# Patient Record
Sex: FEMALE | Race: WHITE | NOT HISPANIC OR LATINO | URBAN - METROPOLITAN AREA
[De-identification: names, ages, dates, MRNs, and addresses within clinical notes are randomized per-mention and may not be internally consistent; named-entity substitution may affect disease eponyms.]

---

## 2017-06-22 ENCOUNTER — OUTPATIENT (OUTPATIENT)
Dept: OUTPATIENT SERVICES | Facility: HOSPITAL | Age: 27
LOS: 1 days | Discharge: HOME | End: 2017-06-22

## 2017-06-22 DIAGNOSIS — K21.9 GASTRO-ESOPHAGEAL REFLUX DISEASE WITHOUT ESOPHAGITIS: ICD-10-CM

## 2017-06-22 DIAGNOSIS — B19.20 UNSPECIFIED VIRAL HEPATITIS C WITHOUT HEPATIC COMA: ICD-10-CM

## 2017-06-22 DIAGNOSIS — F41.8 OTHER SPECIFIED ANXIETY DISORDERS: ICD-10-CM

## 2017-06-22 DIAGNOSIS — F12.20 CANNABIS DEPENDENCE, UNCOMPLICATED: ICD-10-CM

## 2017-06-22 DIAGNOSIS — F13.10 SEDATIVE, HYPNOTIC OR ANXIOLYTIC ABUSE, UNCOMPLICATED: ICD-10-CM

## 2017-06-22 DIAGNOSIS — F11.20 OPIOID DEPENDENCE, UNCOMPLICATED: ICD-10-CM

## 2017-06-22 DIAGNOSIS — J44.9 CHRONIC OBSTRUCTIVE PULMONARY DISEASE, UNSPECIFIED: ICD-10-CM

## 2017-06-22 DIAGNOSIS — F60.3 BORDERLINE PERSONALITY DISORDER: ICD-10-CM

## 2017-06-22 DIAGNOSIS — F17.200 NICOTINE DEPENDENCE, UNSPECIFIED, UNCOMPLICATED: ICD-10-CM

## 2017-06-28 DIAGNOSIS — Z00.8 ENCOUNTER FOR OTHER GENERAL EXAMINATION: ICD-10-CM

## 2017-08-03 ENCOUNTER — OUTPATIENT (OUTPATIENT)
Dept: OUTPATIENT SERVICES | Facility: HOSPITAL | Age: 27
LOS: 1 days | Discharge: HOME | End: 2017-08-03

## 2017-08-03 DIAGNOSIS — F11.20 OPIOID DEPENDENCE, UNCOMPLICATED: ICD-10-CM

## 2017-08-03 DIAGNOSIS — I49.9 CARDIAC ARRHYTHMIA, UNSPECIFIED: ICD-10-CM

## 2017-08-03 DIAGNOSIS — B19.20 UNSPECIFIED VIRAL HEPATITIS C WITHOUT HEPATIC COMA: ICD-10-CM

## 2017-08-03 DIAGNOSIS — F12.20 CANNABIS DEPENDENCE, UNCOMPLICATED: ICD-10-CM

## 2017-08-03 DIAGNOSIS — F13.10 SEDATIVE, HYPNOTIC OR ANXIOLYTIC ABUSE, UNCOMPLICATED: ICD-10-CM

## 2017-08-03 DIAGNOSIS — F60.3 BORDERLINE PERSONALITY DISORDER: ICD-10-CM

## 2017-08-03 DIAGNOSIS — F17.200 NICOTINE DEPENDENCE, UNSPECIFIED, UNCOMPLICATED: ICD-10-CM

## 2017-08-03 DIAGNOSIS — F41.8 OTHER SPECIFIED ANXIETY DISORDERS: ICD-10-CM

## 2017-08-03 DIAGNOSIS — J44.9 CHRONIC OBSTRUCTIVE PULMONARY DISEASE, UNSPECIFIED: ICD-10-CM

## 2017-08-03 DIAGNOSIS — K21.9 GASTRO-ESOPHAGEAL REFLUX DISEASE WITHOUT ESOPHAGITIS: ICD-10-CM

## 2018-07-26 ENCOUNTER — OUTPATIENT (OUTPATIENT)
Dept: OUTPATIENT SERVICES | Facility: HOSPITAL | Age: 28
LOS: 1 days | Discharge: HOME | End: 2018-07-26

## 2018-07-26 DIAGNOSIS — Z00.8 ENCOUNTER FOR OTHER GENERAL EXAMINATION: ICD-10-CM

## 2018-07-30 LAB
ALBUMIN SERPL ELPH-MCNC: 4.3 G/DL — SIGNIFICANT CHANGE UP (ref 3.5–5.2)
ALP SERPL-CCNC: 95 U/L — SIGNIFICANT CHANGE UP (ref 30–115)
ALT FLD-CCNC: 60 U/L — HIGH (ref 0–41)
ANION GAP SERPL CALC-SCNC: 11 MMOL/L — SIGNIFICANT CHANGE UP (ref 7–14)
APPEARANCE UR: ABNORMAL
AST SERPL-CCNC: 50 U/L — HIGH (ref 0–41)
BACTERIA # UR AUTO: ABNORMAL
BILIRUB DIRECT SERPL-MCNC: <0.2 MG/DL — SIGNIFICANT CHANGE UP (ref 0–0.2)
BILIRUB INDIRECT FLD-MCNC: >0.1 MG/DL — LOW (ref 0.2–1.2)
BILIRUB SERPL-MCNC: 0.3 MG/DL — SIGNIFICANT CHANGE UP (ref 0.2–1.2)
BILIRUB UR-MCNC: NEGATIVE — SIGNIFICANT CHANGE UP
BUN SERPL-MCNC: 7 MG/DL — LOW (ref 10–20)
CALCIUM SERPL-MCNC: 9.2 MG/DL — SIGNIFICANT CHANGE UP (ref 8.5–10.1)
CHLORIDE SERPL-SCNC: 103 MMOL/L — SIGNIFICANT CHANGE UP (ref 98–110)
CHOLEST SERPL-MCNC: 147 MG/DL — SIGNIFICANT CHANGE UP (ref 100–200)
CO2 SERPL-SCNC: 25 MMOL/L — SIGNIFICANT CHANGE UP (ref 17–32)
COLOR SPEC: YELLOW — SIGNIFICANT CHANGE UP
CREAT SERPL-MCNC: 0.8 MG/DL — SIGNIFICANT CHANGE UP (ref 0.7–1.5)
DIFF PNL FLD: ABNORMAL
EPI CELLS # UR: ABNORMAL /HPF
ESTIMATED AVERAGE GLUCOSE: 94 MG/DL — SIGNIFICANT CHANGE UP (ref 68–114)
GLUCOSE SERPL-MCNC: 81 MG/DL — SIGNIFICANT CHANGE UP (ref 70–99)
GLUCOSE UR QL: NEGATIVE MG/DL — SIGNIFICANT CHANGE UP
HBA1C BLD-MCNC: 4.9 % — SIGNIFICANT CHANGE UP (ref 4–5.6)
HCG UR QL: NEGATIVE — SIGNIFICANT CHANGE UP
HCT VFR BLD CALC: 42.6 % — SIGNIFICANT CHANGE UP (ref 37–47)
HDLC SERPL-MCNC: 59 MG/DL — SIGNIFICANT CHANGE UP (ref 40–125)
HGB BLD-MCNC: 14.5 G/DL — SIGNIFICANT CHANGE UP (ref 12–16)
KETONES UR-MCNC: NEGATIVE — SIGNIFICANT CHANGE UP
LEUKOCYTE ESTERASE UR-ACNC: SIGNIFICANT CHANGE UP
LIPID PNL WITH DIRECT LDL SERPL: 80 MG/DL — SIGNIFICANT CHANGE UP (ref 4–129)
MAGNESIUM SERPL-MCNC: 2.1 MG/DL — SIGNIFICANT CHANGE UP (ref 1.8–2.4)
MCHC RBC-ENTMCNC: 32.5 PG — HIGH (ref 27–31)
MCHC RBC-ENTMCNC: 34 G/DL — SIGNIFICANT CHANGE UP (ref 32–37)
MCV RBC AUTO: 95.5 FL — SIGNIFICANT CHANGE UP (ref 81–99)
NITRITE UR-MCNC: NEGATIVE — SIGNIFICANT CHANGE UP
NRBC # BLD: 0 /100 WBCS — SIGNIFICANT CHANGE UP (ref 0–0)
PH UR: 7 — SIGNIFICANT CHANGE UP (ref 5–8)
PLATELET # BLD AUTO: 303 K/UL — SIGNIFICANT CHANGE UP (ref 130–400)
POTASSIUM SERPL-MCNC: 5.1 MMOL/L — HIGH (ref 3.5–5)
POTASSIUM SERPL-SCNC: 5.1 MMOL/L — HIGH (ref 3.5–5)
PROT SERPL-MCNC: 7 G/DL — SIGNIFICANT CHANGE UP (ref 6–8)
PROT UR-MCNC: NEGATIVE MG/DL — SIGNIFICANT CHANGE UP
RBC # BLD: 4.46 M/UL — SIGNIFICANT CHANGE UP (ref 4.2–5.4)
RBC # FLD: 12.6 % — SIGNIFICANT CHANGE UP (ref 11.5–14.5)
RBC CASTS # UR COMP ASSIST: SIGNIFICANT CHANGE UP /HPF
SODIUM SERPL-SCNC: 139 MMOL/L — SIGNIFICANT CHANGE UP (ref 135–146)
SP GR SPEC: 1.01 — SIGNIFICANT CHANGE UP (ref 1.01–1.03)
TOTAL CHOLESTEROL/HDL RATIO MEASUREMENT: 2.5 RATIO — LOW (ref 4–5.5)
TRIGL SERPL-MCNC: 58 MG/DL — SIGNIFICANT CHANGE UP (ref 10–149)
UROBILINOGEN FLD QL: 0.2 MG/DL — SIGNIFICANT CHANGE UP (ref 0.2–0.2)
WBC # BLD: 5.5 K/UL — SIGNIFICANT CHANGE UP (ref 4.8–10.8)
WBC # FLD AUTO: 5.5 K/UL — SIGNIFICANT CHANGE UP (ref 4.8–10.8)
WBC UR QL: >50 /HPF

## 2018-07-31 ENCOUNTER — OUTPATIENT (OUTPATIENT)
Dept: OUTPATIENT SERVICES | Facility: HOSPITAL | Age: 28
LOS: 1 days | Discharge: HOME | End: 2018-07-31

## 2018-07-31 DIAGNOSIS — I49.9 CARDIAC ARRHYTHMIA, UNSPECIFIED: ICD-10-CM

## 2018-07-31 LAB
HAV IGM SER-ACNC: SIGNIFICANT CHANGE UP
HBV CORE IGM SER-ACNC: SIGNIFICANT CHANGE UP
HBV SURFACE AG SER-ACNC: SIGNIFICANT CHANGE UP
HCV AB S/CO SERPL IA: 14.03 S/CO — SIGNIFICANT CHANGE UP
HCV AB SERPL-IMP: REACTIVE
T PALLIDUM AB TITR SER: NEGATIVE — SIGNIFICANT CHANGE UP

## 2018-08-01 LAB
HCV RNA FLD QL NAA+PROBE: SIGNIFICANT CHANGE UP
HCV RNA SPEC QL PROBE+SIG AMP: DETECTED

## 2018-09-01 ENCOUNTER — EMERGENCY (EMERGENCY)
Facility: HOSPITAL | Age: 28
LOS: 0 days | Discharge: HOME | End: 2018-09-01
Attending: EMERGENCY MEDICINE | Admitting: EMERGENCY MEDICINE

## 2018-09-01 VITALS
RESPIRATION RATE: 20 BRPM | DIASTOLIC BLOOD PRESSURE: 86 MMHG | TEMPERATURE: 99 F | OXYGEN SATURATION: 98 % | SYSTOLIC BLOOD PRESSURE: 133 MMHG | HEART RATE: 65 BPM

## 2018-09-01 VITALS — WEIGHT: 89.95 LBS | HEIGHT: 62 IN

## 2018-09-01 DIAGNOSIS — F17.200 NICOTINE DEPENDENCE, UNSPECIFIED, UNCOMPLICATED: ICD-10-CM

## 2018-09-01 DIAGNOSIS — R11.2 NAUSEA WITH VOMITING, UNSPECIFIED: ICD-10-CM

## 2018-09-01 DIAGNOSIS — Z79.899 OTHER LONG TERM (CURRENT) DRUG THERAPY: ICD-10-CM

## 2018-09-01 DIAGNOSIS — Z90.49 ACQUIRED ABSENCE OF OTHER SPECIFIED PARTS OF DIGESTIVE TRACT: ICD-10-CM

## 2018-09-01 DIAGNOSIS — Z90.49 ACQUIRED ABSENCE OF OTHER SPECIFIED PARTS OF DIGESTIVE TRACT: Chronic | ICD-10-CM

## 2018-09-01 DIAGNOSIS — K59.00 CONSTIPATION, UNSPECIFIED: ICD-10-CM

## 2018-09-01 DIAGNOSIS — R10.84 GENERALIZED ABDOMINAL PAIN: ICD-10-CM

## 2018-09-01 DIAGNOSIS — R11.10 VOMITING, UNSPECIFIED: ICD-10-CM

## 2018-09-01 DIAGNOSIS — Z86.19 PERSONAL HISTORY OF OTHER INFECTIOUS AND PARASITIC DISEASES: ICD-10-CM

## 2018-09-01 DIAGNOSIS — F32.9 MAJOR DEPRESSIVE DISORDER, SINGLE EPISODE, UNSPECIFIED: ICD-10-CM

## 2018-09-01 RX ORDER — SODIUM CHLORIDE 9 MG/ML
2000 INJECTION, SOLUTION INTRAVENOUS ONCE
Qty: 0 | Refills: 0 | Status: COMPLETED | OUTPATIENT
Start: 2018-09-01 | End: 2018-09-01

## 2018-09-01 RX ORDER — ONDANSETRON 8 MG/1
4 TABLET, FILM COATED ORAL ONCE
Qty: 0 | Refills: 0 | Status: COMPLETED | OUTPATIENT
Start: 2018-09-01 | End: 2018-09-01

## 2018-09-01 RX ORDER — KETOROLAC TROMETHAMINE 30 MG/ML
30 SYRINGE (ML) INJECTION ONCE
Qty: 0 | Refills: 0 | Status: DISCONTINUED | OUTPATIENT
Start: 2018-09-01 | End: 2018-09-01

## 2018-09-01 RX ORDER — IOHEXOL 300 MG/ML
30 INJECTION, SOLUTION INTRAVENOUS ONCE
Qty: 0 | Refills: 0 | Status: COMPLETED | OUTPATIENT
Start: 2018-09-01 | End: 2018-09-01

## 2018-09-01 NOTE — ED PROVIDER NOTE - CARE PLAN
Assessment and plan of treatment:	a/p: abd pain, vomiting, constipation, will do labs, ua, CT a/p, ivf, pain control, antiemetics, upreg, re-eval.

## 2018-09-01 NOTE — ED PROVIDER NOTE - NS ED ROS FT
Review of Systems:  	•	CONSTITUTIONAL - No fever, No diaphoresis, No weight change  	•	SKIN - No rash  	•	HEMATOLOGIC - No abnormal bleeding or bruising  	•	EYES - No eye pain, No blurred vision  	•	ENT - No change in hearing, No sore throat, No neck pain, No rhinorrhea, No ear pain  	•	RESPIRATORY - No shortness of breath, No cough  	•	CARDIAC - No chest pain, No palpitations  	•	GI -+ abdominal pain, + nausea, + vomiting, No diarrhea, + constipation, No bright red blood per rectum or melena.                      •                 - No dysuria, frequency, hematuria.   	•	ENDO - No polydypsia, No polyuria, No heat/cold intolerance  	•	MUSCULOSKELETAL - No joint paint, No swelling, No back pain  	•	NEUROLOGIC - No numbness, No focal weakness, No headache, No dizziness

## 2018-09-01 NOTE — ED ADULT NURSE NOTE - OBJECTIVE STATEMENT
Patient presents to the ER stating she is having stomach pain and hasn't had anything to eat or drink in eighteen days

## 2018-09-01 NOTE — ED ADULT NURSE NOTE - NSIMPLEMENTINTERV_GEN_ALL_ED
Implemented All Fall with Harm Risk Interventions:  [place] to call system. Call bell, personal items and telephone within reach. Instruct patient to call for assistance. Room bathroom lighting operational. Non-slip footwear when patient is off stretcher. Physically safe environment: no spills, clutter or unnecessary equipment. Stretcher in lowest position, wheels locked, appropriate side rails in place. Provide visual cue, wrist band, yellow gown, etc. Monitor gait and stability. Monitor for mental status changes and reorient to person, place, and time. Review medications for side effects contributing to fall risk. Reinforce activity limits and safety measures with patient and family. Provide visual clues: red socks.

## 2018-09-01 NOTE — ED PROVIDER NOTE - PLAN OF CARE
a/p: abd pain, vomiting, constipation, will do labs, ua, CT a/p, ivf, pain control, antiemetics, upreg, re-eval.

## 2018-09-01 NOTE — ED PROVIDER NOTE - PHYSICAL EXAMINATION
VITAL SIGNS: AFebrile, vital signs stable  CONSTITUTIONAL: Well-developed; well-nourished; in no acute distress.  SKIN: Skin exam is warm and dry, no acute rash.  HEAD: Normocephalic; atraumatic.  EYES: Pupils equal round reactive to light, Extraocular movements intact; conjunctiva and sclera clear.  ENT: No nasal discharge; airway clear. Moist mucus membranes.  NECK: Supple; non tender. No rigidity  CARD: Regular rate and rhythm. Normal S1, S2; no murmurs, gallops, or rubs.  RESP: Lungs clear to auscultation bilaterally. No wheezes, rales or rhonchi.  ABD: Abdomen soft; mild diffuse ttp, no rebound or rigidity; non-distended;  no hepatosplenomegaly. No costovertebral angle tenderness.   EXT: Normal ROM. No clubbing, cyanosis or edema. No calf tenderness to palpation.  NEURO: Alert and oriented x 3. No focal deficits.  PSYCH: Cooperative, appropriate.

## 2018-09-17 ENCOUNTER — INPATIENT (INPATIENT)
Facility: HOSPITAL | Age: 28
LOS: 1 days | Discharge: HOME | End: 2018-09-19
Attending: INTERNAL MEDICINE | Admitting: INTERNAL MEDICINE

## 2018-09-17 VITALS
TEMPERATURE: 98 F | DIASTOLIC BLOOD PRESSURE: 52 MMHG | HEIGHT: 62 IN | WEIGHT: 115.08 LBS | SYSTOLIC BLOOD PRESSURE: 101 MMHG | HEART RATE: 75 BPM | RESPIRATION RATE: 16 BRPM

## 2018-09-17 DIAGNOSIS — F11.20 OPIOID DEPENDENCE, UNCOMPLICATED: ICD-10-CM

## 2018-09-17 DIAGNOSIS — Z90.49 ACQUIRED ABSENCE OF OTHER SPECIFIED PARTS OF DIGESTIVE TRACT: Chronic | ICD-10-CM

## 2018-09-17 DIAGNOSIS — F12.20 CANNABIS DEPENDENCE, UNCOMPLICATED: ICD-10-CM

## 2018-09-17 DIAGNOSIS — F17.200 NICOTINE DEPENDENCE, UNSPECIFIED, UNCOMPLICATED: ICD-10-CM

## 2018-09-17 DIAGNOSIS — B19.20 UNSPECIFIED VIRAL HEPATITIS C WITHOUT HEPATIC COMA: ICD-10-CM

## 2018-09-17 DIAGNOSIS — J44.9 CHRONIC OBSTRUCTIVE PULMONARY DISEASE, UNSPECIFIED: ICD-10-CM

## 2018-09-17 DIAGNOSIS — F13.10 SEDATIVE, HYPNOTIC OR ANXIOLYTIC ABUSE, UNCOMPLICATED: ICD-10-CM

## 2018-09-17 DIAGNOSIS — F13.20 SEDATIVE, HYPNOTIC OR ANXIOLYTIC DEPENDENCE, UNCOMPLICATED: ICD-10-CM

## 2018-09-17 DIAGNOSIS — F41.9 ANXIETY DISORDER, UNSPECIFIED: ICD-10-CM

## 2018-09-17 LAB
APPEARANCE UR: CLEAR — SIGNIFICANT CHANGE UP
BACTERIA # UR AUTO: ABNORMAL
BASOPHILS # BLD AUTO: 0.04 K/UL — SIGNIFICANT CHANGE UP (ref 0–0.2)
BASOPHILS NFR BLD AUTO: 0.7 % — SIGNIFICANT CHANGE UP (ref 0–1)
BILIRUB UR-MCNC: NEGATIVE — SIGNIFICANT CHANGE UP
COLOR SPEC: YELLOW — SIGNIFICANT CHANGE UP
DIFF PNL FLD: ABNORMAL
EOSINOPHIL # BLD AUTO: 0.09 K/UL — SIGNIFICANT CHANGE UP (ref 0–0.7)
EOSINOPHIL NFR BLD AUTO: 1.6 % — SIGNIFICANT CHANGE UP (ref 0–8)
EPI CELLS # UR: ABNORMAL /HPF
GLUCOSE UR QL: NEGATIVE MG/DL — SIGNIFICANT CHANGE UP
HCG UR QL: NEGATIVE — SIGNIFICANT CHANGE UP
HCT VFR BLD CALC: 39.9 % — SIGNIFICANT CHANGE UP (ref 37–47)
HGB BLD-MCNC: 13.2 G/DL — SIGNIFICANT CHANGE UP (ref 12–16)
IMM GRANULOCYTES NFR BLD AUTO: 0 % — LOW (ref 0.1–0.3)
KETONES UR-MCNC: NEGATIVE — SIGNIFICANT CHANGE UP
LEUKOCYTE ESTERASE UR-ACNC: NEGATIVE — SIGNIFICANT CHANGE UP
LYMPHOCYTES # BLD AUTO: 2.68 K/UL — SIGNIFICANT CHANGE UP (ref 1.2–3.4)
LYMPHOCYTES # BLD AUTO: 46.9 % — SIGNIFICANT CHANGE UP (ref 20.5–51.1)
MCHC RBC-ENTMCNC: 31.8 PG — HIGH (ref 27–31)
MCHC RBC-ENTMCNC: 33.1 G/DL — SIGNIFICANT CHANGE UP (ref 32–37)
MCV RBC AUTO: 96.1 FL — SIGNIFICANT CHANGE UP (ref 81–99)
MONOCYTES # BLD AUTO: 0.59 K/UL — SIGNIFICANT CHANGE UP (ref 0.1–0.6)
MONOCYTES NFR BLD AUTO: 10.3 % — HIGH (ref 1.7–9.3)
NEUTROPHILS # BLD AUTO: 2.31 K/UL — SIGNIFICANT CHANGE UP (ref 1.4–6.5)
NEUTROPHILS NFR BLD AUTO: 40.5 % — LOW (ref 42.2–75.2)
NITRITE UR-MCNC: NEGATIVE — SIGNIFICANT CHANGE UP
NRBC # BLD: 0 /100 WBCS — SIGNIFICANT CHANGE UP (ref 0–0)
PH UR: 6 — SIGNIFICANT CHANGE UP (ref 5–8)
PLATELET # BLD AUTO: 251 K/UL — SIGNIFICANT CHANGE UP (ref 130–400)
PROT UR-MCNC: NEGATIVE MG/DL — SIGNIFICANT CHANGE UP
RBC # BLD: 4.15 M/UL — LOW (ref 4.2–5.4)
RBC # FLD: 13.5 % — SIGNIFICANT CHANGE UP (ref 11.5–14.5)
RBC CASTS # UR COMP ASSIST: SIGNIFICANT CHANGE UP /HPF
SP GR SPEC: 1.02 — SIGNIFICANT CHANGE UP (ref 1.01–1.03)
UROBILINOGEN FLD QL: 0.2 MG/DL — SIGNIFICANT CHANGE UP (ref 0.2–0.2)
WBC # BLD: 5.71 K/UL — SIGNIFICANT CHANGE UP (ref 4.8–10.8)
WBC # FLD AUTO: 5.71 K/UL — SIGNIFICANT CHANGE UP (ref 4.8–10.8)

## 2018-09-17 RX ORDER — PSEUDOEPHEDRINE HCL 30 MG
60 TABLET ORAL EVERY 6 HOURS
Qty: 0 | Refills: 0 | Status: DISCONTINUED | OUTPATIENT
Start: 2018-09-17 | End: 2018-09-19

## 2018-09-17 RX ORDER — METHOCARBAMOL 500 MG/1
500 TABLET, FILM COATED ORAL EVERY 6 HOURS
Qty: 0 | Refills: 0 | Status: DISCONTINUED | OUTPATIENT
Start: 2018-09-17 | End: 2018-09-19

## 2018-09-17 RX ORDER — METHADONE HYDROCHLORIDE 40 MG/1
TABLET ORAL
Qty: 0 | Refills: 0 | Status: DISCONTINUED | OUTPATIENT
Start: 2018-09-17 | End: 2018-09-19

## 2018-09-17 RX ORDER — ESCITALOPRAM OXALATE 10 MG/1
20 TABLET, FILM COATED ORAL AT BEDTIME
Qty: 0 | Refills: 0 | Status: DISCONTINUED | OUTPATIENT
Start: 2018-09-17 | End: 2018-09-19

## 2018-09-17 RX ORDER — HYDROXYZINE HCL 10 MG
100 TABLET ORAL AT BEDTIME
Qty: 0 | Refills: 0 | Status: DISCONTINUED | OUTPATIENT
Start: 2018-09-17 | End: 2018-09-19

## 2018-09-17 RX ORDER — IBUPROFEN 200 MG
600 TABLET ORAL EVERY 6 HOURS
Qty: 0 | Refills: 0 | Status: DISCONTINUED | OUTPATIENT
Start: 2018-09-17 | End: 2018-09-19

## 2018-09-17 RX ORDER — ALBUTEROL 90 UG/1
2 AEROSOL, METERED ORAL EVERY 6 HOURS
Qty: 0 | Refills: 0 | Status: DISCONTINUED | OUTPATIENT
Start: 2018-09-17 | End: 2018-09-19

## 2018-09-17 RX ORDER — METHADONE HYDROCHLORIDE 40 MG/1
10 TABLET ORAL ONCE
Qty: 0 | Refills: 0 | Status: DISCONTINUED | OUTPATIENT
Start: 2018-09-17 | End: 2018-09-18

## 2018-09-17 RX ORDER — ESCITALOPRAM OXALATE 10 MG/1
20 TABLET, FILM COATED ORAL DAILY
Qty: 0 | Refills: 0 | Status: DISCONTINUED | OUTPATIENT
Start: 2018-09-17 | End: 2018-09-17

## 2018-09-17 RX ORDER — ESCITALOPRAM OXALATE 10 MG/1
40 TABLET, FILM COATED ORAL AT BEDTIME
Qty: 0 | Refills: 0 | Status: DISCONTINUED | OUTPATIENT
Start: 2018-09-17 | End: 2018-09-17

## 2018-09-17 RX ORDER — LAMOTRIGINE 25 MG/1
200 TABLET, ORALLY DISINTEGRATING ORAL AT BEDTIME
Qty: 0 | Refills: 0 | Status: DISCONTINUED | OUTPATIENT
Start: 2018-09-17 | End: 2018-09-19

## 2018-09-17 RX ORDER — MAGNESIUM HYDROXIDE 400 MG/1
30 TABLET, CHEWABLE ORAL ONCE
Qty: 0 | Refills: 0 | Status: DISCONTINUED | OUTPATIENT
Start: 2018-09-17 | End: 2018-09-19

## 2018-09-17 RX ORDER — METHADONE HYDROCHLORIDE 40 MG/1
5 TABLET ORAL EVERY 12 HOURS
Qty: 0 | Refills: 0 | Status: DISCONTINUED | OUTPATIENT
Start: 2018-09-18 | End: 2018-09-19

## 2018-09-17 RX ORDER — METHADONE HYDROCHLORIDE 40 MG/1
10 TABLET ORAL EVERY 12 HOURS
Qty: 0 | Refills: 0 | Status: DISCONTINUED | OUTPATIENT
Start: 2018-09-17 | End: 2018-09-17

## 2018-09-17 RX ORDER — HYDROXYZINE HCL 10 MG
50 TABLET ORAL EVERY 6 HOURS
Qty: 0 | Refills: 0 | Status: DISCONTINUED | OUTPATIENT
Start: 2018-09-17 | End: 2018-09-19

## 2018-09-17 RX ORDER — ACETAMINOPHEN 500 MG
650 TABLET ORAL EVERY 4 HOURS
Qty: 0 | Refills: 0 | Status: DISCONTINUED | OUTPATIENT
Start: 2018-09-17 | End: 2018-09-19

## 2018-09-17 RX ORDER — PHENOBARBITAL 60 MG
32.4 TABLET ORAL EVERY 4 HOURS
Qty: 0 | Refills: 0 | Status: DISCONTINUED | OUTPATIENT
Start: 2018-09-17 | End: 2018-09-19

## 2018-09-17 RX ORDER — INFLUENZA VIRUS VACCINE 15; 15; 15; 15 UG/.5ML; UG/.5ML; UG/.5ML; UG/.5ML
0.5 SUSPENSION INTRAMUSCULAR ONCE
Qty: 0 | Refills: 0 | Status: COMPLETED | OUTPATIENT
Start: 2018-09-17 | End: 2018-09-17

## 2018-09-17 RX ORDER — NICOTINE POLACRILEX 2 MG
1 GUM BUCCAL DAILY
Qty: 0 | Refills: 0 | Status: DISCONTINUED | OUTPATIENT
Start: 2018-09-17 | End: 2018-09-19

## 2018-09-17 RX ADMIN — ESCITALOPRAM OXALATE 20 MILLIGRAM(S): 10 TABLET, FILM COATED ORAL at 21:10

## 2018-09-17 RX ADMIN — LAMOTRIGINE 200 MILLIGRAM(S): 25 TABLET, ORALLY DISINTEGRATING ORAL at 21:11

## 2018-09-17 RX ADMIN — METHADONE HYDROCHLORIDE 10 MILLIGRAM(S): 40 TABLET ORAL at 21:10

## 2018-09-17 NOTE — H&P ADULT - NSHPPHYSICALEXAM_GEN_ALL_CORE
-  Vital Signs:      Temp: 98.5      Pulse: 70        RR:  12      BP:  82/58    Physical Exam:              Constitutional: +Anxious A&Ox3, W/N and W/D.  HEENT: NC/AT, PERRLA, EOM Intact, Nares normal, No Sinus tenderness.  Lips, mucosa and tongue normal; Neck supple, No adenopathy  Respiratory: CTAB, no rales, no rhonchi, + wheezes  Cardiovascular: +S1S2, No M/R/G  Gastrointestinal: +BS, soft, non-tender, not distended, No CVAT  Extremities: Atraumatic, no cyanosis, no edema, no calf tenderness,  Vascular: +dorsal pedis and radial pulses, no extremity cyanosis  Neurological: sensation intact, ROM equal B/L, CN II-XII intact, Gait: steady  Skin: no rashes, no lesions, normal turgor, No track marks  No Decubiti present  No IV lines present  Rectal/Breasts Exam: Deferred

## 2018-09-17 NOTE — H&P ADULT - HISTORY OF PRESENT ILLNESS
27y Female presents for detox with continuous Opioid use disorder & Benzodiazepine use disorder. Pt reports she was being discharged from [facility] 6 weeks ago and she was in 30mg of methadone by that time. Patient says she is now taking "the blue" oxycodone 30mg 4 tablets daily in the past 4 weeks. Denies h/o overdose. Patient also admits to getting xanax or klonopin from the street when she feeling of anxiety. Pt says she was getting valium from Dr Malhotra for about a year and last Rx dispensed in last month on Aug 13 for 2 weeks. Pt says she only takes it as needed. Denies h/o seizure or AVH.    Patient c/o feeling anxiety, insomnia, body aches and poor appetite. Patient A&Ox3, denies cp, sob, headache, dizziness, bleeding and dysuria. As pt reports: LMP: a week ago, Last normal papsmear in 2016.  Patient A&Ox3, denies cp, sob, headache, dizziness, bleeding and dysuria.  Declines AWM or MMTP. Patient says she may consider rehab and wants to try get clean without maintenance treatment.   Patient admits to abusing current substances as follows:    DRUG	AGE OF ONSET (Y.O)	ROUTE	FREQ	AMOUNT	LAST USE	LENGTH OF CURRENT USE	  Oxycodone	14	IN	Daily	120mg	9/17/18 30 mg	4 weeks	  Valium  Rx	13	PO	PRN	2 mg	3-4 weeks ago	1 year	  Klonopin	13	PO	2-3x /month	2mg	9/16/18 2mg	PRN	  Xanax	13	PO	Rarely	0.5mg	6 days ago	PRN	  THC	13	Smoking	Daily	3 joints	9/17/18		  Denies other drugs abuse							  Last Detox: ? years ago  Hx of Withdrawal Seizures: Denied  Psyhx: Borderline PD, Anxiety and depression  Denies any S/H Ideation or A/V Hallucination  Is patient currently receiving methadone from an MMTP: No  Pt no longer belonged to [facility], verified by RN: Crys, ext [phone]  Screening history	Last tested	Result	History of treatment	  HIV	2016	NEG	N/A	  Hepatitis C	2010	POS	Never	  Quantiferon GOLD TB test	2018	NEG	N/A	    Immunization	Not Received	Unknown	Received	Date Received 	  Influenza			v	2017	  Pneumococcus		v			  Tetanus			v	<10 years	  Others					  					  [employer]: Patient Name:	Mattie Mejia	YOB: 1990	  Address:	[address] [place] [address]	Sex:	Female	  Rx Written	Rx Dispensed	Drug	Quantity	Days Supply	Prescriber Name	  08/10/2018	08/13/2018	diazepam 2 mg tablet	14	14	Homero Malhotra MD	  07/17/2018	07/17/2018	diazepam 2 mg tablet	7	7	Homero Malhotra MD	  06/22/2018	06/24/2018	diazepam 2 mg tablet	7	7	Homero Malhotra MD	  05/03/2018	05/03/2018	diazepam 2 mg tablet	7	7	Homero Malhotra MD 27y Female presents for detox with continuous Opioid use disorder & Benzodiazepine use disorder. Pt reports she was being discharged from [facility] 6 weeks ago and she was in 30mg of methadone by that time. Patient says she is now taking "the blue" oxycodone 30mg 4 tablets daily in the past 4 weeks. Denies h/o overdose. Patient also admits to getting xanax or klonopin from the street when she feeling of anxiety. Pt says she was getting valium from Dr Malhotra for about a year and last Rx dispensed in last month on Aug 13 for 2 weeks. Pt says she only takes it as needed. Denies h/o seizure or AVH.    Patient c/o feeling anxiety, insomnia, body aches and poor appetite. Patient A&Ox3, denies cp, sob, headache, dizziness, bleeding and dysuria. As pt reports: LMP: a week ago, Last normal papsmear in 2016.  Declines AWM or MMTP. Patient says she may consider rehab and wants to try get clean without maintenance treatment.   Patient admits to abusing current substances as follows:    DRUG	AGE OF ONSET (Y.O)	ROUTE	FREQ	AMOUNT	LAST USE	LENGTH OF CURRENT USE	  Oxycodone	14	IN	Daily	120mg	9/17/18 30 mg	4 weeks	  Valium  Rx	13	PO	PRN	2 mg	3-4 weeks ago	1 year	  Klonopin	13	PO	2-3x /month	2mg	9/16/18 2mg	PRN	  Xanax	13	PO	Rarely	0.5mg	6 days ago	PRN	  THC	13	Smoking	Daily	3 joints	9/17/18		  Denies other drugs abuse							  Last Detox: ? years ago  Hx of Withdrawal Seizures: Denied  Psyhx: Borderline PD, Anxiety and depression  Denies any S/H Ideation or A/V Hallucination  Is patient currently receiving methadone from an MMTP: No  Pt no longer belonged to [facility], verified by RN: Crys, ext [phone]  Screening history	Last tested	Result	History of treatment	  HIV	2016	NEG	N/A	  Hepatitis C	2010	POS	Never	  Quantiferon GOLD TB test	2018	NEG	N/A	    Immunization	Not Received	Unknown	Received	Date Received 	  Influenza			v	2017	  Pneumococcus		v			  Tetanus			v	<10 years	  Others					  					  [employer]Stop: Patient Name:	Mattie Mejia	YOB: 1990	  Address:	[address] [place] [address] [address] [place] [address]	Sex:	Female	  Rx Written	Rx Dispensed	Drug	Quantity	Days Supply	Prescriber Name	  08/10/2018	08/13/2018	diazepam 2 mg tablet	14	14	Homero Malhotra MD	  07/17/2018	07/17/2018	diazepam 2 mg tablet	7	7	Homero Malhotra MD	  06/22/2018	06/24/2018	diazepam 2 mg tablet	7	7	Homero Malhotra MD	  05/03/2018	05/03/2018	diazepam 2 mg tablet	7	7	Homero Malhotra MD

## 2018-09-17 NOTE — H&P ADULT - PROBLEM SELECTOR PLAN 7
observation  Atarax 50mg PO Q6H PRN for anxiety  Atarax 100mg PO QHS PRN for insomnia  psych consult PRN  c/w home meds:  lamictal 200 mg po qhs  lexapro 40mg po qhs observation  Atarax 50mg PO Q6H PRN for anxiety  Atarax 100mg PO QHS PRN for insomnia  psych consult PRN  c/w home meds:  lamictal 200 mg po qhs  lexapro 20mg po qhs

## 2018-09-17 NOTE — H&P ADULT - ASSESSMENT
27y Female presents for detox with continuous Opioid use disorder & Benzodiazepine use disorder. Pt reports she was being discharged from [facility] 6 weeks ago and she was in 30mg of methadone by that time. Patient says she is now taking "the blue" oxycodone 30mg 4 tablets daily in the past 4 weeks. Denies h/o overdose. Patient also admits to getting xanax or klonopin from the street when she feeling of anxiety. Pt says she was getting valium from Dr Malhotra for about a year and last Rx dispensed in last month on Aug 13 for 2 weeks. Pt says she only takes it as needed. Denies h/o seizure or AVH.

## 2018-09-17 NOTE — H&P ADULT - PMH
Anxiety and depression    Borderline personality disorder    COPD (chronic obstructive pulmonary disease)    Hepatitis C    Nicotine dependence

## 2018-09-18 PROBLEM — F32.9 MAJOR DEPRESSIVE DISORDER, SINGLE EPISODE, UNSPECIFIED: Chronic | Status: INACTIVE | Noted: 2018-09-01 | Resolved: 2018-09-17

## 2018-09-18 LAB
AMPHET UR-MCNC: NEGATIVE — SIGNIFICANT CHANGE UP
BARBITURATES UR SCN-MCNC: NEGATIVE — SIGNIFICANT CHANGE UP
BENZODIAZ UR-MCNC: POSITIVE
COCAINE METAB.OTHER UR-MCNC: NEGATIVE — SIGNIFICANT CHANGE UP
ESTIMATED AVERAGE GLUCOSE: 85 MG/DL — SIGNIFICANT CHANGE UP (ref 68–114)
HAV IGM SER-ACNC: SIGNIFICANT CHANGE UP
HBA1C BLD-MCNC: 4.6 % — SIGNIFICANT CHANGE UP (ref 4–5.6)
HBV CORE IGM SER-ACNC: SIGNIFICANT CHANGE UP
HBV SURFACE AG SER-ACNC: SIGNIFICANT CHANGE UP
HCV AB S/CO SERPL IA: 15.68 S/CO — SIGNIFICANT CHANGE UP
HCV AB SERPL-IMP: REACTIVE
HIV 1+2 AB+HIV1 P24 AG SERPL QL IA: SIGNIFICANT CHANGE UP
METHADONE UR-MCNC: POSITIVE
OPIATES UR-MCNC: NEGATIVE — SIGNIFICANT CHANGE UP
PCP SPEC-MCNC: SIGNIFICANT CHANGE UP
PROPOXYPHENE QUALITATIVE URINE RESULT: NEGATIVE — SIGNIFICANT CHANGE UP
T PALLIDUM AB TITR SER: NEGATIVE — SIGNIFICANT CHANGE UP

## 2018-09-18 RX ADMIN — Medication 32.4 MILLIGRAM(S): at 16:54

## 2018-09-18 RX ADMIN — Medication 650 MILLIGRAM(S): at 10:00

## 2018-09-18 RX ADMIN — Medication 1 TABLET(S): at 09:37

## 2018-09-18 RX ADMIN — Medication 32.4 MILLIGRAM(S): at 09:37

## 2018-09-18 RX ADMIN — ESCITALOPRAM OXALATE 20 MILLIGRAM(S): 10 TABLET, FILM COATED ORAL at 21:31

## 2018-09-18 RX ADMIN — Medication 100 MILLIGRAM(S): at 21:33

## 2018-09-18 RX ADMIN — Medication 650 MILLIGRAM(S): at 10:56

## 2018-09-18 RX ADMIN — LAMOTRIGINE 200 MILLIGRAM(S): 25 TABLET, ORALLY DISINTEGRATING ORAL at 21:31

## 2018-09-18 RX ADMIN — Medication 1 PATCH: at 09:38

## 2018-09-18 RX ADMIN — METHADONE HYDROCHLORIDE 10 MILLIGRAM(S): 40 TABLET ORAL at 14:16

## 2018-09-18 RX ADMIN — Medication 650 MILLIGRAM(S): at 22:05

## 2018-09-19 VITALS
HEART RATE: 54 BPM | TEMPERATURE: 97 F | DIASTOLIC BLOOD PRESSURE: 50 MMHG | SYSTOLIC BLOOD PRESSURE: 94 MMHG | RESPIRATION RATE: 14 BRPM

## 2018-09-19 LAB
GAMMA INTERFERON BACKGROUND BLD IA-ACNC: 0.01 IU/ML — SIGNIFICANT CHANGE UP
M TB IFN-G BLD-IMP: NEGATIVE — SIGNIFICANT CHANGE UP
M TB IFN-G CD4+ BCKGRND COR BLD-ACNC: 0.07 IU/ML — SIGNIFICANT CHANGE UP
M TB IFN-G CD4+CD8+ BCKGRND COR BLD-ACNC: 0.03 IU/ML — SIGNIFICANT CHANGE UP
QUANT TB PLUS MITOGEN MINUS NIL: 8.46 IU/ML — SIGNIFICANT CHANGE UP

## 2018-09-19 RX ADMIN — Medication 1 PATCH: at 09:28

## 2018-09-19 RX ADMIN — Medication 1 TABLET(S): at 09:27

## 2018-09-19 RX ADMIN — Medication 1 PATCH: at 09:26

## 2018-09-19 NOTE — CHART NOTE - NSCHARTNOTEFT_GEN_A_CORE
Allergies:  No Known Drug Allergies  Seafood (Pruritus)      Diet: Regular    Activity: as tolerated    Follow up with    1. PMD in 2 weeks    2. Psych in 2 weeks    3.    Follow up for abnormal labs/tests    1.    Extra Instructions:      Flu Vaccine given  Yes_____         No______      Diagnosis:  Chemical Dependency   Maintain sobriety  refrain from all use      Patient Signature___________________________________________  Date_________________      Nurse Signature_____________________________________________Date_________________

## 2018-09-19 NOTE — CHART NOTE - NSCHARTNOTEFT_GEN_A_CORE
Subsequent Inpatient Encounter                                       Detox Unit    [facility]   27y   Female      Chief Complaint:    Follow up for Polysubstance  Dependency    HPI:     I reviewed previous notes. No Change, except if noted below.             Detail:_    ROS:   I reviewed with patient.  No changes from previous notes except if noted below.             Detail: _    PFSH I reviewed with patient. No changes from previous notes except if noted below.             Detail_    Medication reconciliation performed.    MEDICATIONS  (STANDING):  escitalopram 20 milliGRAM(s) Oral at bedtime  lamoTRIgine 200 milliGRAM(s) Oral at bedtime  methadone    Tablet   Oral   methadone    Tablet 5 milliGRAM(s) Oral every 12 hours  multivitamin 1 Tablet(s) Oral daily  nicotine - 21 mG/24Hr(s) Patch 1 Patch Transdermal daily      MEDICATIONS  (PRN):  acetaminophen   Tablet .. 650 milliGRAM(s) Oral every 4 hours PRN Temp greater or equal to 38C (100.4F), Mild Pain (1 - 3)  ALBUTerol    90 MICROgram(s) HFA Inhaler 2 Puff(s) Inhalation every 6 hours PRN Shortness of Breath and/or Wheezing  aluminum hydroxide/magnesium hydroxide/simethicone Suspension 30 milliLiter(s) Oral every 6 hours PRN Heartburn  bismuth subsalicylate Liquid 30 milliLiter(s) Oral every 6 hours PRN Diarrhea  cloNIDine 0.1 milliGRAM(s) Oral every 8 hours PRN Blood Pressure GREATER THAN 140/90 mmHG  cloNIDine 0.1 milliGRAM(s) Oral every 8 hours PRN opiate withdrawal  guaiFENesin/dextromethorphan  Syrup 5 milliLiter(s) Oral every 4 hours PRN Cough  hydrOXYzine hydrochloride 50 milliGRAM(s) Oral every 6 hours PRN Anxiety  hydrOXYzine hydrochloride 100 milliGRAM(s) Oral at bedtime PRN insomnia  ibuprofen  Tablet. 600 milliGRAM(s) Oral every 6 hours PRN Mild Pain (1 - 3)  magnesium hydroxide Suspension 30 milliLiter(s) Oral once PRN Constipation  methocarbamol 500 milliGRAM(s) Oral every 6 hours PRN muscle pain  PHENobarbital 32.4 milliGRAM(s) Oral every 4 hours PRN Withdrawal  pseudoephedrine 60 milliGRAM(s) Oral every 6 hours PRN Rhinitis  trimethobenzamide 300 milliGRAM(s) Oral every 6 hours PRN Nausea and/or Vomiting  trimethobenzamide Injectable 200 milliGRAM(s) IntraMuscular every 6 hours PRN Nausea and/or Vomiting      T(C): 36.2 (18 @ 06:00), Max: 37 (18 @ 00:09)  HR: 54 (18 @ 06:00) (54 - 87)  BP: 94/50 (18 @ 06:00) (94/50 - 120/67)  RR: 14 (18 @ 06:00) (14 - 16)  SpO2: --    PHYSICAL EXAM:      Constitutional: NAD, A&O x3    Eyes: PERRLA, no conjuctivitis    Neck: no lymphadenopathy    Respiratory: +air entry, no rales, no rhonchi, no wheezes    Cardiovascular: +S1 and S2, regular rate and rhythm    Gastrointestinal: +BS, soft, non-tender, not distended    Extremities:  no edema, no calf tenderness    Skin: no rashes, normal turgor                            13.2   5.71  )-----------( 251      ( 17 Sep 2018 19:31 )             39.9         Hemoglobin A1C, Whole Blood: 4.6 % (18 @ 19:31)  Treponema Pallidum Antibody Interpretation: Negative (18 @ 19:31)  Hepatitis B Surface Antigen: Nonreact (18 @ 19:31)  Hepatitis C Virus S/CO Ratio: 15.68 S/CO (18 @ 19:31)    Hepatitis C Virus Interpretation: Reactive (18 @ 19:31)      Urinalysis Basic - ( 17 Sep 2018 16:46 )    Color: Yellow / Appearance: Clear / S.025 / pH: x  Gluc: x / Ketone: Negative  / Bili: Negative / Urobili: 0.2 mg/dL   Blood: x / Protein: Negative mg/dL / Nitrite: Negative   Leuk Esterase: Negative / RBC: 1-2 /HPF / WBC x   Sq Epi: x / Non Sq Epi: Moderate /HPF / Bacteria: Many          Impression and Plan:    Primary Diagnosis:  Benzo/Opiate Dependency                                Medication: Pheno/Methadone Protocol    Secondary Diagnosis:                                                                                Medication:    Tertiary Diagnosis:                                                                                     Medication:      Continue Detox Protocols. Use of PRNS as needed for withdrawal and comfort.    Adjustments to protocols:    Labs/ Tests reviewed.    Tests ordered:     Likely Disposition: _X__Home       ___Rehab       ___Outpatient Program    ___Self Help     _____Other    Estimated Length of stay:__4__

## 2018-09-20 LAB
HCV RNA FLD QL NAA+PROBE: SIGNIFICANT CHANGE UP
HCV RNA SPEC QL PROBE+SIG AMP: DETECTED

## 2018-09-21 DIAGNOSIS — F13.10 SEDATIVE, HYPNOTIC OR ANXIOLYTIC ABUSE, UNCOMPLICATED: ICD-10-CM

## 2018-09-21 DIAGNOSIS — F12.20 CANNABIS DEPENDENCE, UNCOMPLICATED: ICD-10-CM

## 2018-09-21 DIAGNOSIS — F60.3 BORDERLINE PERSONALITY DISORDER: ICD-10-CM

## 2018-09-21 DIAGNOSIS — B19.20 UNSPECIFIED VIRAL HEPATITIS C WITHOUT HEPATIC COMA: ICD-10-CM

## 2018-09-21 DIAGNOSIS — Z91.013 ALLERGY TO SEAFOOD: ICD-10-CM

## 2018-09-21 DIAGNOSIS — F11.20 OPIOID DEPENDENCE, UNCOMPLICATED: ICD-10-CM

## 2018-09-21 DIAGNOSIS — F41.9 ANXIETY DISORDER, UNSPECIFIED: ICD-10-CM

## 2018-09-21 DIAGNOSIS — F17.200 NICOTINE DEPENDENCE, UNSPECIFIED, UNCOMPLICATED: ICD-10-CM

## 2018-09-21 DIAGNOSIS — F32.9 MAJOR DEPRESSIVE DISORDER, SINGLE EPISODE, UNSPECIFIED: ICD-10-CM

## 2018-09-21 DIAGNOSIS — J44.9 CHRONIC OBSTRUCTIVE PULMONARY DISEASE, UNSPECIFIED: ICD-10-CM

## 2018-11-14 PROBLEM — J44.9 CHRONIC OBSTRUCTIVE PULMONARY DISEASE, UNSPECIFIED: Chronic | Status: ACTIVE | Noted: 2018-09-17

## 2018-11-14 PROBLEM — F60.3 BORDERLINE PERSONALITY DISORDER: Chronic | Status: ACTIVE | Noted: 2018-09-17

## 2018-11-14 PROBLEM — F17.200 NICOTINE DEPENDENCE, UNSPECIFIED, UNCOMPLICATED: Chronic | Status: ACTIVE | Noted: 2018-09-17

## 2018-11-14 PROBLEM — F41.9 ANXIETY DISORDER, UNSPECIFIED: Chronic | Status: ACTIVE | Noted: 2018-09-17

## 2018-11-19 ENCOUNTER — OUTPATIENT (OUTPATIENT)
Dept: OUTPATIENT SERVICES | Facility: HOSPITAL | Age: 28
LOS: 1 days | Discharge: HOME | End: 2018-11-19

## 2018-11-19 DIAGNOSIS — Z90.49 ACQUIRED ABSENCE OF OTHER SPECIFIED PARTS OF DIGESTIVE TRACT: Chronic | ICD-10-CM

## 2018-11-27 LAB
ALBUMIN SERPL ELPH-MCNC: 4.6 G/DL — SIGNIFICANT CHANGE UP (ref 3.5–5.2)
ALP SERPL-CCNC: 92 U/L — SIGNIFICANT CHANGE UP (ref 30–115)
ALT FLD-CCNC: 53 U/L — HIGH (ref 0–41)
ANION GAP SERPL CALC-SCNC: 13 MMOL/L — SIGNIFICANT CHANGE UP (ref 7–14)
APTT BLD: 32.2 SEC — SIGNIFICANT CHANGE UP (ref 27–39.2)
AST SERPL-CCNC: 42 U/L — HIGH (ref 0–41)
BILIRUB SERPL-MCNC: 0.4 MG/DL — SIGNIFICANT CHANGE UP (ref 0.2–1.2)
BUN SERPL-MCNC: 6 MG/DL — LOW (ref 10–20)
CALCIUM SERPL-MCNC: 9.1 MG/DL — SIGNIFICANT CHANGE UP (ref 8.5–10.1)
CHLORIDE SERPL-SCNC: 107 MMOL/L — SIGNIFICANT CHANGE UP (ref 98–110)
CO2 SERPL-SCNC: 22 MMOL/L — SIGNIFICANT CHANGE UP (ref 17–32)
CREAT SERPL-MCNC: 0.8 MG/DL — SIGNIFICANT CHANGE UP (ref 0.7–1.5)
GGT SERPL-CCNC: 118 U/L — HIGH (ref 1–40)
GLUCOSE SERPL-MCNC: 87 MG/DL — SIGNIFICANT CHANGE UP (ref 70–99)
HCG UR QL: NEGATIVE — SIGNIFICANT CHANGE UP
INR BLD: 1.07 RATIO — SIGNIFICANT CHANGE UP (ref 0.65–1.3)
LDH SERPL L TO P-CCNC: 179 — SIGNIFICANT CHANGE UP (ref 50–242)
POTASSIUM SERPL-MCNC: 4.5 MMOL/L — SIGNIFICANT CHANGE UP (ref 3.5–5)
POTASSIUM SERPL-SCNC: 4.5 MMOL/L — SIGNIFICANT CHANGE UP (ref 3.5–5)
PROT SERPL-MCNC: 7.2 G/DL — SIGNIFICANT CHANGE UP (ref 6–8)
PROTHROM AB SERPL-ACNC: 11.6 SEC — SIGNIFICANT CHANGE UP (ref 9.95–12.87)
SODIUM SERPL-SCNC: 142 MMOL/L — SIGNIFICANT CHANGE UP (ref 135–146)

## 2018-11-28 LAB
C TRACH RRNA SPEC QL NAA+PROBE: SIGNIFICANT CHANGE UP
HAV IGG SER QL IA: SIGNIFICANT CHANGE UP
HBV SURFACE AB SER-ACNC: REACTIVE
HIV 1+2 AB+HIV1 P24 AG SERPL QL IA: SIGNIFICANT CHANGE UP
N GONORRHOEA RRNA SPEC QL NAA+PROBE: SIGNIFICANT CHANGE UP
SPECIMEN SOURCE: SIGNIFICANT CHANGE UP
T PALLIDUM AB TITR SER: NEGATIVE — SIGNIFICANT CHANGE UP

## 2018-11-29 LAB
HCV RNA SERPL NAA DL=5-ACNC: SIGNIFICANT CHANGE UP IU/ML
HCV RNA SPEC NAA+PROBE-LOG IU: 5.41 LOGIU/ML — SIGNIFICANT CHANGE UP

## 2018-12-01 LAB
A2 MACROGLOB SERPL-MCNC: 212 MG/DL — SIGNIFICANT CHANGE UP (ref 110–276)
ALT SERPL W P-5'-P-CCNC: 56 IU/L — HIGH (ref 0–40)
APO A-I SERPL-MCNC: 141 MG/DL — SIGNIFICANT CHANGE UP (ref 116–209)
BILIRUB SERPL-MCNC: 0.4 MG/DL — SIGNIFICANT CHANGE UP (ref 0–1.2)
COMMENT 2:: SIGNIFICANT CHANGE UP
FIBROSIS SCORE: 0.16 — SIGNIFICANT CHANGE UP (ref 0–0.21)
FIBROSIS SCORING:: SIGNIFICANT CHANGE UP
FIBROSIS STAGE: SIGNIFICANT CHANGE UP
GGT SERPL-CCNC: 115 IU/L — HIGH (ref 0–60)
HAPTOGLOB SERPL-MCNC: 168 MG/DL — SIGNIFICANT CHANGE UP (ref 34–200)
INTERPRETATIONS:: SIGNIFICANT CHANGE UP
LIMITATIONS:: SIGNIFICANT CHANGE UP
NECROINFLAMM ACTIVITY SCORING:: SIGNIFICANT CHANGE UP
NECROINFLAMMAT ACTIVITY GRADE: SIGNIFICANT CHANGE UP
NECROINFLAMMAT ACTIVITY SCORE: 0.28 — HIGH (ref 0–0.17)

## 2018-12-03 LAB
ALBUMIN SERPL ELPH-MCNC: 4.3 G/DL — SIGNIFICANT CHANGE UP (ref 3.5–5.2)
ALP SERPL-CCNC: 145 U/L — HIGH (ref 30–115)
ALT FLD-CCNC: 64 U/L — HIGH (ref 0–41)
ANION GAP SERPL CALC-SCNC: 13 MMOL/L — SIGNIFICANT CHANGE UP (ref 7–14)
APPEARANCE UR: CLEAR — SIGNIFICANT CHANGE UP
AST SERPL-CCNC: 50 U/L — HIGH (ref 0–41)
BACTERIA # UR AUTO: ABNORMAL
BILIRUB SERPL-MCNC: <0.2 MG/DL — SIGNIFICANT CHANGE UP (ref 0.2–1.2)
BILIRUB UR-MCNC: NEGATIVE — SIGNIFICANT CHANGE UP
BUN SERPL-MCNC: 8 MG/DL — LOW (ref 10–20)
CALCIUM SERPL-MCNC: 8.9 MG/DL — SIGNIFICANT CHANGE UP (ref 8.5–10.1)
CHLORIDE SERPL-SCNC: 102 MMOL/L — SIGNIFICANT CHANGE UP (ref 98–110)
CHOLEST SERPL-MCNC: 137 MG/DL — SIGNIFICANT CHANGE UP (ref 100–200)
CO2 SERPL-SCNC: 25 MMOL/L — SIGNIFICANT CHANGE UP (ref 17–32)
COLOR SPEC: YELLOW — SIGNIFICANT CHANGE UP
CREAT SERPL-MCNC: 0.7 MG/DL — SIGNIFICANT CHANGE UP (ref 0.7–1.5)
DIFF PNL FLD: ABNORMAL
EPI CELLS # UR: ABNORMAL /HPF
ETHANOL SERPL-MCNC: <10 MG/DL — HIGH
GLUCOSE SERPL-MCNC: 94 MG/DL — SIGNIFICANT CHANGE UP (ref 70–99)
GLUCOSE UR QL: NEGATIVE MG/DL — SIGNIFICANT CHANGE UP
HCG UR QL: NEGATIVE — SIGNIFICANT CHANGE UP
HCT VFR BLD CALC: 40.5 % — SIGNIFICANT CHANGE UP (ref 37–47)
HCV GENTYP BLD NAA+PROBE: ABNORMAL
HDLC SERPL-MCNC: 43 MG/DL — LOW
HGB BLD-MCNC: 13.5 G/DL — SIGNIFICANT CHANGE UP (ref 12–16)
KETONES UR-MCNC: NEGATIVE — SIGNIFICANT CHANGE UP
LEUKOCYTE ESTERASE UR-ACNC: NEGATIVE — SIGNIFICANT CHANGE UP
LIPID PNL WITH DIRECT LDL SERPL: 73 MG/DL — SIGNIFICANT CHANGE UP (ref 4–129)
MAGNESIUM SERPL-MCNC: 1.9 MG/DL — SIGNIFICANT CHANGE UP (ref 1.8–2.4)
MCHC RBC-ENTMCNC: 31.7 PG — HIGH (ref 27–31)
MCHC RBC-ENTMCNC: 33.3 G/DL — SIGNIFICANT CHANGE UP (ref 32–37)
MCV RBC AUTO: 95.1 FL — SIGNIFICANT CHANGE UP (ref 81–99)
NITRITE UR-MCNC: NEGATIVE — SIGNIFICANT CHANGE UP
NRBC # BLD: 0 /100 WBCS — SIGNIFICANT CHANGE UP (ref 0–0)
PH UR: 5.5 — SIGNIFICANT CHANGE UP (ref 5–8)
PLATELET # BLD AUTO: 303 K/UL — SIGNIFICANT CHANGE UP (ref 130–400)
POTASSIUM SERPL-MCNC: 4 MMOL/L — SIGNIFICANT CHANGE UP (ref 3.5–5)
POTASSIUM SERPL-SCNC: 4 MMOL/L — SIGNIFICANT CHANGE UP (ref 3.5–5)
PROT SERPL-MCNC: 6.8 G/DL — SIGNIFICANT CHANGE UP (ref 6–8)
PROT UR-MCNC: NEGATIVE MG/DL — SIGNIFICANT CHANGE UP
RBC # BLD: 4.26 M/UL — SIGNIFICANT CHANGE UP (ref 4.2–5.4)
RBC # FLD: 13 % — SIGNIFICANT CHANGE UP (ref 11.5–14.5)
SODIUM SERPL-SCNC: 140 MMOL/L — SIGNIFICANT CHANGE UP (ref 135–146)
SP GR SPEC: >=1.03 (ref 1.01–1.03)
TOTAL CHOLESTEROL/HDL RATIO MEASUREMENT: 3.2 RATIO — LOW (ref 4–5.5)
TRIGL SERPL-MCNC: 104 MG/DL — SIGNIFICANT CHANGE UP (ref 10–149)
UROBILINOGEN FLD QL: 0.2 MG/DL — SIGNIFICANT CHANGE UP (ref 0.2–0.2)
WBC # BLD: 6.24 K/UL — SIGNIFICANT CHANGE UP (ref 4.8–10.8)
WBC # FLD AUTO: 6.24 K/UL — SIGNIFICANT CHANGE UP (ref 4.8–10.8)

## 2018-12-04 DIAGNOSIS — B18.2 CHRONIC VIRAL HEPATITIS C: ICD-10-CM

## 2018-12-04 LAB
HBA1C BLD-MCNC: 4.6 % — SIGNIFICANT CHANGE UP (ref 4–5.6)
HIV 1+2 AB+HIV1 P24 AG SERPL QL IA: SIGNIFICANT CHANGE UP
T PALLIDUM AB TITR SER: NEGATIVE — SIGNIFICANT CHANGE UP

## 2018-12-05 ENCOUNTER — OUTPATIENT (OUTPATIENT)
Dept: OUTPATIENT SERVICES | Facility: HOSPITAL | Age: 28
LOS: 1 days | Discharge: HOME | End: 2018-12-05

## 2018-12-05 DIAGNOSIS — Z90.49 ACQUIRED ABSENCE OF OTHER SPECIFIED PARTS OF DIGESTIVE TRACT: Chronic | ICD-10-CM

## 2018-12-05 DIAGNOSIS — F11.20 OPIOID DEPENDENCE, UNCOMPLICATED: ICD-10-CM

## 2018-12-05 DIAGNOSIS — B18.2 CHRONIC VIRAL HEPATITIS C: ICD-10-CM

## 2018-12-05 DIAGNOSIS — Z23 ENCOUNTER FOR IMMUNIZATION: ICD-10-CM

## 2018-12-05 LAB
GAMMA INTERFERON BACKGROUND BLD IA-ACNC: 0.01 IU/ML — SIGNIFICANT CHANGE UP
M TB IFN-G BLD-IMP: NEGATIVE — SIGNIFICANT CHANGE UP
M TB IFN-G CD4+ BCKGRND COR BLD-ACNC: 0.03 IU/ML — SIGNIFICANT CHANGE UP
M TB IFN-G CD4+CD8+ BCKGRND COR BLD-ACNC: 0.01 IU/ML — SIGNIFICANT CHANGE UP
QUANT TB PLUS MITOGEN MINUS NIL: 2.39 IU/ML — SIGNIFICANT CHANGE UP

## 2018-12-12 ENCOUNTER — OUTPATIENT (OUTPATIENT)
Dept: OUTPATIENT SERVICES | Facility: HOSPITAL | Age: 28
LOS: 1 days | Discharge: HOME | End: 2018-12-12

## 2018-12-12 DIAGNOSIS — Z90.49 ACQUIRED ABSENCE OF OTHER SPECIFIED PARTS OF DIGESTIVE TRACT: Chronic | ICD-10-CM

## 2018-12-12 DIAGNOSIS — B18.2 CHRONIC VIRAL HEPATITIS C: ICD-10-CM

## 2018-12-12 DIAGNOSIS — F11.20 OPIOID DEPENDENCE, UNCOMPLICATED: ICD-10-CM

## 2019-03-01 ENCOUNTER — OUTPATIENT (OUTPATIENT)
Dept: OUTPATIENT SERVICES | Facility: HOSPITAL | Age: 29
LOS: 1 days | End: 2019-03-01
Payer: MEDICAID

## 2019-03-01 DIAGNOSIS — Z90.49 ACQUIRED ABSENCE OF OTHER SPECIFIED PARTS OF DIGESTIVE TRACT: Chronic | ICD-10-CM

## 2019-03-01 PROCEDURE — G9001: CPT

## 2019-03-21 ENCOUNTER — INPATIENT (INPATIENT)
Facility: HOSPITAL | Age: 29
LOS: 0 days | Discharge: LEFT BEFORE DISCHARGE | End: 2019-03-22
Attending: INTERNAL MEDICINE | Admitting: INTERNAL MEDICINE

## 2019-03-21 VITALS
WEIGHT: 104.94 LBS | RESPIRATION RATE: 14 BRPM | HEART RATE: 70 BPM | TEMPERATURE: 97 F | DIASTOLIC BLOOD PRESSURE: 57 MMHG | HEIGHT: 62 IN | SYSTOLIC BLOOD PRESSURE: 105 MMHG

## 2019-03-21 DIAGNOSIS — F33.2 MAJOR DEPRESSIVE DISORDER, RECURRENT SEVERE WITHOUT PSYCHOTIC FEATURES: ICD-10-CM

## 2019-03-21 DIAGNOSIS — F11.20 OPIOID DEPENDENCE, UNCOMPLICATED: ICD-10-CM

## 2019-03-21 DIAGNOSIS — F41.1 GENERALIZED ANXIETY DISORDER: ICD-10-CM

## 2019-03-21 DIAGNOSIS — Z90.49 ACQUIRED ABSENCE OF OTHER SPECIFIED PARTS OF DIGESTIVE TRACT: Chronic | ICD-10-CM

## 2019-03-21 DIAGNOSIS — F13.20 SEDATIVE, HYPNOTIC OR ANXIOLYTIC DEPENDENCE, UNCOMPLICATED: ICD-10-CM

## 2019-03-21 DIAGNOSIS — F17.200 NICOTINE DEPENDENCE, UNSPECIFIED, UNCOMPLICATED: ICD-10-CM

## 2019-03-21 LAB
ALBUMIN SERPL ELPH-MCNC: 4.3 G/DL — SIGNIFICANT CHANGE UP (ref 3.5–5.2)
ALP SERPL-CCNC: 91 U/L — SIGNIFICANT CHANGE UP (ref 30–115)
ALT FLD-CCNC: 20 U/L — SIGNIFICANT CHANGE UP (ref 0–41)
AMMONIA BLD-MCNC: 24 UMOL/L — SIGNIFICANT CHANGE UP (ref 11–55)
ANION GAP SERPL CALC-SCNC: 10 MMOL/L — SIGNIFICANT CHANGE UP (ref 7–14)
APPEARANCE UR: CLEAR — SIGNIFICANT CHANGE UP
AST SERPL-CCNC: 21 U/L — SIGNIFICANT CHANGE UP (ref 0–41)
BACTERIA # UR AUTO: ABNORMAL
BASOPHILS # BLD AUTO: 0.04 K/UL — SIGNIFICANT CHANGE UP (ref 0–0.2)
BASOPHILS NFR BLD AUTO: 0.6 % — SIGNIFICANT CHANGE UP (ref 0–1)
BILIRUB SERPL-MCNC: 0.4 MG/DL — SIGNIFICANT CHANGE UP (ref 0.2–1.2)
BILIRUB UR-MCNC: ABNORMAL
BUN SERPL-MCNC: 7 MG/DL — LOW (ref 10–20)
CALCIUM SERPL-MCNC: 9 MG/DL — SIGNIFICANT CHANGE UP (ref 8.5–10.1)
CHLORIDE SERPL-SCNC: 106 MMOL/L — SIGNIFICANT CHANGE UP (ref 98–110)
CO2 SERPL-SCNC: 25 MMOL/L — SIGNIFICANT CHANGE UP (ref 17–32)
COD CRY URNS QL: NEGATIVE — SIGNIFICANT CHANGE UP
COLOR SPEC: YELLOW — SIGNIFICANT CHANGE UP
CREAT SERPL-MCNC: 0.7 MG/DL — SIGNIFICANT CHANGE UP (ref 0.7–1.5)
DIFF PNL FLD: ABNORMAL
EOSINOPHIL # BLD AUTO: 0.01 K/UL — SIGNIFICANT CHANGE UP (ref 0–0.7)
EOSINOPHIL NFR BLD AUTO: 0.1 % — SIGNIFICANT CHANGE UP (ref 0–8)
EPI CELLS # UR: ABNORMAL /HPF
ETHANOL SERPL-MCNC: <10 MG/DL — HIGH
GGT SERPL-CCNC: 122 U/L — HIGH (ref 1–40)
GLUCOSE SERPL-MCNC: 110 MG/DL — HIGH (ref 70–99)
GLUCOSE UR QL: NEGATIVE MG/DL — SIGNIFICANT CHANGE UP
GRAN CASTS # UR COMP ASSIST: NEGATIVE — SIGNIFICANT CHANGE UP
HCG UR QL: NEGATIVE — SIGNIFICANT CHANGE UP
HCT VFR BLD CALC: 41.4 % — SIGNIFICANT CHANGE UP (ref 37–47)
HGB BLD-MCNC: 14 G/DL — SIGNIFICANT CHANGE UP (ref 12–16)
HYALINE CASTS # UR AUTO: NEGATIVE — SIGNIFICANT CHANGE UP
IMM GRANULOCYTES NFR BLD AUTO: 0.1 % — SIGNIFICANT CHANGE UP (ref 0.1–0.3)
KETONES UR-MCNC: ABNORMAL
LEUKOCYTE ESTERASE UR-ACNC: NEGATIVE — SIGNIFICANT CHANGE UP
LYMPHOCYTES # BLD AUTO: 2.06 K/UL — SIGNIFICANT CHANGE UP (ref 1.2–3.4)
LYMPHOCYTES # BLD AUTO: 29.8 % — SIGNIFICANT CHANGE UP (ref 20.5–51.1)
MAGNESIUM SERPL-MCNC: 2 MG/DL — SIGNIFICANT CHANGE UP (ref 1.8–2.4)
MCHC RBC-ENTMCNC: 32.7 PG — HIGH (ref 27–31)
MCHC RBC-ENTMCNC: 33.8 G/DL — SIGNIFICANT CHANGE UP (ref 32–37)
MCV RBC AUTO: 96.7 FL — SIGNIFICANT CHANGE UP (ref 81–99)
MONOCYTES # BLD AUTO: 0.54 K/UL — SIGNIFICANT CHANGE UP (ref 0.1–0.6)
MONOCYTES NFR BLD AUTO: 7.8 % — SIGNIFICANT CHANGE UP (ref 1.7–9.3)
NEUTROPHILS # BLD AUTO: 4.26 K/UL — SIGNIFICANT CHANGE UP (ref 1.4–6.5)
NEUTROPHILS NFR BLD AUTO: 61.6 % — SIGNIFICANT CHANGE UP (ref 42.2–75.2)
NITRITE UR-MCNC: NEGATIVE — SIGNIFICANT CHANGE UP
NRBC # BLD: 0 /100 WBCS — SIGNIFICANT CHANGE UP (ref 0–0)
PH UR: 6 — SIGNIFICANT CHANGE UP (ref 5–8)
PLATELET # BLD AUTO: 299 K/UL — SIGNIFICANT CHANGE UP (ref 130–400)
POTASSIUM SERPL-MCNC: 3.6 MMOL/L — SIGNIFICANT CHANGE UP (ref 3.5–5)
POTASSIUM SERPL-SCNC: 3.6 MMOL/L — SIGNIFICANT CHANGE UP (ref 3.5–5)
PROT SERPL-MCNC: 7.2 G/DL — SIGNIFICANT CHANGE UP (ref 6–8)
PROT UR-MCNC: 30 MG/DL
RBC # BLD: 4.28 M/UL — SIGNIFICANT CHANGE UP (ref 4.2–5.4)
RBC # FLD: 14.6 % — HIGH (ref 11.5–14.5)
RBC CASTS # UR COMP ASSIST: ABNORMAL /HPF
SODIUM SERPL-SCNC: 141 MMOL/L — SIGNIFICANT CHANGE UP (ref 135–146)
SP GR SPEC: 1.02 — SIGNIFICANT CHANGE UP (ref 1.01–1.03)
TRI-PHOS CRY UR QL COMP ASSIST: NEGATIVE — SIGNIFICANT CHANGE UP
URATE CRY FLD QL MICRO: NEGATIVE — SIGNIFICANT CHANGE UP
UROBILINOGEN FLD QL: 0.2 MG/DL — SIGNIFICANT CHANGE UP (ref 0.2–0.2)
WBC # BLD: 6.92 K/UL — SIGNIFICANT CHANGE UP (ref 4.8–10.8)
WBC # FLD AUTO: 6.92 K/UL — SIGNIFICANT CHANGE UP (ref 4.8–10.8)
WBC UR QL: SIGNIFICANT CHANGE UP /HPF

## 2019-03-21 RX ORDER — MULTIVIT-MIN/FERROUS GLUCONATE 9 MG/15 ML
1 LIQUID (ML) ORAL DAILY
Qty: 0 | Refills: 0 | Status: DISCONTINUED | OUTPATIENT
Start: 2019-03-21 | End: 2019-03-22

## 2019-03-21 RX ORDER — ESCITALOPRAM OXALATE 10 MG/1
20 TABLET, FILM COATED ORAL DAILY
Qty: 0 | Refills: 0 | Status: DISCONTINUED | OUTPATIENT
Start: 2019-03-21 | End: 2019-03-22

## 2019-03-21 RX ORDER — ESCITALOPRAM OXALATE 10 MG/1
40 TABLET, FILM COATED ORAL
Qty: 0 | Refills: 0 | COMMUNITY

## 2019-03-21 RX ORDER — IBUPROFEN 200 MG
400 TABLET ORAL EVERY 6 HOURS
Qty: 0 | Refills: 0 | Status: DISCONTINUED | OUTPATIENT
Start: 2019-03-21 | End: 2019-03-22

## 2019-03-21 RX ORDER — BUPRENORPHINE AND NALOXONE 2; .5 MG/1; MG/1
3 TABLET SUBLINGUAL ONCE
Qty: 0 | Refills: 0 | Status: DISCONTINUED | OUTPATIENT
Start: 2019-03-21 | End: 2019-03-21

## 2019-03-21 RX ORDER — NICOTINE POLACRILEX 2 MG
1 GUM BUCCAL DAILY
Qty: 0 | Refills: 0 | Status: DISCONTINUED | OUTPATIENT
Start: 2019-03-21 | End: 2019-03-22

## 2019-03-21 RX ORDER — BUPRENORPHINE AND NALOXONE 2; .5 MG/1; MG/1
1 TABLET SUBLINGUAL
Qty: 0 | Refills: 0 | Status: DISCONTINUED | OUTPATIENT
Start: 2019-03-21 | End: 2019-03-21

## 2019-03-21 RX ORDER — ACETAMINOPHEN 500 MG
650 TABLET ORAL EVERY 4 HOURS
Qty: 0 | Refills: 0 | Status: DISCONTINUED | OUTPATIENT
Start: 2019-03-21 | End: 2019-03-22

## 2019-03-21 RX ORDER — PHENOBARBITAL 60 MG
32.4 TABLET ORAL EVERY 6 HOURS
Qty: 0 | Refills: 0 | Status: COMPLETED | OUTPATIENT
Start: 2019-03-21 | End: 2019-03-23

## 2019-03-21 RX ORDER — BUPRENORPHINE AND NALOXONE 2; .5 MG/1; MG/1
1 TABLET SUBLINGUAL
Qty: 0 | Refills: 0 | Status: DISCONTINUED | OUTPATIENT
Start: 2019-03-22 | End: 2019-03-22

## 2019-03-21 RX ORDER — HYDROXYZINE HCL 10 MG
100 TABLET ORAL AT BEDTIME
Qty: 0 | Refills: 0 | Status: DISCONTINUED | OUTPATIENT
Start: 2019-03-21 | End: 2019-03-22

## 2019-03-21 RX ORDER — HYDROXYZINE HCL 10 MG
50 TABLET ORAL EVERY 6 HOURS
Qty: 0 | Refills: 0 | Status: DISCONTINUED | OUTPATIENT
Start: 2019-03-21 | End: 2019-03-22

## 2019-03-21 RX ORDER — PHENOBARBITAL 60 MG
32.4 TABLET ORAL EVERY 12 HOURS
Qty: 0 | Refills: 0 | Status: CANCELLED | OUTPATIENT
Start: 2019-03-24 | End: 2019-03-22

## 2019-03-21 RX ORDER — METHOCARBAMOL 500 MG/1
500 TABLET, FILM COATED ORAL EVERY 6 HOURS
Qty: 0 | Refills: 0 | Status: DISCONTINUED | OUTPATIENT
Start: 2019-03-21 | End: 2019-03-22

## 2019-03-21 RX ORDER — PHENOBARBITAL 60 MG
32.4 TABLET ORAL EVERY 6 HOURS
Qty: 0 | Refills: 0 | Status: DISCONTINUED | OUTPATIENT
Start: 2019-03-23 | End: 2019-03-22

## 2019-03-21 RX ORDER — LAMOTRIGINE 25 MG/1
200 TABLET, ORALLY DISINTEGRATING ORAL DAILY
Qty: 0 | Refills: 0 | Status: DISCONTINUED | OUTPATIENT
Start: 2019-03-21 | End: 2019-03-22

## 2019-03-21 RX ORDER — PHENOBARBITAL 60 MG
TABLET ORAL
Qty: 0 | Refills: 0 | Status: DISCONTINUED | OUTPATIENT
Start: 2019-03-21 | End: 2019-03-22

## 2019-03-21 RX ORDER — ALBUTEROL 90 UG/1
1 AEROSOL, METERED ORAL
Qty: 0 | Refills: 0 | Status: DISCONTINUED | OUTPATIENT
Start: 2019-03-21 | End: 2019-03-22

## 2019-03-21 RX ORDER — MAGNESIUM HYDROXIDE 400 MG/1
30 TABLET, CHEWABLE ORAL ONCE
Qty: 0 | Refills: 0 | Status: DISCONTINUED | OUTPATIENT
Start: 2019-03-21 | End: 2019-03-22

## 2019-03-21 RX ORDER — PSEUDOEPHEDRINE HCL 30 MG
60 TABLET ORAL EVERY 6 HOURS
Qty: 0 | Refills: 0 | Status: DISCONTINUED | OUTPATIENT
Start: 2019-03-21 | End: 2019-03-22

## 2019-03-21 RX ADMIN — LAMOTRIGINE 200 MILLIGRAM(S): 25 TABLET, ORALLY DISINTEGRATING ORAL at 16:33

## 2019-03-21 RX ADMIN — ESCITALOPRAM OXALATE 20 MILLIGRAM(S): 10 TABLET, FILM COATED ORAL at 16:33

## 2019-03-21 RX ADMIN — Medication 32.4 MILLIGRAM(S): at 17:08

## 2019-03-21 RX ADMIN — Medication 650 MILLIGRAM(S): at 21:08

## 2019-03-21 RX ADMIN — Medication 1 PATCH: at 18:18

## 2019-03-21 RX ADMIN — Medication 1 PATCH: at 16:32

## 2019-03-21 RX ADMIN — BUPRENORPHINE AND NALOXONE 3 TABLET(S): 2; .5 TABLET SUBLINGUAL at 16:32

## 2019-03-21 RX ADMIN — Medication 650 MILLIGRAM(S): at 16:33

## 2019-03-21 RX ADMIN — Medication 30 MILLILITER(S): at 20:38

## 2019-03-21 RX ADMIN — Medication 650 MILLIGRAM(S): at 20:38

## 2019-03-21 RX ADMIN — Medication 1 TABLET(S): at 16:33

## 2019-03-21 RX ADMIN — BUPRENORPHINE AND NALOXONE 1 TABLET(S): 2; .5 TABLET SUBLINGUAL at 20:38

## 2019-03-21 RX ADMIN — Medication 650 MILLIGRAM(S): at 17:40

## 2019-03-21 NOTE — H&P ADULT - NSICDXPASTMEDICALHX_GEN_ALL_CORE_FT
PAST MEDICAL HISTORY:  Anxiety and depression     Borderline personality disorder     COPD (chronic obstructive pulmonary disease)     Hepatitis C     Nicotine dependence

## 2019-03-21 NOTE — H&P ADULT - ATTENDING COMMENTS
Patient interviewed and examined.    Chart reviewed.    PA's H&P noted and modified, as appropriate.    Case discussed on team rounds    Following is my summary of the case.    Admitted for detox: from ____ED, __x_Intake, ____Med/Surg Floor    Alcohol____   Opioid__x___  Benzo_x__ Other_____    Substance amount, duration of use, last usage, and prior attempts at detox or rehabs, are outlined above in the H&P and discussed with patient.    Associated withdrawal symptoms presents.  Comorbid conditions noted. Chronic and Stable.    Past Medical Hx, Psych Hx, family Hx, Social Hx from H&P reviewed and NO changes.    Old medical record and medication Hx. Reviewed    Following items reviewed and addressed:  1. labs  2. EKG  3. Imaging from PACs module    Examination: no change from PA's exam.    Place on following protocol  _____Medically Managed  __X__Medically Supervised    [place]wa_____Librium taper____Ativan taper___Methadone taper___ Phenobarb taper_x___ Suboxone _x___MMTP____    Narcan Kit Offered    Psych Consult __X__N/A  ___Ordered    Physical Therapy  ___X n/a   ___  Ordered    Aftercare disposition to be addressed by counselors.    Estimated length of stay 3-5 days.

## 2019-03-21 NOTE — H&P ADULT - NSHPPHYSICALEXAM_GEN_ALL_CORE
PHYSICAL EXAM:  GENERAL: NAD, AXOX3  HEAD:  Atraumatic, Normocephalic  EYES: EOMI, PERRLA, conjunctiva and sclera clear  NECK: Supple, No JVD  ENMT: No tonsillar erythema, exudated or enlargement, moist mucous membranes  CHEST/LUNG: Clear to auscultation bilaterally; No rales, rhonchi or wheezing  HEART: S1,S2 Regular rate and rhythm  ABDOMEN: Soft, nontender, nondistended, Bowel sounds present and normoactive  EXTREMITIES:  2+ peripheral pulses bilaterally and symmetrically, no clubbing, cyanosis, or edema  PSYCH: AAOx3, normal mood and affect  NEUROLOGY: non-focal, muscle strength 5/5 all extremities  SKIN: No rashes or lesions

## 2019-03-21 NOTE — H&P ADULT - HISTORY OF PRESENT ILLNESS
28Y OLD FEMALE PMHX BELOW PRESENTS TO DETOX INTAKE REQUESTING TO BE PLACED ON SUBOXONE. PT ADMITS TO CURRENT AND CONTINOUS USE OF POLYSUBSTANCE.  HX OF OPIODS ABUSE - ROXYCODONE - 4-5PILLS/DAY X1MOS. LAST USE 3/20/19  HX OF MMTP - 30MG/DAY X5YRS. LAST USE - 8/2018  CURRENTLY ON SUBOXONE PROGRAM - 8MG/2X/DAY. LAST USE - 3/20/19  HX OF THC ABUSE. $30/DAY.  LAST USE - 3/20/19  HX OF XANAX ABUSE. 1MG/3X/WEEK X6YRS. LAST USE - 4DAYS AGO.  PT ADMITS TO GENERALISED BODYACHE, CHILLS AND DECREASE APPETITE, BUT DENIES  N/V, DIARRHEA, CHEST/ABDOMINAL PAIN, B/O, HA, FEVER, SOB OR SEIZURES

## 2019-03-22 VITALS
HEART RATE: 53 BPM | TEMPERATURE: 98 F | DIASTOLIC BLOOD PRESSURE: 53 MMHG | RESPIRATION RATE: 16 BRPM | SYSTOLIC BLOOD PRESSURE: 94 MMHG

## 2019-03-22 DIAGNOSIS — Z71.89 OTHER SPECIFIED COUNSELING: ICD-10-CM

## 2019-03-22 LAB
HAV IGM SER-ACNC: SIGNIFICANT CHANGE UP
HBV CORE IGM SER-ACNC: SIGNIFICANT CHANGE UP
HBV SURFACE AG SER-ACNC: SIGNIFICANT CHANGE UP
HCV AB S/CO SERPL IA: 13.89 S/CO — HIGH (ref 0–0.79)
HCV AB SERPL-IMP: REACTIVE
HIV 1+2 AB+HIV1 P24 AG SERPL QL IA: SIGNIFICANT CHANGE UP
T PALLIDUM AB TITR SER: NEGATIVE — SIGNIFICANT CHANGE UP

## 2019-03-22 RX ORDER — DIAZEPAM 5 MG
1 TABLET ORAL
Qty: 0 | Refills: 0 | COMMUNITY

## 2019-03-22 RX ORDER — LAMOTRIGINE 25 MG/1
1 TABLET, ORALLY DISINTEGRATING ORAL
Qty: 0 | Refills: 0 | DISCHARGE
Start: 2019-03-22

## 2019-03-22 RX ORDER — LAMOTRIGINE 25 MG/1
0 TABLET, ORALLY DISINTEGRATING ORAL
Qty: 0 | Refills: 0 | COMMUNITY

## 2019-03-22 RX ADMIN — Medication 32.4 MILLIGRAM(S): at 11:50

## 2019-03-22 RX ADMIN — BUPRENORPHINE AND NALOXONE 1 TABLET(S): 2; .5 TABLET SUBLINGUAL at 09:04

## 2019-03-22 RX ADMIN — Medication 1 PATCH: at 09:03

## 2019-03-22 RX ADMIN — ESCITALOPRAM OXALATE 20 MILLIGRAM(S): 10 TABLET, FILM COATED ORAL at 11:49

## 2019-03-22 RX ADMIN — Medication 1 PATCH: at 09:04

## 2019-03-22 RX ADMIN — Medication 50 MILLIGRAM(S): at 09:48

## 2019-03-22 RX ADMIN — LAMOTRIGINE 200 MILLIGRAM(S): 25 TABLET, ORALLY DISINTEGRATING ORAL at 11:49

## 2019-03-22 RX ADMIN — Medication 650 MILLIGRAM(S): at 11:50

## 2019-03-22 RX ADMIN — Medication 1 TABLET(S): at 09:04

## 2019-03-22 RX ADMIN — Medication 32.4 MILLIGRAM(S): at 00:00

## 2019-03-22 RX ADMIN — Medication 650 MILLIGRAM(S): at 09:47

## 2019-03-22 RX ADMIN — Medication 32.4 MILLIGRAM(S): at 06:04

## 2019-03-23 NOTE — CHART NOTE - NSCHARTNOTEFT_GEN_A_CORE
The patient was admitted to the inpt detox unit CDU, for   ETOH____ Opioid__x_  Benzox____Polysubstance _____ Dependency.    Pt was admitted from ED_x___, Intake____, Med/surg Floor_______.    Details are present in the preceding History & Physical section and follow up chart notes.  patient was evaluated on daily detox team  rounds.  Withdrawal symptoms and signs were reviewed on a daily basis, and the protocols were adjusted accordingly.    Labs and imaging results were reviewed and discussed with the patient.    All questions from the patient were addressed.  The patient was seen by the Chemical dependency counselors, and different options for after care were discussed.  The patient attended groups, meetings and 1:1 sessions with the counselors.  Narcane Kit was offered and instructions given prior to discharge.    Psychiatry consultation reviewed______, N/A___x___    Physical therapy evaluation reviewed_____, N/A__x__    Pt was given copies of labs and imaging reports, if applicable.    Prescriptions if needed, were sent through [facility] system to the pharmacy amnd are noted in the discharge instruction sheet.    After care was arranged by counselors and pt was discharged to:    Home___, Outpt. Program___, Rehab ___, Long term____ [facility] ____ IPP____ SNF____, AMA__x_, Admin Discharge____    Principal Diagnosis: Alcohol Dependency____ Opioid Dependency_x_ Benzo Dependency__x__ Polysubstance Dependency____

## 2019-03-25 LAB
HCV RNA FLD QL NAA+PROBE: SIGNIFICANT CHANGE UP
HCV RNA SPEC QL PROBE+SIG AMP: SIGNIFICANT CHANGE UP

## 2019-03-26 DIAGNOSIS — F60.3 BORDERLINE PERSONALITY DISORDER: ICD-10-CM

## 2019-03-26 DIAGNOSIS — F13.10 SEDATIVE, HYPNOTIC OR ANXIOLYTIC ABUSE, UNCOMPLICATED: ICD-10-CM

## 2019-03-26 DIAGNOSIS — F41.9 ANXIETY DISORDER, UNSPECIFIED: ICD-10-CM

## 2019-03-26 DIAGNOSIS — J44.9 CHRONIC OBSTRUCTIVE PULMONARY DISEASE, UNSPECIFIED: ICD-10-CM

## 2019-03-26 DIAGNOSIS — F11.20 OPIOID DEPENDENCE, UNCOMPLICATED: ICD-10-CM

## 2019-03-26 DIAGNOSIS — F17.210 NICOTINE DEPENDENCE, CIGARETTES, UNCOMPLICATED: ICD-10-CM

## 2019-03-26 DIAGNOSIS — B19.20 UNSPECIFIED VIRAL HEPATITIS C WITHOUT HEPATIC COMA: ICD-10-CM

## 2019-03-26 DIAGNOSIS — Z91.013 ALLERGY TO SEAFOOD: ICD-10-CM

## 2019-03-26 LAB
GAMMA INTERFERON BACKGROUND BLD IA-ACNC: 0.01 IU/ML — SIGNIFICANT CHANGE UP
M TB IFN-G BLD-IMP: NEGATIVE — SIGNIFICANT CHANGE UP
M TB IFN-G CD4+ BCKGRND COR BLD-ACNC: 0 IU/ML — SIGNIFICANT CHANGE UP
M TB IFN-G CD4+CD8+ BCKGRND COR BLD-ACNC: 0 IU/ML — SIGNIFICANT CHANGE UP
QUANT TB PLUS MITOGEN MINUS NIL: 8.68 IU/ML — SIGNIFICANT CHANGE UP

## 2019-05-21 NOTE — H&P ADULT - ASSESSMENT
Assumed care of pt at shift change. A/Ox4, discussed plan of care. Pt on room air. Pt right foot dressing in place. Not leaked through so far this shift. Zofran PRN given for nausea. Oxycodone, tylenol and morphine has been given for headache and rib pain so far per request. Pain is not under control so far.   All needs met at this time. Bed in lowest position, treaded socks on, personal belongings and call light within reach, instructed to call for any assistance, hourly rounding in place.   DX; POLYSUBSTANCE ABUSE  A/P; admit to CDU  PHENOBARBITAL PROTOCOL  SUBOXONE  labs/ecg/c-xray  MVI/THIAMINE  CONTINUE PREVIOUS MEDS  SMOKING CESSATION WITH NICOTINE PATCH  SW  consult - COPING SKILLS/REHAB  monitor vss  monitor pt

## 2019-10-25 ENCOUNTER — OUTPATIENT (OUTPATIENT)
Dept: OUTPATIENT SERVICES | Facility: HOSPITAL | Age: 29
LOS: 1 days | Discharge: HOME | End: 2019-10-25

## 2019-10-25 DIAGNOSIS — Z90.49 ACQUIRED ABSENCE OF OTHER SPECIFIED PARTS OF DIGESTIVE TRACT: Chronic | ICD-10-CM

## 2019-10-25 DIAGNOSIS — I49.9 CARDIAC ARRHYTHMIA, UNSPECIFIED: ICD-10-CM

## 2019-11-01 ENCOUNTER — OUTPATIENT (OUTPATIENT)
Dept: OUTPATIENT SERVICES | Facility: HOSPITAL | Age: 29
LOS: 1 days | Discharge: HOME | End: 2019-11-01

## 2019-11-01 DIAGNOSIS — Z00.8 ENCOUNTER FOR OTHER GENERAL EXAMINATION: ICD-10-CM

## 2019-11-01 DIAGNOSIS — I49.9 CARDIAC ARRHYTHMIA, UNSPECIFIED: ICD-10-CM

## 2019-11-01 DIAGNOSIS — Z90.49 ACQUIRED ABSENCE OF OTHER SPECIFIED PARTS OF DIGESTIVE TRACT: Chronic | ICD-10-CM

## 2019-11-01 LAB
ALBUMIN SERPL ELPH-MCNC: 4.8 G/DL — SIGNIFICANT CHANGE UP (ref 3.5–5.2)
ALP SERPL-CCNC: 68 U/L — SIGNIFICANT CHANGE UP (ref 30–115)
ALT FLD-CCNC: 12 U/L — SIGNIFICANT CHANGE UP (ref 0–41)
ANION GAP SERPL CALC-SCNC: 13 MMOL/L — SIGNIFICANT CHANGE UP (ref 7–14)
APPEARANCE UR: CLEAR — SIGNIFICANT CHANGE UP
AST SERPL-CCNC: 16 U/L — SIGNIFICANT CHANGE UP (ref 0–41)
BACTERIA # UR AUTO: ABNORMAL
BILIRUB SERPL-MCNC: 0.2 MG/DL — SIGNIFICANT CHANGE UP (ref 0.2–1.2)
BILIRUB UR-MCNC: ABNORMAL
BUN SERPL-MCNC: 8 MG/DL — LOW (ref 10–20)
CALCIUM SERPL-MCNC: 9.5 MG/DL — SIGNIFICANT CHANGE UP (ref 8.5–10.1)
CHLORIDE SERPL-SCNC: 105 MMOL/L — SIGNIFICANT CHANGE UP (ref 98–110)
CHOLEST SERPL-MCNC: 140 MG/DL — SIGNIFICANT CHANGE UP (ref 100–200)
CO2 SERPL-SCNC: 24 MMOL/L — SIGNIFICANT CHANGE UP (ref 17–32)
COLOR SPEC: YELLOW — SIGNIFICANT CHANGE UP
CREAT SERPL-MCNC: 0.7 MG/DL — SIGNIFICANT CHANGE UP (ref 0.7–1.5)
DIFF PNL FLD: ABNORMAL
EPI CELLS # UR: ABNORMAL /HPF
GLUCOSE SERPL-MCNC: 89 MG/DL — SIGNIFICANT CHANGE UP (ref 70–99)
GLUCOSE UR QL: NEGATIVE MG/DL — SIGNIFICANT CHANGE UP
HCG UR QL: NEGATIVE — SIGNIFICANT CHANGE UP
HCT VFR BLD CALC: 46.6 % — SIGNIFICANT CHANGE UP (ref 37–47)
HDLC SERPL-MCNC: 50 MG/DL — SIGNIFICANT CHANGE UP
HGB BLD-MCNC: 15.5 G/DL — SIGNIFICANT CHANGE UP (ref 12–16)
KETONES UR-MCNC: ABNORMAL
LEUKOCYTE ESTERASE UR-ACNC: NEGATIVE — SIGNIFICANT CHANGE UP
LIPID PNL WITH DIRECT LDL SERPL: 87 MG/DL — SIGNIFICANT CHANGE UP (ref 4–129)
MAGNESIUM SERPL-MCNC: 2.1 MG/DL — SIGNIFICANT CHANGE UP (ref 1.8–2.4)
MCHC RBC-ENTMCNC: 32.2 PG — HIGH (ref 27–31)
MCHC RBC-ENTMCNC: 33.3 G/DL — SIGNIFICANT CHANGE UP (ref 32–37)
MCV RBC AUTO: 96.9 FL — SIGNIFICANT CHANGE UP (ref 81–99)
NITRITE UR-MCNC: NEGATIVE — SIGNIFICANT CHANGE UP
NRBC # BLD: 0 /100 WBCS — SIGNIFICANT CHANGE UP (ref 0–0)
PH UR: 5.5 — SIGNIFICANT CHANGE UP (ref 5–8)
PLATELET # BLD AUTO: 300 K/UL — SIGNIFICANT CHANGE UP (ref 130–400)
POTASSIUM SERPL-MCNC: 4.1 MMOL/L — SIGNIFICANT CHANGE UP (ref 3.5–5)
POTASSIUM SERPL-SCNC: 4.1 MMOL/L — SIGNIFICANT CHANGE UP (ref 3.5–5)
PROT SERPL-MCNC: 7.5 G/DL — SIGNIFICANT CHANGE UP (ref 6–8)
PROT UR-MCNC: ABNORMAL MG/DL
RBC # BLD: 4.81 M/UL — SIGNIFICANT CHANGE UP (ref 4.2–5.4)
RBC # FLD: 12.7 % — SIGNIFICANT CHANGE UP (ref 11.5–14.5)
RBC CASTS # UR COMP ASSIST: ABNORMAL /HPF
SODIUM SERPL-SCNC: 142 MMOL/L — SIGNIFICANT CHANGE UP (ref 135–146)
SP GR SPEC: >=1.03 (ref 1.01–1.03)
TOTAL CHOLESTEROL/HDL RATIO MEASUREMENT: 2.8 RATIO — LOW (ref 4–5.5)
TRIGL SERPL-MCNC: 94 MG/DL — SIGNIFICANT CHANGE UP (ref 10–149)
UROBILINOGEN FLD QL: 0.2 MG/DL — SIGNIFICANT CHANGE UP (ref 0.2–0.2)
WBC # BLD: 6.15 K/UL — SIGNIFICANT CHANGE UP (ref 4.8–10.8)
WBC # FLD AUTO: 6.15 K/UL — SIGNIFICANT CHANGE UP (ref 4.8–10.8)
WBC UR QL: SIGNIFICANT CHANGE UP /HPF

## 2019-11-02 LAB
HAV IGM SER-ACNC: SIGNIFICANT CHANGE UP
HBV CORE IGM SER-ACNC: SIGNIFICANT CHANGE UP
HBV SURFACE AG SER-ACNC: SIGNIFICANT CHANGE UP
HCV AB S/CO SERPL IA: 15.51 S/CO — HIGH (ref 0–0.99)
HCV AB SERPL-IMP: REACTIVE
T PALLIDUM AB TITR SER: NEGATIVE — SIGNIFICANT CHANGE UP

## 2019-11-05 LAB
ESTIMATED AVERAGE GLUCOSE: 94 MG/DL — SIGNIFICANT CHANGE UP (ref 68–114)
HBA1C BLD-MCNC: 4.9 % — SIGNIFICANT CHANGE UP (ref 4–5.6)

## 2019-12-09 ENCOUNTER — OUTPATIENT (OUTPATIENT)
Dept: OUTPATIENT SERVICES | Facility: HOSPITAL | Age: 29
LOS: 1 days | Discharge: HOME | End: 2019-12-09

## 2019-12-09 DIAGNOSIS — Z90.49 ACQUIRED ABSENCE OF OTHER SPECIFIED PARTS OF DIGESTIVE TRACT: Chronic | ICD-10-CM

## 2019-12-09 DIAGNOSIS — I49.9 CARDIAC ARRHYTHMIA, UNSPECIFIED: ICD-10-CM

## 2020-06-01 ENCOUNTER — OUTPATIENT (OUTPATIENT)
Dept: OUTPATIENT SERVICES | Facility: HOSPITAL | Age: 30
LOS: 1 days | End: 2020-06-01
Payer: MEDICAID

## 2020-06-01 DIAGNOSIS — Z90.49 ACQUIRED ABSENCE OF OTHER SPECIFIED PARTS OF DIGESTIVE TRACT: Chronic | ICD-10-CM

## 2020-06-08 DIAGNOSIS — Z71.89 OTHER SPECIFIED COUNSELING: ICD-10-CM

## 2020-06-15 PROCEDURE — G9001: CPT

## 2020-08-13 ENCOUNTER — EMERGENCY (EMERGENCY)
Facility: HOSPITAL | Age: 30
LOS: 0 days | Discharge: LEFT BEFORE DISCHARGE | End: 2020-08-13
Attending: EMERGENCY MEDICINE | Admitting: EMERGENCY MEDICINE
Payer: MEDICAID

## 2020-08-13 VITALS
RESPIRATION RATE: 16 BRPM | HEART RATE: 99 BPM | OXYGEN SATURATION: 98 % | DIASTOLIC BLOOD PRESSURE: 55 MMHG | TEMPERATURE: 100 F | SYSTOLIC BLOOD PRESSURE: 111 MMHG | WEIGHT: 111.99 LBS

## 2020-08-13 DIAGNOSIS — F11.10 OPIOID ABUSE, UNCOMPLICATED: ICD-10-CM

## 2020-08-13 DIAGNOSIS — Z90.49 ACQUIRED ABSENCE OF OTHER SPECIFIED PARTS OF DIGESTIVE TRACT: Chronic | ICD-10-CM

## 2020-08-13 DIAGNOSIS — F17.200 NICOTINE DEPENDENCE, UNSPECIFIED, UNCOMPLICATED: ICD-10-CM

## 2020-08-13 DIAGNOSIS — F19.10 OTHER PSYCHOACTIVE SUBSTANCE ABUSE, UNCOMPLICATED: ICD-10-CM

## 2020-08-13 DIAGNOSIS — Z91.013 ALLERGY TO SEAFOOD: ICD-10-CM

## 2020-08-13 PROCEDURE — 99284 EMERGENCY DEPT VISIT MOD MDM: CPT

## 2020-08-13 RX ORDER — DIPHENOXYLATE HCL/ATROPINE 2.5-.025MG
0 TABLET ORAL
Qty: 0 | Refills: 0 | DISCHARGE

## 2020-08-13 RX ORDER — ALBUTEROL 90 UG/1
2 AEROSOL, METERED ORAL
Qty: 0 | Refills: 0 | DISCHARGE

## 2020-08-13 RX ORDER — POLYETHYLENE GLYCOL 3350 17 G/17G
0 POWDER, FOR SOLUTION ORAL
Qty: 0 | Refills: 0 | DISCHARGE

## 2020-08-13 NOTE — ED ADULT TRIAGE NOTE - CHIEF COMPLAINT QUOTE
"I'm detoxing from opiates and Xanax." Pt last used yesterday. Pt complains of feeling anxious; she can't sleep; she has leg pain."

## 2020-08-13 NOTE — ED ADULT NURSE NOTE - NSIMPLEMENTINTERV_GEN_ALL_ED
Implemented All Universal Safety Interventions:  [place] to call system. Call bell, personal items and telephone within reach. Instruct patient to call for assistance. Room bathroom lighting operational. Non-slip footwear when patient is off stretcher. Physically safe environment: no spills, clutter or unnecessary equipment. Stretcher in lowest position, wheels locked, appropriate side rails in place.

## 2020-08-13 NOTE — ED PROVIDER NOTE - OBJECTIVE STATEMENT
28 yo F requesting drug detox. Patient state she relapsed several months ago and has since been using daily since. She last used Percocet 120mg mg and Xanax 1.5mg yesterday. Patient has tried  Suboxone and does not want that at this time. No homicidal or suicidal ideations.

## 2020-08-13 NOTE — ED PROVIDER NOTE - NSFOLLOWUPCLINICS_GEN_ALL_ED_FT
[facility]  Detox Mgmt  [address] [place] [address]  Phone: [phone]  Fax:   Follow Up Time: 1-3 Days

## 2020-08-13 NOTE — ED PROVIDER NOTE - PATIENT PORTAL LINK FT
You can access the FollowMyHealth Patient Portal offered by [facility] by registering at the following website: [email or web address]. By joining [employer]’s FollowMyHealth portal, you will also be able to view your health information using other applications (apps) compatible with our system.

## 2020-08-13 NOTE — ED PROVIDER NOTE - ATTENDING CONTRIBUTION TO CARE
29y female above PMH requests inpatient detox c/o nausea and body aches, refuses suboxone induction (too soon to last use anyway), has clonidine at home and declines zofran rx, will refer to outpatient

## 2020-08-13 NOTE — ED PROVIDER NOTE - NSFOLLOWUPINSTRUCTIONS_ED_ALL_ED_FT
Polysubstance Abuse    WHAT YOU NEED TO KNOW:    Polysubstance abuse is the abuse of 2 or more drugs that cause impairment or distress. Examples include alcohol, nicotine, marijuana, cocaine, heroin, methamphetamine, hallucinogens such as mushrooms, or inhalants such as paint thinner. Prescribed medicines, such as opioids for pain or benzodiazepines for anxiety, are also commonly abused.    DISCHARGE INSTRUCTIONS:    Call 911 for any of the following:     You feel you might harm yourself or others.         Return to the emergency department if:     You have a seizure.       You have chest pain and your heart is beating faster than usual.       You have new shortness of breath.       You are dizzy and lightheaded.     Contact your healthcare provider or therapist if:     You are using drugs and think you are pregnant.       You have withdrawal symptoms and want to start using drugs again.       You have questions or concerns about your condition or care.     Risks of polysubstance abuse:     Drug dependence is when you continue to use drugs, even when you know the risks. Polysubstance abuse can damage your heart, brain, lungs, liver, and gastrointestinal tract. You continue even when it causes problems with work, school, or relationships. You may have difficulty finding or keeping a job because of your drug dependence.       Drug tolerance is when you need to use more drugs, or use them more often, to get the effects you want. You may not be able to stop using the drugs. When you try to stop, you may have withdrawal symptoms and strong cravings for the drugs.      Drug overdose can occur when you take more drugs than your body can handle. This may be a small amount or a large amount. You can lose consciousness or have a seizure or stroke. Your heart can stop beating, or you can stop breathing. You may die from a drug overdose.     Medicines:     Withdrawal medicines may be given according to the types of drugs you are abusing. Withdrawal from drugs can cause serious, life-threatening side effects. Certain medicines can help decrease your withdrawal symptoms and your desire for the drug. Ask for more information about the withdrawal medicines you may need.       Mood stabilizers may be given to help prevent or treat depression or anxiety caused by drug abuse and withdrawal.       Take your medicine as directed. Contact your healthcare provider if you think your medicine is not helping or if you have side effects. Tell him or her if you are allergic to any medicine. Keep a list of the medicines, vitamins, and herbs you take. Include the amounts, and when and why you take them. Bring the list or the pill bottles to follow-up visits. Carry your medicine list with you in case of an emergency.    Follow up with your healthcare provider as directed: You may be referred to a specialist to treat health conditions caused by your drug use. This includes mental health, heart, or lung specialists. Write down your questions so you remember to ask them during your visits.     Therapy: You may need therapy and support to stop using drugs:     Cognitive and behavioral therapy helps you change your thinking and behavior. It can help you develop plans to avoid the situations that make you want to use drugs. It also helps you cope with the feelings of wanting to use drugs. You may have individual or group therapy.       Contingency management helps you set drug-free goals with a therapist. You will decide ways to celebrate your success when you reach a goal.       Family therapy and support groups allow you and your family members to talk to and be encouraged by other people affected by drug abuse. You and your family members may attend together or separately. Ask your healthcare provider for information about programs in your area.     How polysubstance abuse affects unborn or  babies:     If you are pregnant or get pregnant while using drugs, you may have a miscarriage or give birth early. Your baby may be born addicted to the drugs.      Do not breastfeed your baby if you use drugs. Drugs pass from your bloodstream into your breast milk and affect your baby's health. Talk with your healthcare provider if you are using drugs and breastfeeding.    For support and more information:     Alcoholics Anonymous  Web Address: http://www.aa.org      National Clearinghouse on Drug and Alcohol Information  Phone: 0-435-0995847  Web Address: www.health.org      [employer] on Alcoholism and Drug Dependence  [address] [place], [place] [id number]  Phone: 1-352.279.7179  Phone: 1-253.408.8041  Web Address: http://www.[employer].org           © Copyright [employer]  All illustrations and images included in CareNotes are the copyrighted property of [employer] or [employer].

## 2021-02-27 ENCOUNTER — INPATIENT (INPATIENT)
Facility: HOSPITAL | Age: 31
LOS: 2 days | Discharge: LEFT BEFORE DISCHARGE | End: 2021-03-02
Attending: INTERNAL MEDICINE | Admitting: INTERNAL MEDICINE
Payer: MEDICAID

## 2021-02-27 VITALS
WEIGHT: 104.94 LBS | SYSTOLIC BLOOD PRESSURE: 125 MMHG | DIASTOLIC BLOOD PRESSURE: 56 MMHG | OXYGEN SATURATION: 94 % | HEIGHT: 62 IN | TEMPERATURE: 101 F | RESPIRATION RATE: 20 BRPM | HEART RATE: 86 BPM

## 2021-02-27 DIAGNOSIS — Z90.49 ACQUIRED ABSENCE OF OTHER SPECIFIED PARTS OF DIGESTIVE TRACT: Chronic | ICD-10-CM

## 2021-02-27 LAB
ALBUMIN SERPL ELPH-MCNC: 4.5 G/DL — SIGNIFICANT CHANGE UP (ref 3.5–5.2)
ALP SERPL-CCNC: 107 U/L — SIGNIFICANT CHANGE UP (ref 30–115)
ALT FLD-CCNC: 22 U/L — SIGNIFICANT CHANGE UP (ref 0–41)
ANION GAP SERPL CALC-SCNC: 14 MMOL/L — SIGNIFICANT CHANGE UP (ref 7–14)
APPEARANCE UR: CLEAR — SIGNIFICANT CHANGE UP
AST SERPL-CCNC: 48 U/L — HIGH (ref 0–41)
BACTERIA # UR AUTO: ABNORMAL
BASOPHILS # BLD AUTO: 0.04 K/UL — SIGNIFICANT CHANGE UP (ref 0–0.2)
BASOPHILS NFR BLD AUTO: 0.2 % — SIGNIFICANT CHANGE UP (ref 0–1)
BILIRUB DIRECT SERPL-MCNC: 0.3 MG/DL — HIGH (ref 0–0.2)
BILIRUB INDIRECT FLD-MCNC: 0.6 MG/DL — SIGNIFICANT CHANGE UP (ref 0.2–1.2)
BILIRUB SERPL-MCNC: 0.9 MG/DL — SIGNIFICANT CHANGE UP (ref 0.2–1.2)
BILIRUB UR-MCNC: ABNORMAL
BUN SERPL-MCNC: 21 MG/DL — HIGH (ref 10–20)
CALCIUM SERPL-MCNC: 9.5 MG/DL — SIGNIFICANT CHANGE UP (ref 8.5–10.1)
CHLORIDE SERPL-SCNC: 98 MMOL/L — SIGNIFICANT CHANGE UP (ref 98–110)
CO2 SERPL-SCNC: 27 MMOL/L — SIGNIFICANT CHANGE UP (ref 17–32)
COLOR SPEC: YELLOW — SIGNIFICANT CHANGE UP
CREAT SERPL-MCNC: 0.8 MG/DL — SIGNIFICANT CHANGE UP (ref 0.7–1.5)
DIFF PNL FLD: ABNORMAL
EOSINOPHIL # BLD AUTO: 0 K/UL — SIGNIFICANT CHANGE UP (ref 0–0.7)
EOSINOPHIL NFR BLD AUTO: 0 % — SIGNIFICANT CHANGE UP (ref 0–8)
EPI CELLS # UR: ABNORMAL /HPF
ERYTHROCYTE [SEDIMENTATION RATE] IN BLOOD: 25 MM/HR — HIGH (ref 0–20)
GLUCOSE SERPL-MCNC: 104 MG/DL — HIGH (ref 70–99)
GLUCOSE UR QL: NEGATIVE MG/DL — SIGNIFICANT CHANGE UP
HCT VFR BLD CALC: 40.8 % — SIGNIFICANT CHANGE UP (ref 37–47)
HGB BLD-MCNC: 14.2 G/DL — SIGNIFICANT CHANGE UP (ref 12–16)
IMM GRANULOCYTES NFR BLD AUTO: 0.6 % — HIGH (ref 0.1–0.3)
KETONES UR-MCNC: NEGATIVE — SIGNIFICANT CHANGE UP
LACTATE SERPL-SCNC: 1.8 MMOL/L — SIGNIFICANT CHANGE UP (ref 0.7–2)
LEUKOCYTE ESTERASE UR-ACNC: NEGATIVE — SIGNIFICANT CHANGE UP
LIDOCAIN IGE QN: 12 U/L — SIGNIFICANT CHANGE UP (ref 7–60)
LYMPHOCYTES # BLD AUTO: 0.31 K/UL — LOW (ref 1.2–3.4)
LYMPHOCYTES # BLD AUTO: 1.2 % — LOW (ref 20.5–51.1)
MANUAL SMEAR VERIFICATION: SIGNIFICANT CHANGE UP
MCHC RBC-ENTMCNC: 30.9 PG — SIGNIFICANT CHANGE UP (ref 27–31)
MCHC RBC-ENTMCNC: 34.8 G/DL — SIGNIFICANT CHANGE UP (ref 32–37)
MCV RBC AUTO: 88.7 FL — SIGNIFICANT CHANGE UP (ref 81–99)
MONOCYTES # BLD AUTO: 0.55 K/UL — SIGNIFICANT CHANGE UP (ref 0.1–0.6)
MONOCYTES NFR BLD AUTO: 2.2 % — SIGNIFICANT CHANGE UP (ref 1.7–9.3)
NEUTROPHILS # BLD AUTO: 24.07 K/UL — HIGH (ref 1.4–6.5)
NEUTROPHILS NFR BLD AUTO: 95.8 % — HIGH (ref 42.2–75.2)
NEUTS BAND # BLD: 2 % — SIGNIFICANT CHANGE UP (ref 0–6)
NEUTS VAC BLD QL SMEAR: SLIGHT — SIGNIFICANT CHANGE UP
NITRITE UR-MCNC: NEGATIVE — SIGNIFICANT CHANGE UP
NRBC # BLD: 0 /100 WBCS — SIGNIFICANT CHANGE UP (ref 0–0)
NRBC # BLD: 0 /100 — SIGNIFICANT CHANGE UP (ref 0–0)
PH UR: 5.5 — SIGNIFICANT CHANGE UP (ref 5–8)
PLAT MORPH BLD: NORMAL — SIGNIFICANT CHANGE UP
PLATELET # BLD AUTO: 275 K/UL — SIGNIFICANT CHANGE UP (ref 130–400)
POTASSIUM SERPL-MCNC: 3.3 MMOL/L — LOW (ref 3.5–5)
POTASSIUM SERPL-SCNC: 3.3 MMOL/L — LOW (ref 3.5–5)
PROT SERPL-MCNC: 7.6 G/DL — SIGNIFICANT CHANGE UP (ref 6–8)
PROT UR-MCNC: 30 MG/DL
RAPID RVP RESULT: SIGNIFICANT CHANGE UP
RBC # BLD: 4.6 M/UL — SIGNIFICANT CHANGE UP (ref 4.2–5.4)
RBC # FLD: 12.8 % — SIGNIFICANT CHANGE UP (ref 11.5–14.5)
RBC BLD AUTO: NORMAL — SIGNIFICANT CHANGE UP
RBC CASTS # UR COMP ASSIST: ABNORMAL /HPF
SARS-COV-2 RNA SPEC QL NAA+PROBE: SIGNIFICANT CHANGE UP
SODIUM SERPL-SCNC: 139 MMOL/L — SIGNIFICANT CHANGE UP (ref 135–146)
SP GR SPEC: >=1.03 (ref 1.01–1.03)
UROBILINOGEN FLD QL: 0.2 MG/DL — SIGNIFICANT CHANGE UP (ref 0.2–0.2)
WBC # BLD: 25.11 K/UL — HIGH (ref 4.8–10.8)
WBC # FLD AUTO: 25.11 K/UL — HIGH (ref 4.8–10.8)
WBC UR QL: SIGNIFICANT CHANGE UP /HPF

## 2021-02-27 PROCEDURE — 71045 X-RAY EXAM CHEST 1 VIEW: CPT | Mod: 26

## 2021-02-27 PROCEDURE — 74177 CT ABD & PELVIS W/CONTRAST: CPT | Mod: 26

## 2021-02-27 PROCEDURE — 99285 EMERGENCY DEPT VISIT HI MDM: CPT

## 2021-02-27 RX ORDER — ONDANSETRON 8 MG/1
4 TABLET, FILM COATED ORAL ONCE
Refills: 0 | Status: COMPLETED | OUTPATIENT
Start: 2021-02-27 | End: 2021-02-27

## 2021-02-27 RX ORDER — SODIUM CHLORIDE 9 MG/ML
1000 INJECTION, SOLUTION INTRAVENOUS ONCE
Refills: 0 | Status: COMPLETED | OUTPATIENT
Start: 2021-02-27 | End: 2021-02-27

## 2021-02-27 RX ORDER — AZITHROMYCIN 500 MG/1
500 TABLET, FILM COATED ORAL ONCE
Refills: 0 | Status: COMPLETED | OUTPATIENT
Start: 2021-02-27 | End: 2021-02-27

## 2021-02-27 RX ORDER — CEFTRIAXONE 500 MG/1
1000 INJECTION, POWDER, FOR SOLUTION INTRAMUSCULAR; INTRAVENOUS ONCE
Refills: 0 | Status: COMPLETED | OUTPATIENT
Start: 2021-02-27 | End: 2021-02-27

## 2021-02-27 RX ORDER — POTASSIUM CHLORIDE 20 MEQ
40 PACKET (EA) ORAL ONCE
Refills: 0 | Status: COMPLETED | OUTPATIENT
Start: 2021-02-27 | End: 2021-02-27

## 2021-02-27 RX ORDER — KETOROLAC TROMETHAMINE 30 MG/ML
15 SYRINGE (ML) INJECTION ONCE
Refills: 0 | Status: DISCONTINUED | OUTPATIENT
Start: 2021-02-27 | End: 2021-02-27

## 2021-02-27 RX ORDER — ACETAMINOPHEN 500 MG
975 TABLET ORAL ONCE
Refills: 0 | Status: DISCONTINUED | OUTPATIENT
Start: 2021-02-27 | End: 2021-02-27

## 2021-02-27 RX ADMIN — ONDANSETRON 4 MILLIGRAM(S): 8 TABLET, FILM COATED ORAL at 19:54

## 2021-02-27 RX ADMIN — Medication 15 MILLIGRAM(S): at 21:05

## 2021-02-27 RX ADMIN — SODIUM CHLORIDE 1000 MILLILITER(S): 9 INJECTION, SOLUTION INTRAVENOUS at 19:51

## 2021-02-27 NOTE — ED PROVIDER NOTE - ADMIT DISPOSITION PRESENT ON ADMISSION SEPSIS
Pt/parent declined 48hr nurse callback upon discharge.  Pt/parent verbalized understanding re: discharge instructions, medication, and f/u information.  Urgent Care phone number provided to patient if questions arise.    Pt declined wheelchair to lobby, would like to wait in lobby for daughter to drive her home.   Yes

## 2021-02-27 NOTE — ED PROVIDER NOTE - CLINICAL SUMMARY MEDICAL DECISION MAKING FREE TEXT BOX
Labs noted for WBC 25k otherwise negative.  Covid swab negative.  CXR RLL infiltrate.  CT abdo bilateral ground glass opacities.  Given IVF, Rocephin ad azithromycin.  Will [facility] for high suspicion of Covid.

## 2021-02-27 NOTE — ED PROVIDER NOTE - CARE PLAN
Principal Discharge DX:	Sepsis  Secondary Diagnosis:	Hypokalemia  Secondary Diagnosis:	Nausea & vomiting  Secondary Diagnosis:	Viral pneumonia

## 2021-02-27 NOTE — ED PROVIDER NOTE - OBJECTIVE STATEMENT
29 y/o female with a PMH of borderline personality disorder, s/p cholecystectomy, COPD current cigarette smoker, and hep c presents to the ED for evaluation of diffuse abdominal pain radiating to the back that began around 10AM associated with nbnb emesis. pt reports her back pain is relieved by squeezing her back. pt reports smoking marijuana last time she smoked was about three days ago. pt reports she tested negative for covid about 3 days ago. pt denies recent trauma, fever, chills, cough, chest pain, sob, rashes, diarrhea, constipation, blood in the urine, dizziness, headache, numbness, tingling.

## 2021-02-27 NOTE — ED PROVIDER NOTE - NS ED ROS FT
CONST: No fever, chills or bodyaches  EYES: No pain, redness, drainage or visual changes.  ENT: No ear pain or discharge, nasal discharge or congestion. No sore throat  CARD: No chest pain, palpitations  RESP: No SOB, cough, hemoptysis. No hx of asthma or COPD  GI: (+) abdominal pain, N/V. No Diarrhea  : No urinary symptoms  MS: No joint pain, back pain or extremity pain/injury  SKIN: No rashes  NEURO: No headache, dizziness, paresthesias or LOC

## 2021-02-27 NOTE — ED PROVIDER NOTE - PHYSICAL EXAMINATION
Physical Exam    Vital Signs: I have reviewed the initial vital signs.  Constitutional: well-nourished, appears stated age, no acute distress  Eyes: Conjunctiva pink, Sclera clear  Cardiovascular: S1 and S2, regular rate, regular rhythm, well-perfused extremities, radial pulses equal and 2+ b/l.   Respiratory: unlabored respiratory effort, clear to auscultation bilaterally no wheezing, rales and rhonchi. pt is speaking full sentences. no accessory muscle use.   Gastrointestinal: soft, non-tender, nondistended abdomen, no pulsatile mass, normal bowl sounds, no rebound, no guarding, negative psoas, negative obturator, negative murphys. no cva tenderness. no organomegaly.   Musculoskeletal: supple neck, no lower extremity edema, no calf tenderness, no midline tenderness, no palpable spinal step offs  Integumentary: warm, dry, no rash. (+) dark bruising on the entire back pt reports is due to squeezing her back.   Neurologic: awake, alert  Psychiatric: appropriate mood, appropriate affect

## 2021-02-27 NOTE — ED PROVIDER NOTE - ATTENDING CONTRIBUTION TO CARE
I personally evaluated the patient. I reviewed the Resident’s or Physician Assistant’s note (as assigned above), and agree with the findings and plan except as documented in my note.  Chart reviewed.  H/O COPD, cholecystectomy, hep C, borderline personality, substance abuse, presents with abdominal pain radiating to back with vomiting.  Tested negative for Covid 3 days ago.  Exam shows alert patient, in no distress, HEENT NCAT, lungs clear, RR S1S2, abdomen soft NT +BS, no rebound or guarding, no CCE, discoloration on back, neuro A&OX3 GCS 15 no deficits.

## 2021-02-28 LAB
ANION GAP SERPL CALC-SCNC: 10 MMOL/L — SIGNIFICANT CHANGE UP (ref 7–14)
BASOPHILS # BLD AUTO: 0.05 K/UL — SIGNIFICANT CHANGE UP (ref 0–0.2)
BASOPHILS NFR BLD AUTO: 0.2 % — SIGNIFICANT CHANGE UP (ref 0–1)
BUN SERPL-MCNC: 18 MG/DL — SIGNIFICANT CHANGE UP (ref 10–20)
CALCIUM SERPL-MCNC: 9.3 MG/DL — SIGNIFICANT CHANGE UP (ref 8.5–10.1)
CHLORIDE SERPL-SCNC: 101 MMOL/L — SIGNIFICANT CHANGE UP (ref 98–110)
CO2 SERPL-SCNC: 30 MMOL/L — SIGNIFICANT CHANGE UP (ref 17–32)
CREAT SERPL-MCNC: 0.9 MG/DL — SIGNIFICANT CHANGE UP (ref 0.7–1.5)
CRP SERPL-MCNC: 7.7 MG/DL — HIGH (ref 0–0.4)
D DIMER BLD IA.RAPID-MCNC: 656 NG/ML DDU — HIGH (ref 0–230)
EOSINOPHIL # BLD AUTO: 0 K/UL — SIGNIFICANT CHANGE UP (ref 0–0.7)
EOSINOPHIL NFR BLD AUTO: 0 % — SIGNIFICANT CHANGE UP (ref 0–8)
FERRITIN SERPL-MCNC: 164 NG/ML — HIGH (ref 15–150)
GLUCOSE SERPL-MCNC: 92 MG/DL — SIGNIFICANT CHANGE UP (ref 70–99)
HCT VFR BLD CALC: 38.8 % — SIGNIFICANT CHANGE UP (ref 37–47)
HGB BLD-MCNC: 13 G/DL — SIGNIFICANT CHANGE UP (ref 12–16)
IMM GRANULOCYTES NFR BLD AUTO: 0.5 % — HIGH (ref 0.1–0.3)
LYMPHOCYTES # BLD AUTO: 1.52 K/UL — SIGNIFICANT CHANGE UP (ref 1.2–3.4)
LYMPHOCYTES # BLD AUTO: 6.2 % — LOW (ref 20.5–51.1)
MAGNESIUM SERPL-MCNC: 2.1 MG/DL — SIGNIFICANT CHANGE UP (ref 1.8–2.4)
MCHC RBC-ENTMCNC: 30.4 PG — SIGNIFICANT CHANGE UP (ref 27–31)
MCHC RBC-ENTMCNC: 33.5 G/DL — SIGNIFICANT CHANGE UP (ref 32–37)
MCV RBC AUTO: 90.7 FL — SIGNIFICANT CHANGE UP (ref 81–99)
MONOCYTES # BLD AUTO: 1.39 K/UL — HIGH (ref 0.1–0.6)
MONOCYTES NFR BLD AUTO: 5.7 % — SIGNIFICANT CHANGE UP (ref 1.7–9.3)
NEUTROPHILS # BLD AUTO: 21.25 K/UL — HIGH (ref 1.4–6.5)
NEUTROPHILS NFR BLD AUTO: 87.4 % — HIGH (ref 42.2–75.2)
NRBC # BLD: 0 /100 WBCS — SIGNIFICANT CHANGE UP (ref 0–0)
PLATELET # BLD AUTO: 304 K/UL — SIGNIFICANT CHANGE UP (ref 130–400)
POTASSIUM SERPL-MCNC: 3.6 MMOL/L — SIGNIFICANT CHANGE UP (ref 3.5–5)
POTASSIUM SERPL-SCNC: 3.6 MMOL/L — SIGNIFICANT CHANGE UP (ref 3.5–5)
RBC # BLD: 4.28 M/UL — SIGNIFICANT CHANGE UP (ref 4.2–5.4)
RBC # FLD: 12.9 % — SIGNIFICANT CHANGE UP (ref 11.5–14.5)
SARS-COV-2 IGG SERPL QL IA: NEGATIVE — SIGNIFICANT CHANGE UP
SARS-COV-2 IGM SERPL IA-ACNC: 0.07 INDEX — SIGNIFICANT CHANGE UP
SODIUM SERPL-SCNC: 141 MMOL/L — SIGNIFICANT CHANGE UP (ref 135–146)
WBC # BLD: 24.33 K/UL — HIGH (ref 4.8–10.8)
WBC # FLD AUTO: 24.33 K/UL — HIGH (ref 4.8–10.8)

## 2021-02-28 PROCEDURE — 99406 BEHAV CHNG SMOKING 3-10 MIN: CPT

## 2021-02-28 PROCEDURE — 93010 ELECTROCARDIOGRAM REPORT: CPT

## 2021-02-28 PROCEDURE — 99223 1ST HOSP IP/OBS HIGH 75: CPT | Mod: 25

## 2021-02-28 RX ORDER — SODIUM CHLORIDE 9 MG/ML
1000 INJECTION, SOLUTION INTRAVENOUS ONCE
Refills: 0 | Status: COMPLETED | OUTPATIENT
Start: 2021-02-28 | End: 2021-02-28

## 2021-02-28 RX ORDER — ACETAMINOPHEN 500 MG
650 TABLET ORAL EVERY 6 HOURS
Refills: 0 | Status: DISCONTINUED | OUTPATIENT
Start: 2021-02-28 | End: 2021-03-02

## 2021-02-28 RX ORDER — ALBUTEROL 90 UG/1
1 AEROSOL, METERED ORAL EVERY 4 HOURS
Refills: 0 | Status: DISCONTINUED | OUTPATIENT
Start: 2021-02-28 | End: 2021-03-02

## 2021-02-28 RX ORDER — CEFEPIME 1 G/1
2000 INJECTION, POWDER, FOR SOLUTION INTRAMUSCULAR; INTRAVENOUS ONCE
Refills: 0 | Status: COMPLETED | OUTPATIENT
Start: 2021-02-28 | End: 2021-02-28

## 2021-02-28 RX ORDER — MORPHINE SULFATE 50 MG/1
2 CAPSULE, EXTENDED RELEASE ORAL EVERY 6 HOURS
Refills: 0 | Status: DISCONTINUED | OUTPATIENT
Start: 2021-02-28 | End: 2021-03-02

## 2021-02-28 RX ORDER — IPRATROPIUM/ALBUTEROL SULFATE 18-103MCG
3 AEROSOL WITH ADAPTER (GRAM) INHALATION EVERY 6 HOURS
Refills: 0 | Status: DISCONTINUED | OUTPATIENT
Start: 2021-02-28 | End: 2021-03-02

## 2021-02-28 RX ORDER — CEFEPIME 1 G/1
INJECTION, POWDER, FOR SOLUTION INTRAMUSCULAR; INTRAVENOUS
Refills: 0 | Status: DISCONTINUED | OUTPATIENT
Start: 2021-02-28 | End: 2021-03-02

## 2021-02-28 RX ORDER — NICOTINE POLACRILEX 2 MG
1 GUM BUCCAL DAILY
Refills: 0 | Status: DISCONTINUED | OUTPATIENT
Start: 2021-02-28 | End: 2021-03-02

## 2021-02-28 RX ORDER — ONDANSETRON 8 MG/1
4 TABLET, FILM COATED ORAL ONCE
Refills: 0 | Status: COMPLETED | OUTPATIENT
Start: 2021-02-28 | End: 2021-02-28

## 2021-02-28 RX ORDER — PALIPERIDONE 1.5 MG/1
0 TABLET, EXTENDED RELEASE ORAL
Qty: 0 | Refills: 0 | DISCHARGE

## 2021-02-28 RX ORDER — VANCOMYCIN HCL 1 G
VIAL (EA) INTRAVENOUS
Refills: 0 | Status: DISCONTINUED | OUTPATIENT
Start: 2021-02-28 | End: 2021-03-02

## 2021-02-28 RX ORDER — ALPRAZOLAM 0.25 MG
2 TABLET ORAL THREE TIMES A DAY
Refills: 0 | Status: DISCONTINUED | OUTPATIENT
Start: 2021-02-28 | End: 2021-03-02

## 2021-02-28 RX ORDER — ENOXAPARIN SODIUM 100 MG/ML
40 INJECTION SUBCUTANEOUS AT BEDTIME
Refills: 0 | Status: DISCONTINUED | OUTPATIENT
Start: 2021-02-28 | End: 2021-03-02

## 2021-02-28 RX ORDER — MORPHINE SULFATE 50 MG/1
1 CAPSULE, EXTENDED RELEASE ORAL EVERY 4 HOURS
Refills: 0 | Status: DISCONTINUED | OUTPATIENT
Start: 2021-02-28 | End: 2021-02-28

## 2021-02-28 RX ORDER — PANTOPRAZOLE SODIUM 20 MG/1
40 TABLET, DELAYED RELEASE ORAL
Refills: 0 | Status: DISCONTINUED | OUTPATIENT
Start: 2021-02-28 | End: 2021-03-02

## 2021-02-28 RX ORDER — POTASSIUM CHLORIDE 20 MEQ
20 PACKET (EA) ORAL ONCE
Refills: 0 | Status: COMPLETED | OUTPATIENT
Start: 2021-02-28 | End: 2021-02-28

## 2021-02-28 RX ORDER — CEFEPIME 1 G/1
2000 INJECTION, POWDER, FOR SOLUTION INTRAMUSCULAR; INTRAVENOUS EVERY 8 HOURS
Refills: 0 | Status: DISCONTINUED | OUTPATIENT
Start: 2021-02-28 | End: 2021-03-02

## 2021-02-28 RX ORDER — SODIUM CHLORIDE 9 MG/ML
1000 INJECTION INTRAMUSCULAR; INTRAVENOUS; SUBCUTANEOUS
Refills: 0 | Status: DISCONTINUED | OUTPATIENT
Start: 2021-02-28 | End: 2021-03-02

## 2021-02-28 RX ORDER — ONDANSETRON 8 MG/1
4 TABLET, FILM COATED ORAL EVERY 8 HOURS
Refills: 0 | Status: DISCONTINUED | OUTPATIENT
Start: 2021-02-28 | End: 2021-03-02

## 2021-02-28 RX ORDER — ESCITALOPRAM OXALATE 10 MG/1
20 TABLET, FILM COATED ORAL DAILY
Refills: 0 | Status: DISCONTINUED | OUTPATIENT
Start: 2021-02-28 | End: 2021-03-02

## 2021-02-28 RX ORDER — VANCOMYCIN HCL 1 G
750 VIAL (EA) INTRAVENOUS EVERY 12 HOURS
Refills: 0 | Status: DISCONTINUED | OUTPATIENT
Start: 2021-02-28 | End: 2021-03-02

## 2021-02-28 RX ORDER — VANCOMYCIN HCL 1 G
750 VIAL (EA) INTRAVENOUS ONCE
Refills: 0 | Status: COMPLETED | OUTPATIENT
Start: 2021-02-28 | End: 2021-02-28

## 2021-02-28 RX ORDER — ALBUTEROL 90 UG/1
2 AEROSOL, METERED ORAL EVERY 4 HOURS
Refills: 0 | Status: DISCONTINUED | OUTPATIENT
Start: 2021-02-28 | End: 2021-03-02

## 2021-02-28 RX ADMIN — ESCITALOPRAM OXALATE 20 MILLIGRAM(S): 10 TABLET, FILM COATED ORAL at 11:50

## 2021-02-28 RX ADMIN — Medication 1 PATCH: at 11:49

## 2021-02-28 RX ADMIN — MORPHINE SULFATE 1 MILLIGRAM(S): 50 CAPSULE, EXTENDED RELEASE ORAL at 09:51

## 2021-02-28 RX ADMIN — Medication 2 MILLIGRAM(S): at 21:37

## 2021-02-28 RX ADMIN — Medication 250 MILLIGRAM(S): at 11:51

## 2021-02-28 RX ADMIN — Medication 1 PATCH: at 18:43

## 2021-02-28 RX ADMIN — ENOXAPARIN SODIUM 40 MILLIGRAM(S): 100 INJECTION SUBCUTANEOUS at 21:37

## 2021-02-28 RX ADMIN — MORPHINE SULFATE 1 MILLIGRAM(S): 50 CAPSULE, EXTENDED RELEASE ORAL at 18:50

## 2021-02-28 RX ADMIN — ONDANSETRON 4 MILLIGRAM(S): 8 TABLET, FILM COATED ORAL at 19:52

## 2021-02-28 RX ADMIN — CEFEPIME 100 MILLIGRAM(S): 1 INJECTION, POWDER, FOR SOLUTION INTRAMUSCULAR; INTRAVENOUS at 21:37

## 2021-02-28 RX ADMIN — CEFEPIME 100 MILLIGRAM(S): 1 INJECTION, POWDER, FOR SOLUTION INTRAMUSCULAR; INTRAVENOUS at 07:09

## 2021-02-28 RX ADMIN — CEFTRIAXONE 100 MILLIGRAM(S): 500 INJECTION, POWDER, FOR SOLUTION INTRAMUSCULAR; INTRAVENOUS at 00:12

## 2021-02-28 RX ADMIN — AZITHROMYCIN 255 MILLIGRAM(S): 500 TABLET, FILM COATED ORAL at 01:20

## 2021-02-28 RX ADMIN — CEFEPIME 100 MILLIGRAM(S): 1 INJECTION, POWDER, FOR SOLUTION INTRAMUSCULAR; INTRAVENOUS at 14:42

## 2021-02-28 RX ADMIN — ONDANSETRON 4 MILLIGRAM(S): 8 TABLET, FILM COATED ORAL at 07:16

## 2021-02-28 RX ADMIN — MORPHINE SULFATE 1 MILLIGRAM(S): 50 CAPSULE, EXTENDED RELEASE ORAL at 14:42

## 2021-02-28 RX ADMIN — Medication 650 MILLIGRAM(S): at 17:35

## 2021-02-28 RX ADMIN — SODIUM CHLORIDE 1000 MILLILITER(S): 9 INJECTION, SOLUTION INTRAVENOUS at 02:23

## 2021-02-28 RX ADMIN — SODIUM CHLORIDE 100 MILLILITER(S): 9 INJECTION INTRAMUSCULAR; INTRAVENOUS; SUBCUTANEOUS at 15:09

## 2021-02-28 RX ADMIN — Medication 2 MILLIGRAM(S): at 13:06

## 2021-02-28 RX ADMIN — MORPHINE SULFATE 2 MILLIGRAM(S): 50 CAPSULE, EXTENDED RELEASE ORAL at 22:24

## 2021-02-28 RX ADMIN — SODIUM CHLORIDE 1000 MILLILITER(S): 9 INJECTION, SOLUTION INTRAVENOUS at 07:10

## 2021-02-28 NOTE — H&P ADULT - NSHPSOCIALHISTORY_GEN_ALL_CORE
Substance Use (street drugs): ( x ) never used  (  ) other:  Tobacco Usage:  ( x  ) never smoked   (   ) former smoker   (   ) current smoker  (     ) pack year  Alcohol Usage: None Substance Use (street drugs): (  ) never used  ( x ) other: marijuana, previously on Suboxone (as per previous chart)   Tobacco Usage:  ( x  ) never smoked   (  ) former smoker   (  x ) current smoker- a pack a day  (     ) pack year   Alcohol Usage: None Substance Use (street drugs): (  ) never used  ( x ) other: marijuana, previously on Suboxone (as per previous chart)   Tobacco Usage:  ( x  ) never smoked   (  ) former smoker   (  x ) current smoker- a pack a day    Alcohol Usage: None

## 2021-02-28 NOTE — H&P ADULT - NSHPPHYSICALEXAM_GEN_ALL_CORE
T(C): 37.9 (02-27-21 @ 22:20), Max: 38.3 (02-27-21 @ 19:29)  HR: 76 (02-27-21 @ 22:20) (76 - 86)  BP: 120/81 (02-27-21 @ 22:20) (120/81 - 125/56)  RR: 20 (02-27-21 @ 22:20) (20 - 20)  SpO2: 98% (02-27-21 @ 22:20) (94% - 98%)        GENERAL: NAD, well-developed  HEAD:  Atraumatic, Normocephalic  EYES: EOMI, PERRLA, conjunctiva and sclera clear  ENT: Normal tympanic membrane. No nasal obstruction or discharge. No tonsillar exudate, swelling or erythema.  NECK: Supple, No JVD  CHEST/LUNG: Clear to auscultation bilaterally; No wheeze  HEART: Regular rate and rhythm; No murmurs, rubs, or gallops  ABDOMEN: Soft, Nontender, Nondistended; Bowel sounds present  EXTREMITIES:  2+ Peripheral Pulses, No clubbing, cyanosis, or edema  PSYCH: AAOx3  NEUROLOGY: non-focal  SKIN: No rashes or lesions T(C): 37.9 (02-27-21 @ 22:20), Max: 38.3 (02-27-21 @ 19:29)  HR: 76 (02-27-21 @ 22:20) (76 - 86)  BP: 120/81 (02-27-21 @ 22:20) (120/81 - 125/56)  RR: 20 (02-27-21 @ 22:20) (20 - 20)  SpO2: 98% (02-27-21 @ 22:20) (94% - 98%)    GENERAL: NAD, malnourished  HEAD:  Atraumatic, Normocephalic  EYES: EOMI, PERRLA, conjunctiva and sclera clear  ENT: Normal tympanic membrane. No nasal obstruction or discharge. No tonsillar exudate, swelling or erythema.  NECK: Supple, No JVD  CHEST/LUNG: b/l diffuse rhonchi  HEART: Regular rate and rhythm; No murmurs, rubs, or gallops  ABDOMEN: Soft, diffuse tenderness noted, Nondistended; Bowel sounds present  EXTREMITIES:  2+ Peripheral Pulses, No clubbing, cyanosis, or edema  PSYCH: AAOx3  NEUROLOGY: non-focal  SKIN: diffuse erythematous marks on the back

## 2021-02-28 NOTE — ED ADULT NURSE REASSESSMENT NOTE - NS ED NURSE REASSESS COMMENT FT1
[place] being [place] [facility] due to high suspesion. charge ingrid hansen spoke to charge rn capizzo. crit lead not called due to the fact that the pt will not be going to that area.

## 2021-02-28 NOTE — H&P ADULT - HISTORY OF PRESENT ILLNESS
Pt is a 30F with a PMH of borderline personality disorder, s/p cholecystectomy, COPD current cigarette smoker, and hep c Pt is a 30F with a PMH of borderline personality disorder, s/p cholecystectomy, COPD current cigarette smoker, and hep c- treated presenting for constant abdominal pain associated with NBNB emesis located between the ribs that started yesterday morning at 10 AM. Pt currently rates the pain 10/10. As per pt, she stated that the pain started after eating a peanut butter and jelly sandwich. Pt stated that squeezing her back help alleviate some of the pain. Pt stated having similar symptoms in the past for which she has been admitted. She has f/u with Dr. Johnson (GI Doctor) in the past, but not recently. Pt denied have CP, SOB, constipation, fever, cough.

## 2021-02-28 NOTE — ED ADULT NURSE REASSESSMENT NOTE - NS ED NURSE REASSESS COMMENT FT1
[place] being [place] [facility]. pt was refusing to go wanted ama, ivl removed. once speaking to pt, pt decided to stay and did not want ivl replaced before transport pt being transferred [facility]. pt was refusing to go wanted ama, ivl removed. once speaking to pt, pt decided to stay and did not want ivl replaced before transport, vanco not given, spoke to rn kaveh who is accepting pt at [facility].

## 2021-02-28 NOTE — ED ADULT NURSE REASSESSMENT NOTE - NS ED NURSE REASSESS COMMENT FT1
spoke to md on call on 1144. md notified pt c/o pain/discomfort. no pain medications ordered. pt also stated "I cannot swallow pills right now". Po potassium and protonix not given do to pt unable to swallow, md on call aware. will order something for pain, and pt will receive dose once ordered as prescribed.

## 2021-02-28 NOTE — H&P ADULT - ATTENDING COMMENTS
30 yr old female with hx of borderline personality disorder, COPD, current cigarette smoker, and hep c- treated presenting for constant abdominal pain associated with NBNB emesis located between the ribs that started yesterday morning at 10 AM.     # Acute Colitis  # Acute Cystitis  # Sepsis  - T: 100.9; WBC: 25  - UA positive   - CT Abdomen: Mild gastric wall thickening which can be seen in gastritis.  - start ceftriaxone and flagyl   - f/u blood and urine culture   - ID consult    # High suspicion for covid  - COVID neg  - CT Abdomen: Mild patchy peripheral dominant ground-glass opacities, which may represent viral/infectious etiology.  - transfer to [facility]    # Hypokalemia  - potassium repleated     # Malnutrition   - dietitian consult    # Hx of Polysubs Abuse (opioid / Bz/ Cannabis)  - check urine toxicology  - Monitor VS and withdrawal symptoms    # COPD   - stable   - c/w ventolin HFA prn  - f/u with outpatient    # Anxiety and depression  - c/w Lamictal and xanax     # Nicotine dependence  - Nicotine patch 21 mg daily  - Smoking Cessation advised    # DASH diet    # DVT ppx  - start Lovenox 40mg dialy     # Ambulate as tolerated    # Full Code 30 yr old female with hx of borderline personality disorder, COPD, current cigarette smoker, and hep c- treated presenting for constant abdominal pain associated with NBNB emesis located between the ribs that started yesterday morning at 10 AM.     # Abdominal pain   # SIRS  - T: 100.9; WBC: 25  - UA positive for bacteria (not impressive)  - CT Abdomen: Mild gastric wall thickening which can be seen in gastritis.  - start protoxin   - will treat empirically with cefepime and vancomycin   - check urine tox  - f/u blood and urine culture   - consider ECHO  - ID consult    # High suspicion for covid  - COVID neg on admission  - CT Abdomen: Mild patchy peripheral dominant ground-glass opacities, which may represent viral/infectious etiology.  - CXR: no evidence of cardiopul disease  - sat 98% on RA, denies any resp complains  - transfer to [facility]    # Hx of Polysubs Abuse (opioid / Bz/ Cannabis)  - check urine toxicology  - Monitor VS and withdrawal symptoms    # Hypokalemia  - potassium repleated     # Malnutrition   - dietitian consult    # COPD   - stable   - DuoNebs  - f/u with outpatient    # Anxiety and depression  - c/w Lamictal and xanax     # Nicotine dependence  - Nicotine patch 21 mg daily  - Smoking Cessation advised    # Marks on entire back   - pt denies any hx of assault    # DASH diet    # DVT ppx  - start Lovenox 40mg daily     # Ambulate as tolerated    # Full Code 30 yr old female with hx of borderline personality disorder, COPD, current cigarette smoker, and hep c- treated admitted for abdominal pain associated with multiple episodes of associated with NBNB emesis for 1 day.     # Abdominal pain   # SIRS  - T: 100.9; WBC: 25  - UA positive for bacteria (not impressive)  - CT Abdomen: Mild gastric wall thickening which can be seen in gastritis.  - start protoxin   - will treat empirically with cefepime and vancomycin   - check urine tox  - NS@100  - f/u blood and urine culture   - consider ECHO  - ID consult    # High suspicion for covid  - COVID neg on admission  - CT Abdomen: Mild patchy peripheral dominant ground-glass opacities, which may represent viral/infectious etiology.  - CXR: no evidence of cardiopul disease  - sat 98% on RA, denies any resp complains  - transfer to [facility]    # Hx of Polysubs Abuse (opioid / Bz/ Cannabis)  - check urine toxicology  - Monitor VS and withdrawal symptoms    # Hypokalemia  - potassium repleated     # Malnutrition   - dietitian consult    # COPD   - stable   - DuoNebs  - f/u with outpatient    # Anxiety and depression  - c/w Lamictal and xanax     # Nicotine dependence  - Nicotine patch 21 mg daily  - Smoking Cessation advised    # Marks on entire back   - pt denies any hx of assault    # DASH diet    # DVT ppx  - start Lovenox 40mg daily     # Ambulate as tolerated    # Full Code

## 2021-02-28 NOTE — H&P ADULT - NSHPLABSRESULTS_GEN_ALL_CORE
CT Abdomen and Pelvis w/ IV Cont (02.27.21)  1.  Mild patchy peripheral dominant groundglass opacities, which may represent viral/infectious etiology.  2.  Mild gastric wall thickening which can be seen in gastritis.

## 2021-03-01 VITALS
TEMPERATURE: 99 F | SYSTOLIC BLOOD PRESSURE: 136 MMHG | RESPIRATION RATE: 18 BRPM | DIASTOLIC BLOOD PRESSURE: 60 MMHG | HEART RATE: 49 BPM

## 2021-03-01 PROCEDURE — 99238 HOSP IP/OBS DSCHRG MGMT 30/<: CPT

## 2021-03-01 RX ORDER — MORPHINE SULFATE 50 MG/1
1 CAPSULE, EXTENDED RELEASE ORAL ONCE
Refills: 0 | Status: DISCONTINUED | OUTPATIENT
Start: 2021-03-01 | End: 2021-03-01

## 2021-03-01 RX ORDER — CHLORHEXIDINE GLUCONATE 213 G/1000ML
1 SOLUTION TOPICAL DAILY
Refills: 0 | Status: DISCONTINUED | OUTPATIENT
Start: 2021-03-01 | End: 2021-03-02

## 2021-03-01 RX ADMIN — ESCITALOPRAM OXALATE 20 MILLIGRAM(S): 10 TABLET, FILM COATED ORAL at 11:42

## 2021-03-01 RX ADMIN — Medication 250 MILLIGRAM(S): at 00:38

## 2021-03-01 RX ADMIN — MORPHINE SULFATE 1 MILLIGRAM(S): 50 CAPSULE, EXTENDED RELEASE ORAL at 09:58

## 2021-03-01 RX ADMIN — Medication 1 PATCH: at 11:43

## 2021-03-01 RX ADMIN — ENOXAPARIN SODIUM 40 MILLIGRAM(S): 100 INJECTION SUBCUTANEOUS at 20:57

## 2021-03-01 RX ADMIN — Medication 250 MILLIGRAM(S): at 22:58

## 2021-03-01 RX ADMIN — MORPHINE SULFATE 2 MILLIGRAM(S): 50 CAPSULE, EXTENDED RELEASE ORAL at 11:51

## 2021-03-01 RX ADMIN — ONDANSETRON 4 MILLIGRAM(S): 8 TABLET, FILM COATED ORAL at 16:33

## 2021-03-01 RX ADMIN — MORPHINE SULFATE 2 MILLIGRAM(S): 50 CAPSULE, EXTENDED RELEASE ORAL at 05:38

## 2021-03-01 RX ADMIN — CEFEPIME 100 MILLIGRAM(S): 1 INJECTION, POWDER, FOR SOLUTION INTRAMUSCULAR; INTRAVENOUS at 05:36

## 2021-03-01 RX ADMIN — ONDANSETRON 4 MILLIGRAM(S): 8 TABLET, FILM COATED ORAL at 08:32

## 2021-03-01 RX ADMIN — Medication 1 PATCH: at 11:42

## 2021-03-01 RX ADMIN — CEFEPIME 100 MILLIGRAM(S): 1 INJECTION, POWDER, FOR SOLUTION INTRAMUSCULAR; INTRAVENOUS at 13:58

## 2021-03-01 RX ADMIN — ONDANSETRON 4 MILLIGRAM(S): 8 TABLET, FILM COATED ORAL at 01:49

## 2021-03-01 RX ADMIN — PANTOPRAZOLE SODIUM 40 MILLIGRAM(S): 20 TABLET, DELAYED RELEASE ORAL at 05:36

## 2021-03-01 RX ADMIN — CEFEPIME 100 MILLIGRAM(S): 1 INJECTION, POWDER, FOR SOLUTION INTRAMUSCULAR; INTRAVENOUS at 20:58

## 2021-03-01 RX ADMIN — MORPHINE SULFATE 2 MILLIGRAM(S): 50 CAPSULE, EXTENDED RELEASE ORAL at 18:39

## 2021-03-01 RX ADMIN — Medication 250 MILLIGRAM(S): at 11:42

## 2021-03-01 RX ADMIN — SODIUM CHLORIDE 100 MILLILITER(S): 9 INJECTION INTRAMUSCULAR; INTRAVENOUS; SUBCUTANEOUS at 05:36

## 2021-03-01 RX ADMIN — Medication 2 MILLIGRAM(S): at 08:41

## 2021-03-01 RX ADMIN — Medication 2 MILLIGRAM(S): at 16:33

## 2021-03-01 RX ADMIN — Medication 1 PATCH: at 05:38

## 2021-03-01 RX ADMIN — Medication 1 PATCH: at 17:04

## 2021-03-01 NOTE — PROGRESS NOTE ADULT - ATTENDING COMMENTS
I have performed a history and physical exam of this patient and discussed the management with the resident. I have reviewed the resident note and agree with the documented findings and plan of care.

## 2021-03-01 NOTE — PROGRESS NOTE ADULT - ASSESSMENT
30 yr old female with hx of borderline personality disorder, COPD, current cigarette smoker, and hep c- treated admitted for abdominal pain associated with multiple episodes of associated with NBNB emesis for 1 day.     # Abdominal pain   # SIRS, PoA  - T: 100.9; WBC: 25  - UA positive for bacteria (not impressive)  - CT Abdomen: Mild gastric wall thickening which can be seen in gastritis.  - Started protonix  - Started cefepime and vancomycin   - F/u urine tox  - NS@100  - f/u blood and urine culture   - consider ECHO  - F/u ID consult    # Hx of Polysubs Abuse (opioid / Bz/ Cannabis)  - F/u UTox  - Monitor VS and withdrawal symptoms    # Hypokalemia  - potassium repleated     # Malnutrition   - dietitian consult    # COPD   - stable   - DuoNebs  - f/u with outpatient    # Anxiety and depression  - c/w Lamictal and xanax     # Nicotine dependence  - Nicotine patch 21 mg daily  - Smoking Cessation advised    GI ppx:  [] Not indicated  /  [x] Pantoprazole 40mg PO Daily  DVT ppx: [] Not indicated  /  [] Heparin 5000mg SubQ  /  [x] Lovenox 40mg SubQ  /  [] SCDs  Fluids:  [x] PO  /  [] IVF  Activity:  [x] Increase as Tolerated  /  [] OOB w/ assist  /  [] Bed Rest  Diet:  [x] DASH / [] Carb Consistent  /  [] Renal  /  [] Dysphagia  /  [] NPO  Patient to be discharged when condition(s) optimized:  [x] Home  / [] SNF  /  [] Long Term Rehab    CODE STATUS:   [x] FULL   /   [] DNR

## 2021-03-01 NOTE — PROGRESS NOTE ADULT - SUBJECTIVE AND OBJECTIVE BOX
DAILY PROGRESS NOTE  ===========================================================    Patient Information:  RADU CONTRERAS  /  30y  /  Female  /  MRN#: [id number]    Hospital Day: 1d     |:::::::::::::::::::::::::::| SUBJECTIVE |:::::::::::::::::::::::::::|    OVERNIGHT EVENTS: None  TODAY: Patient was seen today at bedside. Review of systems is otherwise negative.    |:::::::::::::::::::::::::::| OBJECTIVE |:::::::::::::::::::::::::::|    VITAL SIGNS: Last 24 Hours  T(C): 36.8 (01 Mar 2021 08:00), Max: 37.2 (28 Feb 2021 16:19)  T(F): 98.2 (01 Mar 2021 08:00), Max: 99 (28 Feb 2021 16:19)  HR: 45 (01 Mar 2021 08:00) (45 - 98)  BP: 138/65 (01 Mar 2021 08:00) (112/60 - 138/65)  BP(mean): --  RR: 18 (01 Mar 2021 08:00) (17 - 18)  SpO2: 99% (01 Mar 2021 08:00) (97% - 99%)    PHYSICAL EXAM:  GENERAL:   Awake, alert; NAD.  HEENT:  Head NC/AT; Conjunctivae pink, Sclera anicteric; Oral mucosa moist.  CARDIO:   Regular rate; Regular rhythm; S1 & S2.  RESP:   No rales, wheezing, or rhonchi appreciated.  GI:   Soft; NT/ND; BS; No guarding; No rebound tenderness.  EXT:   Strength UE 5/5; Strength LE 5/5; No edema.   SKIN:   Intact.    LAB RESULTS:                        13.0   24.33 )-----------( 304      ( 28 Feb 2021 07:26 )             38.8     02-28    141  |  101  |  18  ----------------------------<  92  3.6   |  30  |  0.9    Ca    9.3      28 Feb 2021 07:26  Mg     2.1     02-28    TPro  7.6  /  Alb  4.5  /  TBili  0.9  /  DBili  0.3<H>  /  AST  48<H>  /  ALT  22  /  AlkPhos  107  02-27      Urinalysis Basic - ( 27 Feb 2021 20:46 )    Color: Yellow / Appearance: Clear / SG: >=1.030 / pH: x  Gluc: x / Ketone: Negative  / Bili: Small / Urobili: 0.2 mg/dL   Blood: x / Protein: 30 mg/dL / Nitrite: Negative   Leuk Esterase: Negative / RBC: 3-5 /HPF / WBC 3-5 /HPF   Sq Epi: x / Non Sq Epi: Many /HPF / Bacteria: Moderate              MICROBIOLOGY:    RADIOLOGY:    ALLERGIES: No Known Allergies      ===========================================================

## 2021-03-01 NOTE — SBIRT NOTE ADULT - NSSBIRTUNABLESCROTHER_GEN_A_CORE
Patient denied any ETOH or drug use. Patient admitted she is on Xanax prescribed by her psychiatrist but has been taking less than what is prescribed to her but her psychiatrist is aware. Patient denies needing any resources.

## 2021-03-02 ENCOUNTER — EMERGENCY (EMERGENCY)
Facility: HOSPITAL | Age: 31
LOS: 0 days | Discharge: HOME | End: 2021-03-02
Attending: EMERGENCY MEDICINE | Admitting: EMERGENCY MEDICINE
Payer: MEDICAID

## 2021-03-02 VITALS
RESPIRATION RATE: 18 BRPM | DIASTOLIC BLOOD PRESSURE: 57 MMHG | SYSTOLIC BLOOD PRESSURE: 101 MMHG | OXYGEN SATURATION: 95 % | HEART RATE: 63 BPM | TEMPERATURE: 98 F

## 2021-03-02 VITALS
TEMPERATURE: 98 F | RESPIRATION RATE: 20 BRPM | DIASTOLIC BLOOD PRESSURE: 57 MMHG | SYSTOLIC BLOOD PRESSURE: 113 MMHG | HEART RATE: 93 BPM | HEIGHT: 62 IN | OXYGEN SATURATION: 98 %

## 2021-03-02 DIAGNOSIS — J44.9 CHRONIC OBSTRUCTIVE PULMONARY DISEASE, UNSPECIFIED: ICD-10-CM

## 2021-03-02 DIAGNOSIS — Z79.899 OTHER LONG TERM (CURRENT) DRUG THERAPY: ICD-10-CM

## 2021-03-02 DIAGNOSIS — J12.89 OTHER VIRAL PNEUMONIA: ICD-10-CM

## 2021-03-02 DIAGNOSIS — R06.02 SHORTNESS OF BREATH: ICD-10-CM

## 2021-03-02 DIAGNOSIS — Z20.822 CONTACT WITH AND (SUSPECTED) EXPOSURE TO COVID-19: ICD-10-CM

## 2021-03-02 DIAGNOSIS — Z90.49 ACQUIRED ABSENCE OF OTHER SPECIFIED PARTS OF DIGESTIVE TRACT: Chronic | ICD-10-CM

## 2021-03-02 DIAGNOSIS — F17.200 NICOTINE DEPENDENCE, UNSPECIFIED, UNCOMPLICATED: ICD-10-CM

## 2021-03-02 LAB — SARS-COV-2 RNA SPEC QL NAA+PROBE: SIGNIFICANT CHANGE UP

## 2021-03-02 PROCEDURE — 99284 EMERGENCY DEPT VISIT MOD MDM: CPT

## 2021-03-02 PROCEDURE — 71046 X-RAY EXAM CHEST 2 VIEWS: CPT | Mod: 26

## 2021-03-02 RX ORDER — AZITHROMYCIN 500 MG/1
1 TABLET, FILM COATED ORAL
Qty: 6 | Refills: 0
Start: 2021-03-02 | End: 2021-03-06

## 2021-03-02 NOTE — ED PROVIDER NOTE - OBJECTIVE STATEMENT
30F hx COPD, cholecystectomy, borderline personality disorder presents with shortness of breath. Patient was recently hospitalized for viral pneumonia, ct demonstrated bilateral infiltrates suspicious for covid, but patient eloped yesterday. She spoke with her pmd, who suggested she come in for her shortness of breath. patient ran out of her inhaler at home, denies fever/abd pain/vomiting/diarrhea. Patient does not want to stay in the hospital.

## 2021-03-02 NOTE — ED PROVIDER NOTE - CLINICAL SUMMARY MEDICAL DECISION MAKING FREE TEXT BOX
pt evaluated for mild SOB, CXR NAPD, pt reporting feeling well. Labs reviewed from admission. Given cough and elevated WBC several days earlier pt prescribed Azithromycin. Pt VS wnl.  Pt ambulatory with pulse ox in high 90's without any sx. Advised close follow up with PMD for reevaluation and pt agreed. Strict return precautions advised and pt verbalized understanding.

## 2021-03-02 NOTE — ED PROVIDER NOTE - PHYSICAL EXAMINATION
Vital Signs: I have reviewed the initial vital signs.  Constitutional: well-nourished, appears stated age, no acute distress.  HEENT: Airway patent, moist MM, no erythema/swelling/deformity of oral structures. EOMI, PERRLA.  CV: regular rate, regular rhythm, well-perfused extremities, 2+ b/l DP and radial pulses equal.  Lungs: faint bilateral crackles, no increased WOB.  ABD: NTND, no guarding or rebound, no pulsatile mass, no hernias.   MSK: Neck supple, nontender, nl ROM, no stepoff. Chest nontender. Back nontender in TLS spine or to b/l bony structures or flanks. Ext nontender, nl rom, no deformity.   INTEG: Skin warm, dry, no rash.  NEURO: A&Ox3, moving all extremities, normal speech  PSYCH: Calm, cooperative, normal affect and interaction.

## 2021-03-02 NOTE — ED PROVIDER NOTE - CARE PROVIDER_API CALL
BETH FLOWER  [id number]  [address] [place] [address]  Phone: ()-  Fax: ()-  Established Patient  Follow Up Time: 4-6 Days

## 2021-03-02 NOTE — ED PROVIDER NOTE - ATTENDING CONTRIBUTION TO CARE
31 yo female presents c/o mild SOB. Pt states she was evaluated for abdominal pain several days ago and had CT A/P 2/27 which showed viral PNA, was admitted to [facility] and then left AMA. Pt states she is tolerating PO and feels better but had a little SOB this AM. Denies any fevers, current N/V/D or abdominal pain, CP or dizziness.     VITAL SIGNS: noted  CONSTITUTIONAL: Well-developed; well-nourished; in no acute distress  HEAD: Normocephalic; atraumatic  EYES: PERRL, EOM intact; conjunctiva and sclera clear  ENT: No nasal discharge; airway clear. MMM  NECK: Supple; non tender. No anterior cervical lymphadenopathy noted  CARD: S1, S2 normal; no murmurs, gallops, or rubs. Regular rate and rhythm  RESP: CTAB/L, no wheezes, rales or rhonchi  ABD: Normal bowel sounds; soft; non-distended; non-tender; no CVA tenderness  EXT: Normal ROM. No calf tenderness or edema. Distal pulses intact  NEURO: Alert, oriented. Grossly unremarkable. No focal deficits  SKIN: Skin exam is warm and dry

## 2021-03-02 NOTE — ED PROVIDER NOTE - PATIENT PORTAL LINK FT
You can access the FollowMyHealth Patient Portal offered by [facility] by registering at the following website: [email or web address]. By joining [employer]’s FollowMyHealth portal, you will also be able to view your health information using other applications (apps) compatible with our system.

## 2021-03-05 LAB
CULTURE RESULTS: SIGNIFICANT CHANGE UP
SPECIMEN SOURCE: SIGNIFICANT CHANGE UP

## 2021-03-08 DIAGNOSIS — A41.9 SEPSIS, UNSPECIFIED ORGANISM: ICD-10-CM

## 2021-03-08 DIAGNOSIS — F19.19 OTHER PSYCHOACTIVE SUBSTANCE ABUSE WITH UNSPECIFIED PSYCHOACTIVE SUBSTANCE-INDUCED DISORDER: ICD-10-CM

## 2021-03-08 DIAGNOSIS — F41.9 ANXIETY DISORDER, UNSPECIFIED: ICD-10-CM

## 2021-03-08 DIAGNOSIS — E87.6 HYPOKALEMIA: ICD-10-CM

## 2021-03-08 DIAGNOSIS — R11.2 NAUSEA WITH VOMITING, UNSPECIFIED: ICD-10-CM

## 2021-03-08 DIAGNOSIS — F17.210 NICOTINE DEPENDENCE, CIGARETTES, UNCOMPLICATED: ICD-10-CM

## 2021-03-08 DIAGNOSIS — R10.9 UNSPECIFIED ABDOMINAL PAIN: ICD-10-CM

## 2021-03-08 DIAGNOSIS — J12.9 VIRAL PNEUMONIA, UNSPECIFIED: ICD-10-CM

## 2021-03-08 DIAGNOSIS — F32.9 MAJOR DEPRESSIVE DISORDER, SINGLE EPISODE, UNSPECIFIED: ICD-10-CM

## 2021-03-08 DIAGNOSIS — J44.0 CHRONIC OBSTRUCTIVE PULMONARY DISEASE WITH (ACUTE) LOWER RESPIRATORY INFECTION: ICD-10-CM

## 2021-03-08 DIAGNOSIS — F60.3 BORDERLINE PERSONALITY DISORDER: ICD-10-CM

## 2021-03-08 DIAGNOSIS — E46 UNSPECIFIED PROTEIN-CALORIE MALNUTRITION: ICD-10-CM

## 2021-03-08 DIAGNOSIS — B19.20 UNSPECIFIED VIRAL HEPATITIS C WITHOUT HEPATIC COMA: ICD-10-CM

## 2021-10-12 ENCOUNTER — OUTPATIENT (OUTPATIENT)
Dept: OUTPATIENT SERVICES | Facility: HOSPITAL | Age: 31
LOS: 1 days | Discharge: HOME | End: 2021-10-12

## 2021-10-12 DIAGNOSIS — I49.9 CARDIAC ARRHYTHMIA, UNSPECIFIED: ICD-10-CM

## 2021-10-12 DIAGNOSIS — Z00.8 ENCOUNTER FOR OTHER GENERAL EXAMINATION: ICD-10-CM

## 2021-10-12 DIAGNOSIS — Z90.49 ACQUIRED ABSENCE OF OTHER SPECIFIED PARTS OF DIGESTIVE TRACT: Chronic | ICD-10-CM

## 2021-10-13 ENCOUNTER — INPATIENT (INPATIENT)
Facility: HOSPITAL | Age: 31
LOS: 3 days | Discharge: HOME | End: 2021-10-17
Attending: INTERNAL MEDICINE | Admitting: INTERNAL MEDICINE
Payer: MEDICAID

## 2021-10-13 VITALS
RESPIRATION RATE: 18 BRPM | TEMPERATURE: 97 F | OXYGEN SATURATION: 99 % | HEART RATE: 88 BPM | HEIGHT: 62 IN | SYSTOLIC BLOOD PRESSURE: 116 MMHG | WEIGHT: 134.92 LBS | DIASTOLIC BLOOD PRESSURE: 63 MMHG

## 2021-10-13 DIAGNOSIS — Z90.49 ACQUIRED ABSENCE OF OTHER SPECIFIED PARTS OF DIGESTIVE TRACT: Chronic | ICD-10-CM

## 2021-10-13 DIAGNOSIS — F11.20 OPIOID DEPENDENCE, UNCOMPLICATED: ICD-10-CM

## 2021-10-13 LAB
ALBUMIN SERPL ELPH-MCNC: 4.1 G/DL — SIGNIFICANT CHANGE UP (ref 3.5–5.2)
ALP SERPL-CCNC: 81 U/L — SIGNIFICANT CHANGE UP (ref 30–115)
ALT FLD-CCNC: 24 U/L — SIGNIFICANT CHANGE UP (ref 0–41)
ANION GAP SERPL CALC-SCNC: 8 MMOL/L — SIGNIFICANT CHANGE UP (ref 7–14)
APAP SERPL-MCNC: <5 UG/ML — LOW (ref 10–30)
APPEARANCE UR: CLEAR — SIGNIFICANT CHANGE UP
AST SERPL-CCNC: 22 U/L — SIGNIFICANT CHANGE UP (ref 0–41)
BASOPHILS # BLD AUTO: 0.03 K/UL — SIGNIFICANT CHANGE UP (ref 0–0.2)
BASOPHILS NFR BLD AUTO: 0.4 % — SIGNIFICANT CHANGE UP (ref 0–1)
BILIRUB SERPL-MCNC: <0.2 MG/DL — SIGNIFICANT CHANGE UP (ref 0.2–1.2)
BILIRUB UR-MCNC: NEGATIVE — SIGNIFICANT CHANGE UP
BUN SERPL-MCNC: 5 MG/DL — LOW (ref 10–20)
CALCIUM SERPL-MCNC: 9.5 MG/DL — SIGNIFICANT CHANGE UP (ref 8.5–10.1)
CHLORIDE SERPL-SCNC: 103 MMOL/L — SIGNIFICANT CHANGE UP (ref 98–110)
CO2 SERPL-SCNC: 28 MMOL/L — SIGNIFICANT CHANGE UP (ref 17–32)
COLOR SPEC: YELLOW — SIGNIFICANT CHANGE UP
CREAT SERPL-MCNC: 0.9 MG/DL — SIGNIFICANT CHANGE UP (ref 0.7–1.5)
DIFF PNL FLD: NEGATIVE — SIGNIFICANT CHANGE UP
EOSINOPHIL # BLD AUTO: 0.11 K/UL — SIGNIFICANT CHANGE UP (ref 0–0.7)
EOSINOPHIL NFR BLD AUTO: 1.4 % — SIGNIFICANT CHANGE UP (ref 0–8)
ETHANOL SERPL-MCNC: <10 MG/DL — SIGNIFICANT CHANGE UP
GLUCOSE SERPL-MCNC: 80 MG/DL — SIGNIFICANT CHANGE UP (ref 70–99)
GLUCOSE UR QL: NEGATIVE MG/DL — SIGNIFICANT CHANGE UP
HCG SERPL QL: NEGATIVE — SIGNIFICANT CHANGE UP
HCT VFR BLD CALC: 42.4 % — SIGNIFICANT CHANGE UP (ref 37–47)
HGB BLD-MCNC: 13.8 G/DL — SIGNIFICANT CHANGE UP (ref 12–16)
IMM GRANULOCYTES NFR BLD AUTO: 0.1 % — SIGNIFICANT CHANGE UP (ref 0.1–0.3)
KETONES UR-MCNC: NEGATIVE — SIGNIFICANT CHANGE UP
LEUKOCYTE ESTERASE UR-ACNC: NEGATIVE — SIGNIFICANT CHANGE UP
LYMPHOCYTES # BLD AUTO: 2.27 K/UL — SIGNIFICANT CHANGE UP (ref 1.2–3.4)
LYMPHOCYTES # BLD AUTO: 29 % — SIGNIFICANT CHANGE UP (ref 20.5–51.1)
MAGNESIUM SERPL-MCNC: 2 MG/DL — SIGNIFICANT CHANGE UP (ref 1.8–2.4)
MCHC RBC-ENTMCNC: 30.7 PG — SIGNIFICANT CHANGE UP (ref 27–31)
MCHC RBC-ENTMCNC: 32.5 G/DL — SIGNIFICANT CHANGE UP (ref 32–37)
MCV RBC AUTO: 94.2 FL — SIGNIFICANT CHANGE UP (ref 81–99)
MONOCYTES # BLD AUTO: 0.65 K/UL — HIGH (ref 0.1–0.6)
MONOCYTES NFR BLD AUTO: 8.3 % — SIGNIFICANT CHANGE UP (ref 1.7–9.3)
NEUTROPHILS # BLD AUTO: 4.75 K/UL — SIGNIFICANT CHANGE UP (ref 1.4–6.5)
NEUTROPHILS NFR BLD AUTO: 60.8 % — SIGNIFICANT CHANGE UP (ref 42.2–75.2)
NITRITE UR-MCNC: NEGATIVE — SIGNIFICANT CHANGE UP
NRBC # BLD: 0 /100 WBCS — SIGNIFICANT CHANGE UP (ref 0–0)
PH UR: 7 — SIGNIFICANT CHANGE UP (ref 5–8)
PLATELET # BLD AUTO: 281 K/UL — SIGNIFICANT CHANGE UP (ref 130–400)
POTASSIUM SERPL-MCNC: 4.8 MMOL/L — SIGNIFICANT CHANGE UP (ref 3.5–5)
POTASSIUM SERPL-SCNC: 4.8 MMOL/L — SIGNIFICANT CHANGE UP (ref 3.5–5)
PROT SERPL-MCNC: 6.4 G/DL — SIGNIFICANT CHANGE UP (ref 6–8)
PROT UR-MCNC: NEGATIVE MG/DL — SIGNIFICANT CHANGE UP
RBC # BLD: 4.5 M/UL — SIGNIFICANT CHANGE UP (ref 4.2–5.4)
RBC # FLD: 13.5 % — SIGNIFICANT CHANGE UP (ref 11.5–14.5)
SALICYLATES SERPL-MCNC: <0.3 MG/DL — LOW (ref 4–30)
SARS-COV-2 RNA SPEC QL NAA+PROBE: SIGNIFICANT CHANGE UP
SODIUM SERPL-SCNC: 139 MMOL/L — SIGNIFICANT CHANGE UP (ref 135–146)
SP GR SPEC: 1.02 — SIGNIFICANT CHANGE UP (ref 1.01–1.03)
UROBILINOGEN FLD QL: 0.2 MG/DL — SIGNIFICANT CHANGE UP
WBC # BLD: 7.82 K/UL — SIGNIFICANT CHANGE UP (ref 4.8–10.8)
WBC # FLD AUTO: 7.82 K/UL — SIGNIFICANT CHANGE UP (ref 4.8–10.8)

## 2021-10-13 PROCEDURE — 93010 ELECTROCARDIOGRAM REPORT: CPT

## 2021-10-13 PROCEDURE — 99281 EMR DPT VST MAYX REQ PHY/QHP: CPT

## 2021-10-13 PROCEDURE — 99221 1ST HOSP IP/OBS SF/LOW 40: CPT

## 2021-10-13 PROCEDURE — 99285 EMERGENCY DEPT VISIT HI MDM: CPT

## 2021-10-13 RX ORDER — METHADONE HYDROCHLORIDE 40 MG/1
TABLET ORAL
Refills: 0 | Status: DISCONTINUED | OUTPATIENT
Start: 2021-10-14 | End: 2021-10-14

## 2021-10-13 RX ORDER — DIAZEPAM 5 MG
5 TABLET ORAL EVERY 4 HOURS
Refills: 0 | Status: DISCONTINUED | OUTPATIENT
Start: 2021-10-13 | End: 2021-10-14

## 2021-10-13 RX ORDER — METHADONE HYDROCHLORIDE 40 MG/1
15 TABLET ORAL EVERY 12 HOURS
Refills: 0 | Status: DISCONTINUED | OUTPATIENT
Start: 2021-10-13 | End: 2021-10-14

## 2021-10-13 RX ORDER — DIAZEPAM 5 MG
5 TABLET ORAL EVERY 4 HOURS
Refills: 0 | Status: DISCONTINUED | OUTPATIENT
Start: 2021-10-13 | End: 2021-10-13

## 2021-10-13 RX ORDER — METHADONE HYDROCHLORIDE 40 MG/1
10 TABLET ORAL EVERY 12 HOURS
Refills: 0 | Status: DISCONTINUED | OUTPATIENT
Start: 2021-10-14 | End: 2021-10-14

## 2021-10-13 RX ADMIN — METHADONE HYDROCHLORIDE 15 MILLIGRAM(S): 40 TABLET ORAL at 20:01

## 2021-10-13 RX ADMIN — Medication 5 MILLIGRAM(S): at 22:51

## 2021-10-13 RX ADMIN — Medication 5 MILLIGRAM(S): at 20:00

## 2021-10-13 NOTE — CONSULT NOTE ADULT - PROBLEM SELECTOR RECOMMENDATION 9
After evaluation at this time recommend methadone protocol with possible admission to [facility] after discharge would consider maintaining methadone dose during admission once approved by Dr. Malhotra. Benzo withdrawal would recommend diazepam detox protocol. Pt would be possible discharge on sunday with admission to [facility] on Monday.  Pt will be monitored and supportive care provided  CATCH team involved for aftercare and pt will follow up with aftercare

## 2021-10-13 NOTE — PATIENT PROFILE ADULT - LONGEST PERIOD TOBACCO-FREE
Obstetric/Gynecology Resident Interval Note    Called back into the room for more bleeding. Patient denying any lightheadedness, dizziness, HA, CP, SOB. Patient examined and no trickling of blood noted but pads underneath buttocks were moderately saturated. Bimanual performed and small amount of clots removed from lower uterine segment and bleeding remained stable with no trickle of blood. Fundus very firm at umbilicus. Will monitor closely and weight pads.      Vitals:    11/10/20 0124 11/10/20 0632 11/10/20 0701 11/10/20 0801   BP: (!) 94/49 128/86 118/81 (!) 146/85   Pulse: 84 72 61 69   Resp: 16 18  18   Temp: 98.3 °F (36.8 °C) 97.2 °F (36.2 °C)  97.9 °F (36.6 °C)   TempSrc: Oral Oral  Oral   Weight:       Height:           Caty Javed DO  OBGYN Resident, PGY3  [address]  11/10/2020, 10:42 AM 0

## 2021-10-13 NOTE — ED PROVIDER NOTE - CARE PLAN
Principal Discharge DX:	Drug abuse  Secondary Diagnosis:	Opiate withdrawal  Secondary Diagnosis:	Benzodiazepine withdrawal   1

## 2021-10-13 NOTE — ED PROVIDER NOTE - ATTENDING CONTRIBUTION TO CARE
29 yo F PMHx COPD, drug abuse, depression and anxiety presents from outpt rehab medicine for possible admission for detox from Xanax.  Pt uses 5-6 sticks of Xanax per day and 2 bundles of heroin.  no SI, no CP, no SOB, Last use was last night, + slight nausea, no vomiting, no diarrhea, On exam pt in NAD AAO x 3, seen ambulate with steady gait, no signs of trauma, Lungs cta b/l no wrr . abd soft nt nd,

## 2021-10-13 NOTE — CONSULT NOTE ADULT - SUBJECTIVE AND OBJECTIVE BOX
Pt interviewed, examined and EMR chart reviewed.  Pt evaluation for polysubstance abuse.  Pt did not complaint detox 3 weeks ago and left AMA.  Pt admits to using opiates 2 bundles of heroine IV with cocaine x 4   months. Last use 1 dpta.    Hx of withdrawal   variable periods of sobriety in the past.  Pt admits to using 5-6 sticks of xanax per day x 4months. Last use 1 DPTA   Withdrawal Seizures  Has been in detox before __X___yes,   _____No    SOCIAL HISTORY:    REVIEW OF SYSTEMS:    Constitutional: No fever, weight loss or fatigue  ENT:  No difficulty hearing, tinnitus, vertigo; No sinus or throat pain  Neck: No pain or stiffness  Respiratory: No cough, wheezing, chills or hemoptysis  Cardiovascular: No chest pain, palpitations, shortness of breath, dizziness or leg swelling  Gastrointestinal: No abdominal or epigastric pain. No nausea, vomiting or hematemesis; No diarrhea or constipation. No melena or hematochezia.  Neurological: No headaches, memory loss, loss of strength, numbness or tremors  Musculoskeletal: No joint pain or swelling; No muscle, back or extremity pain  Psychiatric: No depression, anxiety, mood swings or difficulty sleeping    MEDICATIONS  (STANDING):    MEDICATIONS  (PRN):      Vital Signs Last 24 Hrs  T(C): 36 (13 Oct 2021 12:02), Max: 36 (13 Oct 2021 12:02)  T(F): 96.8 (13 Oct 2021 12:02), Max: 96.8 (13 Oct 2021 12:02)  HR: 88 (13 Oct 2021 12:02) (88 - 88)  BP: 116/63 (13 Oct 2021 12:02) (116/63 - 116/63)  BP(mean): --  RR: 18 (13 Oct 2021 12:02) (18 - 18)  SpO2: 99% (13 Oct 2021 12:02) (99% - 99%)    PHYSICAL EXAM:    Constitutional: NAD, well-groomed, well-developed  HEENT: PERRLA, EOMI, Normal Hearing, MMM  Neck: No LAD, No JVD  Back: Normal spine flexure, No CVA tenderness  Respiratory: CTAB/L  Cardiovascular: S1 and S2, RRR, no M/G/R  Gastrointestinal: BS+, soft, NT/ND  Extremities: No peripheral edema  Vascular: 2+ peripheral pulses  Neurological: A/O x 3, no focal deficits    LABS:                        13.8   7.82  )-----------( 281      ( 13 Oct 2021 15:23 )             42.4     10-13    139  |  103  |  5<L>  ----------------------------<  80  4.8   |  28  |  0.9    Ca    9.5      13 Oct 2021 15:23  Mg     2.0     10-13    TPro  6.4  /  Alb  4.1  /  TBili  <0.2  /  DBili  x   /  AST  22  /  ALT  24  /  AlkPhos  81  10-13        Drug Screen Urine:  Alcohol Level  Alcohol, Blood: <10 mg/dL (10-13-21 @ 15:23)        RADIOLOGY & ADDITIONAL STUDIES:

## 2021-10-13 NOTE — H&P ADULT - HISTORY OF PRESENT ILLNESS
31yo F w pmhx of [facility] personality dx sent from methadone clinic, Wants patient admitted to medicine service for benzo detox before she is started on methadone. Last use heroin and xanax yesterday,

## 2021-10-13 NOTE — ED PROVIDER NOTE - OBJECTIVE STATEMENT
Patient sent from methadone clinic, Wants patient admitted to medicine service for benzo detox before she is started on methadone. Last use heroin and xanax yesterday, no fever, no chest pain, C/o slight nausea, no abdominal pain

## 2021-10-14 PROCEDURE — 99231 SBSQ HOSP IP/OBS SF/LOW 25: CPT

## 2021-10-14 RX ORDER — DIAZEPAM 5 MG
5 TABLET ORAL THREE TIMES A DAY
Refills: 0 | Status: DISCONTINUED | OUTPATIENT
Start: 2021-10-15 | End: 2021-10-15

## 2021-10-14 RX ORDER — DIAZEPAM 5 MG
2 TABLET ORAL EVERY 6 HOURS
Refills: 0 | Status: DISCONTINUED | OUTPATIENT
Start: 2021-10-17 | End: 2021-10-17

## 2021-10-14 RX ORDER — DIAZEPAM 5 MG
5 TABLET ORAL EVERY 6 HOURS
Refills: 0 | Status: DISCONTINUED | OUTPATIENT
Start: 2021-10-14 | End: 2021-10-14

## 2021-10-14 RX ORDER — METHADONE HYDROCHLORIDE 40 MG/1
20 TABLET ORAL DAILY
Refills: 0 | Status: DISCONTINUED | OUTPATIENT
Start: 2021-10-14 | End: 2021-10-17

## 2021-10-14 RX ORDER — DIAZEPAM 5 MG
2 TABLET ORAL EVERY 6 HOURS
Refills: 0 | Status: DISCONTINUED | OUTPATIENT
Start: 2021-10-16 | End: 2021-10-16

## 2021-10-14 RX ADMIN — Medication 5 MILLIGRAM(S): at 14:33

## 2021-10-14 RX ADMIN — METHADONE HYDROCHLORIDE 15 MILLIGRAM(S): 40 TABLET ORAL at 05:40

## 2021-10-14 RX ADMIN — Medication 5 MILLIGRAM(S): at 05:40

## 2021-10-14 RX ADMIN — METHADONE HYDROCHLORIDE 10 MILLIGRAM(S): 40 TABLET ORAL at 18:14

## 2021-10-14 RX ADMIN — Medication 5 MILLIGRAM(S): at 18:14

## 2021-10-14 RX ADMIN — Medication 5 MILLIGRAM(S): at 10:23

## 2021-10-14 NOTE — PROGRESS NOTE ADULT - PROBLEM SELECTOR PLAN 1
After evaluation at this time protocol for diazepam should be completed by sunday for discharge home. Pt will be maintained on 20mg per day until discharge of methadone. Pt will follow up with [facility] on Monday morning.  Pt will be monitored and supportive care provided  CATCH team involved for aftercare and pt will follow up with aftercare

## 2021-10-15 LAB
COVID-19 SPIKE DOMAIN AB INTERP: POSITIVE
COVID-19 SPIKE DOMAIN ANTIBODY RESULT: 0.83 U/ML — HIGH
SARS-COV-2 IGG+IGM SERPL QL IA: 0.83 U/ML — HIGH
SARS-COV-2 IGG+IGM SERPL QL IA: POSITIVE

## 2021-10-15 PROCEDURE — 99231 SBSQ HOSP IP/OBS SF/LOW 25: CPT

## 2021-10-15 RX ADMIN — Medication 5 MILLIGRAM(S): at 16:34

## 2021-10-15 RX ADMIN — Medication 5 MILLIGRAM(S): at 22:04

## 2021-10-15 RX ADMIN — Medication 5 MILLIGRAM(S): at 05:28

## 2021-10-15 RX ADMIN — METHADONE HYDROCHLORIDE 20 MILLIGRAM(S): 40 TABLET ORAL at 10:33

## 2021-10-16 PROCEDURE — 99231 SBSQ HOSP IP/OBS SF/LOW 25: CPT

## 2021-10-16 RX ORDER — ESCITALOPRAM OXALATE 10 MG/1
20 TABLET, FILM COATED ORAL DAILY
Refills: 0 | Status: DISCONTINUED | OUTPATIENT
Start: 2021-10-16 | End: 2021-10-17

## 2021-10-16 RX ORDER — LAMOTRIGINE 25 MG/1
200 TABLET, ORALLY DISINTEGRATING ORAL AT BEDTIME
Refills: 0 | Status: DISCONTINUED | OUTPATIENT
Start: 2021-10-16 | End: 2021-10-17

## 2021-10-16 RX ORDER — METHADONE HYDROCHLORIDE 40 MG/1
10 TABLET ORAL ONCE
Refills: 0 | Status: DISCONTINUED | OUTPATIENT
Start: 2021-10-16 | End: 2021-10-16

## 2021-10-16 RX ADMIN — Medication 2 MILLIGRAM(S): at 11:34

## 2021-10-16 RX ADMIN — Medication 2 MILLIGRAM(S): at 06:01

## 2021-10-16 RX ADMIN — Medication 2 MILLIGRAM(S): at 00:08

## 2021-10-16 RX ADMIN — LAMOTRIGINE 200 MILLIGRAM(S): 25 TABLET, ORALLY DISINTEGRATING ORAL at 22:02

## 2021-10-16 RX ADMIN — METHADONE HYDROCHLORIDE 20 MILLIGRAM(S): 40 TABLET ORAL at 11:34

## 2021-10-16 RX ADMIN — Medication 2 MILLIGRAM(S): at 17:32

## 2021-10-16 RX ADMIN — METHADONE HYDROCHLORIDE 10 MILLIGRAM(S): 40 TABLET ORAL at 18:29

## 2021-10-16 RX ADMIN — ESCITALOPRAM OXALATE 20 MILLIGRAM(S): 10 TABLET, FILM COATED ORAL at 22:02

## 2021-10-16 NOTE — PROGRESS NOTE ADULT - SUBJECTIVE AND OBJECTIVE BOX
RADU CONTRERAS  30y, Female  Allergy: No Known Allergies    Hospital Day: 2d    Patient seen and examined, no acute events overnight.    PMH/PSH:  PAST MEDICAL & SURGICAL HISTORY:  Hepatitis C    COPD (chronic obstructive pulmonary disease)    Borderline personality disorder    Anxiety and depression    Nicotine dependence    S/P cholecystectomy  in     VITALS:  T(F): 96.9 (10-15-21 @ 05:39), Max: 97.6 (10-14-21 @ 21:05)  HR: 56 (10-15-21 @ 05:39)  BP: 97/51 (10-15-21 @ 05:39) (91/51 - 98/52)  RR: 16 (10-15-21 @ 05:39)  SpO2: --    TESTS & MEASUREMENTS:  Weight (Kg): 57.8 (10-13-21 @ 22:30)  BMI (kg/m2): 23.3 (10-13)                          13.8   7.82  )-----------( 281      ( 13 Oct 2021 15:23 )             42.4     10-13    139  |  103  |  5<L>  ----------------------------<  80  4.8   |  28  |  0.9    Ca    9.5      13 Oct 2021 15:23  Mg     2.0     10-13    TPro  6.4  /  Alb  4.1  /  TBili  <0.2  /  DBili  x   /  AST  22  /  ALT  24  /  AlkPhos  81  10-13    LIVER FUNCTIONS - ( 13 Oct 2021 15:23 )  Alb: 4.1 g/dL / Pro: 6.4 g/dL / ALK PHOS: 81 U/L / ALT: 24 U/L / AST: 22 U/L / GGT: x           Urinalysis Basic - ( 13 Oct 2021 15:23 )    Color: Yellow / Appearance: Clear / S.020 / pH: x  Gluc: x / Ketone: Negative  / Bili: Negative / Urobili: 0.2 mg/dL   Blood: x / Protein: Negative mg/dL / Nitrite: Negative   Leuk Esterase: Negative / RBC: x / WBC x   Sq Epi: x / Non Sq Epi: x / Bacteria: x    MEDICATIONS:  MEDICATIONS  (STANDING):  diazepam    Tablet 5 milliGRAM(s) Oral three times a day  methadone    Tablet 20 milliGRAM(s) Oral daily    MEDICATIONS  (PRN):      HOME MEDICATIONS:  Albuterol (Eqv-ProAir HFA) 90 mcg/inh inhalation aerosol (10-13)  Invega 1.5 mg oral tablet, extended release (10-13)  Lexapro (10-13)  Xanax 2 mg oral tablet (10-13)      REVIEW OF SYSTEMS:  All other review of systems is negative unless indicated above.     PHYSICAL EXAM:  PHYSICAL EXAM:  GENERAL: NAD, well-developed  HEAD:  Atraumatic, Normocephalic  EYES: EOMI, PERRLA, conjunctiva and sclera clear  NECK: Supple, No JVD  CHEST/LUNG: Clear to auscultation bilaterally; No wheeze  HEART: Regular rate and rhythm; No murmurs, rubs, or gallops  ABDOMEN: Soft, Nontender, Nondistended; Bowel sounds present  EXTREMITIES:  2+ Peripheral Pulses, No clubbing, cyanosis, or edema  PSYCH: AAOx3  SKIN: No rashes or lesions      
    Pt interviewed, examined and EMR chart reviewed.    Follow up of Opiate Addiction and Benzo Dependency. Pt is on diazepam protocol and will maintain on methadone of 20mg per day.  Pt is doing better. Complaint of some irritability and muscle aches but slept well.     SOCIAL HISTORY:    REVIEW OF SYSTEMS:    Constitutional: No fever, weight loss or fatigue  ENT:  No difficulty hearing, tinnitus, vertigo; No sinus or throat pain  Neck: No pain or stiffness  Respiratory: No cough, wheezing, chills or hemoptysis  Cardiovascular: No chest pain, palpitations, shortness of breath, dizziness or leg swelling  Gastrointestinal: No abdominal or epigastric pain. No nausea, vomiting or hematemesis; No diarrhea or constipation. No melena or hematochezia.  Neurological: No headaches, memory loss, loss of strength, numbness or tremors  Musculoskeletal: No joint pain or swelling; No muscle, back or extremity pain      MEDICATIONS  (STANDING):  diazepam    Tablet 5 milliGRAM(s) Oral every 6 hours  methadone    Tablet 10 milliGRAM(s) Oral every 12 hours  methadone    Tablet   Oral     MEDICATIONS  (PRN):      Vital Signs Last 24 Hrs  T(C): 36.2 (14 Oct 2021 14:36), Max: 36.3 (14 Oct 2021 05:21)  T(F): 97.1 (14 Oct 2021 14:36), Max: 97.4 (14 Oct 2021 05:21)  HR: 57 (14 Oct 2021 14:36) (57 - 65)  BP: 91/51 (14 Oct 2021 14:36) (91/51 - 108/51)  BP(mean): --  RR: 16 (14 Oct 2021 14:36) (16 - 16)  SpO2: --    PHYSICAL EXAM:    Constitutional: NAD, well-groomed, well-developed  HEENT: PERRLA, EOMI, Normal Hearing, MMM  Neck: No LAD, No JVD  Back: Normal spine flexure, No CVA tenderness  Respiratory: CTAB/L  Cardiovascular: S1 and S2, RRR, no M/G/R  Gastrointestinal: BS+, soft, NT/ND  Extremities: No peripheral edema  Vascular: 2+ peripheral pulses  Neurological: A/O x 3, no focal deficits    LABS:                        13.8   7.82  )-----------( 281      ( 13 Oct 2021 15:23 )             42.4     10-13    139  |  103  |  5<L>  ----------------------------<  80  4.8   |  28  |  0.9    Ca    9.5      13 Oct 2021 15:23  Mg     2.0     10-13    TPro  6.4  /  Alb  4.1  /  TBili  <0.2  /  DBili  x   /  AST  22  /  ALT  24  /  AlkPhos  81  10-13      Urinalysis Basic - ( 13 Oct 2021 15:23 )    Color: Yellow / Appearance: Clear / S.020 / pH: x  Gluc: x / Ketone: Negative  / Bili: Negative / Urobili: 0.2 mg/dL   Blood: x / Protein: Negative mg/dL / Nitrite: Negative   Leuk Esterase: Negative / RBC: x / WBC x   Sq Epi: x / Non Sq Epi: x / Bacteria: x      Drug Screen Urine:  Alcohol Level  Alcohol, Blood: <10 mg/dL (10-13-21 @ 15:23)        RADIOLOGY & ADDITIONAL STUDIES:  
  RADU CONTRERAS  30y, Female  Allergy: No Known Allergies    Hospital Day: 1d    Patient seen and examined earlier today. No acute events overnight.    PMH/PSH:  PAST MEDICAL & SURGICAL HISTORY:  Hepatitis C    COPD (chronic obstructive pulmonary disease)    Borderline personality disorder    Anxiety and depression    Nicotine dependence    S/P cholecystectomy  in     VITALS:  T(F): 97.4 (10-14-21 @ 05:21), Max: 97.4 (10-14-21 @ 05:21)  HR: 65 (10-14-21 @ 05:21)  BP: 103/55 (10-14-21 @ 05:21) (103/55 - 108/51)  RR: 16 (10-14-21 @ 05:21)  SpO2: --    TESTS & MEASUREMENTS:  Weight (Kg): 57.8 (10-13-21 @ 22:30)  BMI (kg/m2): 23.3 (10-13)                        13.8   7.82  )-----------( 281      ( 13 Oct 2021 15:23 )             42.4     10-13    139  |  103  |  5<L>  ----------------------------<  80  4.8   |  28  |  0.9    Ca    9.5      13 Oct 2021 15:23  Mg     2.0     10-13    TPro  6.4  /  Alb  4.1  /  TBili  <0.2  /  DBili  x   /  AST  22  /  ALT  24  /  AlkPhos  81  1013    LIVER FUNCTIONS - ( 13 Oct 2021 15:23 )  Alb: 4.1 g/dL / Pro: 6.4 g/dL / ALK PHOS: 81 U/L / ALT: 24 U/L / AST: 22 U/L / GGT: x           Urinalysis Basic - ( 13 Oct 2021 15:23 )    Color: Yellow / Appearance: Clear / S.020 / pH: x  Gluc: x / Ketone: Negative  / Bili: Negative / Urobili: 0.2 mg/dL   Blood: x / Protein: Negative mg/dL / Nitrite: Negative   Leuk Esterase: Negative / RBC: x / WBC x   Sq Epi: x / Non Sq Epi: x / Bacteria: x    RECENT DIAGNOSTIC ORDERS:  COVID-19 Freddy Domain Antibody: AM Sched. Collection: 14-Oct-2021 04:30 (10-13-21 @ 21:22)  Diet, Regular (10-13-21 @ 18:51)  Drug Screen 1, Urine: STAT (10-13-21 @ 15:23)    MEDICATIONS:  MEDICATIONS  (STANDING):  diazepam    Tablet 5 milliGRAM(s) Oral every 4 hours  methadone    Tablet 10 milliGRAM(s) Oral every 12 hours  methadone    Tablet   Oral     MEDICATIONS  (PRN):    HOME MEDICATIONS:  Albuterol (Eqv-ProAir HFA) 90 mcg/inh inhalation aerosol (10-13)  Invega 1.5 mg oral tablet, extended release (10-13)  Lexapro (10-13)  Xanax 2 mg oral tablet (10-13)    REVIEW OF SYSTEMS:  All other review of systems is negative unless indicated above.     PHYSICAL EXAM:  GENERAL: NAD  HEENT: No Swelling  CHEST/LUNG: Good air entry, No wheezing  HEART: RRR, No murmurs  ABDOMEN: Soft, Bowel sounds present  EXTREMITIES:  No clubbing      
  RADU CONTRERAS  30y, Female  Allergy: No Known Allergies    Hospital Day: 3d    Patient seen and examined. No acute events overnight    PMH/PSH:  PAST MEDICAL & SURGICAL HISTORY:  Hepatitis C    COPD (chronic obstructive pulmonary disease)    Borderline personality disorder    Anxiety and depression    Nicotine dependence    S/P cholecystectomy  in 2010    VITALS:  T(F): 97.9 (10-16-21 @ 05:00), Max: 97.9 (10-16-21 @ 05:00)  HR: 79 (10-16-21 @ 05:00)  BP: 103/73 (10-16-21 @ 05:00) (99/68 - 105/68)  RR: 16 (10-16-21 @ 05:00)  SpO2: --    TESTS & MEASUREMENTS:  Weight (Kg):   BMI (kg/m2): 23.3 (10-13)    MEDICATIONS:  MEDICATIONS  (STANDING):  diazepam    Tablet 2 milliGRAM(s) Oral every 6 hours  methadone    Tablet 20 milliGRAM(s) Oral daily    MEDICATIONS  (PRN):    HOME MEDICATIONS:  Albuterol (Eqv-ProAir HFA) 90 mcg/inh inhalation aerosol (10-13)  Invega 1.5 mg oral tablet, extended release (10-13)  Lexapro (10-13)  Xanax 2 mg oral tablet (10-13)    REVIEW OF SYSTEMS:  All other review of systems is negative unless indicated above.     PHYSICAL EXAM:  PHYSICAL EXAM:  GENERAL: NAD, well-developed  HEAD:  Atraumatic, Normocephalic  NECK: Supple, No JVD  CHEST/LUNG: Clear to auscultation bilaterally; No wheeze  HEART: Regular rate and rhythm; No murmurs, rubs, or gallops  ABDOMEN: Soft, Nontender, Nondistended; Bowel sounds present  EXTREMITIES:  2+ Peripheral Pulses, No clubbing, cyanosis, or edema  PSYCH: AAOx3  SKIN: No rashes or lesions

## 2021-10-16 NOTE — CHART NOTE - NSCHARTNOTEFT_GEN_A_CORE
Asked by Dr Mohan to order additional 10 mg Methadone now.    Pt also states she takes Lexapro and Lamictal - called pharmacy to verify doses and they state she has not had a prescription since 11/20 - d/w Dr Mohan will hold off on ordering those. Asked by Dr Mohan to order additional 10 mg Methadone now.    Pt also states she takes Lexapro and Lamictal - called pharmacy to verify doses and they state she has not had a prescription since 11/20 - d/w Dr Mohan will hold off on ordering those.    Pt states she uses different pharmacy [facility] - called and current prescriptions verified    Lexapro 20mg Q24  Lamictal 200mg Q24  Gabapentin 300mg Q12  Inviga 1.5mg Q24  Adderal 20mg Q8  Xanax 2 mg Q8 Asked by Dr Mohan to order additional 10 mg Methadone now.    Pt also states she takes Lexapro and Lamictal - called pharmacy to verify doses and they state she has not had a prescription since 11/20 - d/w Dr Mohan will hold off on ordering those.    Pt states she uses different pharmacy [facility] - called and current prescriptions verified    Lexapro 20mg Q24  Lamictal 200mg Q24  Gabapentin 300mg Q12  Inviga 1.5mg Q24  Adderal 20mg Q8  Xanax 2 mg Q8    Pt states she stopped taking Adderal and Gabapentin on her own - she will fup with Dr Malhotra to discuss that with him

## 2021-10-16 NOTE — PROGRESS NOTE ADULT - ASSESSMENT
29yo F w pmhx of boderline personality dx sent from methadone clinic for detox    #Polysubstance abuse  CATCH team recds appreciated  - Cont protocol with diazepam (taper per CATCH team) and methadone taper (Severe withdrawal)  - Cont to monitor inpatient    #Borderline personality dx  - Cont lamictal 200mg once a day  - Cont lexapro 20mg once a day    DVT PPx, low risk pt is ambulatory
29yo F w pmhx of b[facility]line personality dx sent from methadone clinic for detox    #Polysubstance abuse  CATCH team recds appreciated  - Cont protocol with diazepam (taper per CATCH team) and methadone 20mg once a day. Protocol to be completed on Sunday, 10/17/2021  - Cont to monitor inpatient    #Borderline personality dx  - Cont lamictal 200mg once a day  - Cont lexapro 20mg once a day    DVT PPx, low risk pt is ambulatory
29yo F w pmhx of b[facility]line personality dx sent from methadone clinic for detox    #Polysubstance abuse  CATCH team recds appreciated  - Cont protocol with diazepam (taper per CATCH team) and methadone 20mg once a day. Protocol to be completed on Sunday, 10/17/2021  - Cont to monitor inpatient    #Borderline personality dx  - Cont lamictal 200mg once a day  - Cont lexapro 20mg once a day    DVT PPx, low risk pt is ambulatory

## 2021-10-17 VITALS
HEART RATE: 91 BPM | DIASTOLIC BLOOD PRESSURE: 58 MMHG | RESPIRATION RATE: 16 BRPM | SYSTOLIC BLOOD PRESSURE: 125 MMHG | TEMPERATURE: 98 F

## 2021-10-17 PROCEDURE — 99238 HOSP IP/OBS DSCHRG MGMT 30/<: CPT

## 2021-10-17 RX ORDER — METHADONE HYDROCHLORIDE 40 MG/1
2 TABLET ORAL
Qty: 0 | Refills: 0 | DISCHARGE
Start: 2021-10-17

## 2021-10-17 RX ORDER — LAMOTRIGINE 25 MG/1
1 TABLET, ORALLY DISINTEGRATING ORAL
Qty: 0 | Refills: 0 | DISCHARGE
Start: 2021-10-17

## 2021-10-17 RX ORDER — ALPRAZOLAM 0.25 MG
1 TABLET ORAL
Qty: 0 | Refills: 0 | DISCHARGE

## 2021-10-17 RX ADMIN — METHADONE HYDROCHLORIDE 20 MILLIGRAM(S): 40 TABLET ORAL at 11:25

## 2021-10-17 RX ADMIN — Medication 2 MILLIGRAM(S): at 05:29

## 2021-10-17 RX ADMIN — Medication 2 MILLIGRAM(S): at 01:32

## 2021-10-17 NOTE — DISCHARGE NOTE PROVIDER - HOSPITAL COURSE
29yo F w pmhx of [facility] personality dx sent from methadone clinic for detox. Patient is s/p valium taper and initation of methadone. She is to follow [facility] clinic tomorrow morning.

## 2021-10-17 NOTE — DISCHARGE NOTE PROVIDER - NSDCMRMEDTOKEN_GEN_ALL_CORE_FT
Albuterol (Eqv-ProAir HFA) 90 mcg/inh inhalation aerosol: 2 puff(s) inhaled every 6 hours  Invega 1.5 mg oral tablet, extended release: 1 tab(s) orally once a day (in the morning)  lamoTRIgine 200 mg oral tablet: 1 tab(s) orally once a day (at bedtime)  Lexapro: 20 milligram(s) orally once a day  methadone 10 mg oral tablet: 2 tab(s) orally once a day

## 2021-10-17 NOTE — DISCHARGE NOTE NURSING/CASE MANAGEMENT/SOCIAL WORK - PATIENT PORTAL LINK FT
You can access the FollowMyHealth Patient Portal offered by [facility] by registering at the following website: [email or web address]. By joining [employer]’s FollowMyHealth portal, you will also be able to view your health information using other applications (apps) compatible with our system.

## 2021-10-17 NOTE — DISCHARGE NOTE PROVIDER - NSDCCPCAREPLAN_GEN_ALL_CORE_FT
PRINCIPAL DISCHARGE DIAGNOSIS  Diagnosis: Opiate withdrawal  Assessment and Plan of Treatment: Follow up at [facility] clinic tomorrow for further management and dosing.

## 2021-10-18 ENCOUNTER — OUTPATIENT (OUTPATIENT)
Dept: OUTPATIENT SERVICES | Facility: HOSPITAL | Age: 31
LOS: 1 days | Discharge: HOME | End: 2021-10-18

## 2021-10-18 DIAGNOSIS — Z90.49 ACQUIRED ABSENCE OF OTHER SPECIFIED PARTS OF DIGESTIVE TRACT: Chronic | ICD-10-CM

## 2021-10-18 DIAGNOSIS — I49.9 CARDIAC ARRHYTHMIA, UNSPECIFIED: ICD-10-CM

## 2021-10-22 DIAGNOSIS — F17.210 NICOTINE DEPENDENCE, CIGARETTES, UNCOMPLICATED: ICD-10-CM
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
## 2021-10-22 DIAGNOSIS — Z90.49 ACQUIRED ABSENCE OF OTHER SPECIFIED PARTS OF DIGESTIVE TRACT: ICD-10-CM

## 2021-10-22 DIAGNOSIS — J44.9 CHRONIC OBSTRUCTIVE PULMONARY DISEASE, UNSPECIFIED: ICD-10-CM

## 2021-10-22 DIAGNOSIS — F41.9 ANXIETY DISORDER, UNSPECIFIED: ICD-10-CM

## 2021-10-22 DIAGNOSIS — F13.239 SEDATIVE, HYPNOTIC OR ANXIOLYTIC DEPENDENCE WITH WITHDRAWAL, UNSPECIFIED: ICD-10-CM

## 2021-10-22 DIAGNOSIS — F11.23 OPIOID DEPENDENCE WITH WITHDRAWAL: ICD-10-CM

## 2021-10-22 DIAGNOSIS — F60.3 BORDERLINE PERSONALITY DISORDER: ICD-10-CM

## 2021-12-03 ENCOUNTER — EMERGENCY (EMERGENCY)
Facility: HOSPITAL | Age: 31
LOS: 0 days | Discharge: HOME | End: 2021-12-03
Attending: EMERGENCY MEDICINE | Admitting: EMERGENCY MEDICINE
Payer: MEDICAID

## 2021-12-03 VITALS — WEIGHT: 115.08 LBS | HEIGHT: 62 IN

## 2021-12-03 VITALS — HEART RATE: 67 BPM | DIASTOLIC BLOOD PRESSURE: 60 MMHG | SYSTOLIC BLOOD PRESSURE: 143 MMHG

## 2021-12-03 DIAGNOSIS — F41.9 ANXIETY DISORDER, UNSPECIFIED: ICD-10-CM

## 2021-12-03 DIAGNOSIS — R10.9 UNSPECIFIED ABDOMINAL PAIN: ICD-10-CM

## 2021-12-03 DIAGNOSIS — J44.9 CHRONIC OBSTRUCTIVE PULMONARY DISEASE, UNSPECIFIED: ICD-10-CM

## 2021-12-03 DIAGNOSIS — R11.2 NAUSEA WITH VOMITING, UNSPECIFIED: ICD-10-CM

## 2021-12-03 DIAGNOSIS — F32.A DEPRESSION, UNSPECIFIED: ICD-10-CM

## 2021-12-03 DIAGNOSIS — F17.200 NICOTINE DEPENDENCE, UNSPECIFIED, UNCOMPLICATED: ICD-10-CM

## 2021-12-03 DIAGNOSIS — F11.10 OPIOID ABUSE, UNCOMPLICATED: ICD-10-CM

## 2021-12-03 DIAGNOSIS — Z87.19 PERSONAL HISTORY OF OTHER DISEASES OF THE DIGESTIVE SYSTEM: ICD-10-CM

## 2021-12-03 DIAGNOSIS — Z90.49 ACQUIRED ABSENCE OF OTHER SPECIFIED PARTS OF DIGESTIVE TRACT: Chronic | ICD-10-CM

## 2021-12-03 LAB
ALBUMIN SERPL ELPH-MCNC: 5.1 G/DL — SIGNIFICANT CHANGE UP (ref 3.5–5.2)
ALP SERPL-CCNC: 110 U/L — SIGNIFICANT CHANGE UP (ref 30–115)
ALT FLD-CCNC: 37 U/L — SIGNIFICANT CHANGE UP (ref 0–41)
ANION GAP SERPL CALC-SCNC: 15 MMOL/L — HIGH (ref 7–14)
AST SERPL-CCNC: 22 U/L — SIGNIFICANT CHANGE UP (ref 0–41)
BASOPHILS # BLD AUTO: 0.04 K/UL — SIGNIFICANT CHANGE UP (ref 0–0.2)
BASOPHILS NFR BLD AUTO: 0.3 % — SIGNIFICANT CHANGE UP (ref 0–1)
BILIRUB SERPL-MCNC: 0.4 MG/DL — SIGNIFICANT CHANGE UP (ref 0.2–1.2)
BUN SERPL-MCNC: 14 MG/DL — SIGNIFICANT CHANGE UP (ref 10–20)
CALCIUM SERPL-MCNC: 9.8 MG/DL — SIGNIFICANT CHANGE UP (ref 8.5–10.1)
CHLORIDE SERPL-SCNC: 96 MMOL/L — LOW (ref 98–110)
CO2 SERPL-SCNC: 24 MMOL/L — SIGNIFICANT CHANGE UP (ref 17–32)
CREAT SERPL-MCNC: 0.7 MG/DL — SIGNIFICANT CHANGE UP (ref 0.7–1.5)
EOSINOPHIL # BLD AUTO: 0 K/UL — SIGNIFICANT CHANGE UP (ref 0–0.7)
EOSINOPHIL NFR BLD AUTO: 0 % — SIGNIFICANT CHANGE UP (ref 0–8)
GLUCOSE SERPL-MCNC: 85 MG/DL — SIGNIFICANT CHANGE UP (ref 70–99)
HCT VFR BLD CALC: 46.5 % — SIGNIFICANT CHANGE UP (ref 37–47)
HGB BLD-MCNC: 15.3 G/DL — SIGNIFICANT CHANGE UP (ref 12–16)
IMM GRANULOCYTES NFR BLD AUTO: 0.4 % — HIGH (ref 0.1–0.3)
LIDOCAIN IGE QN: 17 U/L — SIGNIFICANT CHANGE UP (ref 7–60)
LYMPHOCYTES # BLD AUTO: 1.65 K/UL — SIGNIFICANT CHANGE UP (ref 1.2–3.4)
LYMPHOCYTES # BLD AUTO: 14.3 % — LOW (ref 20.5–51.1)
MCHC RBC-ENTMCNC: 29.8 PG — SIGNIFICANT CHANGE UP (ref 27–31)
MCHC RBC-ENTMCNC: 32.9 G/DL — SIGNIFICANT CHANGE UP (ref 32–37)
MCV RBC AUTO: 90.5 FL — SIGNIFICANT CHANGE UP (ref 81–99)
MONOCYTES # BLD AUTO: 0.86 K/UL — HIGH (ref 0.1–0.6)
MONOCYTES NFR BLD AUTO: 7.5 % — SIGNIFICANT CHANGE UP (ref 1.7–9.3)
NEUTROPHILS # BLD AUTO: 8.92 K/UL — HIGH (ref 1.4–6.5)
NEUTROPHILS NFR BLD AUTO: 77.5 % — HIGH (ref 42.2–75.2)
NRBC # BLD: 0 /100 WBCS — SIGNIFICANT CHANGE UP (ref 0–0)
PLATELET # BLD AUTO: 319 K/UL — SIGNIFICANT CHANGE UP (ref 130–400)
POTASSIUM SERPL-MCNC: 4 MMOL/L — SIGNIFICANT CHANGE UP (ref 3.5–5)
POTASSIUM SERPL-SCNC: 4 MMOL/L — SIGNIFICANT CHANGE UP (ref 3.5–5)
PROT SERPL-MCNC: 8.2 G/DL — HIGH (ref 6–8)
RBC # BLD: 5.14 M/UL — SIGNIFICANT CHANGE UP (ref 4.2–5.4)
RBC # FLD: 13.3 % — SIGNIFICANT CHANGE UP (ref 11.5–14.5)
SODIUM SERPL-SCNC: 135 MMOL/L — SIGNIFICANT CHANGE UP (ref 135–146)
WBC # BLD: 11.52 K/UL — HIGH (ref 4.8–10.8)
WBC # FLD AUTO: 11.52 K/UL — HIGH (ref 4.8–10.8)

## 2021-12-03 PROCEDURE — 99284 EMERGENCY DEPT VISIT MOD MDM: CPT

## 2021-12-03 RX ORDER — ONDANSETRON 8 MG/1
4 TABLET, FILM COATED ORAL ONCE
Refills: 0 | Status: COMPLETED | OUTPATIENT
Start: 2021-12-03 | End: 2021-12-03

## 2021-12-03 RX ORDER — PALIPERIDONE 1.5 MG/1
1 TABLET, EXTENDED RELEASE ORAL
Qty: 0 | Refills: 0 | DISCHARGE

## 2021-12-03 RX ORDER — SODIUM CHLORIDE 9 MG/ML
1000 INJECTION INTRAMUSCULAR; INTRAVENOUS; SUBCUTANEOUS ONCE
Refills: 0 | Status: COMPLETED | OUTPATIENT
Start: 2021-12-03 | End: 2021-12-03

## 2021-12-03 RX ORDER — FAMOTIDINE 10 MG/ML
20 INJECTION INTRAVENOUS ONCE
Refills: 0 | Status: COMPLETED | OUTPATIENT
Start: 2021-12-03 | End: 2021-12-03

## 2021-12-03 RX ADMIN — FAMOTIDINE 104 MILLIGRAM(S): 10 INJECTION INTRAVENOUS at 13:19

## 2021-12-03 RX ADMIN — Medication 1 MILLIGRAM(S): at 14:33

## 2021-12-03 RX ADMIN — SODIUM CHLORIDE 1000 MILLILITER(S): 9 INJECTION INTRAMUSCULAR; INTRAVENOUS; SUBCUTANEOUS at 14:32

## 2021-12-03 RX ADMIN — ONDANSETRON 4 MILLIGRAM(S): 8 TABLET, FILM COATED ORAL at 13:19

## 2021-12-03 RX ADMIN — SODIUM CHLORIDE 1000 MILLILITER(S): 9 INJECTION INTRAMUSCULAR; INTRAVENOUS; SUBCUTANEOUS at 13:19

## 2021-12-03 NOTE — ED PROVIDER NOTE - PHYSICAL EXAMINATION
Gen: Alert, NAD, well appearing  Head: NC, AT, PERRL, EOMI, normal lids/conjunctiva  ENT: normal hearing, dry mucous membranes  Neck: +supple, no tenderness/meningismus,  Pulm: Bilateral BS, normal resp effort, no wheeze/stridor/retractions  CV: RRR  Abd: soft, NT/ND. No guarding or rebound  Mskel: no edema/erythema/cyanosis  Skin: no rash, warm/dry  Neuro: AAOx3, no sensory/motor deficits

## 2021-12-03 NOTE — ED PROVIDER NOTE - CLINICAL SUMMARY MEDICAL DECISION MAKING FREE TEXT BOX
patient improved with iv fluids she is not tremulous able to ambulate well.  she is not homicidal or suicidal I will discharge at this time with follow up outpatient resources. we have discussed indications to return at this time stressed to have a low threshold

## 2021-12-03 NOTE — ED PROVIDER NOTE - OBJECTIVE STATEMENT
30 yo F hx of gastritis, colitis, COPD c/o n/v and abdominal pain. Patient was in a detox program on  Wednesday  but was sent to a hospital and admitted for n/v and abdominal pain. She left AMA yesterday since they couldn't get an IV in her to give her  fluids. Last vomit was 12 hours ago. Requesting IV fluids. 30 yo F hx of gastritis, colitis, COPD c/o n/v and abdominal pain. Patient was in a detox program on  Wednesday  but was sent to a hospital and admitted for n/v and abdominal pain. She left AMA yesterday since they couldn't get an IV in her to give her  fluids. Last vomit was 12 hours ago. Requesting IV fluids. Patient used Heroin recently. denies

## 2021-12-03 NOTE — ED PROVIDER NOTE - PATIENT PORTAL LINK FT
You can access the FollowMyHealth Patient Portal offered by [facility] by registering at the following website: [email or web address]. By joining [employer]’s FollowMyHealth portal, you will also be able to view your health information using other applications (apps) compatible with our system.

## 2021-12-03 NOTE — ED PROVIDER NOTE - ATTENDING CONTRIBUTION TO CARE
I was present for and supervised the key and critical aspects of the procedures performed during the care of the patient.  ATTENDING NOTE: 32 y/o F PMH gastritis, colitis, COPD and polysubstance abuse presents to the ED for evaluation of nausea, vomiting and ABD pain. Pt states she was in a detox program on Wednesday and was sent to [facility] at that time for the same symptoms. She left [facility] after not receiving IV fluids. Last episode of emesis was 12hrs ago but is still here requesting IV fluids. On exam: NCAT. Lungs CTAB. RRR, S1S2 noted. Radial pulses 2+ and equal, pedal pulses 2+ and equal. Abdomen soft, NT/ND, no rebound or guarding. FROM x4 extremities. No focal neuro deficits. Plan: IVF, labs, meds and reassess.

## 2021-12-03 NOTE — ED ADULT NURSE NOTE - CHIEF COMPLAINT QUOTE
pt states she went to a place in [place] for detox, while there developed nausea and vomiting, was sent to [facility], was admitted and "they where not giving me fluids" left last night "because they were not giving me fluids" states she is detox from xanax and heroin, complains of generalized abdominal pain that began on Weds night

## 2021-12-03 NOTE — ED ADULT NURSE NOTE - NSICDXPASTMEDICALHX_GEN_ALL_CORE_FT
PAST MEDICAL HISTORY:  Anxiety and depression     Borderline personality disorder     COPD (chronic obstructive pulmonary disease)     Drug abuse     Hepatitis C     Nicotine dependence

## 2021-12-03 NOTE — ED ADULT NURSE REASSESSMENT NOTE - NS ED NURSE REASSESS COMMENT FT1
attempted to go for blood draw, patient refused at this time and requested to go smoke, advised not to but left unit

## 2021-12-03 NOTE — ED PROVIDER NOTE - NSFOLLOWUPINSTRUCTIONS_ED_ALL_ED_FT
Nausea / Vomiting    Nausea is the feeling that you have an upset stomach or have to vomit. As nausea gets worse, it can lead to vomiting. Vomiting occurs when stomach contents are thrown up and out of the mouth which puts you at an increased risk for dehydration. Older adults and people with other diseases or a weak immune system are at higher risk for dehydration. Drink clear fluids in small but frequent amounts as tolerated. Eat bland, easy-to-digest foods in small amounts as tolerated.     SEEK IMMEDIATE MEDICAL CARE IF YOU HAVE THE FOLLOWING SYMPTOMS: fever, inability to keep fluids down, black or bloody vomitus, black or bloody stools, lightheadedness/dizziness, chest pain, severe headache, rash, shortness of breath, cold or clammy skin, confusion, pain with urination, or any signs of dehydration.     Abdominal Pain    Many things can cause abdominal pain. Many times, abdominal pain is not caused by a disease and will improve without treatment. Your health care provider will do a physical exam to determine if there is a dangerous cause of your pain; blood tests and imaging may help determine the cause of your pain. However, in many cases, no cause may be found and you may need further testing as an outpatient. Monitor your abdominal pain for any changes.     SEEK IMMEDIATE MEDICAL CARE IF YOU HAVE ANY OF THE FOLLOWING SYMPTOMS: worsening abdominal pain, uncontrollable vomiting, profuse diarrhea, inability to have bowel movements or pass gas, black or bloody stools, fever accompanying chest pain or back pain, or fainting. These symptoms may represent a serious problem that is an emergency. Do not wait to see if the symptoms will go away. Get medical help right away. Call 911 and do not drive yourself to the hospital.    Polysubstance Abuse    WHAT YOU NEED TO KNOW:    Polysubstance abuse is the abuse of 2 or more drugs that cause impairment or distress. Examples include alcohol, nicotine, marijuana, cocaine, heroin, methamphetamine, hallucinogens such as mushrooms, or inhalants such as paint thinner. Prescribed medicines, such as opioids for pain or benzodiazepines for anxiety, are also commonly abused.    DISCHARGE INSTRUCTIONS:    Call 911 for any of the following:     You feel you might harm yourself or others.         Return to the emergency department if:     You have a seizure.       You have chest pain and your heart is beating faster than usual.       You have new shortness of breath.       You are dizzy and lightheaded.     Contact your healthcare provider or therapist if:     You are using drugs and think you are pregnant.       You have withdrawal symptoms and want to start using drugs again.       You have questions or concerns about your condition or care.     Risks of polysubstance abuse:     Drug dependence is when you continue to use drugs, even when you know the risks. Polysubstance abuse can damage your heart, brain, lungs, liver, and gastrointestinal tract. You continue even when it causes problems with work, school, or relationships. You may have difficulty finding or keeping a job because of your drug dependence.       Drug tolerance is when you need to use more drugs, or use them more often, to get the effects you want. You may not be able to stop using the drugs. When you try to stop, you may have withdrawal symptoms and strong cravings for the drugs.      Drug overdose can occur when you take more drugs than your body can handle. This may be a small amount or a large amount. You can lose consciousness or have a seizure or stroke. Your heart can stop beating, or you can stop breathing. You may die from a drug overdose.     Medicines:     Withdrawal medicines may be given according to the types of drugs you are abusing. Withdrawal from drugs can cause serious, life-threatening side effects. Certain medicines can help decrease your withdrawal symptoms and your desire for the drug. Ask for more information about the withdrawal medicines you may need.       Mood stabilizers may be given to help prevent or treat depression or anxiety caused by drug abuse and withdrawal.       Take your medicine as directed. Contact your healthcare provider if you think your medicine is not helping or if you have side effects. Tell him or her if you are allergic to any medicine. Keep a list of the medicines, vitamins, and herbs you take. Include the amounts, and when and why you take them. Bring the list or the pill bottles to follow-up visits. Carry your medicine list with you in case of an emergency.    Follow up with your healthcare provider as directed: You may be referred to a specialist to treat health conditions caused by your drug use. This includes mental health, heart, or lung specialists. Write down your questions so you remember to ask them during your visits.     Therapy: You may need therapy and support to stop using drugs:     Cognitive and behavioral therapy helps you change your thinking and behavior. It can help you develop plans to avoid the situations that make you want to use drugs. It also helps you cope with the feelings of wanting to use drugs. You may have individual or group therapy.       Contingency management helps you set drug-free goals with a therapist. You will decide ways to celebrate your success when you reach a goal.       Family therapy and support groups allow you and your family members to talk to and be encouraged by other people affected by drug abuse. You and your family members may attend together or separately. Ask your healthcare provider for information about programs in your area.     How polysubstance abuse affects unborn or  babies:     If you are pregnant or get pregnant while using drugs, you may have a miscarriage or give birth early. Your baby may be born addicted to the drugs.      Do not breastfeed your baby if you use drugs. Drugs pass from your bloodstream into your breast milk and affect your baby's health. Talk with your healthcare provider if you are using drugs and breastfeeding.    For support and more information:     Alcoholics Anonymous  Web Address: http://www.aa.org      National Clearinghouse on Drug and Alcohol Information  Phone: 3-259-2348896  Web Address: www.health.org      National [employer] on Alcoholism and Drug Dependence  [address] [place], [place] [id number]  Phone: 1-507.824.5791  Phone: 1-816.946.2965  Web Address: http://www.[employer].org           © Copyright [employer]  All illustrations and images included in CareNotes are the copyrighted property of [employer] or [employer].

## 2021-12-03 NOTE — ED PROVIDER NOTE - PROGRESS NOTE DETAILS
ATTENDING NOTE: 30 y/o F PMH gastritis, colitis, COPD and polysubstance abuse presents to the ED for evaluation of nausea, vomiting and ABD pain. Pt states she was in a detox program on Wednesday and was sent to [facility] at that time for the same symptoms. She left [facility] after not receiving IV fluids. Last episode of emesis was 12hrs ago but is still here requesting IV fluids. On exam: NCAT. Lungs CTAB. RRR, S1S2 noted. Radial pulses 2+ and equal, pedal pulses 2+ and equal. Abdomen soft, NT/ND, no rebound or guarding. FROM x4 extremities. No focal neuro deficits. Plan: IVF, labs, meds and reassess. Patient drinking water now.   Catch Team Yehuda aware and will come see patient Patient tolerated PO. Seen by CATCH team and resources were given to patient.

## 2021-12-03 NOTE — ED PROVIDER NOTE - NSFOLLOWUPCLINICS_GEN_ALL_ED_FT
[facility]  Detox Mgmt  [address] [place] [address]  Phone: [phone]  Fax:   Follow Up Time: 1-3 Days

## 2021-12-06 ENCOUNTER — OUTPATIENT (OUTPATIENT)
Dept: OUTPATIENT SERVICES | Facility: HOSPITAL | Age: 31
LOS: 1 days | Discharge: HOME | End: 2021-12-06

## 2021-12-06 ENCOUNTER — INPATIENT (INPATIENT)
Facility: HOSPITAL | Age: 31
LOS: 3 days | Discharge: HOME | End: 2021-12-10
Attending: HOSPITALIST | Admitting: HOSPITALIST
Payer: MEDICAID

## 2021-12-06 VITALS
HEART RATE: 75 BPM | RESPIRATION RATE: 18 BRPM | WEIGHT: 115.08 LBS | TEMPERATURE: 97 F | OXYGEN SATURATION: 100 % | DIASTOLIC BLOOD PRESSURE: 67 MMHG | HEIGHT: 62 IN | SYSTOLIC BLOOD PRESSURE: 130 MMHG

## 2021-12-06 DIAGNOSIS — F11.20 OPIOID DEPENDENCE, UNCOMPLICATED: ICD-10-CM

## 2021-12-06 DIAGNOSIS — F13.20 SEDATIVE, HYPNOTIC OR ANXIOLYTIC DEPENDENCE, UNCOMPLICATED: ICD-10-CM

## 2021-12-06 DIAGNOSIS — Z90.49 ACQUIRED ABSENCE OF OTHER SPECIFIED PARTS OF DIGESTIVE TRACT: Chronic | ICD-10-CM

## 2021-12-06 DIAGNOSIS — L03.119 CELLULITIS OF UNSPECIFIED PART OF LIMB: ICD-10-CM

## 2021-12-06 DIAGNOSIS — Z00.8 ENCOUNTER FOR OTHER GENERAL EXAMINATION: ICD-10-CM

## 2021-12-06 DIAGNOSIS — R11.2 NAUSEA WITH VOMITING, UNSPECIFIED: ICD-10-CM

## 2021-12-06 PROBLEM — F19.10 OTHER PSYCHOACTIVE SUBSTANCE ABUSE, UNCOMPLICATED: Chronic | Status: ACTIVE | Noted: 2021-12-03

## 2021-12-06 LAB
ALBUMIN SERPL ELPH-MCNC: 5 G/DL — SIGNIFICANT CHANGE UP (ref 3.5–5.2)
ALP SERPL-CCNC: 94 U/L — SIGNIFICANT CHANGE UP (ref 30–115)
ALT FLD-CCNC: 20 U/L — SIGNIFICANT CHANGE UP (ref 0–41)
AMPHET UR-MCNC: NEGATIVE — SIGNIFICANT CHANGE UP
ANION GAP SERPL CALC-SCNC: 14 MMOL/L — SIGNIFICANT CHANGE UP (ref 7–14)
APAP SERPL-MCNC: <5 UG/ML — LOW (ref 10–30)
APPEARANCE UR: CLEAR — SIGNIFICANT CHANGE UP
AST SERPL-CCNC: 14 U/L — SIGNIFICANT CHANGE UP (ref 0–41)
BACTERIA # UR AUTO: ABNORMAL
BARBITURATES UR SCN-MCNC: NEGATIVE — SIGNIFICANT CHANGE UP
BASOPHILS # BLD AUTO: 0.05 K/UL — SIGNIFICANT CHANGE UP (ref 0–0.2)
BASOPHILS NFR BLD AUTO: 0.7 % — SIGNIFICANT CHANGE UP (ref 0–1)
BENZODIAZ UR-MCNC: NEGATIVE — SIGNIFICANT CHANGE UP
BILIRUB SERPL-MCNC: 0.4 MG/DL — SIGNIFICANT CHANGE UP (ref 0.2–1.2)
BILIRUB UR-MCNC: NEGATIVE — SIGNIFICANT CHANGE UP
BUN SERPL-MCNC: 4 MG/DL — LOW (ref 10–20)
CALCIUM SERPL-MCNC: 9.8 MG/DL — SIGNIFICANT CHANGE UP (ref 8.5–10.1)
CHLORIDE SERPL-SCNC: 102 MMOL/L — SIGNIFICANT CHANGE UP (ref 98–110)
CO2 SERPL-SCNC: 26 MMOL/L — SIGNIFICANT CHANGE UP (ref 17–32)
COCAINE METAB.OTHER UR-MCNC: NEGATIVE — SIGNIFICANT CHANGE UP
COLOR SPEC: YELLOW — SIGNIFICANT CHANGE UP
CREAT SERPL-MCNC: 0.7 MG/DL — SIGNIFICANT CHANGE UP (ref 0.7–1.5)
DIFF PNL FLD: NEGATIVE — SIGNIFICANT CHANGE UP
DRUG SCREEN 1, URINE RESULT: SIGNIFICANT CHANGE UP
EOSINOPHIL # BLD AUTO: 0.05 K/UL — SIGNIFICANT CHANGE UP (ref 0–0.7)
EOSINOPHIL NFR BLD AUTO: 0.7 % — SIGNIFICANT CHANGE UP (ref 0–8)
EPI CELLS # UR: ABNORMAL /HPF
ETHANOL SERPL-MCNC: <10 MG/DL — SIGNIFICANT CHANGE UP
GLUCOSE SERPL-MCNC: 101 MG/DL — HIGH (ref 70–99)
GLUCOSE UR QL: NEGATIVE MG/DL — SIGNIFICANT CHANGE UP
HCT VFR BLD CALC: 48.5 % — HIGH (ref 37–47)
HGB BLD-MCNC: 16 G/DL — SIGNIFICANT CHANGE UP (ref 12–16)
IMM GRANULOCYTES NFR BLD AUTO: 0.1 % — SIGNIFICANT CHANGE UP (ref 0.1–0.3)
KETONES UR-MCNC: NEGATIVE — SIGNIFICANT CHANGE UP
LEUKOCYTE ESTERASE UR-ACNC: ABNORMAL
LYMPHOCYTES # BLD AUTO: 2.06 K/UL — SIGNIFICANT CHANGE UP (ref 1.2–3.4)
LYMPHOCYTES # BLD AUTO: 28.4 % — SIGNIFICANT CHANGE UP (ref 20.5–51.1)
MAGNESIUM SERPL-MCNC: 2 MG/DL — SIGNIFICANT CHANGE UP (ref 1.8–2.4)
MCHC RBC-ENTMCNC: 29.7 PG — SIGNIFICANT CHANGE UP (ref 27–31)
MCHC RBC-ENTMCNC: 33 G/DL — SIGNIFICANT CHANGE UP (ref 32–37)
MCV RBC AUTO: 90.1 FL — SIGNIFICANT CHANGE UP (ref 81–99)
METHADONE UR-MCNC: NEGATIVE — SIGNIFICANT CHANGE UP
MONOCYTES # BLD AUTO: 0.48 K/UL — SIGNIFICANT CHANGE UP (ref 0.1–0.6)
MONOCYTES NFR BLD AUTO: 6.6 % — SIGNIFICANT CHANGE UP (ref 1.7–9.3)
NEUTROPHILS # BLD AUTO: 4.6 K/UL — SIGNIFICANT CHANGE UP (ref 1.4–6.5)
NEUTROPHILS NFR BLD AUTO: 63.5 % — SIGNIFICANT CHANGE UP (ref 42.2–75.2)
NITRITE UR-MCNC: NEGATIVE — SIGNIFICANT CHANGE UP
NRBC # BLD: 0 /100 WBCS — SIGNIFICANT CHANGE UP (ref 0–0)
OPIATES UR-MCNC: POSITIVE
PCP UR-MCNC: NEGATIVE — SIGNIFICANT CHANGE UP
PH UR: 7.5 — SIGNIFICANT CHANGE UP (ref 5–8)
PLATELET # BLD AUTO: 339 K/UL — SIGNIFICANT CHANGE UP (ref 130–400)
POTASSIUM SERPL-MCNC: 3.8 MMOL/L — SIGNIFICANT CHANGE UP (ref 3.5–5)
POTASSIUM SERPL-SCNC: 3.8 MMOL/L — SIGNIFICANT CHANGE UP (ref 3.5–5)
PROPOXYPHENE QUALITATIVE URINE RESULT: NEGATIVE — SIGNIFICANT CHANGE UP
PROT SERPL-MCNC: 7.9 G/DL — SIGNIFICANT CHANGE UP (ref 6–8)
PROT UR-MCNC: ABNORMAL MG/DL
RBC # BLD: 5.38 M/UL — SIGNIFICANT CHANGE UP (ref 4.2–5.4)
RBC # FLD: 13.2 % — SIGNIFICANT CHANGE UP (ref 11.5–14.5)
RBC CASTS # UR COMP ASSIST: SIGNIFICANT CHANGE UP /HPF
SALICYLATES SERPL-MCNC: <0.3 MG/DL — LOW (ref 4–30)
SARS-COV-2 RNA SPEC QL NAA+PROBE: SIGNIFICANT CHANGE UP
SODIUM SERPL-SCNC: 142 MMOL/L — SIGNIFICANT CHANGE UP (ref 135–146)
SP GR SPEC: 1.02 — SIGNIFICANT CHANGE UP (ref 1.01–1.03)
THC UR QL: POSITIVE
UROBILINOGEN FLD QL: 0.2 MG/DL — SIGNIFICANT CHANGE UP
WBC # BLD: 7.25 K/UL — SIGNIFICANT CHANGE UP (ref 4.8–10.8)
WBC # FLD AUTO: 7.25 K/UL — SIGNIFICANT CHANGE UP (ref 4.8–10.8)
WBC UR QL: SIGNIFICANT CHANGE UP /HPF

## 2021-12-06 PROCEDURE — 99285 EMERGENCY DEPT VISIT HI MDM: CPT

## 2021-12-06 PROCEDURE — 99222 1ST HOSP IP/OBS MODERATE 55: CPT

## 2021-12-06 PROCEDURE — 93010 ELECTROCARDIOGRAM REPORT: CPT

## 2021-12-06 RX ORDER — MAGNESIUM HYDROXIDE 400 MG/1
30 TABLET, CHEWABLE ORAL ONCE
Refills: 0 | Status: DISCONTINUED | OUTPATIENT
Start: 2021-12-06 | End: 2021-12-10

## 2021-12-06 RX ORDER — ALBUTEROL 90 UG/1
2 AEROSOL, METERED ORAL EVERY 6 HOURS
Refills: 0 | Status: DISCONTINUED | OUTPATIENT
Start: 2021-12-06 | End: 2021-12-10

## 2021-12-06 RX ORDER — SODIUM CHLORIDE 9 MG/ML
1000 INJECTION, SOLUTION INTRAVENOUS ONCE
Refills: 0 | Status: COMPLETED | OUTPATIENT
Start: 2021-12-06 | End: 2021-12-06

## 2021-12-06 RX ORDER — METHADONE HYDROCHLORIDE 40 MG/1
15 TABLET ORAL ONCE
Refills: 0 | Status: DISCONTINUED | OUTPATIENT
Start: 2021-12-06 | End: 2021-12-06

## 2021-12-06 RX ORDER — TRAZODONE HCL 50 MG
100 TABLET ORAL AT BEDTIME
Refills: 0 | Status: DISCONTINUED | OUTPATIENT
Start: 2021-12-06 | End: 2021-12-10

## 2021-12-06 RX ORDER — ONDANSETRON 8 MG/1
4 TABLET, FILM COATED ORAL EVERY 6 HOURS
Refills: 0 | Status: DISCONTINUED | OUTPATIENT
Start: 2021-12-06 | End: 2021-12-10

## 2021-12-06 RX ORDER — METHADONE HYDROCHLORIDE 40 MG/1
15 TABLET ORAL EVERY 12 HOURS
Refills: 0 | Status: DISCONTINUED | OUTPATIENT
Start: 2021-12-06 | End: 2021-12-07

## 2021-12-06 RX ORDER — ALPRAZOLAM 0.25 MG
1 TABLET ORAL
Qty: 0 | Refills: 0 | DISCHARGE

## 2021-12-06 RX ORDER — NICOTINE POLACRILEX 2 MG
1 GUM BUCCAL DAILY
Refills: 0 | Status: DISCONTINUED | OUTPATIENT
Start: 2021-12-06 | End: 2021-12-10

## 2021-12-06 RX ORDER — METHADONE HYDROCHLORIDE 40 MG/1
10 TABLET ORAL EVERY 12 HOURS
Refills: 0 | Status: DISCONTINUED | OUTPATIENT
Start: 2021-12-07 | End: 2021-12-07

## 2021-12-06 RX ORDER — PSEUDOEPHEDRINE HCL 30 MG
60 TABLET ORAL EVERY 6 HOURS
Refills: 0 | Status: DISCONTINUED | OUTPATIENT
Start: 2021-12-06 | End: 2021-12-10

## 2021-12-06 RX ORDER — ONDANSETRON 8 MG/1
4 TABLET, FILM COATED ORAL ONCE
Refills: 0 | Status: COMPLETED | OUTPATIENT
Start: 2021-12-06 | End: 2021-12-06

## 2021-12-06 RX ORDER — METHOCARBAMOL 500 MG/1
500 TABLET, FILM COATED ORAL EVERY 6 HOURS
Refills: 0 | Status: DISCONTINUED | OUTPATIENT
Start: 2021-12-06 | End: 2021-12-10

## 2021-12-06 RX ORDER — MULTIVIT-MIN/FERROUS GLUCONATE 9 MG/15 ML
1 LIQUID (ML) ORAL DAILY
Refills: 0 | Status: DISCONTINUED | OUTPATIENT
Start: 2021-12-06 | End: 2021-12-10

## 2021-12-06 RX ORDER — LAMOTRIGINE 25 MG/1
200 TABLET, ORALLY DISINTEGRATING ORAL AT BEDTIME
Refills: 0 | Status: DISCONTINUED | OUTPATIENT
Start: 2021-12-06 | End: 2021-12-10

## 2021-12-06 RX ORDER — CEPHALEXIN 500 MG
500 CAPSULE ORAL
Refills: 0 | Status: DISCONTINUED | OUTPATIENT
Start: 2021-12-06 | End: 2021-12-10

## 2021-12-06 RX ORDER — CLONAZEPAM 1 MG
2 TABLET ORAL ONCE
Refills: 0 | Status: DISCONTINUED | OUTPATIENT
Start: 2021-12-06 | End: 2021-12-06

## 2021-12-06 RX ORDER — CLONAZEPAM 1 MG
2 TABLET ORAL
Refills: 0 | Status: DISCONTINUED | OUTPATIENT
Start: 2021-12-06 | End: 2021-12-07

## 2021-12-06 RX ORDER — CLONAZEPAM 1 MG
1.5 TABLET ORAL
Refills: 0 | Status: DISCONTINUED | OUTPATIENT
Start: 2021-12-08 | End: 2021-12-08

## 2021-12-06 RX ORDER — GUAIFENESIN/DEXTROMETHORPHAN 600MG-30MG
5 TABLET, EXTENDED RELEASE 12 HR ORAL EVERY 4 HOURS
Refills: 0 | Status: DISCONTINUED | OUTPATIENT
Start: 2021-12-06 | End: 2021-12-10

## 2021-12-06 RX ORDER — METHADONE HYDROCHLORIDE 40 MG/1
TABLET ORAL
Refills: 0 | Status: DISCONTINUED | OUTPATIENT
Start: 2021-12-06 | End: 2021-12-07

## 2021-12-06 RX ORDER — ESCITALOPRAM OXALATE 10 MG/1
20 TABLET, FILM COATED ORAL DAILY
Refills: 0 | Status: DISCONTINUED | OUTPATIENT
Start: 2021-12-06 | End: 2021-12-10

## 2021-12-06 RX ORDER — SODIUM CHLORIDE 9 MG/ML
1000 INJECTION INTRAMUSCULAR; INTRAVENOUS; SUBCUTANEOUS
Refills: 0 | Status: DISCONTINUED | OUTPATIENT
Start: 2021-12-06 | End: 2021-12-10

## 2021-12-06 RX ORDER — ACETAMINOPHEN 500 MG
650 TABLET ORAL EVERY 8 HOURS
Refills: 0 | Status: DISCONTINUED | OUTPATIENT
Start: 2021-12-06 | End: 2021-12-10

## 2021-12-06 RX ORDER — IBUPROFEN 200 MG
400 TABLET ORAL EVERY 6 HOURS
Refills: 0 | Status: DISCONTINUED | OUTPATIENT
Start: 2021-12-06 | End: 2021-12-10

## 2021-12-06 RX ORDER — HYDROXYZINE HCL 10 MG
50 TABLET ORAL EVERY 6 HOURS
Refills: 0 | Status: DISCONTINUED | OUTPATIENT
Start: 2021-12-06 | End: 2021-12-10

## 2021-12-06 RX ORDER — PANTOPRAZOLE SODIUM 20 MG/1
40 TABLET, DELAYED RELEASE ORAL
Refills: 0 | Status: DISCONTINUED | OUTPATIENT
Start: 2021-12-06 | End: 2021-12-10

## 2021-12-06 RX ADMIN — METHADONE HYDROCHLORIDE 15 MILLIGRAM(S): 40 TABLET ORAL at 21:40

## 2021-12-06 RX ADMIN — ONDANSETRON 4 MILLIGRAM(S): 8 TABLET, FILM COATED ORAL at 12:43

## 2021-12-06 RX ADMIN — SODIUM CHLORIDE 1000 MILLILITER(S): 9 INJECTION, SOLUTION INTRAVENOUS at 12:43

## 2021-12-06 RX ADMIN — LAMOTRIGINE 200 MILLIGRAM(S): 25 TABLET, ORALLY DISINTEGRATING ORAL at 21:40

## 2021-12-06 RX ADMIN — Medication 500 MILLIGRAM(S): at 23:18

## 2021-12-06 RX ADMIN — METHADONE HYDROCHLORIDE 15 MILLIGRAM(S): 40 TABLET ORAL at 17:14

## 2021-12-06 RX ADMIN — Medication 500 MILLIGRAM(S): at 17:41

## 2021-12-06 RX ADMIN — Medication 2 MILLIGRAM(S): at 21:39

## 2021-12-06 RX ADMIN — Medication 2 MILLIGRAM(S): at 17:40

## 2021-12-06 NOTE — H&P ADULT - NSHPREVIEWOFSYSTEMS_GEN_ALL_CORE
REVIEW OF SYSTEMS:    CONSTITUTIONAL: see HPI  EYES/ENT: No visual changes;  No vertigo or throat pain   NECK: No pain or stiffness  RESPIRATORY: No cough, wheezing, hemoptysis; No shortness of breath  CARDIOVASCULAR: No chest pain or palpitations  GASTROINTESTINAL: No abdominal or epigastric pain. No nausea, vomiting, or hematemesis; No diarrhea or constipation. No melena or hematochezia.  GENITOURINARY: No dysuria, frequency or hematuria  NEUROLOGICAL: No numbness or weakness  SKIN: N+ redness left palm secondary to IV drug usage

## 2021-12-06 NOTE — ED ADULT TRIAGE NOTE - CHIEF COMPLAINT QUOTE
pt states "I have been in detox since Wednesday and have been vomiting for days". pt reports taking 1/4 of a xanax and 1 bag of heroin this morning. went to intake to be admitted and was sent to ED

## 2021-12-06 NOTE — ED PROVIDER NOTE - INTERPRETATION
Sinu Andres/normal sinus rhythm, Normal axis, Normal MD interval and QRS complex. There are no acute ischemic ST or T-wave changes.

## 2021-12-06 NOTE — ED PROVIDER NOTE - NS ED ROS FT
Review of Systems  Constitutional:  No fever, chills, malaise/ + generalized weakness.  Eyes:  No visual changes  ENMT:  No hearing changes  Cardiac:  No chest pain  Respiratory:  No dyspnea, orthopnea, stridor, wheezing, or cough. No hemoptysis.  GI:  + nausea, vomiting, - diarrhea, + crampy abdominal pain. No melena or hematochezia.  :  No dysuria, hematuria, frequency, or burning. Normal urine output. LMP- 4 days ago  MS:  + myalgia, muscle weakness, gait abnormality, +back pain.  Skin:  No skin rash, pruritis, jaundice, or lesions.  Neuro:  + headache, - dizziness, No change in mental status.   Endocrine: No history of thyroid disease or diabetes.  Heme/Lymph: No jaundice, easy bruising, or bleeding tendency. No history of anemia

## 2021-12-06 NOTE — H&P ADULT - ASSESSMENT
32y/o  Female w/ Pmhx Anxiety and depression Borderline personality disorder, COPD, Hep C, Nicotine dependence and drug abuse presents to the ED for opiate and benzo dependence w/ multiple episodes on vomiting.

## 2021-12-06 NOTE — H&P ADULT - HISTORY OF PRESENT ILLNESS
30y/o  Female w/ Pmhx Anxiety and depression Borderline personality disorder, COPD, Hep C, Nicotine dependence and drug abuse presents to the ED for withdrawal symptoms, pt had recent admission in Oct for same situation and was to be followed up with methadone clinic. Presents today for Heroin abuse IVDA 20-25 bags daily last clean time greater than one month ago.  Pt also abusing Xanax 2mg sticks 3-4 daily.  Pt denies any seizure w/d, and denies any history  of.  No ETOH abuse.  Pt was seen outpatient by Dr Malhotra for outpatient detox but was referred inpatient.  Pt denies any recent cough, cold, fever or chills. Pt states positive vomiting x 4-8 times. Negative hematemesis.

## 2021-12-06 NOTE — SBIRT NOTE ADULT - NSSBIRTDRGBRIEFINTDET_GEN_A_CORE
Screening results were reviewed with the patient and patient was provided information about healthy guidelines and potential negative consequences associated with level of risk. Motivation and readiness to reduce or stop use was discussed and goals and activities to make changes were suggested/offered. Will continue to monitor patient's plan, will provide assistance with detox referral if patient is not admitted to medical floor.  Naloxone Rescue Kit dispensed: Pt was educated about Naloxone and trained on how to assemble and utilize the kit.  Screening results were reviewed with the patient and patient was provided information about healthy guidelines and potential negative consequences associated with level of risk. Motivation and readiness to reduce or stop use was discussed and goals and activities to make changes were suggested/offered. Patient to be admitted to medical floor, will follow up with [facility] after medical stabilization.  Naloxone Rescue Kit dispensed: Pt was educated about Naloxone and trained on how to assemble and utilize the kit.

## 2021-12-06 NOTE — ED PROVIDER NOTE - OBJECTIVE STATEMENT
31 F pmhx Anxiety and depression Borderline personality disorder, COPD, Hep C, Nicotine dependence and drug abuse presents to the ED for withdrawal symptoms, pt had recent admission in Oct for same situation and was to be followed up with methadone clinic. presents to day for few days of N/V

## 2021-12-06 NOTE — H&P ADULT - NSHPLABSRESULTS_GEN_ALL_CORE
16.0   7.25  )-----------( 339      ( 06 Dec 2021 12:45 )             48.5       12-    142  |  102  |  4<L>  ----------------------------<  101<H>  3.8   |  26  |  0.7    Ca    9.8      06 Dec 2021 12:45  Mg     2.0         TPro  7.9  /  Alb  5.0  /  TBili  0.4  /  DBili  x   /  AST  14  /  ALT  20  /  AlkPhos  94  12-              Urinalysis Basic - ( 06 Dec 2021 14:26 )    Color: Yellow / Appearance: Clear / S.020 / pH: x  Gluc: x / Ketone: Negative  / Bili: Negative / Urobili: 0.2 mg/dL   Blood: x / Protein: Trace mg/dL / Nitrite: Negative   Leuk Esterase: Small / RBC: 1-2 /HPF / WBC 3-5 /HPF   Sq Epi: x / Non Sq Epi: Moderate /HPF / Bacteria: Few

## 2021-12-06 NOTE — ED ADULT NURSE NOTE - NSICDXPASTMEDICALHX_GEN_ALL_CORE_FT
PAST MEDICAL HISTORY:  Anxiety and depression     Borderline personality disorder     COPD (chronic obstructive pulmonary disease)     Drug abuse     Hepatitis C     Heroin abuse     IV drug user     Nicotine dependence

## 2021-12-06 NOTE — ED PROVIDER NOTE - PHYSICAL EXAMINATION
VITAL SIGNS: I have reviewed nursing notes and confirm.  CONSTITUTIONAL: Well-developed; well-nourished; in no acute distress.  SKIN: Skin exam is warm and dry, no acute rash.  HEAD: Normocephalic; atraumatic.  EYES: PERRL, EOM intact; conjunctiva and sclera clear.  ENT: No nasal discharge; airway clear. TMs clear.  NECK: Supple; non tender.  CARD: S1, S2 normal; no murmurs, gallops, or rubs. Regular rate and rhythm.  RESP: No wheezes, rales or rhonchi. Speaking in full sentences.   ABD: Normal bowel sounds; soft; non-distended; tender throughout ; No rebound or guarding.  CVA tenderness bilateral  EXT: Normal ROM. No clubbing, cyanosis or edema.  NEURO: Alert, oriented. Grossly unremarkable. No focal deficits.   PSYCH: Cooperative, appropriate.

## 2021-12-06 NOTE — H&P ADULT - NSHPPHYSICALEXAM_GEN_ALL_CORE
GENERAL:  30y/o Female NAD, resting comfortably.  HEAD:  Atraumatic, Normocephalic  EYES: EOMI, PERRLA, conjunctiva and sclera clear  NECK: Supple, No JVD, no cervical lymphadenopathy, non-tender  CHEST/LUNG: Clear to auscultation bilaterally; No wheeze, rhonchi, or rales  HEART: Regular rate and rhythm; S1&S2  ABDOMEN: Soft, Nontender, Nondistended x 4 quadrants; Bowel sounds present  EXTREMITIES:   Peripheral Pulses Present, No clubbing, no cyanosis, or no edema, no calf tenderness  PSYCH: AAOx3, cooperative, appropriate  NEUROLOGY: WNL  SKIN: + redness left palm - multiple healed track marks secondary injection sites

## 2021-12-06 NOTE — ED PROVIDER NOTE - ATTENDING CONTRIBUTION TO CARE
31y female above PMH sent from outpatient detox/Dr Malhotra for medical admission for benzo withdrawal, no hallucinosis vss stable for floor

## 2021-12-07 DIAGNOSIS — F11.20 OPIOID DEPENDENCE, UNCOMPLICATED: ICD-10-CM

## 2021-12-07 DIAGNOSIS — Z02.9 ENCOUNTER FOR ADMINISTRATIVE EXAMINATIONS, UNSPECIFIED: ICD-10-CM

## 2021-12-07 LAB
6-ACETYLMORPHINE, UR RESULT: 101 NG/ML — SIGNIFICANT CHANGE UP
6MAM UR CFM-MCNC: 101 NG/ML — SIGNIFICANT CHANGE UP
ALBUMIN SERPL ELPH-MCNC: 4.2 G/DL — SIGNIFICANT CHANGE UP (ref 3.5–5.2)
ALP SERPL-CCNC: 83 U/L — SIGNIFICANT CHANGE UP (ref 30–115)
ALT FLD-CCNC: 15 U/L — SIGNIFICANT CHANGE UP (ref 0–41)
ANION GAP SERPL CALC-SCNC: 15 MMOL/L — HIGH (ref 7–14)
AST SERPL-CCNC: 12 U/L — SIGNIFICANT CHANGE UP (ref 0–41)
BASOPHILS # BLD AUTO: 0.07 K/UL — SIGNIFICANT CHANGE UP (ref 0–0.2)
BASOPHILS NFR BLD AUTO: 0.7 % — SIGNIFICANT CHANGE UP (ref 0–1)
BILIRUB SERPL-MCNC: 0.4 MG/DL — SIGNIFICANT CHANGE UP (ref 0.2–1.2)
BUN SERPL-MCNC: 10 MG/DL — SIGNIFICANT CHANGE UP (ref 10–20)
CALCIUM SERPL-MCNC: 9.1 MG/DL — SIGNIFICANT CHANGE UP (ref 8.5–10.1)
CHLORIDE SERPL-SCNC: 105 MMOL/L — SIGNIFICANT CHANGE UP (ref 98–110)
CO2 SERPL-SCNC: 24 MMOL/L — SIGNIFICANT CHANGE UP (ref 17–32)
CODEINE UR CFM-MCNC: NEGATIVE NG/ML — SIGNIFICANT CHANGE UP
CODEINE, UR RESULT: NEGATIVE NG/ML — SIGNIFICANT CHANGE UP
CREAT SERPL-MCNC: 0.8 MG/DL — SIGNIFICANT CHANGE UP (ref 0.7–1.5)
EOSINOPHIL # BLD AUTO: 0.22 K/UL — SIGNIFICANT CHANGE UP (ref 0–0.7)
EOSINOPHIL NFR BLD AUTO: 2.2 % — SIGNIFICANT CHANGE UP (ref 0–8)
GGT SERPL-CCNC: 78 U/L — HIGH (ref 1–40)
GLUCOSE SERPL-MCNC: 71 MG/DL — SIGNIFICANT CHANGE UP (ref 70–99)
HCT VFR BLD CALC: 47.6 % — HIGH (ref 37–47)
HGB BLD-MCNC: 15.4 G/DL — SIGNIFICANT CHANGE UP (ref 12–16)
HYDROCODONE UR QL CFM: NEGATIVE NG/ML — SIGNIFICANT CHANGE UP
HYDROCODONE, UR RESULT: NEGATIVE NG/ML — SIGNIFICANT CHANGE UP
HYDROMORPHONE UR QL CFM: NEGATIVE NG/ML — SIGNIFICANT CHANGE UP
HYDROMORPHONE, UR RESULT: NEGATIVE NG/ML — SIGNIFICANT CHANGE UP
IMM GRANULOCYTES NFR BLD AUTO: 0.2 % — SIGNIFICANT CHANGE UP (ref 0.1–0.3)
LYMPHOCYTES # BLD AUTO: 3.96 K/UL — HIGH (ref 1.2–3.4)
LYMPHOCYTES # BLD AUTO: 40 % — SIGNIFICANT CHANGE UP (ref 20.5–51.1)
MAGNESIUM SERPL-MCNC: 2 MG/DL — SIGNIFICANT CHANGE UP (ref 1.8–2.4)
MCHC RBC-ENTMCNC: 30.3 PG — SIGNIFICANT CHANGE UP (ref 27–31)
MCHC RBC-ENTMCNC: 32.4 G/DL — SIGNIFICANT CHANGE UP (ref 32–37)
MCV RBC AUTO: 93.5 FL — SIGNIFICANT CHANGE UP (ref 81–99)
MONOCYTES # BLD AUTO: 0.71 K/UL — HIGH (ref 0.1–0.6)
MONOCYTES NFR BLD AUTO: 7.2 % — SIGNIFICANT CHANGE UP (ref 1.7–9.3)
MORPHINE UR QL CFM: 2613 NG/ML — SIGNIFICANT CHANGE UP
MORPHINE, UR RESULT: 2613 NG/ML — SIGNIFICANT CHANGE UP
NEUTROPHILS # BLD AUTO: 4.92 K/UL — SIGNIFICANT CHANGE UP (ref 1.4–6.5)
NEUTROPHILS NFR BLD AUTO: 49.7 % — SIGNIFICANT CHANGE UP (ref 42.2–75.2)
NOROXYCODONE (OPIATES), UR RESULT: NEGATIVE NG/ML — SIGNIFICANT CHANGE UP
NOROXYCODONE UR CFM-MCNC: NEGATIVE NG/ML — SIGNIFICANT CHANGE UP
NRBC # BLD: 0 /100 WBCS — SIGNIFICANT CHANGE UP (ref 0–0)
OPIATES IN-HOUSE INTERPRETATION: POSITIVE
OPIATES UR QL CFM: POSITIVE
OXYCODONE (OPIATES), UR RESULT: NEGATIVE NG/ML — SIGNIFICANT CHANGE UP
OXYCODONE UR-MCNC: NEGATIVE NG/ML — SIGNIFICANT CHANGE UP
OXYMORPHONE (OPIATES), UR RESULT: NEGATIVE NG/ML — SIGNIFICANT CHANGE UP
OXYMORPHONE UR CFM-MCNC: NEGATIVE NG/ML — SIGNIFICANT CHANGE UP
PLATELET # BLD AUTO: 320 K/UL — SIGNIFICANT CHANGE UP (ref 130–400)
POTASSIUM SERPL-MCNC: 4.1 MMOL/L — SIGNIFICANT CHANGE UP (ref 3.5–5)
POTASSIUM SERPL-SCNC: 4.1 MMOL/L — SIGNIFICANT CHANGE UP (ref 3.5–5)
PROT SERPL-MCNC: 6.8 G/DL — SIGNIFICANT CHANGE UP (ref 6–8)
RBC # BLD: 5.09 M/UL — SIGNIFICANT CHANGE UP (ref 4.2–5.4)
RBC # FLD: 13.6 % — SIGNIFICANT CHANGE UP (ref 11.5–14.5)
SODIUM SERPL-SCNC: 144 MMOL/L — SIGNIFICANT CHANGE UP (ref 135–146)
WBC # BLD: 9.9 K/UL — SIGNIFICANT CHANGE UP (ref 4.8–10.8)
WBC # FLD AUTO: 9.9 K/UL — SIGNIFICANT CHANGE UP (ref 4.8–10.8)

## 2021-12-07 PROCEDURE — 99232 SBSQ HOSP IP/OBS MODERATE 35: CPT

## 2021-12-07 PROCEDURE — 99221 1ST HOSP IP/OBS SF/LOW 40: CPT

## 2021-12-07 PROCEDURE — 99251: CPT

## 2021-12-07 RX ORDER — METHADONE HYDROCHLORIDE 40 MG/1
TABLET ORAL
Refills: 0 | Status: COMPLETED | OUTPATIENT
Start: 2021-12-07 | End: 2021-12-10

## 2021-12-07 RX ORDER — METHADONE HYDROCHLORIDE 40 MG/1
10 TABLET ORAL EVERY 12 HOURS
Refills: 0 | Status: DISCONTINUED | OUTPATIENT
Start: 2021-12-07 | End: 2021-12-08

## 2021-12-07 RX ORDER — METHADONE HYDROCHLORIDE 40 MG/1
5 TABLET ORAL EVERY 12 HOURS
Refills: 0 | Status: DISCONTINUED | OUTPATIENT
Start: 2021-12-08 | End: 2021-12-10

## 2021-12-07 RX ORDER — SIMETHICONE 80 MG/1
80 TABLET, CHEWABLE ORAL ONCE
Refills: 0 | Status: COMPLETED | OUTPATIENT
Start: 2021-12-07 | End: 2021-12-09

## 2021-12-07 RX ADMIN — METHADONE HYDROCHLORIDE 15 MILLIGRAM(S): 40 TABLET ORAL at 08:21

## 2021-12-07 RX ADMIN — Medication 30 MILLILITER(S): at 18:03

## 2021-12-07 RX ADMIN — Medication 50 MILLIGRAM(S): at 21:50

## 2021-12-07 RX ADMIN — Medication 500 MILLIGRAM(S): at 23:35

## 2021-12-07 RX ADMIN — LAMOTRIGINE 200 MILLIGRAM(S): 25 TABLET, ORALLY DISINTEGRATING ORAL at 21:47

## 2021-12-07 RX ADMIN — Medication 30 MILLILITER(S): at 11:47

## 2021-12-07 RX ADMIN — Medication 500 MILLIGRAM(S): at 18:02

## 2021-12-07 RX ADMIN — Medication 500 MILLIGRAM(S): at 05:46

## 2021-12-07 RX ADMIN — METHADONE HYDROCHLORIDE 10 MILLIGRAM(S): 40 TABLET ORAL at 18:02

## 2021-12-07 RX ADMIN — Medication 2 MILLIGRAM(S): at 08:21

## 2021-12-07 RX ADMIN — PANTOPRAZOLE SODIUM 40 MILLIGRAM(S): 20 TABLET, DELAYED RELEASE ORAL at 06:04

## 2021-12-07 RX ADMIN — Medication 25 MILLIGRAM(S): at 21:52

## 2021-12-07 NOTE — PROGRESS NOTE ADULT - SUBJECTIVE AND OBJECTIVE BOX
32y/o  Female w/ Pmhx Anxiety and depression Borderline personality disorder, COPD, Hep C, Nicotine dependence and drug abuse presents to the ED for opiate and benzo dependence w/ multiple episodes on vomiting.    Today:  Seen at bedside, states she will not go to methadone program after discharge.  Patient encountered at bedside with mother; patient's mother massaging her back and flanks.  Patient stated "it's the craziest thing.  I need someone to massage my bowels and that's the only way I can move my bowels or fart".  Patient also complained of abdominal pain not relieved by antacids, this is a chronic issue for her.            REVIEW OF SYSTEMS:  Abdominal pain        MEDICATIONS  (STANDING):  cephalexin 500 milliGRAM(s) Oral four times a day  chlordiazePOXIDE 25 milliGRAM(s) Oral every 8 hours  chlordiazePOXIDE   Oral   escitalopram 20 milliGRAM(s) Oral daily  lamoTRIgine 200 milliGRAM(s) Oral at bedtime  methadone    Tablet 10 milliGRAM(s) Oral every 12 hours  methadone    Tablet   Oral   multivitamin/minerals 1 Tablet(s) Oral daily  nicotine - 21 mG/24Hr(s) Patch 1 Patch Transdermal daily  pantoprazole    Tablet 40 milliGRAM(s) Oral before breakfast  sodium chloride 0.9%. 1000 milliLiter(s) (125 mL/Hr) IV Continuous <Continuous>    MEDICATIONS  (PRN):  acetaminophen     Tablet .. 650 milliGRAM(s) Oral every 8 hours PRN Mild Pain (1 - 3)  ALBUTerol    90 MICROgram(s) HFA Inhaler 2 Puff(s) Inhalation every 6 hours PRN Shortness of Breath and/or Wheezing  aluminum hydroxide/magnesium hydroxide/simethicone Suspension 30 milliLiter(s) Oral every 6 hours PRN Heartburn  bismuth subsalicylate Liquid 30 milliLiter(s) Oral every 6 hours PRN Diarrhea  chlordiazePOXIDE 25 milliGRAM(s) Oral every 6 hours PRN benzo withdrawal  cloNIDine 0.1 milliGRAM(s) Oral every 8 hours PRN opiate withdrawal  guaifenesin/dextromethorphan Oral Liquid 5 milliLiter(s) Oral every 4 hours PRN Cough  hydrOXYzine hydrochloride 50 milliGRAM(s) Oral every 6 hours PRN Anxiety  ibuprofen  Tablet. 400 milliGRAM(s) Oral every 6 hours PRN Mild Pain (1 - 3)  magnesium hydroxide Suspension 30 milliLiter(s) Oral once PRN Constipation  methocarbamol 500 milliGRAM(s) Oral every 6 hours PRN muscle pain  ondansetron   Disintegrating Tablet 4 milliGRAM(s) Oral every 6 hours PRN Nausea and/or Vomiting  pseudoephedrine 60 milliGRAM(s) Oral every 6 hours PRN Rhinitis  simethicone 80 milliGRAM(s) Chew once PRN Gas  traZODone 100 milliGRAM(s) Oral at bedtime PRN insomnia      Allergies  No Known Allergies        FAMILY HISTORY:  No pertinent family history in first degree relatives        Vital Signs Last 24 Hrs  T(C): 37.2 (07 Dec 2021 14:53), Max: 37.2 (06 Dec 2021 17:12)  T(F): 98.9 (07 Dec 2021 14:53), Max: 99 (06 Dec 2021 17:12)  HR: 52 (07 Dec 2021 14:53) (52 - 69)  BP: 112/76 (07 Dec 2021 14:53) (98/53 - 114/60)  RR: 18 (07 Dec 2021 14:53) (18 - 18)      PHYSICAL EXAM:  GENERAL: Disheveled  HEAD:  Atraumatic, Normocephalic  EYES: EOMI, PERRLA, conjunctiva and sclera clear  NECK: Supple, No JVD, no cervical lymphadenopathy, non-tender  CHEST/LUNG: Clear to auscultation bilaterally; No wheeze, rhonchi, or rales  HEART: Regular rate and rhythm; S1&S2  ABDOMEN: Soft, Nontender, Nondistended x 4 quadrants; Bowel sounds present  EXTREMITIES:   Peripheral Pulses Present, No clubbing, no cyanosis, or no edema, no calf tenderness  PSYCH: AAOx3, cooperative, appropriate  NEUROLOGY: WNL  SKIN: + redness left palm - multiple healed track marks secondary injection sites      LABS:                        15.4   9.90  )-----------( 320      ( 07 Dec 2021 06:16 )             47.6     12-    144  |  105  |  10  ----------------------------<  71  4.1   |  24  |  0.8    Ca    9.1      07 Dec 2021 06:16  Mg     2.0     12-    TPro  6.8  /  Alb  4.2  /  TBili  0.4  /  DBili  x   /  AST  12  /  ALT  15  /  AlkPhos  83  12-07      Urinalysis Basic - ( 06 Dec 2021 14:26 )    Color: Yellow / Appearance: Clear / S.020 / pH: x  Gluc: x / Ketone: Negative  / Bili: Negative / Urobili: 0.2 mg/dL   Blood: x / Protein: Trace mg/dL / Nitrite: Negative   Leuk Esterase: Small / RBC: 1-2 /HPF / WBC 3-5 /HPF   Sq Epi: x / Non Sq Epi: Moderate /HPF / Bacteria: Few

## 2021-12-07 NOTE — CONSULT NOTE ADULT - SUBJECTIVE AND OBJECTIVE BOX
Pt interviewed, examined and EMR chart reviewed.  Pt admits to using opiates 5 bags inhaled over the last 3 days pt was weening herself off 20 bags IV for the last month. Pt recently completed detox in this facility but never followed up with aftercare including [facility]. Pt is refusing any referal at this time.  Last use day of admission  Pt admits to using 6mg of xanax per day with intermittent sobriety. Pt states she does not have any issue with stopping benzos.  Hx of withdrawal   variable periods of sobriety in the past.  Has been in detox before __X___yes,   _____No    SOCIAL HISTORY:    REVIEW OF SYSTEMS:    Constitutional: No fever, weight loss or fatigue  ENT:  No difficulty hearing, tinnitus, vertigo; No sinus or throat pain  Neck: No pain or stiffness  Respiratory: No cough, wheezing, chills or hemoptysis  Cardiovascular: No chest pain, palpitations, shortness of breath, dizziness or leg swelling  Gastrointestinal: No abdominal or epigastric pain. No nausea, vomiting or hematemesis; No diarrhea or constipation. No melena or hematochezia.  Neurological: No headaches, memory loss, loss of strength, numbness or tremors  Musculoskeletal: No joint pain or swelling; No muscle, back or extremity pain  Psychiatric: positive anxiety and anger , mood swings and difficulty sleeping    MEDICATIONS  (STANDING):  cephalexin 500 milliGRAM(s) Oral four times a day  clonazePAM  Tablet 2 milliGRAM(s) Oral <User Schedule>  escitalopram 20 milliGRAM(s) Oral daily  lamoTRIgine 200 milliGRAM(s) Oral at bedtime  methadone    Tablet   Oral   methadone    Tablet 10 milliGRAM(s) Oral every 12 hours  multivitamin/minerals 1 Tablet(s) Oral daily  nicotine - 21 mG/24Hr(s) Patch 1 Patch Transdermal daily  pantoprazole    Tablet 40 milliGRAM(s) Oral before breakfast  sodium chloride 0.9%. 1000 milliLiter(s) (125 mL/Hr) IV Continuous <Continuous>    MEDICATIONS  (PRN):  acetaminophen     Tablet .. 650 milliGRAM(s) Oral every 8 hours PRN Mild Pain (1 - 3)  ALBUTerol    90 MICROgram(s) HFA Inhaler 2 Puff(s) Inhalation every 6 hours PRN Shortness of Breath and/or Wheezing  aluminum hydroxide/magnesium hydroxide/simethicone Suspension 30 milliLiter(s) Oral every 6 hours PRN Heartburn  bismuth subsalicylate Liquid 30 milliLiter(s) Oral every 6 hours PRN Diarrhea  cloNIDine 0.1 milliGRAM(s) Oral every 8 hours PRN opiate withdrawal  guaifenesin/dextromethorphan Oral Liquid 5 milliLiter(s) Oral every 4 hours PRN Cough  hydrOXYzine hydrochloride 50 milliGRAM(s) Oral every 6 hours PRN Anxiety  ibuprofen  Tablet. 400 milliGRAM(s) Oral every 6 hours PRN Mild Pain (1 - 3)  magnesium hydroxide Suspension 30 milliLiter(s) Oral once PRN Constipation  methocarbamol 500 milliGRAM(s) Oral every 6 hours PRN muscle pain  ondansetron   Disintegrating Tablet 4 milliGRAM(s) Oral every 6 hours PRN Nausea and/or Vomiting  pseudoephedrine 60 milliGRAM(s) Oral every 6 hours PRN Rhinitis  simethicone 80 milliGRAM(s) Chew once PRN Gas  traZODone 100 milliGRAM(s) Oral at bedtime PRN insomnia      Vital Signs Last 24 Hrs  T(C): 36.1 (07 Dec 2021 04:56), Max: 37.2 (06 Dec 2021 17:12)  T(F): 96.9 (07 Dec 2021 04:56), Max: 99 (06 Dec 2021 17:12)  HR: 67 (07 Dec 2021 04:56) (59 - 70)  BP: 98/53 (07 Dec 2021 04:56) (98/53 - 128/60)  BP(mean): --  RR: 18 (06 Dec 2021 20:54) (18 - 18)  SpO2: 100% (06 Dec 2021 16:19) (100% - 100%)    PHYSICAL EXAM:    Constitutional: NAD, well-groomed, well-developed  HEENT: PERRLA, EOMI, Normal Hearing, MMM  Neck: No LAD, No JVD  Back: Normal spine flexure, No CVA tenderness  Respiratory: CTAB/L  Cardiovascular: S1 and S2, RRR, no M/G/R  Gastrointestinal: BS+, soft, NT/ND  Extremities: No peripheral edema positive track marks noted with erythema  Vascular: 2+ peripheral pulses  Neurological: A/O x 3, no focal deficits    LABS:                        15.4   9.90  )-----------( 320      ( 07 Dec 2021 06:16 )             47.6     12    144  |  105  |  10  ----------------------------<  71  4.1   |  24  |  0.8    Ca    9.1      07 Dec 2021 06:16  Mg     2.0         TPro  6.8  /  Alb  4.2  /  TBili  0.4  /  DBili  x   /  AST  12  /  ALT  15  /  AlkPhos  83        Urinalysis Basic - ( 06 Dec 2021 14:26 )    Color: Yellow / Appearance: Clear / S.020 / pH: x  Gluc: x / Ketone: Negative  / Bili: Negative / Urobili: 0.2 mg/dL   Blood: x / Protein: Trace mg/dL / Nitrite: Negative   Leuk Esterase: Small / RBC: 1-2 /HPF / WBC 3-5 /HPF   Sq Epi: x / Non Sq Epi: Moderate /HPF / Bacteria: Few      Drug Screen Urine:  Alcohol Level  Alcohol, Blood: <10 mg/dL (21 @ 12:45)        RADIOLOGY & ADDITIONAL STUDIES:

## 2021-12-07 NOTE — CONSULT NOTE ADULT - PROBLEM SELECTOR RECOMMENDATION 9
After evaluation at this time would modify benzodiazipine withdrawal protocol and continue on methadone taper. Pt told n ot to leave unit and offered nicotine patch but refuses. Pt with legal issues and is not sure of her plan for aftercare. Pt is offering obstacles to every type of treatment plan. Pt will be monitored and supportive care provided  CATCH team involved for aftercare and pt will follow up with aftercare and follow up in AM for any possible changes

## 2021-12-08 LAB
ALBUMIN SERPL ELPH-MCNC: 4.3 G/DL — SIGNIFICANT CHANGE UP (ref 3.5–5.2)
ALP SERPL-CCNC: 77 U/L — SIGNIFICANT CHANGE UP (ref 30–115)
ALT FLD-CCNC: 14 U/L — SIGNIFICANT CHANGE UP (ref 0–41)
ANION GAP SERPL CALC-SCNC: 15 MMOL/L — HIGH (ref 7–14)
AST SERPL-CCNC: 14 U/L — SIGNIFICANT CHANGE UP (ref 0–41)
BILIRUB SERPL-MCNC: 0.3 MG/DL — SIGNIFICANT CHANGE UP (ref 0.2–1.2)
BUN SERPL-MCNC: 3 MG/DL — LOW (ref 10–20)
CALCIUM SERPL-MCNC: 9.3 MG/DL — SIGNIFICANT CHANGE UP (ref 8.5–10.1)
CHLORIDE SERPL-SCNC: 106 MMOL/L — SIGNIFICANT CHANGE UP (ref 98–110)
CO2 SERPL-SCNC: 20 MMOL/L — SIGNIFICANT CHANGE UP (ref 17–32)
CREAT SERPL-MCNC: 0.7 MG/DL — SIGNIFICANT CHANGE UP (ref 0.7–1.5)
GLUCOSE SERPL-MCNC: 89 MG/DL — SIGNIFICANT CHANGE UP (ref 70–99)
HAV IGM SER-ACNC: SIGNIFICANT CHANGE UP
HBV CORE IGM SER-ACNC: SIGNIFICANT CHANGE UP
HBV SURFACE AG SER-ACNC: SIGNIFICANT CHANGE UP
HCV AB S/CO SERPL IA: 15.78 S/CO — HIGH (ref 0–0.99)
HCV AB SERPL-IMP: REACTIVE
MAGNESIUM SERPL-MCNC: 2.2 MG/DL — SIGNIFICANT CHANGE UP (ref 1.8–2.4)
PHOSPHATE SERPL-MCNC: 2.5 MG/DL — SIGNIFICANT CHANGE UP (ref 2.1–4.9)
POTASSIUM SERPL-MCNC: 3.8 MMOL/L — SIGNIFICANT CHANGE UP (ref 3.5–5)
POTASSIUM SERPL-SCNC: 3.8 MMOL/L — SIGNIFICANT CHANGE UP (ref 3.5–5)
PROT SERPL-MCNC: 6.9 G/DL — SIGNIFICANT CHANGE UP (ref 6–8)
SODIUM SERPL-SCNC: 141 MMOL/L — SIGNIFICANT CHANGE UP (ref 135–146)

## 2021-12-08 PROCEDURE — 99232 SBSQ HOSP IP/OBS MODERATE 35: CPT

## 2021-12-08 PROCEDURE — 74176 CT ABD & PELVIS W/O CONTRAST: CPT | Mod: 26

## 2021-12-08 PROCEDURE — 99231 SBSQ HOSP IP/OBS SF/LOW 25: CPT

## 2021-12-08 RX ORDER — IOHEXOL 300 MG/ML
30 INJECTION, SOLUTION INTRAVENOUS ONCE
Refills: 0 | Status: DISCONTINUED | OUTPATIENT
Start: 2021-12-08 | End: 2021-12-10

## 2021-12-08 RX ADMIN — METHADONE HYDROCHLORIDE 5 MILLIGRAM(S): 40 TABLET ORAL at 18:31

## 2021-12-08 RX ADMIN — Medication 30 MILLILITER(S): at 21:13

## 2021-12-08 RX ADMIN — Medication 500 MILLIGRAM(S): at 18:31

## 2021-12-08 RX ADMIN — Medication 25 MILLIGRAM(S): at 19:06

## 2021-12-08 RX ADMIN — Medication 25 MILLIGRAM(S): at 05:57

## 2021-12-08 RX ADMIN — Medication 25 MILLIGRAM(S): at 18:31

## 2021-12-08 RX ADMIN — METHADONE HYDROCHLORIDE 10 MILLIGRAM(S): 40 TABLET ORAL at 05:57

## 2021-12-08 NOTE — PROGRESS NOTE ADULT - SUBJECTIVE AND OBJECTIVE BOX
Pt interviewed, examined and EMR chart reviewed.    Follow up of Opiate and benzodiazipine  Addiction. Pt is on methadone and librium protocol. Pt states she is doing from withdrawal standpoint. pt denies any benzo withdrawal and some minor opiate muscle aches. Pt complains of continued GI issues including constipation and epigastric discomfort. __     SOCIAL HISTORY:    REVIEW OF SYSTEMS:    Constitutional: No fever, weight loss or fatigue  ENT:  No difficulty hearing, tinnitus, vertigo; No sinus or throat pain  Neck: No pain or stiffness  Respiratory: No cough, wheezing, chills or hemoptysis  Cardiovascular: No chest pain, palpitations, shortness of breath, dizziness or leg swelling  Gastrointestinal: positive abdominal/epigastric pain. No nausea, vomiting or hematemesis; No diarrhea positive constipation. No melena or hematochezia.  Neurological: No headaches, memory loss, loss of strength, numbness or tremors  Musculoskeletal: No joint pain or swelling; No muscle, back or extremity pain      MEDICATIONS  (STANDING):  cephalexin 500 milliGRAM(s) Oral four times a day  chlordiazePOXIDE   Oral   chlordiazePOXIDE 25 milliGRAM(s) Oral every 12 hours  escitalopram 20 milliGRAM(s) Oral daily  lamoTRIgine 200 milliGRAM(s) Oral at bedtime  methadone    Tablet 5 milliGRAM(s) Oral every 12 hours  methadone    Tablet   Oral   multivitamin/minerals 1 Tablet(s) Oral daily  nicotine - 21 mG/24Hr(s) Patch 1 Patch Transdermal daily  pantoprazole    Tablet 40 milliGRAM(s) Oral before breakfast  sodium chloride 0.9%. 1000 milliLiter(s) (125 mL/Hr) IV Continuous <Continuous>    MEDICATIONS  (PRN):  acetaminophen     Tablet .. 650 milliGRAM(s) Oral every 8 hours PRN Mild Pain (1 - 3)  ALBUTerol    90 MICROgram(s) HFA Inhaler 2 Puff(s) Inhalation every 6 hours PRN Shortness of Breath and/or Wheezing  aluminum hydroxide/magnesium hydroxide/simethicone Suspension 30 milliLiter(s) Oral every 6 hours PRN Heartburn  bismuth subsalicylate Liquid 30 milliLiter(s) Oral every 6 hours PRN Diarrhea  chlordiazePOXIDE 25 milliGRAM(s) Oral every 6 hours PRN benzo withdrawal  cloNIDine 0.1 milliGRAM(s) Oral every 8 hours PRN opiate withdrawal  guaifenesin/dextromethorphan Oral Liquid 5 milliLiter(s) Oral every 4 hours PRN Cough  hydrOXYzine hydrochloride 50 milliGRAM(s) Oral every 6 hours PRN Anxiety  ibuprofen  Tablet. 400 milliGRAM(s) Oral every 6 hours PRN Mild Pain (1 - 3)  magnesium hydroxide Suspension 30 milliLiter(s) Oral once PRN Constipation  methocarbamol 500 milliGRAM(s) Oral every 6 hours PRN muscle pain  ondansetron   Disintegrating Tablet 4 milliGRAM(s) Oral every 6 hours PRN Nausea and/or Vomiting  pseudoephedrine 60 milliGRAM(s) Oral every 6 hours PRN Rhinitis  simethicone 80 milliGRAM(s) Chew once PRN Gas  traZODone 100 milliGRAM(s) Oral at bedtime PRN insomnia      Vital Signs Last 24 Hrs  T(C): 35.9 (08 Dec 2021 05:00), Max: 37.2 (07 Dec 2021 14:53)  T(F): 96.6 (08 Dec 2021 05:00), Max: 98.9 (07 Dec 2021 14:53)  HR: 53 (08 Dec 2021 05:00) (52 - 65)  BP: 145/75 (08 Dec 2021 05:00) (112/65 - 145/75)  BP(mean): --  RR: 18 (08 Dec 2021 05:00) (18 - 18)  SpO2: --    PHYSICAL EXAM:    Constitutional: NAD, well-groomed, well-developed  HEENT: PERRLA, EOMI, Normal Hearing, MMM  Neck: No LAD, No JVD  Back: Normal spine flexure, No CVA tenderness  Respiratory: CTAB/L  Cardiovascular: S1 and S2, RRR, no M/G/R  Gastrointestinal: BS+, soft, NT/ND  Extremities: erythema to hand no abscess noted  Vascular: 2+ peripheral pulses  Neurological: A/O x 3, no focal deficits    LABS:                        15.4   9.90  )-----------( 320      ( 07 Dec 2021 06:16 )             47.6     12    144  |  105  |  10  ----------------------------<  71  4.1   |  24  |  0.8    Ca    9.1      07 Dec 2021 06:16  Mg     2.0     12-07    TPro  6.8  /  Alb  4.2  /  TBili  0.4  /  DBili  x   /  AST  12  /  ALT  15  /  AlkPhos  83        Urinalysis Basic - ( 06 Dec 2021 14:26 )    Color: Yellow / Appearance: Clear / S.020 / pH: x  Gluc: x / Ketone: Negative  / Bili: Negative / Urobili: 0.2 mg/dL   Blood: x / Protein: Trace mg/dL / Nitrite: Negative   Leuk Esterase: Small / RBC: 1-2 /HPF / WBC 3-5 /HPF   Sq Epi: x / Non Sq Epi: Moderate /HPF / Bacteria: Few      Drug Screen Urine:  Alcohol Level  Alcohol, Blood: <10 mg/dL (21 @ 12:45)        RADIOLOGY & ADDITIONAL STUDIES:       Pt interviewed, examined and EMR chart reviewed.    Follow up of Opiate and benzodiazipine  Addiction. Pt is on methadone and librium protocol. Although UDS negative for Benzo would complete last 3 doses of librium at this time. Pt states she is doing from withdrawal standpoint. pt denies any benzo withdrawal and some minor opiate muscle aches. Pt complains of continued GI issues including constipation and epigastric discomfort. __     SOCIAL HISTORY:    REVIEW OF SYSTEMS:    Constitutional: No fever, weight loss or fatigue  ENT:  No difficulty hearing, tinnitus, vertigo; No sinus or throat pain  Neck: No pain or stiffness  Respiratory: No cough, wheezing, chills or hemoptysis  Cardiovascular: No chest pain, palpitations, shortness of breath, dizziness or leg swelling  Gastrointestinal: positive abdominal/epigastric pain. No nausea, vomiting or hematemesis; No diarrhea positive constipation. No melena or hematochezia.  Neurological: No headaches, memory loss, loss of strength, numbness or tremors  Musculoskeletal: No joint pain or swelling; No muscle, back or extremity pain      MEDICATIONS  (STANDING):  cephalexin 500 milliGRAM(s) Oral four times a day  chlordiazePOXIDE   Oral   chlordiazePOXIDE 25 milliGRAM(s) Oral every 12 hours  escitalopram 20 milliGRAM(s) Oral daily  lamoTRIgine 200 milliGRAM(s) Oral at bedtime  methadone    Tablet 5 milliGRAM(s) Oral every 12 hours  methadone    Tablet   Oral   multivitamin/minerals 1 Tablet(s) Oral daily  nicotine - 21 mG/24Hr(s) Patch 1 Patch Transdermal daily  pantoprazole    Tablet 40 milliGRAM(s) Oral before breakfast  sodium chloride 0.9%. 1000 milliLiter(s) (125 mL/Hr) IV Continuous <Continuous>    MEDICATIONS  (PRN):  acetaminophen     Tablet .. 650 milliGRAM(s) Oral every 8 hours PRN Mild Pain (1 - 3)  ALBUTerol    90 MICROgram(s) HFA Inhaler 2 Puff(s) Inhalation every 6 hours PRN Shortness of Breath and/or Wheezing  aluminum hydroxide/magnesium hydroxide/simethicone Suspension 30 milliLiter(s) Oral every 6 hours PRN Heartburn  bismuth subsalicylate Liquid 30 milliLiter(s) Oral every 6 hours PRN Diarrhea  chlordiazePOXIDE 25 milliGRAM(s) Oral every 6 hours PRN benzo withdrawal  cloNIDine 0.1 milliGRAM(s) Oral every 8 hours PRN opiate withdrawal  guaifenesin/dextromethorphan Oral Liquid 5 milliLiter(s) Oral every 4 hours PRN Cough  hydrOXYzine hydrochloride 50 milliGRAM(s) Oral every 6 hours PRN Anxiety  ibuprofen  Tablet. 400 milliGRAM(s) Oral every 6 hours PRN Mild Pain (1 - 3)  magnesium hydroxide Suspension 30 milliLiter(s) Oral once PRN Constipation  methocarbamol 500 milliGRAM(s) Oral every 6 hours PRN muscle pain  ondansetron   Disintegrating Tablet 4 milliGRAM(s) Oral every 6 hours PRN Nausea and/or Vomiting  pseudoephedrine 60 milliGRAM(s) Oral every 6 hours PRN Rhinitis  simethicone 80 milliGRAM(s) Chew once PRN Gas  traZODone 100 milliGRAM(s) Oral at bedtime PRN insomnia      Vital Signs Last 24 Hrs  T(C): 35.9 (08 Dec 2021 05:00), Max: 37.2 (07 Dec 2021 14:53)  T(F): 96.6 (08 Dec 2021 05:00), Max: 98.9 (07 Dec 2021 14:53)  HR: 53 (08 Dec 2021 05:00) (52 - 65)  BP: 145/75 (08 Dec 2021 05:00) (112/65 - 145/75)  BP(mean): --  RR: 18 (08 Dec 2021 05:00) (18 - 18)  SpO2: --    PHYSICAL EXAM:    Constitutional: NAD, well-groomed, well-developed  HEENT: PERRLA, EOMI, Normal Hearing, MMM  Neck: No LAD, No JVD  Back: Normal spine flexure, No CVA tenderness  Respiratory: CTAB/L  Cardiovascular: S1 and S2, RRR, no M/G/R  Gastrointestinal: BS+, soft, NT/ND  Extremities: erythema to hand no abscess noted  Vascular: 2+ peripheral pulses  Neurological: A/O x 3, no focal deficits    LABS:                        15.4   9.90  )-----------( 320      ( 07 Dec 2021 06:16 )             47.6     12-    144  |  105  |  10  ----------------------------<  71  4.1   |  24  |  0.8    Ca    9.1      07 Dec 2021 06:16  Mg     2.0         TPro  6.8  /  Alb  4.2  /  TBili  0.4  /  DBili  x   /  AST  12  /  ALT  15  /  AlkPhos  83        Urinalysis Basic - ( 06 Dec 2021 14:26 )    Color: Yellow / Appearance: Clear / S.020 / pH: x  Gluc: x / Ketone: Negative  / Bili: Negative / Urobili: 0.2 mg/dL   Blood: x / Protein: Trace mg/dL / Nitrite: Negative   Leuk Esterase: Small / RBC: 1-2 /HPF / WBC 3-5 /HPF   Sq Epi: x / Non Sq Epi: Moderate /HPF / Bacteria: Few      Drug Screen Urine:  Alcohol Level  Alcohol, Blood: <10 mg/dL (21 @ 12:45)        RADIOLOGY & ADDITIONAL STUDIES:

## 2021-12-08 NOTE — PROGRESS NOTE ADULT - SUBJECTIVE AND OBJECTIVE BOX
32y/o  Female w/ Pmhx Anxiety and depression Borderline personality disorder, COPD, Hep C, Nicotine dependence and drug abuse presents to the ED for opiate and benzo dependence w/ multiple episodes on vomiting.    Today:  Seen at bedside, states her RLQ abdominal pain started prior to weaning off benzos and heroin.  Passing gas and moving bowels.              REVIEW OF SYSTEMS:  +Abdominal pain      MEDICATIONS  (STANDING):  cephalexin 500 milliGRAM(s) Oral four times a day  chlordiazePOXIDE   Oral   chlordiazePOXIDE 25 milliGRAM(s) Oral every 12 hours  escitalopram 20 milliGRAM(s) Oral daily  iohexol 300 mG (iodine)/mL Oral Solution 30 milliLiter(s) Oral once  lamoTRIgine 200 milliGRAM(s) Oral at bedtime  methadone    Tablet 5 milliGRAM(s) Oral every 12 hours  methadone    Tablet   Oral   multivitamin/minerals 1 Tablet(s) Oral daily  nicotine - 21 mG/24Hr(s) Patch 1 Patch Transdermal daily  pantoprazole    Tablet 40 milliGRAM(s) Oral before breakfast  sodium chloride 0.9%. 1000 milliLiter(s) (125 mL/Hr) IV Continuous <Continuous>    MEDICATIONS  (PRN):  acetaminophen     Tablet .. 650 milliGRAM(s) Oral every 8 hours PRN Mild Pain (1 - 3)  ALBUTerol    90 MICROgram(s) HFA Inhaler 2 Puff(s) Inhalation every 6 hours PRN Shortness of Breath and/or Wheezing  aluminum hydroxide/magnesium hydroxide/simethicone Suspension 30 milliLiter(s) Oral every 6 hours PRN Heartburn  bismuth subsalicylate Liquid 30 milliLiter(s) Oral every 6 hours PRN Diarrhea  chlordiazePOXIDE 25 milliGRAM(s) Oral every 6 hours PRN benzo withdrawal  cloNIDine 0.1 milliGRAM(s) Oral every 8 hours PRN opiate withdrawal  guaifenesin/dextromethorphan Oral Liquid 5 milliLiter(s) Oral every 4 hours PRN Cough  hydrOXYzine hydrochloride 50 milliGRAM(s) Oral every 6 hours PRN Anxiety  ibuprofen  Tablet. 400 milliGRAM(s) Oral every 6 hours PRN Mild Pain (1 - 3)  magnesium hydroxide Suspension 30 milliLiter(s) Oral once PRN Constipation  methocarbamol 500 milliGRAM(s) Oral every 6 hours PRN muscle pain  ondansetron   Disintegrating Tablet 4 milliGRAM(s) Oral every 6 hours PRN Nausea and/or Vomiting  pseudoephedrine 60 milliGRAM(s) Oral every 6 hours PRN Rhinitis  simethicone 80 milliGRAM(s) Chew once PRN Gas  traZODone 100 milliGRAM(s) Oral at bedtime PRN insomnia      Allergies  No Known Allergies        FAMILY HISTORY:  No pertinent family history in first degree relatives        Vital Signs Last 24 Hrs  T(C): 36.6 (08 Dec 2021 13:33), Max: 36.6 (08 Dec 2021 13:33)  T(F): 97.8 (08 Dec 2021 13:33), Max: 97.8 (08 Dec 2021 13:33)  HR: 53 (08 Dec 2021 13:33) (53 - 65)  BP: 120/70 (08 Dec 2021 13:33) (112/65 - 145/75)  RR: 18 (08 Dec 2021 13:33) (18 - 18)      PHYSICAL EXAM:  GENERAL: Disheveled  HEAD:  Atraumatic, Normocephalic  EYES: EOMI, PERRLA, conjunctiva and sclera clear  NECK: Supple, No JVD, no cervical lymphadenopathy, non-tender  CHEST/LUNG: Clear to auscultation bilaterally; No wheeze, rhonchi, or rales  HEART: Regular rate and rhythm; S1&S2  ABDOMEN: Soft, tender epigastric area and RLQ, no rebound or guarding; Bowel sounds present  EXTREMITIES:   Peripheral Pulses Present, No clubbing, no cyanosis, or no edema, no calf tenderness  PSYCH: AAOx3, cooperative, appropriate  NEUROLOGY: WNL  SKIN: + redness left palm - multiple healed track marks secondary injection sites      LABS:                        15.4   9.90  )-----------( 320      ( 07 Dec 2021 06:16 )             47.6     12-08    141  |  106  |  3<L>  ----------------------------<  89  3.8   |  20  |  0.7    Ca    9.3      08 Dec 2021 11:30  Phos  2.5     12-08  Mg     2.2     12-08    TPro  6.9  /  Alb  4.3  /  TBili  0.3  /  DBili  x   /  AST  14  /  ALT  14  /  AlkPhos  77  12-08

## 2021-12-09 PROCEDURE — 99232 SBSQ HOSP IP/OBS MODERATE 35: CPT

## 2021-12-09 PROCEDURE — 99231 SBSQ HOSP IP/OBS SF/LOW 25: CPT

## 2021-12-09 RX ORDER — MULTIVIT-MIN/FERROUS GLUCONATE 9 MG/15 ML
1 LIQUID (ML) ORAL
Qty: 0 | Refills: 0 | DISCHARGE
Start: 2021-12-09

## 2021-12-09 RX ORDER — CEPHALEXIN 500 MG
1 CAPSULE ORAL
Qty: 16 | Refills: 0
Start: 2021-12-09

## 2021-12-09 RX ORDER — NICOTINE POLACRILEX 2 MG
1 GUM BUCCAL
Qty: 0 | Refills: 0 | DISCHARGE
Start: 2021-12-09

## 2021-12-09 RX ADMIN — Medication 30 MILLILITER(S): at 19:48

## 2021-12-09 RX ADMIN — Medication 25 MILLIGRAM(S): at 06:01

## 2021-12-09 RX ADMIN — METHADONE HYDROCHLORIDE 5 MILLIGRAM(S): 40 TABLET ORAL at 06:01

## 2021-12-09 RX ADMIN — SIMETHICONE 80 MILLIGRAM(S): 80 TABLET, CHEWABLE ORAL at 14:07

## 2021-12-09 RX ADMIN — PANTOPRAZOLE SODIUM 40 MILLIGRAM(S): 20 TABLET, DELAYED RELEASE ORAL at 06:01

## 2021-12-09 RX ADMIN — Medication 500 MILLIGRAM(S): at 06:01

## 2021-12-09 RX ADMIN — METHADONE HYDROCHLORIDE 5 MILLIGRAM(S): 40 TABLET ORAL at 17:40

## 2021-12-09 NOTE — DISCHARGE NOTE PROVIDER - HOSPITAL COURSE
patient is a 30yo F admitted for benzo and opiate dependence, placed on methadone and librium protocol and followed by catch/addiction services. Pt also c/o abd pain ct scan showed thickening of stomach. patient given keflex for cellulitis of hand. Pt clinically improved and dc to home.

## 2021-12-09 NOTE — PROGRESS NOTE ADULT - PROBLEM SELECTOR PLAN 1
After evaluation at this time complete both librium and methadone protocols. .  Pt will be monitored and supportive care provided  CATCH team involved for aftercare and pt now states will go PMD on Friday for transition from subutex to suboxone and then will follow up for rehab after that. Pt is doing all her own aftercare.  [facility] counselor will assist if needed. Pt for discharge on Friday.
After evaluation at this time complete both librium and methadone protocols. Pt continues with abdominal complaints treatment as per medical team.  Pt will be monitored and supportive care provided  [facility] team involved for aftercare and pt now states will go to rehab on Friday and is arranging her own aftercare. [facility] counselor will assist if needed. Pt for discharge on Friday.

## 2021-12-09 NOTE — PROGRESS NOTE ADULT - PROBLEM SELECTOR PROBLEM 1
Polysubstance (including opioids) dependence, daily use
Polysubstance (including opioids) dependence, daily use

## 2021-12-09 NOTE — DISCHARGE NOTE PROVIDER - NSDCCPCAREPLAN_GEN_ALL_CORE_FT
PRINCIPAL DISCHARGE DIAGNOSIS  Diagnosis: Withdrawal complaint  Assessment and Plan of Treatment: Do not use drugs. Follow up with your primary care doctor. Catch team will follow up with you. Recommend drug counseling program.      SECONDARY DISCHARGE DIAGNOSES  Diagnosis: Cellulitis  Assessment and Plan of Treatment: complete course of antibiotics. Call doctor if increasing pain, redness, swelling, or fever.    Diagnosis: Abdominal pain  Assessment and Plan of Treatment:      PRINCIPAL DISCHARGE DIAGNOSIS  Diagnosis: Withdrawal complaint  Assessment and Plan of Treatment: Do not use drugs. Follow up with your primary care doctor. Catch team will follow up with you. Recommend drug counseling program.      SECONDARY DISCHARGE DIAGNOSES  Diagnosis: Cellulitis  Assessment and Plan of Treatment: complete course of antibiotics. Call doctor if increasing pain, redness, swelling, or fever.    Diagnosis: Abdominal pain  Assessment and Plan of Treatment: Your ct scan showed thickening of your stomach. Follow up with a gastroenterologist. You will need to have a EGD done to further evaluate  the ct scan findings.

## 2021-12-09 NOTE — PROGRESS NOTE ADULT - SUBJECTIVE AND OBJECTIVE BOX
Pt interviewed, examined and EMR chart reviewed.    Follow up of Opiate and Benzo Addiction. Pt is on methadone and librium protocol. Pt is doing better and states abdominal pain is improved.    SOCIAL HISTORY:    REVIEW OF SYSTEMS:    Constitutional: No fever, weight loss or fatigue  ENT:  No difficulty hearing, tinnitus, vertigo; No sinus or throat pain  Neck: No pain or stiffness  Respiratory: No cough, wheezing, chills or hemoptysis  Cardiovascular: No chest pain, palpitations, shortness of breath, dizziness or leg swelling  Gastrointestinal: No abdominal or epigastric pain. No nausea, vomiting or hematemesis; No diarrhea or constipation. No melena or hematochezia.  Neurological: No headaches, memory loss, loss of strength, numbness or tremors  Musculoskeletal: No joint pain or swelling; No muscle, back or extremity pain      MEDICATIONS  (STANDING):  cephalexin 500 milliGRAM(s) Oral four times a day  chlordiazePOXIDE   Oral   chlordiazePOXIDE 25 milliGRAM(s) Oral daily  escitalopram 20 milliGRAM(s) Oral daily  iohexol 300 mG (iodine)/mL Oral Solution 30 milliLiter(s) Oral once  lamoTRIgine 200 milliGRAM(s) Oral at bedtime  methadone    Tablet 5 milliGRAM(s) Oral every 12 hours  methadone    Tablet   Oral   multivitamin/minerals 1 Tablet(s) Oral daily  nicotine - 21 mG/24Hr(s) Patch 1 Patch Transdermal daily  pantoprazole    Tablet 40 milliGRAM(s) Oral before breakfast  sodium chloride 0.9%. 1000 milliLiter(s) (125 mL/Hr) IV Continuous <Continuous>    MEDICATIONS  (PRN):  acetaminophen     Tablet .. 650 milliGRAM(s) Oral every 8 hours PRN Mild Pain (1 - 3)  ALBUTerol    90 MICROgram(s) HFA Inhaler 2 Puff(s) Inhalation every 6 hours PRN Shortness of Breath and/or Wheezing  aluminum hydroxide/magnesium hydroxide/simethicone Suspension 30 milliLiter(s) Oral every 6 hours PRN Heartburn  bismuth subsalicylate Liquid 30 milliLiter(s) Oral every 6 hours PRN Diarrhea  chlordiazePOXIDE 25 milliGRAM(s) Oral every 6 hours PRN benzo withdrawal  cloNIDine 0.1 milliGRAM(s) Oral every 8 hours PRN opiate withdrawal  guaifenesin/dextromethorphan Oral Liquid 5 milliLiter(s) Oral every 4 hours PRN Cough  hydrOXYzine hydrochloride 50 milliGRAM(s) Oral every 6 hours PRN Anxiety  ibuprofen  Tablet. 400 milliGRAM(s) Oral every 6 hours PRN Mild Pain (1 - 3)  magnesium hydroxide Suspension 30 milliLiter(s) Oral once PRN Constipation  methocarbamol 500 milliGRAM(s) Oral every 6 hours PRN muscle pain  ondansetron   Disintegrating Tablet 4 milliGRAM(s) Oral every 6 hours PRN Nausea and/or Vomiting  pseudoephedrine 60 milliGRAM(s) Oral every 6 hours PRN Rhinitis  simethicone 80 milliGRAM(s) Chew once PRN Gas  traZODone 100 milliGRAM(s) Oral at bedtime PRN insomnia      Vital Signs Last 24 Hrs  T(C): 36 (09 Dec 2021 05:15), Max: 36.6 (08 Dec 2021 13:33)  T(F): 96.8 (09 Dec 2021 05:15), Max: 97.8 (08 Dec 2021 13:33)  HR: 44 (09 Dec 2021 05:15) (44 - 53)  BP: 145/72 (09 Dec 2021 05:15) (113/61 - 145/72)  BP(mean): --  RR: 16 (09 Dec 2021 05:15) (16 - 18)  SpO2: --    PHYSICAL EXAM:    Constitutional: NAD, well-groomed, well-developed  HEENT: PERRLA, EOMI, Normal Hearing, MMM  Neck: No LAD, No JVD  Back: Normal spine flexure, No CVA tenderness  Respiratory: CTAB/L  Cardiovascular: S1 and S2, RRR, no M/G/R  Gastrointestinal: BS+, soft, NT/ND  Extremities: No peripheral edema  Vascular: 2+ peripheral pulses  Neurological: A/O x 3, no focal deficits    LABS:    12-08    141  |  106  |  3<L>  ----------------------------<  89  3.8   |  20  |  0.7    Ca    9.3      08 Dec 2021 11:30  Phos  2.5     12-08  Mg     2.2     12-08    TPro  6.9  /  Alb  4.3  /  TBili  0.3  /  DBili  x   /  AST  14  /  ALT  14  /  AlkPhos  77  12-08        Drug Screen Urine:  Alcohol Level  Alcohol, Blood: <10 mg/dL (12-06-21 @ 12:45)        RADIOLOGY & ADDITIONAL STUDIES:

## 2021-12-09 NOTE — DISCHARGE NOTE PROVIDER - CARE PROVIDER_API CALL
BETH FLOWER  Specialist  [address] [place] [address]  Phone: [phone]  Fax: [phone])-  Follow Up Time: 1 week   BETH FLOWER  Specialist  [address] [place] [address]  Phone: ()-  Fax: ()-  Follow Up Time: 1 week    Mili Boles)  Gastroenterology  [address] [place] [address]  Phone: [phone]  Fax: [phone]  Follow Up Time: 1 month

## 2021-12-09 NOTE — DISCHARGE NOTE PROVIDER - CARE PROVIDERS DIRECT ADDRESSES
,DirectAddress_Unknown ,DirectAddress_Unknown,[email or web address]

## 2021-12-09 NOTE — DISCHARGE NOTE PROVIDER - PROVIDER TOKENS
PROVIDER:[TOKEN:[[id number]],FOLLOWUP:[1 week]] PROVIDER:[TOKEN:[[id number]],FOLLOWUP:[1 week]],PROVIDER:[TOKEN:[[id number]],FOLLOWUP:[1 month]]

## 2021-12-09 NOTE — PROGRESS NOTE ADULT - SUBJECTIVE AND OBJECTIVE BOX
32y/o  Female w/ Pmhx Anxiety and depression Borderline personality disorder, COPD, Hep C, Nicotine dependence and drug abuse presents to the ED for opiate and benzo dependence w/ multiple episodes on vomiting.    Today:  Seen at bedside, reports her abdominal pain has improved; she has no new complaints today. CATCH team also assessed her this AM.         REVIEW OF SYSTEMS:  +Abdominal pain; otherwise all systems were reviewed and are otherwise negative      MEDICATIONS  (STANDING):  cephalexin 500 milliGRAM(s) Oral four times a day  chlordiazePOXIDE   Oral   chlordiazePOXIDE 25 milliGRAM(s) Oral daily  escitalopram 20 milliGRAM(s) Oral daily  iohexol 300 mG (iodine)/mL Oral Solution 30 milliLiter(s) Oral once  lamoTRIgine 200 milliGRAM(s) Oral at bedtime  methadone    Tablet 5 milliGRAM(s) Oral every 12 hours  methadone    Tablet   Oral   multivitamin/minerals 1 Tablet(s) Oral daily  nicotine - 21 mG/24Hr(s) Patch 1 Patch Transdermal daily  pantoprazole    Tablet 40 milliGRAM(s) Oral before breakfast  sodium chloride 0.9%. 1000 milliLiter(s) (125 mL/Hr) IV Continuous <Continuous>    MEDICATIONS  (PRN):  acetaminophen     Tablet .. 650 milliGRAM(s) Oral every 8 hours PRN Mild Pain (1 - 3)  ALBUTerol    90 MICROgram(s) HFA Inhaler 2 Puff(s) Inhalation every 6 hours PRN Shortness of Breath and/or Wheezing  aluminum hydroxide/magnesium hydroxide/simethicone Suspension 30 milliLiter(s) Oral every 6 hours PRN Heartburn  bismuth subsalicylate Liquid 30 milliLiter(s) Oral every 6 hours PRN Diarrhea  chlordiazePOXIDE 25 milliGRAM(s) Oral every 6 hours PRN benzo withdrawal  cloNIDine 0.1 milliGRAM(s) Oral every 8 hours PRN opiate withdrawal  guaifenesin/dextromethorphan Oral Liquid 5 milliLiter(s) Oral every 4 hours PRN Cough  hydrOXYzine hydrochloride 50 milliGRAM(s) Oral every 6 hours PRN Anxiety  ibuprofen  Tablet. 400 milliGRAM(s) Oral every 6 hours PRN Mild Pain (1 - 3)  magnesium hydroxide Suspension 30 milliLiter(s) Oral once PRN Constipation  methocarbamol 500 milliGRAM(s) Oral every 6 hours PRN muscle pain  ondansetron   Disintegrating Tablet 4 milliGRAM(s) Oral every 6 hours PRN Nausea and/or Vomiting  pseudoephedrine 60 milliGRAM(s) Oral every 6 hours PRN Rhinitis  simethicone 80 milliGRAM(s) Chew once PRN Gas  traZODone 100 milliGRAM(s) Oral at bedtime PRN insomnia        Allergies  No Known Allergies        FAMILY HISTORY:  No pertinent family history in first degree relatives      Vital Signs Last 24 Hrs  T(C): 36 (09 Dec 2021 05:15), Max: 36.6 (08 Dec 2021 13:33)  T(F): 96.8 (09 Dec 2021 05:15), Max: 97.8 (08 Dec 2021 13:33)  HR: 44 (09 Dec 2021 05:15) (44 - 53)  BP: 145/72 (09 Dec 2021 05:15) (113/61 - 145/72)  BP(mean): --  RR: 16 (09 Dec 2021 05:15) (16 - 18)  SpO2: --  PHYSICAL EXAM:  GENERAL: Disheveled  HEAD:  Atraumatic, Normocephalic  EYES: EOMI, PERRLA, conjunctiva and sclera clear  NECK: Supple, No JVD, no cervical lymphadenopathy, non-tender  CHEST/LUNG: Clear to auscultation bilaterally; No wheeze, rhonchi, or rales  HEART: Regular rate and rhythm; S1&S2  ABDOMEN: Soft, nontender to palpate, no rebound or guarding; Bowel sounds present  EXTREMITIES:   Peripheral Pulses Present, No clubbing, no cyanosis, or no edema, no calf tenderness  PSYCH: AAOx3, cooperative, appropriate  NEUROLOGY: WNL  SKIN: + redness left palm - multiple healed track marks secondary injection sites      LABS:

## 2021-12-09 NOTE — DISCHARGE NOTE PROVIDER - NSDCMRMEDTOKEN_GEN_ALL_CORE_FT
Albuterol (Eqv-ProAir HFA) 90 mcg/inh inhalation aerosol: 2 puff(s) inhaled every 6 hours  lamoTRIgine 200 mg oral tablet: 1 tab(s) orally once a day (at bedtime)  Lexapro: 20 milligram(s) orally once a day  Multiple Vitamins with Minerals oral tablet: 1 tab(s) orally once a day  nicotine 21 mg/24 hr transdermal film, extended release: 1 patch transdermal once a day   Albuterol (Eqv-ProAir HFA) 90 mcg/inh inhalation aerosol: 2 puff(s) inhaled every 6 hours  cephalexin 500 mg oral capsule: 1 cap(s) orally 4 times a day  lamoTRIgine 200 mg oral tablet: 1 tab(s) orally once a day (at bedtime)  Lexapro: 20 milligram(s) orally once a day  Multiple Vitamins with Minerals oral tablet: 1 tab(s) orally once a day  nicotine 21 mg/24 hr transdermal film, extended release: 1 patch transdermal once a day

## 2021-12-10 VITALS
DIASTOLIC BLOOD PRESSURE: 67 MMHG | SYSTOLIC BLOOD PRESSURE: 128 MMHG | TEMPERATURE: 98 F | RESPIRATION RATE: 17 BRPM | HEART RATE: 55 BPM

## 2021-12-10 LAB — HCV RNA FLD QL NAA+PROBE: SIGNIFICANT CHANGE UP

## 2021-12-10 PROCEDURE — 99239 HOSP IP/OBS DSCHRG MGMT >30: CPT

## 2021-12-10 RX ORDER — CEPHALEXIN 500 MG
1 CAPSULE ORAL
Qty: 16 | Refills: 0
Start: 2021-12-10

## 2021-12-10 RX ADMIN — METHADONE HYDROCHLORIDE 5 MILLIGRAM(S): 40 TABLET ORAL at 05:09

## 2021-12-10 RX ADMIN — Medication 30 MILLILITER(S): at 09:57

## 2021-12-10 RX ADMIN — Medication 500 MILLIGRAM(S): at 05:09

## 2021-12-10 NOTE — PROGRESS NOTE ADULT - ASSESSMENT
30y/o  Female w/ Pmhx Anxiety and depression Borderline personality disorder, COPD, Hep C, Nicotine dependence and drug abuse presents to the ED for opiate and benzo dependence w/ multiple episodes on vomiting.    #Opiate dependence/ Benzodiazepine dependence  -Taper as per CATCH  -klonopin taper; Taper as per CATCH    #Nausea & vomiting, abdominal pain Likely gastritis  - CT abd/pelvis: Gastric wall thickening with a small hiatal hernia.Direct visualization is recommended for further evaluation. Trace free fluid in the pelvis  - IV hydration  - has not had EGD- needs to be done as outpatient    # Cellulitis of hand.   - Keflex PO  - Monitor infection.    Dispo: Dc planning for today  
30y/o  Female w/ Pmhx Anxiety and depression Borderline personality disorder, COPD, Hep C, Nicotine dependence and drug abuse presents to the ED for opiate and benzo dependence w/ multiple episodes on vomiting.    #Opiate dependence/ Benzodiazepine dependence  -Taper as per CATCH  -klonopin taper; Taper as per CATCH    #Nausea & vomiting, abdominal pain Likely gastritis  - CT abd/pelvis: Gastric wall thickening with a small hiatal hernia.Direct visualization is recommended for further evaluation. Trace free fluid in the pelvis  - IV hydration  - has not had EGD- needs to be done as outpatient    # Cellulitis of hand.   - Keflex PO  - Monitor infection.    Dispo: Finish benzo and methdone taper, patient wants to go to rehab program of her own choice. likely dc tomorrow  
32y/o  Female w/ Pmhx Anxiety and depression Borderline personality disorder, COPD, Hep C, Nicotine dependence and drug abuse presents to the ED for opiate and benzo dependence w/ multiple episodes on vomiting.     Problem/Plan - 1:  ·  Problem: Opiate dependence.   ·  Plan: Admit inpatient medicine  Taper as per CATCH     Problem/Plan - 2:  ·  Problem: Benzodiazepine dependence.   ·  Plan: klonopin taper  Taper as per CATCH     Problem/Plan - 3:  ·  Problem: Nausea & vomiting, abdominal pain   ·  Plan: IV hydration  Likely gastritis, has not had EGD- needs to be done as outpatient     Problem/Plan - 4:  ·  Problem: Cellulitis of hand.   ·  Plan: Keflex PO  Monitor infection.
30y/o  Female w/ Pmhx Anxiety and depression Borderline personality disorder, COPD, Hep C, Nicotine dependence and drug abuse presents to the ED for opiate and benzo dependence w/ multiple episodes on vomiting.     Problem/Plan - 1:  ·  Problem: Opiate dependence.   ·  Plan: Admit inpatient medicine  Taper as per CATCH     Problem/Plan - 2:  ·  Problem: Benzodiazepine dependence.   ·  Plan: klonopin taper  Taper as per CATCH     Problem/Plan - 3:  ·  Problem: Nausea & vomiting, abdominal pain   ·  Plan: IV hydration  Likely gastritis, has not had EGD- needs to be done as outpatient  Will get CT ab/plv with PO contrast-pending     Problem/Plan - 4:  ·  Problem: Cellulitis of hand.   ·  Plan: Keflex PO  Monitor infection.    Dispo: Finish benzo and methdone taper, patient wants to go to rehab program of her own choice.

## 2021-12-10 NOTE — DISCHARGE NOTE NURSING/CASE MANAGEMENT/SOCIAL WORK - PATIENT PORTAL LINK FT
You can access the FollowMyHealth Patient Portal offered by [facility] by registering at the following website: [email or web address]. By joining [facility]’s FollowMyHealth portal, you will also be able to view your health information using other applications (apps) compatible with our system.

## 2021-12-10 NOTE — PROGRESS NOTE ADULT - SUBJECTIVE AND OBJECTIVE BOX
32y/o  Female w/ Pmhx Anxiety and depression Borderline personality disorder, COPD, Hep C, Nicotine dependence and drug abuse presents to the ED for opiate and benzo dependence w/ multiple episodes on vomiting.    Today:  Seen at bedside, reports her abdominal pain has improved; she has no new complaints today. Discharge planning was discussed with her.        REVIEW OF SYSTEMS:  +Abdominal pain; otherwise all systems were reviewed and are otherwise negative      MEDICATIONS  (STANDING):  cephalexin 500 milliGRAM(s) Oral four times a day  chlordiazePOXIDE   Oral   chlordiazePOXIDE 25 milliGRAM(s) Oral daily  escitalopram 20 milliGRAM(s) Oral daily  iohexol 300 mG (iodine)/mL Oral Solution 30 milliLiter(s) Oral once  lamoTRIgine 200 milliGRAM(s) Oral at bedtime  multivitamin/minerals 1 Tablet(s) Oral daily  nicotine - 21 mG/24Hr(s) Patch 1 Patch Transdermal daily  pantoprazole    Tablet 40 milliGRAM(s) Oral before breakfast  sodium chloride 0.9%. 1000 milliLiter(s) (125 mL/Hr) IV Continuous <Continuous>    MEDICATIONS  (PRN):  acetaminophen     Tablet .. 650 milliGRAM(s) Oral every 8 hours PRN Mild Pain (1 - 3)  ALBUTerol    90 MICROgram(s) HFA Inhaler 2 Puff(s) Inhalation every 6 hours PRN Shortness of Breath and/or Wheezing  aluminum hydroxide/magnesium hydroxide/simethicone Suspension 30 milliLiter(s) Oral every 6 hours PRN Heartburn  bismuth subsalicylate Liquid 30 milliLiter(s) Oral every 6 hours PRN Diarrhea  chlordiazePOXIDE 25 milliGRAM(s) Oral every 6 hours PRN benzo withdrawal  cloNIDine 0.1 milliGRAM(s) Oral every 8 hours PRN opiate withdrawal  guaifenesin/dextromethorphan Oral Liquid 5 milliLiter(s) Oral every 4 hours PRN Cough  hydrOXYzine hydrochloride 50 milliGRAM(s) Oral every 6 hours PRN Anxiety  ibuprofen  Tablet. 400 milliGRAM(s) Oral every 6 hours PRN Mild Pain (1 - 3)  magnesium hydroxide Suspension 30 milliLiter(s) Oral once PRN Constipation  methocarbamol 500 milliGRAM(s) Oral every 6 hours PRN muscle pain  ondansetron   Disintegrating Tablet 4 milliGRAM(s) Oral every 6 hours PRN Nausea and/or Vomiting  pseudoephedrine 60 milliGRAM(s) Oral every 6 hours PRN Rhinitis  traZODone 100 milliGRAM(s) Oral at bedtime PRN insomnia        Allergies  No Known Allergies        FAMILY HISTORY:  No pertinent family history in first degree relatives      Vital Signs Last 24 Hrs  T(C): 36.5 (10 Dec 2021 05:00), Max: 36.7 (09 Dec 2021 14:00)  T(F): 97.7 (10 Dec 2021 05:00), Max: 98.1 (09 Dec 2021 14:00)  HR: 55 (10 Dec 2021 05:00) (51 - 69)  BP: 128/67 (10 Dec 2021 05:00) (107/59 - 128/67)  BP(mean): --  RR: 17 (10 Dec 2021 05:00) (16 - 18)  SpO2: --  PHYSICAL EXAM:  GENERAL: Disheveled  HEAD:  Atraumatic, Normocephalic  EYES: EOMI, PERRLA, conjunctiva and sclera clear  NECK: Supple, No JVD, no cervical lymphadenopathy, non-tender  CHEST/LUNG: Clear to auscultation bilaterally; No wheeze, rhonchi, or rales  HEART: Regular rate and rhythm; S1&S2  ABDOMEN: Soft, nontender to palpate, no rebound or guarding; Bowel sounds present  EXTREMITIES:   Peripheral Pulses Present, No clubbing, no cyanosis, or no edema, no calf tenderness  PSYCH: AAOx3, cooperative, appropriate  NEUROLOGY: WNL  SKIN: + redness left palm - multiple healed track marks secondary injection sites      LABS:

## 2021-12-10 NOTE — DISCHARGE NOTE NURSING/CASE MANAGEMENT/SOCIAL WORK - NSDCPEFALRISK_GEN_ALL_CORE
For information on Fall & Injury Prevention, visit: [email or web address] OR  [email or web address] OR  https://www.cdc.gov/steadi/patient.html

## 2021-12-14 LAB
CARBOXYTHC UR CFM-MCNC: 853 NG/ML — SIGNIFICANT CHANGE UP
CARBOXYTHC UR QL SCN: 1052 — SIGNIFICANT CHANGE UP
CARBOXYTHC UR QL SCN: 81.1 MG/DL — SIGNIFICANT CHANGE UP
THC CREATININE URINE: 81.1 MG/DL — SIGNIFICANT CHANGE UP
THC METABOLITE/CREAT URINE: 1052 — SIGNIFICANT CHANGE UP

## 2021-12-15 DIAGNOSIS — Z90.49 ACQUIRED ABSENCE OF OTHER SPECIFIED PARTS OF DIGESTIVE TRACT: ICD-10-CM

## 2021-12-15 DIAGNOSIS — F11.23 OPIOID DEPENDENCE WITH WITHDRAWAL: ICD-10-CM

## 2021-12-15 DIAGNOSIS — K29.70 GASTRITIS, UNSPECIFIED, WITHOUT BLEEDING: ICD-10-CM

## 2021-12-15 DIAGNOSIS — J44.9 CHRONIC OBSTRUCTIVE PULMONARY DISEASE, UNSPECIFIED: ICD-10-CM

## 2021-12-15 DIAGNOSIS — F31.9 BIPOLAR DISORDER, UNSPECIFIED: ICD-10-CM

## 2021-12-15 DIAGNOSIS — Z86.19 PERSONAL HISTORY OF OTHER INFECTIOUS AND PARASITIC DISEASES: ICD-10-CM

## 2021-12-15 DIAGNOSIS — F13.239 SEDATIVE, HYPNOTIC OR ANXIOLYTIC DEPENDENCE WITH WITHDRAWAL, UNSPECIFIED: ICD-10-CM

## 2021-12-15 DIAGNOSIS — L03.119 CELLULITIS OF UNSPECIFIED PART OF LIMB: ICD-10-CM

## 2021-12-15 DIAGNOSIS — F41.9 ANXIETY DISORDER, UNSPECIFIED: ICD-10-CM

## 2021-12-15 DIAGNOSIS — F17.210 NICOTINE DEPENDENCE, CIGARETTES, UNCOMPLICATED: ICD-10-CM

## 2022-02-14 NOTE — ED ADULT NURSE NOTE - NSSUHOSCREENINGYN_ED_ALL_ED
No - the patient is unable to be screened due to medical condition

## 2022-03-07 NOTE — DISCHARGE NOTE PROVIDER - NSDCHHHOMEBOUND_GEN_ALL_CORE
[de-identified] : \par Ms. ROSALINE PLASCENCIA is a 72 year old female, accompanied by her daughter presents for follow up visit to discuss lab results\par She is doing well. \par Denies SOB, chest pain, abdominal pain, N/V/D, leg swelling, headache, dizziness \par Offers no complaints\par \par \par 
Other, specify...

## 2022-05-17 ENCOUNTER — INPATIENT (INPATIENT)
Facility: HOSPITAL | Age: 32
LOS: 1 days | Discharge: LEFT BEFORE DISCHARGE | End: 2022-05-19
Attending: HOSPITALIST | Admitting: HOSPITALIST
Payer: MEDICAID

## 2022-05-17 VITALS
RESPIRATION RATE: 18 BRPM | OXYGEN SATURATION: 98 % | TEMPERATURE: 98 F | HEIGHT: 62 IN | HEART RATE: 65 BPM | SYSTOLIC BLOOD PRESSURE: 124 MMHG | DIASTOLIC BLOOD PRESSURE: 67 MMHG | WEIGHT: 117.07 LBS

## 2022-05-17 DIAGNOSIS — Z90.49 ACQUIRED ABSENCE OF OTHER SPECIFIED PARTS OF DIGESTIVE TRACT: Chronic | ICD-10-CM

## 2022-05-17 PROBLEM — F11.10 OPIOID ABUSE, UNCOMPLICATED: Chronic | Status: ACTIVE | Noted: 2021-12-06

## 2022-05-17 PROBLEM — F19.90 OTHER PSYCHOACTIVE SUBSTANCE USE, UNSPECIFIED, UNCOMPLICATED: Chronic | Status: ACTIVE | Noted: 2021-12-06

## 2022-05-17 LAB
ALBUMIN SERPL ELPH-MCNC: 5.2 G/DL — SIGNIFICANT CHANGE UP (ref 3.5–5.2)
ALP SERPL-CCNC: 116 U/L — HIGH (ref 30–115)
ALT FLD-CCNC: 21 U/L — SIGNIFICANT CHANGE UP (ref 0–41)
AMPHET UR-MCNC: NEGATIVE — SIGNIFICANT CHANGE UP
ANION GAP SERPL CALC-SCNC: 12 MMOL/L — SIGNIFICANT CHANGE UP (ref 7–14)
APAP SERPL-MCNC: <5 UG/ML — LOW (ref 10–30)
APPEARANCE UR: CLEAR — SIGNIFICANT CHANGE UP
AST SERPL-CCNC: 26 U/L — SIGNIFICANT CHANGE UP (ref 0–41)
BACTERIA # UR AUTO: ABNORMAL
BARBITURATES UR SCN-MCNC: NEGATIVE — SIGNIFICANT CHANGE UP
BASOPHILS # BLD AUTO: 0.07 K/UL — SIGNIFICANT CHANGE UP (ref 0–0.2)
BASOPHILS NFR BLD AUTO: 0.9 % — SIGNIFICANT CHANGE UP (ref 0–1)
BENZODIAZ UR-MCNC: POSITIVE
BILIRUB SERPL-MCNC: 0.3 MG/DL — SIGNIFICANT CHANGE UP (ref 0.2–1.2)
BILIRUB UR-MCNC: NEGATIVE — SIGNIFICANT CHANGE UP
BUN SERPL-MCNC: 8 MG/DL — LOW (ref 10–20)
CALCIUM SERPL-MCNC: 9.7 MG/DL — SIGNIFICANT CHANGE UP (ref 8.5–10.1)
CHLORIDE SERPL-SCNC: 102 MMOL/L — SIGNIFICANT CHANGE UP (ref 98–110)
CO2 SERPL-SCNC: 22 MMOL/L — SIGNIFICANT CHANGE UP (ref 17–32)
COCAINE METAB.OTHER UR-MCNC: NEGATIVE — SIGNIFICANT CHANGE UP
COLOR SPEC: YELLOW — SIGNIFICANT CHANGE UP
CREAT SERPL-MCNC: 0.8 MG/DL — SIGNIFICANT CHANGE UP (ref 0.7–1.5)
DIFF PNL FLD: ABNORMAL
DRUG SCREEN 1, URINE RESULT: SIGNIFICANT CHANGE UP
EGFR: 100 ML/MIN/1.73M2 — SIGNIFICANT CHANGE UP
EOSINOPHIL # BLD AUTO: 0.05 K/UL — SIGNIFICANT CHANGE UP (ref 0–0.7)
EOSINOPHIL NFR BLD AUTO: 0.6 % — SIGNIFICANT CHANGE UP (ref 0–8)
EPI CELLS # UR: ABNORMAL /HPF
ETHANOL SERPL-MCNC: <10 MG/DL — SIGNIFICANT CHANGE UP
FENTANYL UR QL: POSITIVE
GLUCOSE SERPL-MCNC: 90 MG/DL — SIGNIFICANT CHANGE UP (ref 70–99)
GLUCOSE UR QL: NEGATIVE MG/DL — SIGNIFICANT CHANGE UP
HCT VFR BLD CALC: 49 % — HIGH (ref 37–47)
HGB BLD-MCNC: 16.1 G/DL — HIGH (ref 12–16)
IMM GRANULOCYTES NFR BLD AUTO: 0.1 % — SIGNIFICANT CHANGE UP (ref 0.1–0.3)
KETONES UR-MCNC: NEGATIVE — SIGNIFICANT CHANGE UP
LEUKOCYTE ESTERASE UR-ACNC: ABNORMAL
LYMPHOCYTES # BLD AUTO: 1.87 K/UL — SIGNIFICANT CHANGE UP (ref 1.2–3.4)
LYMPHOCYTES # BLD AUTO: 23.4 % — SIGNIFICANT CHANGE UP (ref 20.5–51.1)
MCHC RBC-ENTMCNC: 29.8 PG — SIGNIFICANT CHANGE UP (ref 27–31)
MCHC RBC-ENTMCNC: 32.9 G/DL — SIGNIFICANT CHANGE UP (ref 32–37)
MCV RBC AUTO: 90.7 FL — SIGNIFICANT CHANGE UP (ref 81–99)
METHADONE UR-MCNC: POSITIVE
MONOCYTES # BLD AUTO: 0.43 K/UL — SIGNIFICANT CHANGE UP (ref 0.1–0.6)
MONOCYTES NFR BLD AUTO: 5.4 % — SIGNIFICANT CHANGE UP (ref 1.7–9.3)
NEUTROPHILS # BLD AUTO: 5.57 K/UL — SIGNIFICANT CHANGE UP (ref 1.4–6.5)
NEUTROPHILS NFR BLD AUTO: 69.6 % — SIGNIFICANT CHANGE UP (ref 42.2–75.2)
NITRITE UR-MCNC: NEGATIVE — SIGNIFICANT CHANGE UP
NRBC # BLD: 0 /100 WBCS — SIGNIFICANT CHANGE UP (ref 0–0)
OPIATES UR-MCNC: POSITIVE
OXYCODONE UR-MCNC: NEGATIVE — SIGNIFICANT CHANGE UP
PCP UR-MCNC: NEGATIVE — SIGNIFICANT CHANGE UP
PH UR: 6.5 — SIGNIFICANT CHANGE UP (ref 5–8)
PLATELET # BLD AUTO: 249 K/UL — SIGNIFICANT CHANGE UP (ref 130–400)
POTASSIUM SERPL-MCNC: 4.3 MMOL/L — SIGNIFICANT CHANGE UP (ref 3.5–5)
POTASSIUM SERPL-SCNC: 4.3 MMOL/L — SIGNIFICANT CHANGE UP (ref 3.5–5)
PROPOXYPHENE QUALITATIVE URINE RESULT: NEGATIVE — SIGNIFICANT CHANGE UP
PROT SERPL-MCNC: 8.3 G/DL — HIGH (ref 6–8)
PROT UR-MCNC: NEGATIVE MG/DL — SIGNIFICANT CHANGE UP
RBC # BLD: 5.4 M/UL — SIGNIFICANT CHANGE UP (ref 4.2–5.4)
RBC # FLD: 13.6 % — SIGNIFICANT CHANGE UP (ref 11.5–14.5)
RBC CASTS # UR COMP ASSIST: SIGNIFICANT CHANGE UP /HPF
SALICYLATES SERPL-MCNC: <0.3 MG/DL — LOW (ref 4–30)
SARS-COV-2 RNA SPEC QL NAA+PROBE: SIGNIFICANT CHANGE UP
SODIUM SERPL-SCNC: 136 MMOL/L — SIGNIFICANT CHANGE UP (ref 135–146)
SP GR SPEC: >=1.03 (ref 1.01–1.03)
THC UR QL: POSITIVE
UROBILINOGEN FLD QL: 0.2 MG/DL — SIGNIFICANT CHANGE UP
WBC # BLD: 8 K/UL — SIGNIFICANT CHANGE UP (ref 4.8–10.8)
WBC # FLD AUTO: 8 K/UL — SIGNIFICANT CHANGE UP (ref 4.8–10.8)
WBC UR QL: SIGNIFICANT CHANGE UP /HPF

## 2022-05-17 PROCEDURE — 93010 ELECTROCARDIOGRAM REPORT: CPT

## 2022-05-17 PROCEDURE — 99285 EMERGENCY DEPT VISIT HI MDM: CPT

## 2022-05-17 RX ORDER — GABAPENTIN 400 MG/1
300 CAPSULE ORAL EVERY 8 HOURS
Refills: 0 | Status: DISCONTINUED | OUTPATIENT
Start: 2022-05-17 | End: 2022-05-19

## 2022-05-17 RX ORDER — ENOXAPARIN SODIUM 100 MG/ML
40 INJECTION SUBCUTANEOUS EVERY 24 HOURS
Refills: 0 | Status: DISCONTINUED | OUTPATIENT
Start: 2022-05-17 | End: 2022-05-19

## 2022-05-17 RX ORDER — HYDROXYZINE HCL 10 MG
50 TABLET ORAL EVERY 8 HOURS
Refills: 0 | Status: DISCONTINUED | OUTPATIENT
Start: 2022-05-17 | End: 2022-05-19

## 2022-05-17 RX ORDER — LAMOTRIGINE 25 MG/1
200 TABLET, ORALLY DISINTEGRATING ORAL AT BEDTIME
Refills: 0 | Status: DISCONTINUED | OUTPATIENT
Start: 2022-05-17 | End: 2022-05-19

## 2022-05-17 RX ORDER — ONDANSETRON 8 MG/1
4 TABLET, FILM COATED ORAL EVERY 8 HOURS
Refills: 0 | Status: DISCONTINUED | OUTPATIENT
Start: 2022-05-17 | End: 2022-05-19

## 2022-05-17 RX ORDER — PHENOBARBITAL 60 MG
TABLET ORAL
Refills: 0 | Status: DISCONTINUED | OUTPATIENT
Start: 2022-05-17 | End: 2022-05-18

## 2022-05-17 RX ORDER — ESCITALOPRAM OXALATE 10 MG/1
20 TABLET, FILM COATED ORAL AT BEDTIME
Refills: 0 | Status: DISCONTINUED | OUTPATIENT
Start: 2022-05-17 | End: 2022-05-19

## 2022-05-17 RX ORDER — PHENOBARBITAL 60 MG
64.8 TABLET ORAL EVERY 6 HOURS
Refills: 0 | Status: DISCONTINUED | OUTPATIENT
Start: 2022-05-17 | End: 2022-05-18

## 2022-05-17 RX ORDER — SODIUM CHLORIDE 9 MG/ML
1000 INJECTION INTRAMUSCULAR; INTRAVENOUS; SUBCUTANEOUS
Refills: 0 | Status: DISCONTINUED | OUTPATIENT
Start: 2022-05-17 | End: 2022-05-17

## 2022-05-17 RX ORDER — ACETAMINOPHEN 500 MG
650 TABLET ORAL EVERY 6 HOURS
Refills: 0 | Status: DISCONTINUED | OUTPATIENT
Start: 2022-05-17 | End: 2022-05-19

## 2022-05-17 RX ORDER — ESCITALOPRAM OXALATE 10 MG/1
1 TABLET, FILM COATED ORAL
Qty: 0 | Refills: 0 | DISCHARGE

## 2022-05-17 RX ORDER — ESCITALOPRAM OXALATE 10 MG/1
20 TABLET, FILM COATED ORAL
Qty: 0 | Refills: 0 | DISCHARGE

## 2022-05-17 RX ORDER — CHLORHEXIDINE GLUCONATE 213 G/1000ML
1 SOLUTION TOPICAL
Refills: 0 | Status: DISCONTINUED | OUTPATIENT
Start: 2022-05-17 | End: 2022-05-19

## 2022-05-17 RX ORDER — METHADONE HYDROCHLORIDE 40 MG/1
10 TABLET ORAL EVERY 12 HOURS
Refills: 0 | Status: DISCONTINUED | OUTPATIENT
Start: 2022-05-17 | End: 2022-05-18

## 2022-05-17 RX ORDER — ALBUTEROL 90 UG/1
2 AEROSOL, METERED ORAL EVERY 6 HOURS
Refills: 0 | Status: DISCONTINUED | OUTPATIENT
Start: 2022-05-17 | End: 2022-05-19

## 2022-05-17 RX ORDER — METHADONE HYDROCHLORIDE 40 MG/1
5 TABLET ORAL EVERY 6 HOURS
Refills: 0 | Status: DISCONTINUED | OUTPATIENT
Start: 2022-05-17 | End: 2022-05-18

## 2022-05-17 RX ORDER — METHADONE HYDROCHLORIDE 40 MG/1
TABLET ORAL
Refills: 0 | Status: DISCONTINUED | OUTPATIENT
Start: 2022-05-18 | End: 2022-05-19

## 2022-05-17 RX ORDER — METHADONE HYDROCHLORIDE 40 MG/1
5 TABLET ORAL EVERY 12 HOURS
Refills: 0 | Status: DISCONTINUED | OUTPATIENT
Start: 2022-05-18 | End: 2022-05-19

## 2022-05-17 RX ORDER — LANOLIN ALCOHOL/MO/W.PET/CERES
3 CREAM (GRAM) TOPICAL AT BEDTIME
Refills: 0 | Status: DISCONTINUED | OUTPATIENT
Start: 2022-05-17 | End: 2022-05-19

## 2022-05-17 RX ADMIN — GABAPENTIN 300 MILLIGRAM(S): 400 CAPSULE ORAL at 21:46

## 2022-05-17 RX ADMIN — LAMOTRIGINE 200 MILLIGRAM(S): 25 TABLET, ORALLY DISINTEGRATING ORAL at 21:46

## 2022-05-17 RX ADMIN — ESCITALOPRAM OXALATE 20 MILLIGRAM(S): 10 TABLET, FILM COATED ORAL at 21:47

## 2022-05-17 RX ADMIN — METHADONE HYDROCHLORIDE 10 MILLIGRAM(S): 40 TABLET ORAL at 17:55

## 2022-05-17 RX ADMIN — Medication 64.8 MILLIGRAM(S): at 17:55

## 2022-05-17 RX ADMIN — ENOXAPARIN SODIUM 40 MILLIGRAM(S): 100 INJECTION SUBCUTANEOUS at 19:47

## 2022-05-17 RX ADMIN — Medication 50 MILLIGRAM(S): at 22:06

## 2022-05-17 RX ADMIN — METHADONE HYDROCHLORIDE 5 MILLIGRAM(S): 40 TABLET ORAL at 22:08

## 2022-05-17 NOTE — ED PROVIDER NOTE - ATTENDING APP SHARED VISIT CONTRIBUTION OF CARE
31y female polysubstance abuser sent for detox for benzo/heroin, PE as above, labs appreciated, will admit for medically supervised detox, stable for floor

## 2022-05-17 NOTE — SBIRT NOTE ADULT - NSSBIRTDRGPASSREFTXDET_GEN_A_CORE
Screening results were reviewed with the patient and patient was provided information about healthy guidelines and potential negative consequences associated with level of risk. Motivation and readiness to reduce or stop use was discussed and goals and activities to make changes were suggested/offered.     Referral for complete assessment and level of care determination at a certified treatment facility was completed by giving the patient information for treatment facilities that met their needs and encouraging them to call for an appointment. A call was not made to a facility because patient will be admitted to [facility] for medical care and stabilization. Discussed patient attending [facility] inpatient rehab following her hospital stay. Additionally, patient plans to engage in outpatient treatment at [facility] following her rehab stay, potentially utilizing suboxone to aid in her recovery.

## 2022-05-17 NOTE — H&P ADULT - HISTORY OF PRESENT ILLNESS
Patient is a 31 year old Female with a past medical history of Polysubstance abuse, Gastritis, Colitis, Chronic Obstructive Pulmonary Disease presented to the ER with Significant other with a chief complaint of opioid withdrawal. Patient admits to taking Xanax and Heroine. Patient describes Xanax as 5-6 tabs of 2mg daily for past 4-5 months. Patient describes Heroine as 25-35g a day, occasionally uses pot and injects all over the body. Last use yesterday and blacked out. Patient admits to history of black outs, tremors, unsure of seizures while passed out. Patient denies delirium tremens, hallucinations, suicidal or homicidal ideations. Patient states she is not pregnant as she is cosme. Last detox was in December 2021. Patient admits to using methadone in the past.

## 2022-05-17 NOTE — H&P ADULT - NSHPLABSRESULTS_GEN_ALL_CORE
16.1   8.00  )-----------( 249      ( 17 May 2022 14:25 )             49.0       05-17    136  |  102  |  8<L>  ----------------------------<  90  4.3   |  22  |  0.8    Ca    9.7      17 May 2022 14:25    TPro  8.3<H>  /  Alb  5.2  /  TBili  0.3  /  DBili  x   /  AST  26  /  ALT  21  /  AlkPhos  116<H>  05-17                  Urinalysis Basic - ( 17 May 2022 14:40 )    Color: Yellow / Appearance: Clear / SG: >=1.030 / pH: x  Gluc: x / Ketone: Negative  / Bili: Negative / Urobili: 0.2 mg/dL   Blood: x / Protein: Negative mg/dL / Nitrite: Negative   Leuk Esterase: Trace / RBC: 1-2 /HPF / WBC 1-2 /HPF   Sq Epi: x / Non Sq Epi: Many /HPF / Bacteria: Few

## 2022-05-17 NOTE — ED PROVIDER NOTE - NSICDXFAMILYHX_GEN_ALL_CORE_FT
FAMILY HISTORY:  Grandparent  Still living? Unknown  Family history of diabetes mellitus, Age at diagnosis: Age Unknown  Family history of diverticulitis of colon, Age at diagnosis: Age Unknown

## 2022-05-17 NOTE — ED ADULT NURSE NOTE - HPI (INCLUDE ILLNESS QUALITY, SEVERITY, DURATION, TIMING, CONTEXT, MODIFYING FACTORS, ASSOCIATED SIGNS AND SYMPTOMS)
Patient does 30+ bags of heroin a day and 2-6mg of  xanax daily. Last used heroin at 6 & 9am, xanax 3mg last night.

## 2022-05-17 NOTE — ED ADULT NURSE NOTE - DRUG PRE-SCREENING (DAST -1)
Visit Information Date & Time Provider Department Dept. Phone Encounter #  
 1/16/2017 10:00 AM Branden Villegas, [facility] [phone] [id number] Upcoming Health Maintenance Date Due DTaP/Tdap/Td series (1 - Tdap) 10/25/1992 INFLUENZA AGE 9 TO ADULT 8/1/2016 PAP AKA CERVICAL CYTOLOGY 9/23/2018 Allergies as of 1/16/2017  Review Complete On: 1/16/2017 By: Branden Villegas MD  
  
 Severity Noted Reaction Type Reactions Anesthetics - [place] Type- Parabens  08/15/2016   Side Effect Itching Sulfa (Sulfonamide Antibiotics)  07/28/2014    Shortness of Breath Anesthetics - Amide Type Low 08/15/2016   Side Effect Itching Current Immunizations  Reviewed on 10/12/2015 Name Date Influenza Vaccine 10/12/2015, 1/9/2015 Not reviewed this visit You Were Diagnosed With   
  
 Codes Comments Moderate episode of recurrent major depressive disorder [facility]    -  Primary ICD-10-CM: F33.1 ICD-9-CM: 296.32 Hypothyroidism, unspecified type     ICD-10-CM: E03.9 ICD-9-CM: 244.9 Nonintractable migraine, unspecified migraine type     ICD-10-CM: G43.009 ICD-9-CM: 346.10 Nail fungal infection     ICD-10-CM: B35.1 ICD-9-CM: 110.1 Ingrown toenail     ICD-10-CM: L60.0 ICD-9-CM: 703.0 Vitals BP Pulse Temp Resp Height(growth percentile) Weight(growth percentile) 130/87 (BP 1 Location: Left arm, BP Patient Position: Sitting) 92 98 °F (36.7 °C) (Oral) 20 5' 3\" (1.6 m) 211 lb (95.7 kg) SpO2 BMI OB Status Smoking Status 99% 37.38 kg/m2 Hysterectomy Never Smoker Vitals History BMI and BSA Data Body Mass Index Body Surface Area  
 37.38 kg/m 2 2.06 m 2 Preferred Pharmacy Pharmacy Name Phone [facility] [address] [facility], [address] [phone] Your Updated Medication List  
  
   
This list is accurate as of: 1/16/17 10:40 AM.  Always use your most recent med list.  
  
  
  
  
 albuterol 90 mcg/actuation inhaler Commonly known as:  PROVENTIL HFA, VENTOLIN HFA, PROAIR HFA Take 2 puffs by inhalation every four (4) hours as needed for Wheezing or Shortness of Breath. COLACE 100 mg capsule Generic drug:  docusate sodium Take 100 mg by mouth two (2) times a day. ibuprofen 800 mg tablet Commonly known as:  MOTRIN Take 1 Tab by mouth three (3) times daily as needed for Pain.  
  
 levothyroxine 150 mcg tablet Commonly known as:  SYNTHROID Take 1 Tab by mouth Daily (before breakfast). LINZESS 145 mcg Cap capsule Generic drug:  linaclotide Take 145 mcg by mouth Daily (before breakfast). MINIVELLE 0.05 mg/24 hr  
Generic drug:  estradiol 1 patch by TransDERmal route Every Saturday. multivitamin tablet Commonly known as:  ONE A DAY Take 1 Tab by mouth daily. oxyCODONE-acetaminophen 5-325 mg per tablet Commonly known as:  PERCOCET  
1-2 tablets every 4-6 hours prn pain SUMAtriptan 50 mg tablet Commonly known as:  IMITREX Take 1 Tab by mouth once as needed for Migraine for up to 1 dose. terbinafine HCl 250 mg tablet Commonly known as:  LAMISIL Take 1 Tab by mouth daily. venlafaxine-SR 75 mg capsule Commonly known as:  EFFEXOR XR Take 1 Cap by mouth daily. ZOLMitriptan 5 mg tablet Commonly known as:  ZOMIG Take 1 Tab by mouth as needed for Migraine. Prescriptions Sent to Pharmacy Refills  
 levothyroxine (SYNTHROID) 150 mcg tablet 5 Sig: Take 1 Tab by mouth Daily (before breakfast). Class: Normal  
 Pharmacy: [id number] [address] Ph #: [phone] Route: Oral  
 venlafaxine-SR (EFFEXOR XR) 75 mg capsule 3 Sig: Take 1 Cap by mouth daily. Class: Normal  
 Pharmacy: [id number] [address] [place] [address] [place] Ph #: [phone]  Route: Oral  
 SUMAtriptan (IMITREX) 50 mg tablet 3 Sig: Take 1 Tab by mouth once as needed for Migraine for up to 1 dose. Class: Normal  
 Pharmacy: [id number] [address] Ph #: [phone] Route: Oral  
 ZOLMitriptan (ZOMIG) 5 mg tablet 3 Sig: Take 1 Tab by mouth as needed for Migraine. Class: Normal  
 Pharmacy: [id number] [address] [facility] [address] Ph #: [phone] Route: Oral  
 terbinafine HCl (LAMISIL) 250 mg tablet 0 Sig: Take 1 Tab by mouth daily. Class: Normal  
 Pharmacy: [id number] [address] [facility] [address] Ph #: [phone] Route: Oral  
  
We Performed the Following REFERRAL TO PODIATRY [REF90 Custom] Comments:  
 Please evaluate patient for ingrown toenail and toe nail fungus To-Do List   
 01/16/2017 Lab:  LIPID PANEL   
  
 01/16/2017 Lab:  METABOLIC PANEL, COMPREHENSIVE   
  
 01/16/2017 Lab:  TSH 3RD GENERATION Referral Information Referral ID Referred By Referred To  
  
 [id number] Marsha DOTSON 88, DPM   
   [address] [place] [address], [address] [place] [address],[place] [address] Phone: [phone] Fax: [phone] Visits Status Start Date End Date 1 New Request 1/16/17 1/16/18 If your referral has a status of pending review or denied, additional information will be sent to support the outcome of this decision. Patient Instructions Depression and Chronic Disease: Care Instructions Your Care Instructions A chronic disease is one that you have for a long time. Some chronic diseases can be controlled, but they usually cannot be cured. Depression is common in people with chronic diseases, but it often goes unnoticed. Many people have concerns about seeking treatment for a mental health problem.  You may think it's a sign of weakness, or you don't want people to know about it. It's important to overcome these reasons for not seeking treatment. Treating depression or anxiety is good for your health. Follow-up care is a key part of your treatment and safety. Be sure to make and go to all appointments, and call your doctor if you are having problems. It's also a good idea to know your test results and keep a list of the medicines you take. How can you care for yourself at home? Watch for symptoms of depression The symptoms of depression are often subtle at first. You may think they are caused by your disease rather than depression. Or you may think it is normal to be depressed when you have a chronic disease. If you are depressed you may: · Feel sad or hopeless. · Feel guilty or worthless. · Not enjoy the things you used to enjoy. · Feel hopeless, as though life is not worth living. · Have trouble thinking or remembering. · Have low energy, and you may not eat or sleep well. · Pull away from others. · Think often about death or killing yourself. (Keep the numbers for these national suicide hotlines: 3-015-282-TALK [1-380.611.4285] and 1-459-NCNEUFP [1-869.982.8635]. ) Get treatment By treating your depression, you can feel more hopeful and have more energy. If you feel better, you may take better care of yourself, so your health may improve. · Talk to your doctor if you have any changes in mood during treatment for your disease. · Ask your doctor for help. Counseling, antidepressant medicine, or a combination of the two can help most people with depression. Often a combination works best. Counseling can also help you cope with having a chronic disease. When should you call for help? Call 911 anytime you think you may need emergency care. For example, call if: 
· You feel like hurting yourself or someone else. · Someone you know has depression and is about to attempt or is attempting suicide. Call your doctor now or seek immediate medical care if: · You hear voices. · Someone you know has depression and: 
¨ Starts to give away his or her possessions. ¨ Uses illegal drugs or drinks alcohol heavily. ¨ Talks or writes about death, including writing suicide notes or talking about guns, knives, or pills. ¨ Starts to spend a lot of time alone. ¨ Acts very aggressively or suddenly appears calm. Watch closely for changes in your health, and be sure to contact your doctor if: 
· You do not get better as expected. Where can you learn more? Go to http://nathaly-jp.info/. Enter V507 in the search box to learn more about \"Depression and Chronic Disease: Care Instructions. \" Current as of: July 26, 2016 Content Version: 11.1 © 0281-3567 [employer]. Care instructions adapted under license by [employer] (which disclaims liability or warranty for this information). If you have questions about a medical condition or this instruction, always ask your healthcare professional. [place] [address] any warranty or liability for your use of this information. Depression and Chronic Disease: Care Instructions Your Care Instructions A chronic disease is one that you have for a long time. Some chronic diseases can be controlled, but they usually cannot be cured. Depression is common in people with chronic diseases, but it often goes unnoticed. Many people have concerns about seeking treatment for a mental health problem. You may think it's a sign of weakness, or you don't want people to know about it. It's important to overcome these reasons for not seeking treatment. Treating depression or anxiety is good for your health. Follow-up care is a key part of your treatment and safety. Be sure to make and go to all appointments, and call your doctor if you are having problems. It's also a good idea to know your test results and keep a list of the medicines you take. How can you care for yourself at home? Watch for symptoms of depression The symptoms of depression are often subtle at first. You may think they are caused by your disease rather than depression. Or you may think it is normal to be depressed when you have a chronic disease. If you are depressed you may: · Feel sad or hopeless. · Feel guilty or worthless. · Not enjoy the things you used to enjoy. · Feel hopeless, as though life is not worth living. · Have trouble thinking or remembering. · Have low energy, and you may not eat or sleep well. · Pull away from others. · Think often about death or killing yourself. (Keep the numbers for these national suicide hotlines: 8-613-165-TALK [1-676.266.9884] and 7-788-WPOSLGU [1-839.601.9951]. ) Get treatment By treating your depression, you can feel more hopeful and have more energy. If you feel better, you may take better care of yourself, so your health may improve. · Talk to your doctor if you have any changes in mood during treatment for your disease. · Ask your doctor for help. Counseling, antidepressant medicine, or a combination of the two can help most people with depression. Often a combination works best. Counseling can also help you cope with having a chronic disease. When should you call for help? Call 911 anytime you think you may need emergency care. For example, call if: 
· You feel like hurting yourself or someone else. · Someone you know has depression and is about to attempt or is attempting suicide. Call your doctor now or seek immediate medical care if: 
· You hear voices. · Someone you know has depression and: 
¨ Starts to give away his or her possessions. ¨ Uses illegal drugs or drinks alcohol heavily. ¨ Talks or writes about death, including writing suicide notes or talking about guns, knives, or pills. ¨ Starts to spend a lot of time alone. ¨ Acts very aggressively or suddenly appears calm.  
Watch closely for changes in your health, and be sure to contact your doctor if: 
· You do not get better as expected. Where can you learn more? Go to http://nathaly-jp.info/. Enter Y861 in the search box to learn more about \"Depression and Chronic Disease: Care Instructions. \" Current as of: July 26, 2016 Content Version: 11.1 © 7935-0464 [employer] [place]. Care instructions adapted under license by [employer] (which disclaims liability or warranty for this information). If you have questions about a medical condition or this instruction, always ask your healthcare professional. [address] any warranty or liability for your use of this information. Toenail Fungus: Care Instructions Your Care Instructions A toenail that is infected by a fungus usually turns white or yellow. As the fungus spreads, the nail turns a darker color and gets thicker, and its edges start to turn ragged and crumble. A bad infection can cause toe pain, and the nail may pull away from the toe. Toenails that are exposed to moisture and warmth a lot are more likely to get infected by a fungus. This can happen from wearing sweaty shoes often and from walking barefoot on shower floors. It is hard to treat toenail fungus, and the infection can return after it has cleared up. But medicines can sometimes get rid of toenail fungus for good. If the infection is very bad, or if it causes a lot of pain, you may need to have the nail removed. Follow-up care is a key part of your treatment and safety. Be sure to make and go to all appointments, and call your doctor if you are having problems. Its also a good idea to know your test results and keep a list of the medicines you take. How can you care for yourself at home? · Take your medicines exactly as prescribed. Call your doctor if you have any problems with your medicine. You will get more details on the specific medicines your doctor prescribes. · If your doctor gave you a cream or liquid to put on your toenail, use it exactly as directed. · Wash your feet often, and wash your hands after touching your feet. · Keep your toenails clean and dry. Dry your feet completely after you bathe and before you put on shoes and socks. · Keep your toenails trimmed. · Change socks often. Wear dry socks that absorb moisture. · Do not go barefoot in public places. · Use a spray or powder that fights fungus on your feet and in your shoes. · Do not pick at the skin around your nails. · Do not use nail polish or fake nails on your toenails. When should you call for help? Call your doctor now or seek immediate medical care if: 
· You have signs of infection, such as: 
¨ Increased pain, swelling, warmth, or redness. ¨ Red streaks leading from the site. ¨ Pus draining from the site. ¨ A fever. · You have new or increased toe pain. Watch closely for changes in your health, and be sure to contact your doctor if: 
· You do not get better as expected. Where can you learn more? Go to http://nathaly-jp.info/. Enter D202 in the search box to learn more about \"Toenail Fungus: Care Instructions. \" Current as of: February 5, 2016 Content Version: 11.1 © 5565-7198 [employer]. Care instructions adapted under license by [employer] (which disclaims liability or warranty for this information). If you have questions about a medical condition or this instruction, always ask your healthcare professional. [place] [address] any warranty or liability for your use of this information. Please provide this summary of care documentation to your next provider. Your primary care clinician is listed as Charmaine Medina. If you have any questions after today's visit, please call [phone]. Statement Selected

## 2022-05-17 NOTE — H&P ADULT - NSHPPHYSICALEXAM_GEN_ALL_CORE
VITAL SIGNS: Vital Signs Last 24 Hrs  T(C): 37.1 (17 May 2022 16:54), Max: 37.1 (17 May 2022 16:54)  T(F): 98.7 (17 May 2022 16:54), Max: 98.7 (17 May 2022 16:54)  HR: 77 (17 May 2022 16:54) (65 - 77)  BP: 121/77 (17 May 2022 16:54) (121/77 - 124/67)  RR: 20 (17 May 2022 16:54) (18 - 20)  SpO2: 99% (17 May 2022 16:54) (98% - 99%)    GENERAL: NAD, lying in bed with significant other   HEAD:  Atraumatic, Normocephalic  EYES: Conjunctiva and sclera clear  ENT: Moist mucous membranes  NECK: Supple  CHEST/LUNG: Clear to auscultation bilaterally. Unlabored respirations  HEART: Regular rate and rhythm  ABDOMEN: Soft, Nontender, Nondistended  EXTREMITIES:  No calf tenderness, +Tremors b/l  NERVOUS SYSTEM:  Alert & Oriented X3, speech clear  MSK: FROM all 4 extremities, full and equal strength  SKIN: Multiple injection scars

## 2022-05-17 NOTE — ED PROVIDER NOTE - OBJECTIVE STATEMENT
32 y/o female  with hx of hep c , anxiety and depression presents to the Ed for detox . patient states she has hx of substance abuse and last IVDA heroin 25 bags early this am. as per patient, she was administered narcan by her mother seconday to overdose last night. patient take xanax daily ~8 tablets. last usage 2 days ago. patient tremulous. no hx of prior seizures. no alcohol usage. patient also smokes marijuana. patient denies any SI/HI or hearing voices. no chest pain, palpitations. patient denies any vomiting or abdominal cramping. patient eval at outpatient detox and sent to the ED for management.

## 2022-05-17 NOTE — ED ADULT TRIAGE NOTE - CHIEF COMPLAINT QUOTE
pt states she is withdrawing from xanax  and heroin  Pt states she takes xanax 2mg- 6xs/day and heroin 25-30 bags IV / day. Last xanax was yesterday and last heroin was at 6am

## 2022-05-17 NOTE — ED ADULT TRIAGE NOTE - HEIGHT IN INCHES
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 Durant and the multiple sclerosis clinic),  CHASE cirrhosis, DMII (A1c 7.4), JOHN on CPAP, and underlying dementia (established patient of Dr. Cross) consulted by hospital medicine for treatment of possible vasculitis flare and initiation of rituxan. Patient had initially failed cytoxan hiatus and transitioned to imuran in september 2018. He was then restarted on cytoxan in October 2018 which he was tolerating well. 12/4/19 MRI brain with/without showing chronic ischemic change, similar to MRI of 04/15/2019. Patient initially presented to the MS clinic yesterday due to a 8/10 headache which has resolved with the use of Advil/Aleve. Wife has mentioned that since October 2019 (when he was admitted to the hospital for sepsis 2/2 UTI), he has lost weight (10-15 lbs) and has cognitive decline. Please note that due to the recent hospital admission he was not able to receive his dose of cytoxan. Decision was made to switch him to rituxan. Neurology and rheumatology were consulted with admission so that he can get Rituxan as inpatient to avoid further delays with outpatient scheduling (if medically cleared). However due to his worsening kidney function would hold the treatment.     Plan   1) Scheduled for rituxan therapy as outpatient on 1/10/20    2

## 2022-05-17 NOTE — H&P ADULT - ASSESSMENT
Patient is a 31 year old Female with a past medical history of Polysubstance abuse, Gastritis, Colitis, Chronic Obstructive Pulmonary Disease presented to the ER with Significant other with a chief complaint of opioid withdrawal.    # Polysubstance abuse   # Opioid withdrawal   - Methadone Taper   - Ativan Taper   - Monitor signs of withdrawal   - Counseling as needed   - PRN Medications ordered  - Intravenous Fluids   - Will start on Gabapentin 300 TID  - Addiction medicine consult      # Depression   - Continue with Lexapro and Lamictal    Patient is a 31 year old Female with a past medical history of Polysubstance abuse, Gastritis, Colitis, Chronic Obstructive Pulmonary Disease presented to the ER with Significant other with a chief complaint of opioid withdrawal.    # Polysubstance abuse   # Opioid withdrawal   - Methadone Taper for Heroine   - Phenobarb Taper for Xanax (UNABLE TO USE TAPER AS PT IS IN ER - Needs to adjust Phenobarb 64.8 q6h x 2 doses, then 48.6 q6h x 4 doses, 32.4 q6hrs x 7 doses and 32.4 q12h x 3 doses)   - Monitor signs of withdrawal   - Counseling as needed   - PRN Medications ordered  - Vistaril/Atarax PRN for anxiety   - Intravenous Fluids   - Will start on Gabapentin 300 TID  - Addiction medicine consult      # Depression   - Continue with Lexapro and Lamictal

## 2022-05-17 NOTE — ED PROVIDER NOTE - CARE PLAN
1 Principal Discharge DX:	Benzodiazepine dependence, continuous  Secondary Diagnosis:	Opiate abuse, continuous

## 2022-05-17 NOTE — ED PROVIDER NOTE - PHYSICAL EXAMINATION
Vital Signs: I have reviewed the initial vital signs.  Constitutional: well-nourished, no acute distress, normocephalic  Eyes: PERRLA, EOMI, no nystagmus, clear conjunctiva  ENT: MMM, no tongue fasiculations  Cardiovascular: regular rate, regular rhythm, no murmur appreciated  Respiratory: unlabored respiratory effort, clear to auscultation bilaterally  Gastrointestinal: soft, non-tender, non-distended  abdomen, no pulsatile mass  Musculoskeletal: supple neck, no lower extremity edema, fine tremors to extremities, , no bony tenderness  Integumentary: warm, dry, no rash  Neurologic: awake, alert, cranial nerves II-XII grossly intact, extremities’ motor and sensory functions grossly intact, no focal deficits  Psychiatric: appropriate mood, appropriate affect

## 2022-05-18 DIAGNOSIS — F11.20 OPIOID DEPENDENCE, UNCOMPLICATED: ICD-10-CM

## 2022-05-18 LAB
ALBUMIN SERPL ELPH-MCNC: 4.6 G/DL — SIGNIFICANT CHANGE UP (ref 3.5–5.2)
ALP SERPL-CCNC: 121 U/L — HIGH (ref 30–115)
ALT FLD-CCNC: 37 U/L — SIGNIFICANT CHANGE UP (ref 0–41)
ANION GAP SERPL CALC-SCNC: 10 MMOL/L — SIGNIFICANT CHANGE UP (ref 7–14)
AST SERPL-CCNC: 67 U/L — HIGH (ref 0–41)
BILIRUB SERPL-MCNC: 0.3 MG/DL — SIGNIFICANT CHANGE UP (ref 0.2–1.2)
BUN SERPL-MCNC: 7 MG/DL — LOW (ref 10–20)
CALCIUM SERPL-MCNC: 9.3 MG/DL — SIGNIFICANT CHANGE UP (ref 8.5–10.1)
CHLORIDE SERPL-SCNC: 105 MMOL/L — SIGNIFICANT CHANGE UP (ref 98–110)
CO2 SERPL-SCNC: 24 MMOL/L — SIGNIFICANT CHANGE UP (ref 17–32)
CREAT SERPL-MCNC: 0.7 MG/DL — SIGNIFICANT CHANGE UP (ref 0.7–1.5)
EDDP UR QL CFM: NEGATIVE NG/ML — SIGNIFICANT CHANGE UP
EDDP, UR RESULT: NEGATIVE NG/ML — SIGNIFICANT CHANGE UP
EGFR: 118 ML/MIN/1.73M2 — SIGNIFICANT CHANGE UP
GLUCOSE SERPL-MCNC: 85 MG/DL — SIGNIFICANT CHANGE UP (ref 70–99)
HCT VFR BLD CALC: 43.2 % — SIGNIFICANT CHANGE UP (ref 37–47)
HGB BLD-MCNC: 14.1 G/DL — SIGNIFICANT CHANGE UP (ref 12–16)
MCHC RBC-ENTMCNC: 29 PG — SIGNIFICANT CHANGE UP (ref 27–31)
MCHC RBC-ENTMCNC: 32.6 G/DL — SIGNIFICANT CHANGE UP (ref 32–37)
MCV RBC AUTO: 88.9 FL — SIGNIFICANT CHANGE UP (ref 81–99)
NRBC # BLD: 0 /100 WBCS — SIGNIFICANT CHANGE UP (ref 0–0)
PLATELET # BLD AUTO: 267 K/UL — SIGNIFICANT CHANGE UP (ref 130–400)
POTASSIUM SERPL-MCNC: 4.6 MMOL/L — SIGNIFICANT CHANGE UP (ref 3.5–5)
POTASSIUM SERPL-SCNC: 4.6 MMOL/L — SIGNIFICANT CHANGE UP (ref 3.5–5)
PROT SERPL-MCNC: 7.1 G/DL — SIGNIFICANT CHANGE UP (ref 6–8)
RBC # BLD: 4.86 M/UL — SIGNIFICANT CHANGE UP (ref 4.2–5.4)
RBC # FLD: 13.6 % — SIGNIFICANT CHANGE UP (ref 11.5–14.5)
SODIUM SERPL-SCNC: 139 MMOL/L — SIGNIFICANT CHANGE UP (ref 135–146)
WBC # BLD: 6.36 K/UL — SIGNIFICANT CHANGE UP (ref 4.8–10.8)
WBC # FLD AUTO: 6.36 K/UL — SIGNIFICANT CHANGE UP (ref 4.8–10.8)

## 2022-05-18 PROCEDURE — 99221 1ST HOSP IP/OBS SF/LOW 40: CPT

## 2022-05-18 PROCEDURE — 36569 INSJ PICC 5 YR+ W/O IMAGING: CPT

## 2022-05-18 PROCEDURE — 99251: CPT

## 2022-05-18 PROCEDURE — 99232 SBSQ HOSP IP/OBS MODERATE 35: CPT

## 2022-05-18 PROCEDURE — 76937 US GUIDE VASCULAR ACCESS: CPT | Mod: 26,59

## 2022-05-18 RX ORDER — SODIUM CHLORIDE 9 MG/ML
1000 INJECTION INTRAMUSCULAR; INTRAVENOUS; SUBCUTANEOUS
Refills: 0 | Status: DISCONTINUED | OUTPATIENT
Start: 2022-05-18 | End: 2022-05-19

## 2022-05-18 RX ORDER — METOCLOPRAMIDE HCL 10 MG
10 TABLET ORAL ONCE
Refills: 0 | Status: COMPLETED | OUTPATIENT
Start: 2022-05-18 | End: 2022-05-18

## 2022-05-18 RX ORDER — ONDANSETRON 8 MG/1
4 TABLET, FILM COATED ORAL EVERY 8 HOURS
Refills: 0 | Status: DISCONTINUED | OUTPATIENT
Start: 2022-05-18 | End: 2022-05-19

## 2022-05-18 RX ORDER — ACETAMINOPHEN 500 MG
650 TABLET ORAL ONCE
Refills: 0 | Status: COMPLETED | OUTPATIENT
Start: 2022-05-18 | End: 2022-05-18

## 2022-05-18 RX ORDER — METHOCARBAMOL 500 MG/1
500 TABLET, FILM COATED ORAL EVERY 6 HOURS
Refills: 0 | Status: DISCONTINUED | OUTPATIENT
Start: 2022-05-18 | End: 2022-05-19

## 2022-05-18 RX ORDER — FAMOTIDINE 10 MG/ML
20 INJECTION INTRAVENOUS
Refills: 0 | Status: DISCONTINUED | OUTPATIENT
Start: 2022-05-18 | End: 2022-05-19

## 2022-05-18 RX ADMIN — Medication 64.8 MILLIGRAM(S): at 05:36

## 2022-05-18 RX ADMIN — LAMOTRIGINE 200 MILLIGRAM(S): 25 TABLET, ORALLY DISINTEGRATING ORAL at 22:53

## 2022-05-18 RX ADMIN — Medication 10 MILLIGRAM(S): at 21:45

## 2022-05-18 RX ADMIN — ONDANSETRON 4 MILLIGRAM(S): 8 TABLET, FILM COATED ORAL at 17:29

## 2022-05-18 RX ADMIN — Medication 64.8 MILLIGRAM(S): at 00:04

## 2022-05-18 RX ADMIN — Medication 25 MILLIGRAM(S): at 22:56

## 2022-05-18 RX ADMIN — METHADONE HYDROCHLORIDE 10 MILLIGRAM(S): 40 TABLET ORAL at 05:36

## 2022-05-18 RX ADMIN — ONDANSETRON 4 MILLIGRAM(S): 8 TABLET, FILM COATED ORAL at 08:07

## 2022-05-18 RX ADMIN — GABAPENTIN 300 MILLIGRAM(S): 400 CAPSULE ORAL at 22:53

## 2022-05-18 RX ADMIN — Medication 3 MILLIGRAM(S): at 00:04

## 2022-05-18 RX ADMIN — METHADONE HYDROCHLORIDE 5 MILLIGRAM(S): 40 TABLET ORAL at 10:11

## 2022-05-18 RX ADMIN — Medication 10 MILLIGRAM(S): at 16:47

## 2022-05-18 RX ADMIN — Medication 650 MILLIGRAM(S): at 00:04

## 2022-05-18 RX ADMIN — Medication 30 MILLILITER(S): at 21:35

## 2022-05-18 RX ADMIN — Medication 30 MILLILITER(S): at 12:56

## 2022-05-18 RX ADMIN — METHADONE HYDROCHLORIDE 5 MILLIGRAM(S): 40 TABLET ORAL at 17:28

## 2022-05-18 RX ADMIN — GABAPENTIN 300 MILLIGRAM(S): 400 CAPSULE ORAL at 05:37

## 2022-05-18 RX ADMIN — Medication 650 MILLIGRAM(S): at 00:27

## 2022-05-18 RX ADMIN — Medication 0.1 MILLIGRAM(S): at 11:26

## 2022-05-18 RX ADMIN — METHOCARBAMOL 500 MILLIGRAM(S): 500 TABLET, FILM COATED ORAL at 11:26

## 2022-05-18 RX ADMIN — ESCITALOPRAM OXALATE 20 MILLIGRAM(S): 10 TABLET, FILM COATED ORAL at 22:53

## 2022-05-18 RX ADMIN — FAMOTIDINE 20 MILLIGRAM(S): 10 INJECTION INTRAVENOUS at 17:29

## 2022-05-18 NOTE — PATIENT PROFILE ADULT - FALL HARM RISK - HARM RISK INTERVENTIONS

## 2022-05-18 NOTE — PROGRESS NOTE ADULT - SUBJECTIVE AND OBJECTIVE BOX
RADU CONTRERAS  31y  Female      Patient is a 31y old  Female who presents with a chief complaint of Polysubstance abuse (18 May 2022 10:51)      INTERVAL HPI/OVERNIGHT EVENTS:  Patient seen and examined earlier this morning. She reports she is in withdrawal right now (patient comfortable and on phone). Patient was evaluated by addiction medicine this AM.       REVIEW OF SYSTEMS: limited due to subjectivity of being in withdrawl. Otherwise negative      T(C): 36.8 (05-18-22 @ 04:42), Max: 37.1 (05-17-22 @ 16:54)  HR: 81 (05-18-22 @ 11:27) (50 - 81)  BP: 118/72 (05-18-22 @ 11:27) (111/57 - 147/64)  RR: 20 (05-17-22 @ 21:03) (18 - 20)  SpO2: 99% (05-17-22 @ 16:54) (98% - 99%)    PHYSICAL EXAM:  GENERAL: NAD, well-groomed, well-developed  HEAD:  Atraumatic, Normocephalic  EYES: EOMI, PERRLA, conjunctiva and sclera clear  ENMT: No tonsillar erythema, exudates, or enlargement; Moist mucous membranes  NECK: Supple, No JVD, Normal thyroid  NERVOUS SYSTEM:  Alert & Oriented X3, Good concentration; Motor Strength 5/5 B/L upper and lower extremities  CHEST/LUNG: Clear to percussion bilaterally; No rales, rhonchi, wheezing, or rubs  HEART: Regular rate and rhythm; No murmurs, rubs, or gallops  ABDOMEN: Soft, Nontender, Nondistended; Bowel sounds present  EXTREMITIES:  2+ Peripheral Pulses, No clubbing, cyanosis, or edema      Consultant(s) Notes Reviewed:  [x ] YES  [ ] NO  Care Discussed with Consultants/Other Providers [ x] YES  [ ] NO    LAB:                        14.1   6.36  )-----------( 267      ( 18 May 2022 06:31 )             43.2     05-18    139  |  105  |  7<L>  ----------------------------<  85  4.6   |  24  |  0.7    Ca    9.3      18 May 2022 06:31    TPro  7.1  /  Alb  4.6  /  TBili  0.3  /  DBili  x   /  AST  67<H>  /  ALT  37  /  AlkPhos  121<H>  05-18    LIVER FUNCTIONS - ( 18 May 2022 06:31 )  Alb: 4.6 g/dL / Pro: 7.1 g/dL / ALK PHOS: 121 U/L / ALT: 37 U/L / AST: 67 U/L / GGT: x                       Drug Dosing Weight  Height (cm): 157.5 (17 May 2022 17:49)  Weight (kg): 53.1 (17 May 2022 13:17)  BMI (kg/m2): 21.4 (17 May 2022 17:49)  BSA (m2): 1.52 (17 May 2022 17:49)  Height (cm): 157.5 (05-17-22 @ 17:49)  Weight (kg): 53.1 (05-17-22 @ 13:17)  BMI (kg/m2): 21.4 (05-17-22 @ 17:49)  BSA (m2): 1.52 (05-17-22 @ 17:49)  CAPILLARY BLOOD GLUCOSE        I&O's Summary    Urinalysis Basic - ( 17 May 2022 14:40 )    Color: Yellow / Appearance: Clear / SG: >=1.030 / pH: x  Gluc: x / Ketone: Negative  / Bili: Negative / Urobili: 0.2 mg/dL   Blood: x / Protein: Negative mg/dL / Nitrite: Negative   Leuk Esterase: Trace / RBC: 1-2 /HPF / WBC 1-2 /HPF   Sq Epi: x / Non Sq Epi: Many /HPF / Bacteria: Few        RADIOLOGY & ADDITIONAL TESTS:  Imaging Personally Reviewed:  [x] YES  [ ] NO    HEALTH ISSUES - PROBLEM Dx:  Polysubstance dependence including opioid drug with daily use            MEDS:  acetaminophen     Tablet .. 650 milliGRAM(s) Oral every 6 hours PRN  ALBUTerol    90 MICROgram(s) HFA Inhaler 2 Puff(s) Inhalation every 6 hours PRN  aluminum hydroxide/magnesium hydroxide/simethicone Suspension 30 milliLiter(s) Oral every 4 hours PRN  chlordiazePOXIDE 25 milliGRAM(s) Oral every 8 hours  chlorhexidine 4% Liquid 1 Application(s) Topical <User Schedule>  cloNIDine 0.1 milliGRAM(s) Oral every 8 hours PRN  enoxaparin Injectable 40 milliGRAM(s) SubCutaneous every 24 hours  escitalopram 20 milliGRAM(s) Oral at bedtime  gabapentin 300 milliGRAM(s) Oral every 8 hours  hydrOXYzine hydrochloride 50 milliGRAM(s) Oral every 8 hours PRN  lamoTRIgine 200 milliGRAM(s) Oral at bedtime  melatonin 3 milliGRAM(s) Oral at bedtime PRN  methadone    Tablet 5 milliGRAM(s) Oral every 12 hours  methadone    Tablet   Oral   methocarbamol 500 milliGRAM(s) Oral every 6 hours PRN  ondansetron    Tablet 4 milliGRAM(s) Oral every 8 hours PRN  ondansetron Injectable 4 milliGRAM(s) IV Push every 8 hours PRN

## 2022-05-18 NOTE — PROGRESS NOTE ADULT - ASSESSMENT
Patient is a 31 year old Female with a past medical history of Polysubstance abuse, Gastritis, Colitis, Chronic Obstructive Pulmonary Disease presented to the ER with Significant other with a chief complaint of opioid withdrawal.    # Polysubstance abuse   # Opioid withdrawal   - Monitor signs of withdrawal   - Counseling as needed   - PRN Medications ordered  - Vistaril/Atarax PRN for anxiety   - Intravenous Fluids   - Will start on Gabapentin 300 TID  - Addiction medicine consult appreciated  - continue on Methadone protocol likely until Friday  - Pt will be treated with catapres, vistaril, and robaxin prn for opiate withdrawal and agitation  - on librium protocol  likely until Friday  - will d/c phenobarbital protocol for benzo withdrawal  - CATCH team involved for aftercare and pt will follow up with aftercare for possible yesika    # Depression   - Continue with Lexapro and Lamictal     Progress Note Handoff  Pending Consults: None  Pending Tests: none  Pending Results: clinical improvement   Family Discussion: Patient  Disposition: Home__X___/SNF______/Other_____/Unknown at this time_____  Spent over 35 min reviewing chart and on coordinating patient care during interdisciplinary rounds

## 2022-05-18 NOTE — CONSULT NOTE ADULT - PROBLEM SELECTOR RECOMMENDATION 9
After evaluation at this time continue on Methadone protocol. Pt will be treated with catapres, vistaril, and robaxin prn for opiate withdrawal and agitation. Pt started on librium protocol d/c phenobarbital protocol for benzo withdrawal. Pt has done well on in past. Pt has abruptly stopped xanax in past without any issues. . Use of PRN meds.  Pt will be monitored and supportive care provided.        CATCH team involved for aftercare and pt will follow up with aftercare for possible rehab.

## 2022-05-18 NOTE — CONSULT NOTE ADULT - SUBJECTIVE AND OBJECTIVE BOX
Pt interviewed, examined and EMR chart reviewed.  Pt admits to using opiates up to 20-30 bags per day. for about 1 month. Pt also admits to using xanax to help with withdrawal and to be sedated. Pt states up to 6mg per day. Pt has been non complaint with psych meds. Pt does not want to stop smoking as well  Pt is well known to institution both inpatient and outpatient.    Hx of withdrawal   variable periods of sobriety in the past.  Has been in detox before __X___yes,   _____No    SOCIAL HISTORY:    REVIEW OF SYSTEMS:    Constitutional: No fever, weight loss or fatigue  ENT:  No difficulty hearing, tinnitus, vertigo; No sinus or throat pain  Neck: No pain or stiffness  Respiratory: No cough, wheezing, chills or hemoptysis  Cardiovascular: No chest pain, palpitations, shortness of breath, dizziness or leg swelling  Gastrointestinal: No abdominal or epigastric pain. No nausea, vomiting or hematemesis; No diarrhea or constipation. No melena or hematochezia.  Neurological: No headaches, memory loss, loss of strength, numbness or tremors  Musculoskeletal: No joint pain or swelling; No muscle, back or extremity pain  Psychiatric: Positive  anxiety, mood swings or difficulty sleeping    MEDICATIONS  (STANDING):  chlordiazePOXIDE 25 milliGRAM(s) Oral every 8 hours  chlorhexidine 4% Liquid 1 Application(s) Topical <User Schedule>  enoxaparin Injectable 40 milliGRAM(s) SubCutaneous every 24 hours  escitalopram 20 milliGRAM(s) Oral at bedtime  gabapentin 300 milliGRAM(s) Oral every 8 hours  lamoTRIgine 200 milliGRAM(s) Oral at bedtime  methadone    Tablet 5 milliGRAM(s) Oral every 12 hours  methadone    Tablet   Oral     MEDICATIONS  (PRN):  acetaminophen     Tablet .. 650 milliGRAM(s) Oral every 6 hours PRN Temp greater or equal to 38C (100.4F), Mild Pain (1 - 3)  ALBUTerol    90 MICROgram(s) HFA Inhaler 2 Puff(s) Inhalation every 6 hours PRN Bronchospasm  aluminum hydroxide/magnesium hydroxide/simethicone Suspension 30 milliLiter(s) Oral every 4 hours PRN Dyspepsia  cloNIDine 0.1 milliGRAM(s) Oral every 8 hours PRN opiate withwrawal  hydrOXYzine hydrochloride 50 milliGRAM(s) Oral every 8 hours PRN Anxiety  melatonin 3 milliGRAM(s) Oral at bedtime PRN Insomnia  methocarbamol 500 milliGRAM(s) Oral every 6 hours PRN muscle ache/spasm  ondansetron    Tablet 4 milliGRAM(s) Oral every 8 hours PRN Nausea and/or Vomiting  ondansetron Injectable 4 milliGRAM(s) IV Push every 8 hours PRN Nausea and/or Vomiting      Vital Signs Last 24 Hrs  T(C): 36.8 (18 May 2022 04:42), Max: 37.1 (17 May 2022 16:54)  T(F): 98.3 (18 May 2022 04:42), Max: 98.7 (17 May 2022 16:54)  HR: 63 (18 May 2022 04:42) (50 - 77)  BP: 133/74 (18 May 2022 04:42) (111/57 - 147/64)  BP(mean): --  RR: 20 (17 May 2022 21:03) (18 - 20)  SpO2: 99% (17 May 2022 16:54) (98% - 99%)    PHYSICAL EXAM:    Constitutional: NAD, well-groomed, well-developed  HEENT: PERRLA, EOMI, Normal Hearing, MMM  Neck: No LAD, No JVD  Back: Normal spine flexure, No CVA tenderness  Respiratory: CTAB/L  Cardiovascular: S1 and S2, RRR, no M/G/R  Gastrointestinal: BS+, soft, NT/ND  Extremities: No peripheral edema, multiple skin track marks  Vascular: 2+ peripheral pulses  Neurological: A/O x 3, no focal deficits    LABS:                        14.1   6.36  )-----------( 267      ( 18 May 2022 06:31 )             43.2     05-18    139  |  105  |  7<L>  ----------------------------<  85  4.6   |  24  |  0.7    Ca    9.3      18 May 2022 06:31    TPro  7.1  /  Alb  4.6  /  TBili  0.3  /  DBili  x   /  AST  67<H>  /  ALT  37  /  AlkPhos  121<H>  05-18      Urinalysis Basic - ( 17 May 2022 14:40 )    Color: Yellow / Appearance: Clear / SG: >=1.030 / pH: x  Gluc: x / Ketone: Negative  / Bili: Negative / Urobili: 0.2 mg/dL   Blood: x / Protein: Negative mg/dL / Nitrite: Negative   Leuk Esterase: Trace / RBC: 1-2 /HPF / WBC 1-2 /HPF   Sq Epi: x / Non Sq Epi: Many /HPF / Bacteria: Few      Drug Screen Urine:  Alcohol Level  Alcohol, Blood: <10 mg/dL (05-17-22 @ 14:25)        RADIOLOGY & ADDITIONAL STUDIES:

## 2022-05-19 VITALS
RESPIRATION RATE: 16 BRPM | DIASTOLIC BLOOD PRESSURE: 61 MMHG | HEART RATE: 104 BPM | TEMPERATURE: 97 F | SYSTOLIC BLOOD PRESSURE: 130 MMHG

## 2022-05-19 LAB
METHADONE IN-HOUSE INTERPRETATION: NEGATIVE — SIGNIFICANT CHANGE UP
METHADONE UR CFM-MCNC: NEGATIVE — SIGNIFICANT CHANGE UP

## 2022-05-19 PROCEDURE — 99232 SBSQ HOSP IP/OBS MODERATE 35: CPT

## 2022-05-19 PROCEDURE — 99231 SBSQ HOSP IP/OBS SF/LOW 25: CPT

## 2022-05-19 RX ORDER — METHADONE HYDROCHLORIDE 40 MG/1
15 TABLET ORAL ONCE
Refills: 0 | Status: COMPLETED | OUTPATIENT
Start: 2022-05-19 | End: 2022-05-19

## 2022-05-19 RX ORDER — METHADONE HYDROCHLORIDE 40 MG/1
10 TABLET ORAL ONCE
Refills: 0 | Status: DISCONTINUED | OUTPATIENT
Start: 2022-05-19 | End: 2022-05-19

## 2022-05-19 RX ORDER — METOCLOPRAMIDE HCL 10 MG
10 TABLET ORAL ONCE
Refills: 0 | Status: DISCONTINUED | OUTPATIENT
Start: 2022-05-19 | End: 2022-05-19

## 2022-05-19 RX ADMIN — METHADONE HYDROCHLORIDE 10 MILLIGRAM(S): 40 TABLET ORAL at 12:20

## 2022-05-19 RX ADMIN — GABAPENTIN 300 MILLIGRAM(S): 400 CAPSULE ORAL at 05:56

## 2022-05-19 RX ADMIN — Medication 25 MILLIGRAM(S): at 05:57

## 2022-05-19 RX ADMIN — GABAPENTIN 300 MILLIGRAM(S): 400 CAPSULE ORAL at 13:51

## 2022-05-19 RX ADMIN — SODIUM CHLORIDE 75 MILLILITER(S): 9 INJECTION INTRAMUSCULAR; INTRAVENOUS; SUBCUTANEOUS at 10:40

## 2022-05-19 RX ADMIN — FAMOTIDINE 20 MILLIGRAM(S): 10 INJECTION INTRAVENOUS at 05:57

## 2022-05-19 RX ADMIN — METHADONE HYDROCHLORIDE 5 MILLIGRAM(S): 40 TABLET ORAL at 05:55

## 2022-05-19 RX ADMIN — ONDANSETRON 4 MILLIGRAM(S): 8 TABLET, FILM COATED ORAL at 04:10

## 2022-05-19 NOTE — PROGRESS NOTE ADULT - ASSESSMENT
Patient is a 31 year old Female with a past medical history of Polysubstance abuse, Gastritis, Colitis, Chronic Obstructive Pulmonary Disease presented to the ER with Significant other with a chief complaint of opioid withdrawal.    # Polysubstance abuse   # Opioid withdrawal   - Monitor signs of withdrawal   - Counseling as needed   - PRN Medications ordered  - Vistaril/Atarax PRN for anxiety   - Intravenous Fluids   - Will start on Gabapentin 300 TID  - Addiction medicine consult appreciated  - continue on Methadone protocol likely until Friday  - Pt will be treated with catapres, vistaril, and robaxin prn for opiate withdrawal and agitation  - on librium protocol  likely until Friday  - will d/c phenobarbital protocol for benzo withdrawal  - CATCH team involved for aftercare and pt will follow up with aftercare for possible yesika    # Depression   - Continue with Lexapro and Lamictal     Progress Note Handoff  Pending Consults: None  Pending Tests: none  Pending Results: clinical improvement   Family Discussion: Patient  Disposition: Home__Xlikely dc tomorrow___/SNF______/Other_____/Unknown at this time_____  Spent over 35 min reviewing chart and on coordinating patient care during interdisciplinary rounds

## 2022-05-19 NOTE — PROGRESS NOTE ADULT - SUBJECTIVE AND OBJECTIVE BOX
RADU CONTRERAS  31y  Female      Patient is a 31y old  Female who presents with a chief complaint of Polysubstance abuse (18 May 2022 10:51)      INTERVAL HPI/OVERNIGHT EVENTS:  Patient seen and examined earlier this morning. She reports feeling nauseous and reports of left abd pain. Patient was evaluated by addiction medicine this AM as well.       REVIEW OF SYSTEMS: limited due to subjectivity of being in withdrawl. Otherwise as above      Vital Signs Last 24 Hrs  T(C): 36.1 (19 May 2022 04:43), Max: 36.1 (18 May 2022 21:00)  T(F): 96.9 (19 May 2022 04:43), Max: 96.9 (18 May 2022 21:00)  HR: 51 (19 May 2022 10:02) (48 - 95)  BP: 96/50 (19 May 2022 10:02) (96/50 - 136/60)  BP(mean): --  RR: 18 (19 May 2022 04:43) (16 - 18)  SpO2: --  PHYSICAL EXAM:  GENERAL: NAD, in mild distress from nausea  HEAD:  Atraumatic, Normocephalic  EYES: EOMI, PERRLA, conjunctiva and sclera clear  ENMT: No tonsillar erythema, exudates, or enlargement; Moist mucous membranes  NECK: Supple, No JVD, Normal thyroid  NERVOUS SYSTEM:  Alert & Oriented X3, Good concentration; Motor Strength 5/5 B/L upper and lower extremities  CHEST/LUNG: Clear to percussion bilaterally; No rales, rhonchi, wheezing, or rubs  HEART: Regular rate and rhythm; No murmurs, rubs, or gallops  ABDOMEN: Soft, Nontender, Nondistended; Bowel sounds present  EXTREMITIES:  2+ Peripheral Pulses, No clubbing, cyanosis, or edema      Consultant(s) Notes Reviewed:  [x ] YES  [ ] NO  Care Discussed with Consultants/Other Providers [ x] YES  [ ] NO    LAB:                        14.1   6.36  )-----------( 267      ( 18 May 2022 06:31 )             43.2     05-18    139  |  105  |  7<L>  ----------------------------<  85  4.6   |  24  |  0.7    Ca    9.3      18 May 2022 06:31    TPro  7.1  /  Alb  4.6  /  TBili  0.3  /  DBili  x   /  AST  67<H>  /  ALT  37  /  AlkPhos  121<H>  05-18    LIVER FUNCTIONS - ( 18 May 2022 06:31 )  Alb: 4.6 g/dL / Pro: 7.1 g/dL / ALK PHOS: 121 U/L / ALT: 37 U/L / AST: 67 U/L / GGT: x                       Drug Dosing Weight  Height (cm): 157.5 (17 May 2022 17:49)  Weight (kg): 53.1 (17 May 2022 13:17)  BMI (kg/m2): 21.4 (17 May 2022 17:49)  BSA (m2): 1.52 (17 May 2022 17:49)  Height (cm): 157.5 (05-17-22 @ 17:49)  Weight (kg): 53.1 (05-17-22 @ 13:17)  BMI (kg/m2): 21.4 (05-17-22 @ 17:49)  BSA (m2): 1.52 (05-17-22 @ 17:49)  CAPILLARY BLOOD GLUCOSE        I&O's Summary    Urinalysis Basic - ( 17 May 2022 14:40 )    Color: Yellow / Appearance: Clear / SG: >=1.030 / pH: x  Gluc: x / Ketone: Negative  / Bili: Negative / Urobili: 0.2 mg/dL   Blood: x / Protein: Negative mg/dL / Nitrite: Negative   Leuk Esterase: Trace / RBC: 1-2 /HPF / WBC 1-2 /HPF   Sq Epi: x / Non Sq Epi: Many /HPF / Bacteria: Few        RADIOLOGY & ADDITIONAL TESTS:  Imaging Personally Reviewed:  [x] YES  [ ] NO    HEALTH ISSUES - PROBLEM Dx:  Polysubstance dependence including opioid drug with daily use          MEDICATIONS  (STANDING):  chlordiazePOXIDE 25 milliGRAM(s) Oral every 12 hours  chlorhexidine 4% Liquid 1 Application(s) Topical <User Schedule>  enoxaparin Injectable 40 milliGRAM(s) SubCutaneous every 24 hours  escitalopram 20 milliGRAM(s) Oral at bedtime  famotidine    Tablet 20 milliGRAM(s) Oral two times a day  gabapentin 300 milliGRAM(s) Oral every 8 hours  lamoTRIgine 200 milliGRAM(s) Oral at bedtime  methadone    Tablet 10 milliGRAM(s) Oral once  methadone    Tablet 15 milliGRAM(s) Oral once  sodium chloride 0.9%. 1000 milliLiter(s) (75 mL/Hr) IV Continuous <Continuous>    MEDICATIONS  (PRN):  acetaminophen     Tablet .. 650 milliGRAM(s) Oral every 6 hours PRN Temp greater or equal to 38C (100.4F), Mild Pain (1 - 3)  ALBUTerol    90 MICROgram(s) HFA Inhaler 2 Puff(s) Inhalation every 6 hours PRN Bronchospasm  aluminum hydroxide/magnesium hydroxide/simethicone Suspension 30 milliLiter(s) Oral every 4 hours PRN Dyspepsia  cloNIDine 0.1 milliGRAM(s) Oral every 8 hours PRN opiate withwrawal  hydrOXYzine hydrochloride 50 milliGRAM(s) Oral every 8 hours PRN Anxiety  melatonin 3 milliGRAM(s) Oral at bedtime PRN Insomnia  methocarbamol 500 milliGRAM(s) Oral every 6 hours PRN muscle ache/spasm  metoclopramide Injectable 10 milliGRAM(s) IV Push once PRN vomiting  ondansetron    Tablet 4 milliGRAM(s) Oral every 8 hours PRN Nausea and/or Vomiting  ondansetron Injectable 4 milliGRAM(s) IV Push every 8 hours PRN Nausea and/or Vomiting

## 2022-05-19 NOTE — PROGRESS NOTE ADULT - PROBLEM SELECTOR PLAN 1
After evaluation at this time case was discussed with Dr Malhotra for MMTP placement at [facility]. He is accepting patient onto his program if she allows care coordination and the cessation of benzo rx and suboxone rx. Pt improved on the PRN meds of catapres, robaxin, vistaril, tylenol, etc. Pt was told that she her total dose today is 30mg.   Case discussed with Dr. Bourgeois if patient is stable for discharge tomorrow. Pt can be in opiate withdrawal at discharge as will be addressed as outpatient in MMTP program.   CATCH team involved for aftercare and pt will follow up with aftercare

## 2022-05-19 NOTE — PROGRESS NOTE ADULT - SUBJECTIVE AND OBJECTIVE BOX
Pt interviewed, examined and EMR chart reviewed.    Follow up of Opiate/Benzo  Addiction. Pt is on librium / methadone protocol. Pt is doing better from yesterday decreased abdominal pain, nausea and vomiting. Pt is still willing to go on MMTP at [facility]. Pt understood that she will need to give consent for care coordination with Henri Bishop and PAUL Bright.      SOCIAL HISTORY:    REVIEW OF SYSTEMS:    Constitutional: No fever, weight loss or fatigue  ENT:  No difficulty hearing, tinnitus, vertigo; No sinus or throat pain  Neck: No pain or stiffness  Respiratory: No cough, wheezing, chills or hemoptysis  Cardiovascular: No chest pain, palpitations, shortness of breath, dizziness or leg swelling  Gastrointestinal: See HPI  Neurological: No headaches, memory loss, loss of strength, numbness or tremors  Musculoskeletal: No joint pain or swelling; No muscle, back or extremity pain      MEDICATIONS  (STANDING):  chlordiazePOXIDE 25 milliGRAM(s) Oral every 12 hours  chlorhexidine 4% Liquid 1 Application(s) Topical <User Schedule>  enoxaparin Injectable 40 milliGRAM(s) SubCutaneous every 24 hours  escitalopram 20 milliGRAM(s) Oral at bedtime  famotidine    Tablet 20 milliGRAM(s) Oral two times a day  gabapentin 300 milliGRAM(s) Oral every 8 hours  lamoTRIgine 200 milliGRAM(s) Oral at bedtime  methadone    Tablet 15 milliGRAM(s) Oral once  sodium chloride 0.9%. 1000 milliLiter(s) (75 mL/Hr) IV Continuous <Continuous>    MEDICATIONS  (PRN):  acetaminophen     Tablet .. 650 milliGRAM(s) Oral every 6 hours PRN Temp greater or equal to 38C (100.4F), Mild Pain (1 - 3)  ALBUTerol    90 MICROgram(s) HFA Inhaler 2 Puff(s) Inhalation every 6 hours PRN Bronchospasm  aluminum hydroxide/magnesium hydroxide/simethicone Suspension 30 milliLiter(s) Oral every 4 hours PRN Dyspepsia  cloNIDine 0.1 milliGRAM(s) Oral every 8 hours PRN opiate withwrawal  hydrOXYzine hydrochloride 50 milliGRAM(s) Oral every 8 hours PRN Anxiety  melatonin 3 milliGRAM(s) Oral at bedtime PRN Insomnia  methocarbamol 500 milliGRAM(s) Oral every 6 hours PRN muscle ache/spasm  metoclopramide Injectable 10 milliGRAM(s) IV Push once PRN vomiting  ondansetron    Tablet 4 milliGRAM(s) Oral every 8 hours PRN Nausea and/or Vomiting  ondansetron Injectable 4 milliGRAM(s) IV Push every 8 hours PRN Nausea and/or Vomiting      Vital Signs Last 24 Hrs  T(C): 37.3 (19 May 2022 13:24), Max: 37.3 (19 May 2022 13:24)  T(F): 99.2 (19 May 2022 13:24), Max: 99.2 (19 May 2022 13:24)  HR: 56 (19 May 2022 13:24) (48 - 95)  BP: 105/53 (19 May 2022 13:24) (96/50 - 136/60)  BP(mean): --  RR: 18 (19 May 2022 13:24) (16 - 18)  SpO2: --    PHYSICAL EXAM:    Constitutional: NAD, well-groomed, well-developed  HEENT: PERRLA, EOMI, Normal Hearing, MMM  Neck: No LAD, No JVD  Back: Normal spine flexure, No CVA tenderness  Respiratory: CTAB/L  Cardiovascular: S1 and S2, RRR, no M/G/R  Gastrointestinal: BS+, soft, NT/ND  Extremities: No peripheral edema  Vascular: 2+ peripheral pulses  Neurological: A/O x 3, no focal deficits    LABS:                        14.1   6.36  )-----------( 267      ( 18 May 2022 06:31 )             43.2     05-18    139  |  105  |  7<L>  ----------------------------<  85  4.6   |  24  |  0.7    Ca    9.3      18 May 2022 06:31    TPro  7.1  /  Alb  4.6  /  TBili  0.3  /  DBili  x   /  AST  67<H>  /  ALT  37  /  AlkPhos  121<H>  05-18      Urinalysis Basic - ( 17 May 2022 14:40 )    Color: Yellow / Appearance: Clear / SG: >=1.030 / pH: x  Gluc: x / Ketone: Negative  / Bili: Negative / Urobili: 0.2 mg/dL   Blood: x / Protein: Negative mg/dL / Nitrite: Negative   Leuk Esterase: Trace / RBC: 1-2 /HPF / WBC 1-2 /HPF   Sq Epi: x / Non Sq Epi: Many /HPF / Bacteria: Few      Drug Screen Urine:  Alcohol Level  Alcohol, Blood: <10 mg/dL (05-17-22 @ 14:25)        RADIOLOGY & ADDITIONAL STUDIES:

## 2022-05-19 NOTE — CHART NOTE - NSCHARTNOTEFT_GEN_A_CORE
Discussion with JOURDAN Meija: Addiction Team  - give Methadone 10mg PO x 1 now, received 5mg earlier this morning  - give Methadone 15mg PO x 1 at 6PM tonight
Patient eloped (she refused to sign AMA form) Was notified that patient took her IV out and left the hospital.  Spoke to patient near the hospital lobby as she was leaving, tried to convince patient to stay so that she can go to outpatient methadoen clinic in AM;   Thomas Mejia from detox spoke to patient on the phone as well as in person in lobby. Patient expressed understanding and still refused to stay; she walked out of the hospital.
Pt c/o breakthrough w/d symptoms.  Will order methadone 5mg po q6hrs prn for breakthrough S/S of opiate W/D.

## 2022-05-20 ENCOUNTER — OUTPATIENT (OUTPATIENT)
Dept: OUTPATIENT SERVICES | Facility: HOSPITAL | Age: 32
LOS: 1 days | Discharge: HOME | End: 2022-05-20

## 2022-05-20 DIAGNOSIS — Z00.8 ENCOUNTER FOR OTHER GENERAL EXAMINATION: ICD-10-CM

## 2022-05-20 DIAGNOSIS — Z90.49 ACQUIRED ABSENCE OF OTHER SPECIFIED PARTS OF DIGESTIVE TRACT: Chronic | ICD-10-CM

## 2022-05-23 LAB
6-ACETYLMORPHINE, UR RESULT: NEGATIVE NG/ML — SIGNIFICANT CHANGE UP
6MAM UR CFM-MCNC: NEGATIVE NG/ML — SIGNIFICANT CHANGE UP
A1C WITH ESTIMATED AVERAGE GLUCOSE RESULT: 5.3 % — SIGNIFICANT CHANGE UP (ref 4–5.6)
ALBUMIN SERPL ELPH-MCNC: 4.6 G/DL — SIGNIFICANT CHANGE UP (ref 3.5–5.2)
ALP SERPL-CCNC: 92 U/L — SIGNIFICANT CHANGE UP (ref 30–115)
ALT FLD-CCNC: 29 U/L — SIGNIFICANT CHANGE UP (ref 0–41)
ANION GAP SERPL CALC-SCNC: 13 MMOL/L — SIGNIFICANT CHANGE UP (ref 7–14)
APPEARANCE UR: CLEAR — SIGNIFICANT CHANGE UP
AST SERPL-CCNC: 25 U/L — SIGNIFICANT CHANGE UP (ref 0–41)
BACTERIA # UR AUTO: ABNORMAL
BILIRUB SERPL-MCNC: 0.2 MG/DL — SIGNIFICANT CHANGE UP (ref 0.2–1.2)
BILIRUB UR-MCNC: ABNORMAL
BUN SERPL-MCNC: 10 MG/DL — SIGNIFICANT CHANGE UP (ref 10–20)
CALCIUM SERPL-MCNC: 9.1 MG/DL — SIGNIFICANT CHANGE UP (ref 8.5–10.1)
CHLORIDE SERPL-SCNC: 100 MMOL/L — SIGNIFICANT CHANGE UP (ref 98–110)
CHOLEST SERPL-MCNC: 129 MG/DL — SIGNIFICANT CHANGE UP
CO2 SERPL-SCNC: 26 MMOL/L — SIGNIFICANT CHANGE UP (ref 17–32)
CODEINE UR CFM-MCNC: NEGATIVE NG/ML — SIGNIFICANT CHANGE UP
CODEINE, UR RESULT: NEGATIVE NG/ML — SIGNIFICANT CHANGE UP
COLOR SPEC: YELLOW — SIGNIFICANT CHANGE UP
CREAT SERPL-MCNC: 0.8 MG/DL — SIGNIFICANT CHANGE UP (ref 0.7–1.5)
DIFF PNL FLD: NEGATIVE — SIGNIFICANT CHANGE UP
EGFR: 100 ML/MIN/1.73M2 — SIGNIFICANT CHANGE UP
EPI CELLS # UR: ABNORMAL /HPF
ESTIMATED AVERAGE GLUCOSE: 105 MG/DL — SIGNIFICANT CHANGE UP (ref 68–114)
GLUCOSE SERPL-MCNC: 86 MG/DL — SIGNIFICANT CHANGE UP (ref 70–99)
GLUCOSE UR QL: NEGATIVE MG/DL — SIGNIFICANT CHANGE UP
HCG UR QL: NEGATIVE — SIGNIFICANT CHANGE UP
HCT VFR BLD CALC: 42.9 % — SIGNIFICANT CHANGE UP (ref 37–47)
HDLC SERPL-MCNC: 51 MG/DL — SIGNIFICANT CHANGE UP
HGB BLD-MCNC: 14.2 G/DL — SIGNIFICANT CHANGE UP (ref 12–16)
HYDROCODONE UR QL CFM: NEGATIVE NG/ML — SIGNIFICANT CHANGE UP
HYDROCODONE, UR RESULT: NEGATIVE NG/ML — SIGNIFICANT CHANGE UP
HYDROMORPHONE UR QL CFM: NEGATIVE NG/ML — SIGNIFICANT CHANGE UP
HYDROMORPHONE, UR RESULT: NEGATIVE NG/ML — SIGNIFICANT CHANGE UP
KETONES UR-MCNC: NEGATIVE — SIGNIFICANT CHANGE UP
LEUKOCYTE ESTERASE UR-ACNC: NEGATIVE — SIGNIFICANT CHANGE UP
LIPID PNL WITH DIRECT LDL SERPL: 61 MG/DL — SIGNIFICANT CHANGE UP
MAGNESIUM SERPL-MCNC: 1.8 MG/DL — SIGNIFICANT CHANGE UP (ref 1.8–2.4)
MCHC RBC-ENTMCNC: 29.8 PG — SIGNIFICANT CHANGE UP (ref 27–31)
MCHC RBC-ENTMCNC: 33.1 G/DL — SIGNIFICANT CHANGE UP (ref 32–37)
MCV RBC AUTO: 90.1 FL — SIGNIFICANT CHANGE UP (ref 81–99)
MORPHINE UR QL CFM: 633 NG/ML — SIGNIFICANT CHANGE UP
MORPHINE, UR RESULT: 633 NG/ML — SIGNIFICANT CHANGE UP
NITRITE UR-MCNC: NEGATIVE — SIGNIFICANT CHANGE UP
NON HDL CHOLESTEROL: 78 MG/DL — SIGNIFICANT CHANGE UP
NOROXYCODONE (OPIATES), UR RESULT: NEGATIVE NG/ML — SIGNIFICANT CHANGE UP
NOROXYCODONE UR CFM-MCNC: NEGATIVE NG/ML — SIGNIFICANT CHANGE UP
NRBC # BLD: 0 /100 WBCS — SIGNIFICANT CHANGE UP (ref 0–0)
OPIATES IN-HOUSE INTERPRETATION: POSITIVE
OPIATES UR QL CFM: POSITIVE
OXYCODONE (OPIATES), UR RESULT: NEGATIVE NG/ML — SIGNIFICANT CHANGE UP
OXYCODONE UR-MCNC: NEGATIVE NG/ML — SIGNIFICANT CHANGE UP
OXYMORPHONE (OPIATES), UR RESULT: NEGATIVE NG/ML — SIGNIFICANT CHANGE UP
OXYMORPHONE UR CFM-MCNC: NEGATIVE NG/ML — SIGNIFICANT CHANGE UP
PH UR: 5.5 — SIGNIFICANT CHANGE UP (ref 5–8)
PLATELET # BLD AUTO: 256 K/UL — SIGNIFICANT CHANGE UP (ref 130–400)
POTASSIUM SERPL-MCNC: 3.4 MMOL/L — LOW (ref 3.5–5)
POTASSIUM SERPL-SCNC: 3.4 MMOL/L — LOW (ref 3.5–5)
PROT SERPL-MCNC: 7.1 G/DL — SIGNIFICANT CHANGE UP (ref 6–8)
PROT UR-MCNC: 30 MG/DL
RBC # BLD: 4.76 M/UL — SIGNIFICANT CHANGE UP (ref 4.2–5.4)
RBC # FLD: 13.6 % — SIGNIFICANT CHANGE UP (ref 11.5–14.5)
RBC CASTS # UR COMP ASSIST: SIGNIFICANT CHANGE UP /HPF
SODIUM SERPL-SCNC: 139 MMOL/L — SIGNIFICANT CHANGE UP (ref 135–146)
SP GR SPEC: >=1.03 (ref 1.01–1.03)
TRIGL SERPL-MCNC: 86 MG/DL — SIGNIFICANT CHANGE UP
UROBILINOGEN FLD QL: 0.2 MG/DL — SIGNIFICANT CHANGE UP
WBC # BLD: 8.74 K/UL — SIGNIFICANT CHANGE UP (ref 4.8–10.8)
WBC # FLD AUTO: 8.74 K/UL — SIGNIFICANT CHANGE UP (ref 4.8–10.8)
WBC UR QL: SIGNIFICANT CHANGE UP /HPF

## 2022-05-24 LAB
HAV IGM SER-ACNC: SIGNIFICANT CHANGE UP
HBV CORE IGM SER-ACNC: SIGNIFICANT CHANGE UP
HBV SURFACE AG SER-ACNC: SIGNIFICANT CHANGE UP
HCV AB S/CO SERPL IA: 14.24 S/CO — HIGH (ref 0–0.99)
HCV AB SERPL-IMP: REACTIVE
HCV RNA FLD QL NAA+PROBE: SIGNIFICANT CHANGE UP
HCV RNA SPEC QL PROBE+SIG AMP: SIGNIFICANT CHANGE UP
T PALLIDUM AB TITR SER: NEGATIVE — SIGNIFICANT CHANGE UP

## 2022-05-26 DIAGNOSIS — Z90.49 ACQUIRED ABSENCE OF OTHER SPECIFIED PARTS OF DIGESTIVE TRACT: ICD-10-CM

## 2022-05-26 DIAGNOSIS — J44.9 CHRONIC OBSTRUCTIVE PULMONARY DISEASE, UNSPECIFIED: ICD-10-CM

## 2022-05-26 DIAGNOSIS — F41.9 ANXIETY DISORDER, UNSPECIFIED: ICD-10-CM

## 2022-05-26 DIAGNOSIS — F17.210 NICOTINE DEPENDENCE, CIGARETTES, UNCOMPLICATED: ICD-10-CM

## 2022-05-26 DIAGNOSIS — Z79.51 LONG TERM (CURRENT) USE OF INHALED STEROIDS: ICD-10-CM

## 2022-05-26 DIAGNOSIS — F60.3 BORDERLINE PERSONALITY DISORDER: ICD-10-CM

## 2022-05-26 DIAGNOSIS — F11.23 OPIOID DEPENDENCE WITH WITHDRAWAL: ICD-10-CM

## 2022-05-26 DIAGNOSIS — F32.9 MAJOR DEPRESSIVE DISORDER, SINGLE EPISODE, UNSPECIFIED: ICD-10-CM

## 2022-05-26 DIAGNOSIS — Z20.822 CONTACT WITH AND (SUSPECTED) EXPOSURE TO COVID-19: ICD-10-CM

## 2022-05-26 DIAGNOSIS — B18.2 CHRONIC VIRAL HEPATITIS C: ICD-10-CM

## 2022-05-26 DIAGNOSIS — F12.90 CANNABIS USE, UNSPECIFIED, UNCOMPLICATED: ICD-10-CM

## 2022-05-26 DIAGNOSIS — F13.20 SEDATIVE, HYPNOTIC OR ANXIOLYTIC DEPENDENCE, UNCOMPLICATED: ICD-10-CM

## 2022-05-26 LAB
ALFENTANIL UR QUANT: SIGNIFICANT CHANGE UP NG/MG CREAT
CARBOXYTHC UR CFM-MCNC: 1531 NG/ML — SIGNIFICANT CHANGE UP
CARBOXYTHC UR QL SCN: 199.8 MG/DL — SIGNIFICANT CHANGE UP
CARBOXYTHC UR QL SCN: 766 — SIGNIFICANT CHANGE UP
CREAT UR-MCNC: 184 MG/DL — SIGNIFICANT CHANGE UP
FENTANYL UR CFM-MCNC: >272 NG/MG CREAT — SIGNIFICANT CHANGE UP
FENTANYL UR CFM-MCNC: ABNORMAL
GAMMA INTERFERON BACKGROUND BLD IA-ACNC: 0.04 IU/ML — SIGNIFICANT CHANGE UP
M TB IFN-G BLD-IMP: NEGATIVE — SIGNIFICANT CHANGE UP
M TB IFN-G CD4+ BCKGRND COR BLD-ACNC: 0.03 IU/ML — SIGNIFICANT CHANGE UP
M TB IFN-G CD4+CD8+ BCKGRND COR BLD-ACNC: 0.05 IU/ML — SIGNIFICANT CHANGE UP
NORFENTANYL UR-MCNC: >543 NG/MG CREAT — SIGNIFICANT CHANGE UP
QUANT TB PLUS MITOGEN MINUS NIL: 9.96 IU/ML — SIGNIFICANT CHANGE UP
SUFENTANIL UR QUANT: SIGNIFICANT CHANGE UP NG/MG CREAT
THC CREATININE URINE: 199.8 MG/DL — SIGNIFICANT CHANGE UP
THC METABOLITE/CREAT URINE: 766 — SIGNIFICANT CHANGE UP

## 2022-06-27 ENCOUNTER — OUTPATIENT (OUTPATIENT)
Dept: OUTPATIENT SERVICES | Facility: HOSPITAL | Age: 32
LOS: 1 days | Discharge: HOME | End: 2022-06-27

## 2022-06-27 DIAGNOSIS — Z90.49 ACQUIRED ABSENCE OF OTHER SPECIFIED PARTS OF DIGESTIVE TRACT: Chronic | ICD-10-CM

## 2022-06-27 DIAGNOSIS — I49.9 CARDIAC ARRHYTHMIA, UNSPECIFIED: ICD-10-CM

## 2022-06-27 PROCEDURE — 93010 ELECTROCARDIOGRAM REPORT: CPT

## 2022-07-18 ENCOUNTER — EMERGENCY (EMERGENCY)
Facility: HOSPITAL | Age: 32
LOS: 0 days | Discharge: HOME | End: 2022-07-18
Attending: STUDENT IN AN ORGANIZED HEALTH CARE EDUCATION/TRAINING PROGRAM | Admitting: STUDENT IN AN ORGANIZED HEALTH CARE EDUCATION/TRAINING PROGRAM

## 2022-07-18 VITALS
RESPIRATION RATE: 18 BRPM | SYSTOLIC BLOOD PRESSURE: 98 MMHG | DIASTOLIC BLOOD PRESSURE: 59 MMHG | TEMPERATURE: 98 F | HEART RATE: 66 BPM | OXYGEN SATURATION: 98 %

## 2022-07-18 VITALS
WEIGHT: 110.01 LBS | TEMPERATURE: 99 F | DIASTOLIC BLOOD PRESSURE: 51 MMHG | OXYGEN SATURATION: 95 % | HEIGHT: 62 IN | SYSTOLIC BLOOD PRESSURE: 106 MMHG | RESPIRATION RATE: 18 BRPM | HEART RATE: 78 BPM

## 2022-07-18 DIAGNOSIS — F17.200 NICOTINE DEPENDENCE, UNSPECIFIED, UNCOMPLICATED: ICD-10-CM

## 2022-07-18 DIAGNOSIS — M79.89 OTHER SPECIFIED SOFT TISSUE DISORDERS: ICD-10-CM

## 2022-07-18 DIAGNOSIS — J44.9 CHRONIC OBSTRUCTIVE PULMONARY DISEASE, UNSPECIFIED: ICD-10-CM

## 2022-07-18 DIAGNOSIS — Z90.49 ACQUIRED ABSENCE OF OTHER SPECIFIED PARTS OF DIGESTIVE TRACT: Chronic | ICD-10-CM

## 2022-07-18 DIAGNOSIS — Z86.19 PERSONAL HISTORY OF OTHER INFECTIOUS AND PARASITIC DISEASES: ICD-10-CM

## 2022-07-18 DIAGNOSIS — L03.113 CELLULITIS OF RIGHT UPPER LIMB: ICD-10-CM

## 2022-07-18 DIAGNOSIS — F11.10 OPIOID ABUSE, UNCOMPLICATED: ICD-10-CM

## 2022-07-18 DIAGNOSIS — Z90.49 ACQUIRED ABSENCE OF OTHER SPECIFIED PARTS OF DIGESTIVE TRACT: ICD-10-CM

## 2022-07-18 DIAGNOSIS — F41.0 PANIC DISORDER [EPISODIC PAROXYSMAL ANXIETY]: ICD-10-CM

## 2022-07-18 DIAGNOSIS — F60.3 BORDERLINE PERSONALITY DISORDER: ICD-10-CM

## 2022-07-18 DIAGNOSIS — L08.9 LOCAL INFECTION OF THE SKIN AND SUBCUTANEOUS TISSUE, UNSPECIFIED: ICD-10-CM

## 2022-07-18 DIAGNOSIS — Z20.822 CONTACT WITH AND (SUSPECTED) EXPOSURE TO COVID-19: ICD-10-CM

## 2022-07-18 DIAGNOSIS — L53.9 ERYTHEMATOUS CONDITION, UNSPECIFIED: ICD-10-CM

## 2022-07-18 LAB
ALBUMIN SERPL ELPH-MCNC: 4.5 G/DL — SIGNIFICANT CHANGE UP (ref 3.5–5.2)
ALP SERPL-CCNC: 186 U/L — HIGH (ref 30–115)
ALT FLD-CCNC: 72 U/L — HIGH (ref 0–41)
ANION GAP SERPL CALC-SCNC: 10 MMOL/L — SIGNIFICANT CHANGE UP (ref 7–14)
AST SERPL-CCNC: 54 U/L — HIGH (ref 0–41)
BASOPHILS # BLD AUTO: 0.06 K/UL — SIGNIFICANT CHANGE UP (ref 0–0.2)
BASOPHILS NFR BLD AUTO: 1.1 % — HIGH (ref 0–1)
BILIRUB SERPL-MCNC: 0.4 MG/DL — SIGNIFICANT CHANGE UP (ref 0.2–1.2)
BUN SERPL-MCNC: 9 MG/DL — LOW (ref 10–20)
CALCIUM SERPL-MCNC: 9.5 MG/DL — SIGNIFICANT CHANGE UP (ref 8.5–10.1)
CHLORIDE SERPL-SCNC: 100 MMOL/L — SIGNIFICANT CHANGE UP (ref 98–110)
CO2 SERPL-SCNC: 26 MMOL/L — SIGNIFICANT CHANGE UP (ref 17–32)
CREAT SERPL-MCNC: 0.9 MG/DL — SIGNIFICANT CHANGE UP (ref 0.7–1.5)
EGFR: 87 ML/MIN/1.73M2 — SIGNIFICANT CHANGE UP
EOSINOPHIL # BLD AUTO: 0.07 K/UL — SIGNIFICANT CHANGE UP (ref 0–0.7)
EOSINOPHIL NFR BLD AUTO: 1.3 % — SIGNIFICANT CHANGE UP (ref 0–8)
GLUCOSE SERPL-MCNC: 85 MG/DL — SIGNIFICANT CHANGE UP (ref 70–99)
HCG SERPL QL: NEGATIVE — SIGNIFICANT CHANGE UP
HCT VFR BLD CALC: 45.3 % — SIGNIFICANT CHANGE UP (ref 37–47)
HGB BLD-MCNC: 14.6 G/DL — SIGNIFICANT CHANGE UP (ref 12–16)
IMM GRANULOCYTES NFR BLD AUTO: 0.2 % — SIGNIFICANT CHANGE UP (ref 0.1–0.3)
LACTATE SERPL-SCNC: 1.1 MMOL/L — SIGNIFICANT CHANGE UP (ref 0.7–2)
LYMPHOCYTES # BLD AUTO: 2.61 K/UL — SIGNIFICANT CHANGE UP (ref 1.2–3.4)
LYMPHOCYTES # BLD AUTO: 48.8 % — SIGNIFICANT CHANGE UP (ref 20.5–51.1)
MCHC RBC-ENTMCNC: 29.6 PG — SIGNIFICANT CHANGE UP (ref 27–31)
MCHC RBC-ENTMCNC: 32.2 G/DL — SIGNIFICANT CHANGE UP (ref 32–37)
MCV RBC AUTO: 91.7 FL — SIGNIFICANT CHANGE UP (ref 81–99)
MONOCYTES # BLD AUTO: 0.75 K/UL — HIGH (ref 0.1–0.6)
MONOCYTES NFR BLD AUTO: 14 % — HIGH (ref 1.7–9.3)
NEUTROPHILS # BLD AUTO: 1.85 K/UL — SIGNIFICANT CHANGE UP (ref 1.4–6.5)
NEUTROPHILS NFR BLD AUTO: 34.6 % — LOW (ref 42.2–75.2)
NRBC # BLD: 0 /100 WBCS — SIGNIFICANT CHANGE UP (ref 0–0)
PLATELET # BLD AUTO: 288 K/UL — SIGNIFICANT CHANGE UP (ref 130–400)
POTASSIUM SERPL-MCNC: 4.8 MMOL/L — SIGNIFICANT CHANGE UP (ref 3.5–5)
POTASSIUM SERPL-SCNC: 4.8 MMOL/L — SIGNIFICANT CHANGE UP (ref 3.5–5)
PROT SERPL-MCNC: 7.5 G/DL — SIGNIFICANT CHANGE UP (ref 6–8)
RBC # BLD: 4.94 M/UL — SIGNIFICANT CHANGE UP (ref 4.2–5.4)
RBC # FLD: 14.1 % — SIGNIFICANT CHANGE UP (ref 11.5–14.5)
SARS-COV-2 RNA SPEC QL NAA+PROBE: SIGNIFICANT CHANGE UP
SODIUM SERPL-SCNC: 136 MMOL/L — SIGNIFICANT CHANGE UP (ref 135–146)
WBC # BLD: 5.35 K/UL — SIGNIFICANT CHANGE UP (ref 4.8–10.8)
WBC # FLD AUTO: 5.35 K/UL — SIGNIFICANT CHANGE UP (ref 4.8–10.8)

## 2022-07-18 PROCEDURE — 73090 X-RAY EXAM OF FOREARM: CPT | Mod: 26,RT

## 2022-07-18 PROCEDURE — 99285 EMERGENCY DEPT VISIT HI MDM: CPT

## 2022-07-18 PROCEDURE — 93971 EXTREMITY STUDY: CPT | Mod: 26,RT

## 2022-07-18 RX ORDER — CEPHALEXIN 500 MG
1 CAPSULE ORAL
Qty: 28 | Refills: 0
Start: 2022-07-18 | End: 2022-07-24

## 2022-07-18 RX ORDER — AZTREONAM 2 G
1 VIAL (EA) INJECTION
Qty: 14 | Refills: 0
Start: 2022-07-18 | End: 2022-07-24

## 2022-07-18 NOTE — ED PROVIDER NOTE - NS ED ROS FT
Constitutional: No fever, chills.  Eyes:  No visual changes  ENMT:  No neck pain  Cardiac:  No chest pain  Respiratory:  No cough, SOB  GI:  No nausea, vomiting, diarrhea, abdominal pain.  :  No dysuria, hematuria  MS:  (+)R forearm pain/swelling  Neuro:  No headache or lightheadedness  Skin:  (+)R forearm erythema  Endocrine: No history of thyroid disease or diabetes.  Except as documented in the HPI,  all other systems are negative.

## 2022-07-18 NOTE — ED PROVIDER NOTE - PHYSICAL EXAMINATION
GENERAL: Well-nourished, Well-developed. NAD.  HEAD: No visible or palpable bumps or hematomas. No ecchymosis behind ears B/L.  Eyes: PERRLA, EOMI. No asymmetry. No nystagmus. No conjunctival injection. Non-icteric sclera.  ENMT: MMM.   Neck: Supple. FROM  CVS: RRR. Normal S1,S2. No murmurs appreciated on auscultation   RESP: No use of accessory muscles. Chest rise symmetrical with good expansion. Lungs clear to auscultation B/L. No wheezing, rales, or rhonchi auscultated.  MSK: Extremities w/o deformity or ttp. No visible signs of trauma such as ecchymosis, erythema, or swelling. FROM of upper and lower extremities B/L.   Skin: (+)mild localized warmth and small area of erythema to R forearm with visible track marks. no crepitus, no fluctuance.   Neuro: NVI

## 2022-07-18 NOTE — ED PROVIDER NOTE - PATIENT PORTAL LINK FT
You can access the FollowMyHealth Patient Portal offered by [facility] by registering at the following website: [email or web address]. By joining [employer]’s FollowMyHealth portal, you will also be able to view your health information using other applications (apps) compatible with our system.

## 2022-07-18 NOTE — ED PROVIDER NOTE - ATTENDING APP SHARED VISIT CONTRIBUTION OF CARE
32 yo f hx IVDU (heroin, daily, last use this AM, on MMTP), hep c, abx, depression  pt states she had RUE redness/swelling at injection site. no fevers/chills. pt states forearm gets warm/red.  sx have been ongoing for 3 weeks.  no cp/sob/rashes. pt was at methadone clinic who rec ED eval.  pt has been trying cipro she had at home w/o significant improvement     vss  gen- NAD, aaox3  card-rrr  lungs-ctab, no wheezing or rhonchi  abd-sntnd, no guarding or rebound  neuro- full str/sensation, cn ii-xii grossly intact, normal coordination  RUE- forearm w/ warmth, mild swelling    c/f infection, r/o dvt/thrombophlebitis  will get screening labs, duplex

## 2022-07-18 NOTE — ED ADULT NURSE NOTE - NSFALLRSKHARMRISK_ED_ALL_ED
LMOR informing mom that I do see in last office visit note to return in 2 weeks (around 9/13/21). Advised [facility] back to schedule ear recheck.   no

## 2022-07-18 NOTE — ED PROVIDER NOTE - CLINICAL SUMMARY MEDICAL DECISION MAKING FREE TEXT BOX
labs reassuring, no leukocytosis, xr w/o FB, no dvt/thrombophlebitis  will dc w/ abx for cellulitis to cover for mrsa  return precautions

## 2022-07-18 NOTE — ED PROVIDER NOTE - NSICDXPASTSURGICALHX_GEN_ALL_CORE_FT
Patient would like to schedule a cardiac cath with you, patient states that you discussed this with him last week and he has decided to go ahead and have the test done. Please call to schedule with him. PAST SURGICAL HISTORY:  S/P cholecystectomy in 2010

## 2022-07-18 NOTE — ED PROVIDER NOTE - NSFOLLOWUPCLINICS_GEN_ALL_ED_FT
[facility]  Detox Mgmt  [address] [place] [address]  Phone: [phone]  Fax:   Follow Up Time: 1-3 Days    [facility]  Medicine  [address], [place]   Phone: [phone]  Fax:   Follow Up Time: 1-3 Days

## 2022-07-18 NOTE — ED PROVIDER NOTE - OBJECTIVE STATEMENT
31-year-old female past medical history IV drug use, uses heroin daily presenting to the ED for evaluation of possible right forearm infection x3 weeks.  Patient reports she injects to her bilateral upper extremities, noticed her right forearm with swelling, redness, mild pain intermittently over the past 3 weeks.  Patient went to methadone clinic today and was sent to the ED for evaluation of right forearm infection.  Denies fever, chills, numbness/tingling, weakness, chest pain, shortness of breath.

## 2022-07-27 ENCOUNTER — OUTPATIENT (OUTPATIENT)
Dept: OUTPATIENT SERVICES | Facility: HOSPITAL | Age: 32
LOS: 1 days | Discharge: HOME | End: 2022-07-27

## 2022-07-27 DIAGNOSIS — I49.9 CARDIAC ARRHYTHMIA, UNSPECIFIED: ICD-10-CM

## 2022-07-27 DIAGNOSIS — Z90.49 ACQUIRED ABSENCE OF OTHER SPECIFIED PARTS OF DIGESTIVE TRACT: Chronic | ICD-10-CM

## 2022-07-27 PROCEDURE — 93010 ELECTROCARDIOGRAM REPORT: CPT

## 2022-10-11 NOTE — ED ADULT TRIAGE NOTE - WEIGHT IN LBS
Filled out the best way possible since she took time off without our recommendations and wanted us to back date for a week   117

## 2022-11-01 NOTE — ED ADULT NURSE NOTE - HOW MANY DRINKS CONTAINING ALCOHOL DO YOU HAVE ON A TYPICAL DAY WHEN YOU ARE DRINKING?
[de-identified] : Patient returns for her first postop visit status post right knee open synovectomy revision of tibial component.  We indicated the patient that during the surgery were able to significantly improve her range of motion with flexion achieved at 220 degrees with gravity.  The motion was also photographed intraoperatively.  Patient states has been doing her physical therapy has some pain but overall is pleased with her result so far. 1 or 2

## 2022-11-15 ENCOUNTER — INPATIENT (INPATIENT)
Facility: HOSPITAL | Age: 32
LOS: 1 days | Discharge: HOME | End: 2022-11-17
Attending: INTERNAL MEDICINE | Admitting: INTERNAL MEDICINE
Payer: MEDICAID

## 2022-11-15 VITALS
OXYGEN SATURATION: 99 % | RESPIRATION RATE: 20 BRPM | HEART RATE: 78 BPM | DIASTOLIC BLOOD PRESSURE: 62 MMHG | TEMPERATURE: 97 F | SYSTOLIC BLOOD PRESSURE: 97 MMHG | WEIGHT: 110.01 LBS

## 2022-11-15 DIAGNOSIS — Z90.49 ACQUIRED ABSENCE OF OTHER SPECIFIED PARTS OF DIGESTIVE TRACT: Chronic | ICD-10-CM

## 2022-11-15 LAB
ALBUMIN SERPL ELPH-MCNC: 4 G/DL — SIGNIFICANT CHANGE UP (ref 3.5–5.2)
ALP SERPL-CCNC: 102 U/L — SIGNIFICANT CHANGE UP (ref 30–115)
ALT FLD-CCNC: 28 U/L — SIGNIFICANT CHANGE UP (ref 0–41)
ANION GAP SERPL CALC-SCNC: 9 MMOL/L — SIGNIFICANT CHANGE UP (ref 7–14)
AST SERPL-CCNC: 30 U/L — SIGNIFICANT CHANGE UP (ref 0–41)
BASOPHILS # BLD AUTO: 0.05 K/UL — SIGNIFICANT CHANGE UP (ref 0–0.2)
BASOPHILS NFR BLD AUTO: 1.1 % — HIGH (ref 0–1)
BILIRUB SERPL-MCNC: <0.2 MG/DL — SIGNIFICANT CHANGE UP (ref 0.2–1.2)
BUN SERPL-MCNC: 7 MG/DL — LOW (ref 10–20)
CALCIUM SERPL-MCNC: 9.3 MG/DL — SIGNIFICANT CHANGE UP (ref 8.4–10.5)
CHLORIDE SERPL-SCNC: 103 MMOL/L — SIGNIFICANT CHANGE UP (ref 98–110)
CO2 SERPL-SCNC: 23 MMOL/L — SIGNIFICANT CHANGE UP (ref 17–32)
CREAT SERPL-MCNC: 0.8 MG/DL — SIGNIFICANT CHANGE UP (ref 0.7–1.5)
EGFR: 100 ML/MIN/1.73M2 — SIGNIFICANT CHANGE UP
EOSINOPHIL # BLD AUTO: 0.07 K/UL — SIGNIFICANT CHANGE UP (ref 0–0.7)
EOSINOPHIL NFR BLD AUTO: 1.5 % — SIGNIFICANT CHANGE UP (ref 0–8)
GLUCOSE SERPL-MCNC: 79 MG/DL — SIGNIFICANT CHANGE UP (ref 70–99)
HCG SERPL QL: NEGATIVE — SIGNIFICANT CHANGE UP
HCT VFR BLD CALC: 44.1 % — SIGNIFICANT CHANGE UP (ref 37–47)
HGB BLD-MCNC: 14.4 G/DL — SIGNIFICANT CHANGE UP (ref 12–16)
IMM GRANULOCYTES NFR BLD AUTO: 0.2 % — SIGNIFICANT CHANGE UP (ref 0.1–0.3)
LYMPHOCYTES # BLD AUTO: 1.97 K/UL — SIGNIFICANT CHANGE UP (ref 1.2–3.4)
LYMPHOCYTES # BLD AUTO: 42.6 % — SIGNIFICANT CHANGE UP (ref 20.5–51.1)
MCHC RBC-ENTMCNC: 30.1 PG — SIGNIFICANT CHANGE UP (ref 27–31)
MCHC RBC-ENTMCNC: 32.7 G/DL — SIGNIFICANT CHANGE UP (ref 32–37)
MCV RBC AUTO: 92.1 FL — SIGNIFICANT CHANGE UP (ref 81–99)
MONOCYTES # BLD AUTO: 0.44 K/UL — SIGNIFICANT CHANGE UP (ref 0.1–0.6)
MONOCYTES NFR BLD AUTO: 9.5 % — HIGH (ref 1.7–9.3)
NEUTROPHILS # BLD AUTO: 2.08 K/UL — SIGNIFICANT CHANGE UP (ref 1.4–6.5)
NEUTROPHILS NFR BLD AUTO: 45.1 % — SIGNIFICANT CHANGE UP (ref 42.2–75.2)
NRBC # BLD: 0 /100 WBCS — SIGNIFICANT CHANGE UP (ref 0–0)
PLATELET # BLD AUTO: 264 K/UL — SIGNIFICANT CHANGE UP (ref 130–400)
POTASSIUM SERPL-MCNC: 4.7 MMOL/L — SIGNIFICANT CHANGE UP (ref 3.5–5)
POTASSIUM SERPL-SCNC: 4.7 MMOL/L — SIGNIFICANT CHANGE UP (ref 3.5–5)
PROT SERPL-MCNC: 6.8 G/DL — SIGNIFICANT CHANGE UP (ref 6–8)
RBC # BLD: 4.79 M/UL — SIGNIFICANT CHANGE UP (ref 4.2–5.4)
RBC # FLD: 13.2 % — SIGNIFICANT CHANGE UP (ref 11.5–14.5)
SARS-COV-2 RNA SPEC QL NAA+PROBE: SIGNIFICANT CHANGE UP
SODIUM SERPL-SCNC: 135 MMOL/L — SIGNIFICANT CHANGE UP (ref 135–146)
WBC # BLD: 4.62 K/UL — LOW (ref 4.8–10.8)
WBC # FLD AUTO: 4.62 K/UL — LOW (ref 4.8–10.8)

## 2022-11-15 PROCEDURE — 99223 1ST HOSP IP/OBS HIGH 75: CPT

## 2022-11-15 PROCEDURE — 99285 EMERGENCY DEPT VISIT HI MDM: CPT

## 2022-11-15 RX ORDER — ALBUTEROL 90 UG/1
2 AEROSOL, METERED ORAL
Qty: 0 | Refills: 0 | DISCHARGE

## 2022-11-15 RX ORDER — ACETAMINOPHEN 500 MG
650 TABLET ORAL EVERY 8 HOURS
Refills: 0 | Status: DISCONTINUED | OUTPATIENT
Start: 2022-11-15 | End: 2022-11-17

## 2022-11-15 RX ORDER — PSEUDOEPHEDRINE HCL 30 MG
60 TABLET ORAL EVERY 6 HOURS
Refills: 0 | Status: DISCONTINUED | OUTPATIENT
Start: 2022-11-15 | End: 2022-11-17

## 2022-11-15 RX ORDER — HYDROXYZINE HCL 10 MG
50 TABLET ORAL EVERY 6 HOURS
Refills: 0 | Status: DISCONTINUED | OUTPATIENT
Start: 2022-11-15 | End: 2022-11-17

## 2022-11-15 RX ORDER — NICOTINE POLACRILEX 2 MG
1 GUM BUCCAL DAILY
Refills: 0 | Status: DISCONTINUED | OUTPATIENT
Start: 2022-11-15 | End: 2022-11-17

## 2022-11-15 RX ORDER — PANTOPRAZOLE SODIUM 20 MG/1
40 TABLET, DELAYED RELEASE ORAL
Refills: 0 | Status: DISCONTINUED | OUTPATIENT
Start: 2022-11-15 | End: 2022-11-17

## 2022-11-15 RX ORDER — METHOCARBAMOL 500 MG/1
500 TABLET, FILM COATED ORAL EVERY 6 HOURS
Refills: 0 | Status: DISCONTINUED | OUTPATIENT
Start: 2022-11-15 | End: 2022-11-17

## 2022-11-15 RX ORDER — GABAPENTIN 400 MG/1
300 CAPSULE ORAL
Refills: 0 | Status: DISCONTINUED | OUTPATIENT
Start: 2022-11-15 | End: 2022-11-17

## 2022-11-15 RX ORDER — LAMOTRIGINE 25 MG/1
200 TABLET, ORALLY DISINTEGRATING ORAL AT BEDTIME
Refills: 0 | Status: DISCONTINUED | OUTPATIENT
Start: 2022-11-15 | End: 2022-11-17

## 2022-11-15 RX ORDER — HYDROXYZINE HCL 10 MG
100 TABLET ORAL AT BEDTIME
Refills: 0 | Status: DISCONTINUED | OUTPATIENT
Start: 2022-11-15 | End: 2022-11-17

## 2022-11-15 RX ORDER — ESCITALOPRAM OXALATE 10 MG/1
20 TABLET, FILM COATED ORAL DAILY
Refills: 0 | Status: DISCONTINUED | OUTPATIENT
Start: 2022-11-15 | End: 2022-11-17

## 2022-11-15 RX ORDER — MULTIVIT-MIN/FERROUS GLUCONATE 9 MG/15 ML
1 LIQUID (ML) ORAL DAILY
Refills: 0 | Status: DISCONTINUED | OUTPATIENT
Start: 2022-11-15 | End: 2022-11-17

## 2022-11-15 RX ORDER — GABAPENTIN 400 MG/1
1 CAPSULE ORAL
Qty: 0 | Refills: 0 | DISCHARGE

## 2022-11-15 RX ORDER — IBUPROFEN 200 MG
400 TABLET ORAL EVERY 8 HOURS
Refills: 0 | Status: DISCONTINUED | OUTPATIENT
Start: 2022-11-15 | End: 2022-11-17

## 2022-11-15 RX ADMIN — Medication 1 PATCH: at 22:13

## 2022-11-15 RX ADMIN — GABAPENTIN 300 MILLIGRAM(S): 400 CAPSULE ORAL at 22:23

## 2022-11-15 RX ADMIN — Medication 25 MILLIGRAM(S): at 20:15

## 2022-11-15 RX ADMIN — ESCITALOPRAM OXALATE 20 MILLIGRAM(S): 10 TABLET, FILM COATED ORAL at 22:57

## 2022-11-15 RX ADMIN — LAMOTRIGINE 200 MILLIGRAM(S): 25 TABLET, ORALLY DISINTEGRATING ORAL at 22:19

## 2022-11-15 NOTE — H&P ADULT - HISTORY OF PRESENT ILLNESS
33yo female PMHx heroin (IV, last used last night) and BZD use (12mg Xanax daily, last used last night) sent in by Dr Malhotra for BZD detoxification. She went to methadone clinic earlier today. Complaining of mild anxiety, but no somatic complaints like F/C/CP/SOB/BP    Pt known to [facility] and addiction medicine   Pt was RX xanax 2mg po 1 tab tid x 30 day supply on 10/13/22    ER: zack 25mg given      31yo female PMHx heroin (IV, last used last night) and BZD use (12mg Xanax daily, last used last night) sent in by Dr Malhotra for BZD detoxification. She went to methadone clinic earlier today. Complaining of mild anxiety, but no somatic complaints like F/C/CP/SOB/BP  pt states being complaint with her regular meds     opiates : pt on METHADONE 160mg from clinic , LAST TAKEN today this am                 abusing heroin 15-20 bags IV daily ( few episodes of abstinence but abusing now 3 years )                denies n/v/d   xanax : abusing RX and street benzo 12-15mg daily , LAST TAKEN yesterday , now detoxing with anxiety / skin crawling      denies any skin abscess at this point / etoh abuse     Pt known to [facility] and addiction medicine   Pt was RX xanax 2mg po 1 tab tid x 30 day supply on 10/13/22    ER: zack 25mg given

## 2022-11-15 NOTE — H&P ADULT - NSHPPHYSICALEXAM_GEN_ALL_CORE
Vital Signs Last 24 Hrs  T(C): 35.9 (15 Nov 2022 20:43), Max: 36.3 (15 Nov 2022 16:33)  T(F): 96.6 (15 Nov 2022 20:43), Max: 97.4 (15 Nov 2022 16:33)  HR: 55 (15 Nov 2022 20:43) (55 - 78)  BP: 114/66 (15 Nov 2022 20:43) (97/62 - 114/66)  BP(mean): --  RR: 18 (15 Nov 2022 20:43) (18 - 20)  SpO2: 98% (15 Nov 2022 20:43) (98% - 99%)    Parameters below as of 15 Nov 2022 20:43  Patient On (Oxygen Delivery Method): room air    CONSTITUTIONAL: Well-appearing, non-toxic, in NAD  SKIN: Warm dry, normal skin turgor  HEAD: NCAT  EYES: No conjunctival injection, scleral anicteric  ENT: Moist mucous membranes  NECK: Supple; full ROM. Nontender  CARD: RRR, no murmurs, rubs or gallops  RESP: Clear to ausculation bilaterally.  No rales, rhonchi, or wheezing.  ABD: Soft, non-distended, non-tender, no rebound or guarding. No CVA tenderness  EXT: Full ROM, no bony tenderness, no pedal edema, no calf tenderness  NEURO: Normal motor, normal sensory. CN II-XII grossly intact. Normal gait. Mild tremor of hands, mild tongue fasciculations.   PSYCH: Cooperative, appropriate. Vital Signs Last 24 Hrs  T(C): 35.9 (15 Nov 2022 20:43), Max: 36.3 (15 Nov 2022 16:33)  T(F): 96.6 (15 Nov 2022 20:43), Max: 97.4 (15 Nov 2022 16:33)  HR: 55 (15 Nov 2022 20:43) (55 - 78)  BP: 114/66 (15 Nov 2022 20:43) (97/62 - 114/66)  BP(mean): --  RR: 18 (15 Nov 2022 20:43) (18 - 20)  SpO2: 98% (15 Nov 2022 20:43) (98% - 99%)    Parameters below as of 15 Nov 2022 20:43  Patient On (Oxygen Delivery Method): room air    CONSTITUTIONAL:  non-toxic, in NAD  SKIN: Warm dry, normal skin turgor, multiple areas of puncture wounds in UE with different stages of ecchymosis    HEAD: NCAT  EYES: No conjunctival injection, scleral anicteric  ENT: Moist mucous membranes  NECK: Supple; full ROM. Nontender  CARD: RRR, no murmurs, rubs or gallops  RESP: Clear to ausculation bilaterally.  No rales, rhonchi, or wheezing.  ABD: Soft, non-distended, non-tender, no rebound or guarding. No CVA tenderness  EXT: Full ROM, no bony tenderness, no pedal edema, no calf tenderness  NEURO: Normal motor, normal sensory. CN II-XII grossly intact. Normal gait. Mild tremor of hands, mild tongue fasciculations.   PSYCH: Cooperative, appropriate.

## 2022-11-15 NOTE — ED ADULT TRIAGE NOTE - CHIEF COMPLAINT QUOTE
patient sent from Methadone clinic for detox from Xanax and Heroin, patient reports she last used Xanax last night and heroin this morning. Patient also received prescribed dose of methadone this morning. Patient c/o feeling anxious, irritable, and chills

## 2022-11-15 NOTE — H&P ADULT - ASSESSMENT
32 year old Female with a past medical history of Polysubstance abuse, Gastritis, Colitis, Chronic Obstructive Pulmonary Disease presented to the ER with Significant other with a chief complaint of opioid withdrawal.    # Polysubstance abuse   # Opioid withdrawal   - Methadone Taper for Heroine   - Phenobarb Taper for Xanax (UNABLE TO USE TAPER AS PT IS IN ER - Needs to adjust Phenobarb 64.8 q6h x 2 doses, then 48.6 q6h x 4 doses, 32.4 q6hrs x 7 doses and 32.4 q12h x 3 doses)   - Monitor signs of withdrawal   - Counseling as needed   - PRN Medications ordered  - Vistaril/Atarax PRN for anxiety   - Intravenous Fluids   - Will start on Gabapentin 300 TID  - Addiction medicine consult      # Depression   - Continue with Lexapro and Lamictal  32 year old Female with a past medical history of Polysubstance abuse, Gastritis, Colitis, Chronic Obstructive Pulmonary Disease presented to the ER with Significant other with a chief complaint of opioid withdrawal.    # Polysubstance abuse   # Opioid withdrawal   # BENZO withdrawal   - c/w Methadone 160 mg ( NEEDS TO BE VERIFIED IN AM WITH METH CLINIC )   - librium protocol   - Monitor signs of withdrawal   - Counseling as needed   - PRN Medications ordered  - Vistaril/Atarax PRN for anxiety   - Intravenous Fluids   - Addiction medicine consult    - c/w  aldo     # Depression   - Continue with Lexapro and Lamictal     d/w dr. hair  32 year old Female with a past medical history of Polysubstance abuse, Gastritis, Colitis, Chronic Obstructive Pulmonary Disease presented to the ER with Significant other with a chief complaint of opioid withdrawal.    # Polysubstance abuse   # Opioid withdrawal   # BENZO withdrawal   - c/w Methadone 160 mg ( NEEDS TO BE VERIFIED IN AM WITH METH CLINIC )   - librium protocol   - Monitor signs of withdrawal   - Counseling as needed   - PRN Medications ordered  - Vistaril/Atarax PRN for anxiety   - IV lock   - Addiction medicine consult    - c/w  aldo     # Depression   - Continue with Lexapro and Lamictal     d/w dr. hair

## 2022-11-15 NOTE — ED PROVIDER NOTE - OBJECTIVE STATEMENT
31yo female PMHx heroin (IV, last used last night) and BZD use (12mg Xanax daily, last used last night) sent in by Dr Malhotra for BZD detoxification. She went to methadone clinic eariler today. Complaining of mild anxiety, but no somatic complaints like F/C/CP/SOB/BP/AP/V/D/US/R.

## 2022-11-15 NOTE — H&P ADULT - NSHPLABSRESULTS_GEN_ALL_CORE
11-15    135  |  103  |  7<L>  ----------------------------<  79  4.7   |  23  |  0.8    Ca    9.3      15 Nov 2022 19:18    TPro  6.8  /  Alb  4.0  /  TBili  <0.2  /  DBili  x   /  AST  30  /  ALT  28  /  AlkPhos  102  11-15                            14.4   4.62  )-----------( 264      ( 15 Nov 2022 19:18 )             44.1

## 2022-11-15 NOTE — ED PROVIDER NOTE - PHYSICAL EXAMINATION
VITAL SIGNS: I have reviewed nursing notes and confirm.  CONSTITUTIONAL: Well-appearing, non-toxic, in NAD  SKIN: Warm dry, normal skin turgor  HEAD: NCAT  EYES: No conjunctival injection, scleral anicteric  ENT: Moist mucous membranes, normal pharynx with no erythema or exudates  NECK: Supple; full ROM. Nontender. No cervical LAD  CARD: RRR, no murmurs, rubs or gallops  RESP: Clear to ausculation bilaterally.  No rales, rhonchi, or wheezing.  ABD: Soft, non-distended, non-tender, no rebound or guarding. No CVA tenderness  EXT: Full ROM, no bony tenderness, no pedal edema, no calf tenderness  NEURO: Normal motor, normal sensory. CN II-XII grossly intact. Normal gait. Mild tremor of hands, mild tongue fasciculations.   PSYCH: Cooperative, appropriate.

## 2022-11-15 NOTE — ED PROVIDER NOTE - CLINICAL SUMMARY MEDICAL DECISION MAKING FREE TEXT BOX
patient sent in for evaluation of substance abuse withdrawal from benzo sent in by dr christian for admission for further evaluation

## 2022-11-15 NOTE — H&P ADULT - NS ATTEND AMEND GEN_ALL_CORE FT
Seen in ER, currently resting comfortably. Case discussed in detail with admitting PA, agree with assessment and plan as outlinrd Seen in ER, currently resting comfortably. Case discussed in detail with admitting PA, agree with assessment and plan as outlined

## 2022-11-15 NOTE — PATIENT PROFILE ADULT - FUNCTIONAL ASSESSMENT - BASIC MOBILITY 6.
4-calculated by average/Not able to assess (calculate score using [facility] averaging method)

## 2022-11-15 NOTE — PATIENT PROFILE ADULT - FALL HARM RISK - HARM RISK INTERVENTIONS

## 2022-11-15 NOTE — ED PROCEDURE NOTE - NS ED ATTENDING STATEMENT MOD
This was a shared visit with the IRENE. I reviewed and verified the documentation and independently performed the documented: Attending with

## 2022-11-15 NOTE — ED PROVIDER NOTE - ATTENDING APP SHARED VISIT CONTRIBUTION OF CARE
I was present for and supervised the key and critical aspects of the procedures performed during the care of the patient. Patient presents for evaluation sent in by Dr. Malhotra for benzo withdrawal patient is 33yo female PMHx heroin IV, last used last night and Benzo use 12mg Xanax daily, last used last night. She went to methadone clinic eariler today.  She denies any visual changes, headache, cp sob, abd pain back pain or other extremity pain she is not homicidal or suicidal   on exam she is non-toxic well appearing nc/at perrla eomi oropharynx clear cta b/l, no murmurs noted on cardiac exam, abd- soft moving all 4 extremities   a/p- patient given iv fluids I will admit for further workup at this time she is not tremulous with no signs of fevers or chills I will admit for further monitoring

## 2022-11-16 DIAGNOSIS — F19.20 OTHER PSYCHOACTIVE SUBSTANCE DEPENDENCE, UNCOMPLICATED: ICD-10-CM

## 2022-11-16 LAB
ALBUMIN SERPL ELPH-MCNC: 3.7 G/DL — SIGNIFICANT CHANGE UP (ref 3.5–5.2)
ALP SERPL-CCNC: 100 U/L — SIGNIFICANT CHANGE UP (ref 30–115)
ALT FLD-CCNC: 27 U/L — SIGNIFICANT CHANGE UP (ref 0–41)
ANION GAP SERPL CALC-SCNC: 8 MMOL/L — SIGNIFICANT CHANGE UP (ref 7–14)
AST SERPL-CCNC: 29 U/L — SIGNIFICANT CHANGE UP (ref 0–41)
BILIRUB SERPL-MCNC: <0.2 MG/DL — SIGNIFICANT CHANGE UP (ref 0.2–1.2)
BUN SERPL-MCNC: 8 MG/DL — LOW (ref 10–20)
CALCIUM SERPL-MCNC: 9 MG/DL — SIGNIFICANT CHANGE UP (ref 8.4–10.5)
CHLORIDE SERPL-SCNC: 102 MMOL/L — SIGNIFICANT CHANGE UP (ref 98–110)
CO2 SERPL-SCNC: 28 MMOL/L — SIGNIFICANT CHANGE UP (ref 17–32)
CREAT SERPL-MCNC: 0.8 MG/DL — SIGNIFICANT CHANGE UP (ref 0.7–1.5)
EGFR: 100 ML/MIN/1.73M2 — SIGNIFICANT CHANGE UP
GLUCOSE SERPL-MCNC: 101 MG/DL — HIGH (ref 70–99)
HCT VFR BLD CALC: 41.4 % — SIGNIFICANT CHANGE UP (ref 37–47)
HGB BLD-MCNC: 13.6 G/DL — SIGNIFICANT CHANGE UP (ref 12–16)
MCHC RBC-ENTMCNC: 30 PG — SIGNIFICANT CHANGE UP (ref 27–31)
MCHC RBC-ENTMCNC: 32.9 G/DL — SIGNIFICANT CHANGE UP (ref 32–37)
MCV RBC AUTO: 91.4 FL — SIGNIFICANT CHANGE UP (ref 81–99)
NRBC # BLD: 0 /100 WBCS — SIGNIFICANT CHANGE UP (ref 0–0)
PLATELET # BLD AUTO: 250 K/UL — SIGNIFICANT CHANGE UP (ref 130–400)
POTASSIUM SERPL-MCNC: 4.1 MMOL/L — SIGNIFICANT CHANGE UP (ref 3.5–5)
POTASSIUM SERPL-SCNC: 4.1 MMOL/L — SIGNIFICANT CHANGE UP (ref 3.5–5)
PROT SERPL-MCNC: 6.6 G/DL — SIGNIFICANT CHANGE UP (ref 6–8)
RBC # BLD: 4.53 M/UL — SIGNIFICANT CHANGE UP (ref 4.2–5.4)
RBC # FLD: 13.4 % — SIGNIFICANT CHANGE UP (ref 11.5–14.5)
SODIUM SERPL-SCNC: 138 MMOL/L — SIGNIFICANT CHANGE UP (ref 135–146)
WBC # BLD: 4.79 K/UL — LOW (ref 4.8–10.8)
WBC # FLD AUTO: 4.79 K/UL — LOW (ref 4.8–10.8)

## 2022-11-16 PROCEDURE — 99251: CPT

## 2022-11-16 PROCEDURE — 99221 1ST HOSP IP/OBS SF/LOW 40: CPT

## 2022-11-16 PROCEDURE — 99233 SBSQ HOSP IP/OBS HIGH 50: CPT

## 2022-11-16 RX ORDER — METHADONE HYDROCHLORIDE 40 MG/1
160 TABLET ORAL DAILY
Refills: 0 | Status: DISCONTINUED | OUTPATIENT
Start: 2022-11-16 | End: 2022-11-17

## 2022-11-16 RX ADMIN — METHADONE HYDROCHLORIDE 160 MILLIGRAM(S): 40 TABLET ORAL at 09:36

## 2022-11-16 RX ADMIN — PANTOPRAZOLE SODIUM 40 MILLIGRAM(S): 20 TABLET, DELAYED RELEASE ORAL at 06:56

## 2022-11-16 RX ADMIN — Medication 25 MILLIGRAM(S): at 02:17

## 2022-11-16 RX ADMIN — GABAPENTIN 300 MILLIGRAM(S): 400 CAPSULE ORAL at 17:26

## 2022-11-16 RX ADMIN — Medication 25 MILLIGRAM(S): at 09:55

## 2022-11-16 RX ADMIN — Medication 1 TABLET(S): at 11:13

## 2022-11-16 RX ADMIN — GABAPENTIN 300 MILLIGRAM(S): 400 CAPSULE ORAL at 06:55

## 2022-11-16 RX ADMIN — Medication 1 PATCH: at 11:13

## 2022-11-16 RX ADMIN — ESCITALOPRAM OXALATE 20 MILLIGRAM(S): 10 TABLET, FILM COATED ORAL at 11:12

## 2022-11-16 RX ADMIN — METHOCARBAMOL 500 MILLIGRAM(S): 500 TABLET, FILM COATED ORAL at 19:56

## 2022-11-16 RX ADMIN — Medication 25 MILLIGRAM(S): at 06:56

## 2022-11-16 RX ADMIN — Medication 50 MILLIGRAM(S): at 19:56

## 2022-11-16 RX ADMIN — LAMOTRIGINE 200 MILLIGRAM(S): 25 TABLET, ORALLY DISINTEGRATING ORAL at 22:49

## 2022-11-16 NOTE — CONSULT NOTE ADULT - PROBLEM SELECTOR RECOMMENDATION 9
After evaluation at this time would stop librium protocol for benzo withdrawal and continue with PRN meds. Case discussed with MMTP Dr. Malhotra. Pt has had consistent negative urines for benzodiazipines.  Pt should continue on MMTP dose with possible increases as per program for withdrawal.   -Substance Abuse counseling provided   CATCH team involved for aftercare and pt will follow up with aftercare for REHAB.

## 2022-11-16 NOTE — PROGRESS NOTE ADULT - ASSESSMENT
#polysubstance abuse - on MMTP, still OD at home - librium taper, cont methadone - verified  < from: 12 Lead ECG (07.27.22 @ 12:01) >  QTC Calculation(Bakaran) 418 ms      < end of copied text >  addiction med - plan to transfer to inpatient rehab -   nicotine def - has patch on   thaimne and folate def on repletion    poor kacie walker - pt cousnelled   dc plan in 48 hrs

## 2022-11-17 ENCOUNTER — EMERGENCY (EMERGENCY)
Facility: HOSPITAL | Age: 32
LOS: 0 days | Discharge: LEFT BEFORE DISCHARGE | End: 2022-11-17
Attending: EMERGENCY MEDICINE | Admitting: EMERGENCY MEDICINE
Payer: MEDICAID

## 2022-11-17 VITALS
HEART RATE: 80 BPM | OXYGEN SATURATION: 100 % | SYSTOLIC BLOOD PRESSURE: 119 MMHG | DIASTOLIC BLOOD PRESSURE: 65 MMHG | WEIGHT: 134.92 LBS | HEIGHT: 62 IN | TEMPERATURE: 98 F | RESPIRATION RATE: 19 BRPM

## 2022-11-17 VITALS
SYSTOLIC BLOOD PRESSURE: 110 MMHG | DIASTOLIC BLOOD PRESSURE: 60 MMHG | RESPIRATION RATE: 18 BRPM | HEART RATE: 67 BPM | TEMPERATURE: 96 F

## 2022-11-17 DIAGNOSIS — F19.10 OTHER PSYCHOACTIVE SUBSTANCE ABUSE, UNCOMPLICATED: ICD-10-CM

## 2022-11-17 DIAGNOSIS — F17.210 NICOTINE DEPENDENCE, CIGARETTES, UNCOMPLICATED: ICD-10-CM

## 2022-11-17 DIAGNOSIS — Z53.29 PROCEDURE AND TREATMENT NOT CARRIED OUT BECAUSE OF PATIENT'S DECISION FOR OTHER REASONS: ICD-10-CM

## 2022-11-17 DIAGNOSIS — Z90.49 ACQUIRED ABSENCE OF OTHER SPECIFIED PARTS OF DIGESTIVE TRACT: Chronic | ICD-10-CM

## 2022-11-17 LAB
AMPHET UR-MCNC: NEGATIVE — SIGNIFICANT CHANGE UP
BARBITURATES UR SCN-MCNC: NEGATIVE — SIGNIFICANT CHANGE UP
BENZODIAZ UR-MCNC: POSITIVE
COCAINE METAB.OTHER UR-MCNC: POSITIVE
DRUG SCREEN 1, URINE RESULT: SIGNIFICANT CHANGE UP
FENTANYL UR QL: POSITIVE
METHADONE UR-MCNC: POSITIVE
OPIATES UR-MCNC: POSITIVE
OXYCODONE UR-MCNC: NEGATIVE — SIGNIFICANT CHANGE UP
PCP UR-MCNC: NEGATIVE — SIGNIFICANT CHANGE UP
PROPOXYPHENE QUALITATIVE URINE RESULT: NEGATIVE — SIGNIFICANT CHANGE UP
THC UR QL: POSITIVE

## 2022-11-17 PROCEDURE — 99231 SBSQ HOSP IP/OBS SF/LOW 25: CPT

## 2022-11-17 PROCEDURE — 99239 HOSP IP/OBS DSCHRG MGMT >30: CPT

## 2022-11-17 PROCEDURE — 99284 EMERGENCY DEPT VISIT MOD MDM: CPT

## 2022-11-17 RX ORDER — METHADONE HYDROCHLORIDE 40 MG/1
16 TABLET ORAL
Qty: 0 | Refills: 0 | DISCHARGE
Start: 2022-11-17

## 2022-11-17 RX ADMIN — METHADONE HYDROCHLORIDE 160 MILLIGRAM(S): 40 TABLET ORAL at 10:04

## 2022-11-17 RX ADMIN — GABAPENTIN 300 MILLIGRAM(S): 400 CAPSULE ORAL at 06:37

## 2022-11-17 RX ADMIN — PANTOPRAZOLE SODIUM 40 MILLIGRAM(S): 20 TABLET, DELAYED RELEASE ORAL at 06:37

## 2022-11-17 NOTE — CHART NOTE - NSCHARTNOTEFT_GEN_A_CORE
Patient seen and examined throughout the course of the day.    Throughout this admission pt has been stepping outside the hospital for smoking, pt was warned if she continues to leave the hospital   will need to go to ED to be readmitted. This am pt left floor again, security was informed by nursing stuff as per hospital policy

## 2022-11-17 NOTE — ED PROVIDER NOTE - IV ALTEPLASE EXCL REL HIDDEN
[de-identified] : 59 year old female presents presents today with right knee injury sustained 4/24/20. Her right knee buckled causing her to fall down stairs. She was seen at [facility] and  with right tibial plateau fx. She presents in a wheel chair. Taking Percocet, Tylenol, Meloxicam.  Unable to fully weight-bear, and patient has difficulty with range of motion and function at this time.  Reports significant swelling.  Severe pain.  Denies numbness and tingling.  Patient has pre-existing osteoarthritis, and had been told that she requires arthroplasty at some point in the near future.  Patient does report a history of rheumatoid arthritis.\par \par The patient's past medical history, past surgical history, medications and allergies were reviewed by me today with the patient and documented accordingly. In addition, the patient's family and social history, which were noncontributory to this visit, were reviewed also. show

## 2022-11-17 NOTE — ED PROVIDER NOTE - OBJECTIVE STATEMENT
1yo female PMHx heroin (IV, last used last night) and BZD use with recent admission and elopement from hospital. pt was warned to not leave the hospital for a smoke break but did anyay. pt now back for placement in detox placement. no active complaints.

## 2022-11-17 NOTE — ED ADULT TRIAGE NOTE - CHIEF COMPLAINT QUOTE
Pt states " I was admitted upstairs. I left for a cigarette and I was told I needed to come back through the ER"

## 2022-11-17 NOTE — ED PROVIDER NOTE - CLINICAL SUMMARY MEDICAL DECISION MAKING FREE TEXT BOX
32yF polysubstance abuse presents to the ED after she eloped from the hospital floor to smoke a cig.  Pt continues to leave the ED even during her short ED stay.  No concern for active intoxication or current withdrawal.  No SI/HI.  D/w hospitalist - pt was essentially ready for dc and no need for readmission.  D/w JOCE team who is helping to facilitate admission to [facility] for further rehab care.  CATCH team is in communication with pt directly and relayed the details organized for her [facility] admission directly to the patient, who has already eloped from the ED bc her ride had to leave.

## 2022-11-17 NOTE — ED ADULT NURSE NOTE - NSFALLRSKPASTHIST_ED_ALL_ED
"Spoke briefly to patient's daughter via phone. Daughter stated she is \"very familiar with palliative care\" and did not need any information or support. Daughter also stated \"we're doing pretty good right now\" and \"I think we are doing everything I would do if we went to palliative\". Explained patient looked uncomfortable with labored tachypnic breathing, daughter stated \"well I'm coming back up there so we will see\".     Spoke with MD, regarding patient distress. Palliative care will sign off. Please let us know if we can be of further assistance.   " no

## 2022-11-17 NOTE — DISCHARGE NOTE PROVIDER - HOSPITAL COURSE
33 y/o female PMHx heroin (IV, last used last night) and BZD use (12mg Xanax daily, last used last night) sent in by Dr Malhotra for BZD detoxification. She went to methadone clinic earlier today. Complaining of mild anxiety, but   opiates : pt on METHADONE 160mg from clinic , LAST TAKEN today this am                 abusing heroin 15-20 bags IV daily ( few episodes of abstinence but abusing now 3 years )                denies n/v/d   xanax : abusing RX and street benzo 12-15mg daily , LAST TAKEN yesterday , now detoxing with anxiety / skin crawling    Pt known to [facility] and addiction medicine   Pt was RX xanax 2mg po 1 tab tid x 30 day supply on 10/13/22  Pt seen by detox, was initially started on librium denia , then changed to PRN. Pt is stable for discharge to inpatient rehab or return to MMTP program and await placement to rehab.   Pt  ready to go to rehab today to [facility]. Pt states she has a place to go if she cant get in today and will follow up on Monday to [facility]. ([facility] only accepts MMTP patients on MOnday and Thursday)   CATCH team involved for aftercare and pt will follow up with aftercare.  Pt to follow up with pcp

## 2022-11-17 NOTE — DISCHARGE NOTE PROVIDER - NSDCCPCAREPLAN_GEN_ALL_CORE_FT
PRINCIPAL DISCHARGE DIAGNOSIS  Diagnosis: Admitted to substance misuse detoxification center  Assessment and Plan of Treatment: you were seen by addiction medicine, were started on librium denia then librium prn   follow up with detox clinic.

## 2022-11-17 NOTE — PROGRESS NOTE ADULT - SUBJECTIVE AND OBJECTIVE BOX
acetaminophen     Tablet .. 650 milliGRAM(s) Oral every 8 hours PRN  aluminum hydroxide/magnesium hydroxide/simethicone Suspension 30 milliLiter(s) Oral every 6 hours PRN  chlordiazePOXIDE 25 milliGRAM(s) Oral every 4 hours PRN  escitalopram 20 milliGRAM(s) Oral daily  gabapentin 300 milliGRAM(s) Oral two times a day  hydrOXYzine hydrochloride 50 milliGRAM(s) Oral every 6 hours PRN  hydrOXYzine hydrochloride 100 milliGRAM(s) Oral at bedtime PRN  ibuprofen  Tablet. 400 milliGRAM(s) Oral every 8 hours PRN  lamoTRIgine 200 milliGRAM(s) Oral at bedtime  methadone    Tablet 160 milliGRAM(s) Oral daily  methocarbamol 500 milliGRAM(s) Oral every 6 hours PRN  multivitamin/minerals 1 Tablet(s) Oral daily  nicotine - 21 mG/24Hr(s) Patch 1 Patch Transdermal daily  pantoprazole    Tablet 40 milliGRAM(s) Oral before breakfast  pseudoephedrine 60 milliGRAM(s) Oral every 6 hours PRN        ADMITTED TO SUBSTANCE MISUSE DETOXIFICATION CENTER  ADMITTED TO SUBSTANCE MISUSE DETOXIFICATION CENTER    Polysubstance dependence    
    Pt interviewed, examined and EMR chart reviewed.    Follow up of Opiate Addiction. Pt is on MMTP for opiate maintenance and abuse of heroine/fentanyl and librium PRN for Benzowithdrawal. Pt states she is doing well and ready to go to rehab today to [facility]. Pt does not want to go to any other rehab. Pt states she has a place to go if she cant get in today and will follow up on Monday to [facility]. ([facility] only accepts MMTP patients on MOnday and Thursday)     SOCIAL HISTORY:    REVIEW OF SYSTEMS:    Constitutional: No fever, weight loss or fatigue  ENT:  No difficulty hearing, tinnitus, vertigo; No sinus or throat pain  Neck: No pain or stiffness  Respiratory: No cough, wheezing, chills or hemoptysis  Cardiovascular: No chest pain, palpitations, shortness of breath, dizziness or leg swelling  Gastrointestinal: No abdominal or epigastric pain. No nausea, vomiting or hematemesis; No diarrhea or constipation. No melena or hematochezia.  Neurological: No headaches, memory loss, loss of strength, numbness or tremors  Musculoskeletal: No joint pain or swelling; No muscle, back or extremity pain      MEDICATIONS  (STANDING):  escitalopram 20 milliGRAM(s) Oral daily  gabapentin 300 milliGRAM(s) Oral two times a day  lamoTRIgine 200 milliGRAM(s) Oral at bedtime  methadone    Tablet 160 milliGRAM(s) Oral daily  multivitamin/minerals 1 Tablet(s) Oral daily  nicotine - 21 mG/24Hr(s) Patch 1 Patch Transdermal daily  pantoprazole    Tablet 40 milliGRAM(s) Oral before breakfast    MEDICATIONS  (PRN):  acetaminophen     Tablet .. 650 milliGRAM(s) Oral every 8 hours PRN Temp greater or equal to 38C (100.4F)  aluminum hydroxide/magnesium hydroxide/simethicone Suspension 30 milliLiter(s) Oral every 6 hours PRN Heartburn  chlordiazePOXIDE 25 milliGRAM(s) Oral every 4 hours PRN Withdrawal  hydrOXYzine hydrochloride 50 milliGRAM(s) Oral every 6 hours PRN Anxiety  hydrOXYzine hydrochloride 100 milliGRAM(s) Oral at bedtime PRN insomnia  ibuprofen  Tablet. 400 milliGRAM(s) Oral every 8 hours PRN Mild Pain (1 - 3)  methocarbamol 500 milliGRAM(s) Oral every 6 hours PRN muscle pain  pseudoephedrine 60 milliGRAM(s) Oral every 6 hours PRN Rhinitis      Vital Signs Last 24 Hrs  T(C): 35.8 (17 Nov 2022 04:34), Max: 36.5 (16 Nov 2022 19:55)  T(F): 96.5 (17 Nov 2022 04:34), Max: 97.7 (16 Nov 2022 19:55)  HR: 67 (17 Nov 2022 04:34) (67 - 100)  BP: 110/60 (17 Nov 2022 04:34) (110/60 - 122/68)  BP(mean): --  RR: 18 (17 Nov 2022 04:34) (18 - 18)  SpO2: 99% (16 Nov 2022 14:36) (99% - 99%)    Parameters below as of 16 Nov 2022 14:36  Patient On (Oxygen Delivery Method): room air        PHYSICAL EXAM:    Constitutional: NAD, well-groomed, well-developed  HEENT: PERRLA, EOMI, Normal Hearing, MMM  Neck: No LAD, No JVD  Back: Normal spine flexure, No CVA tenderness  Respiratory: CTAB/L  Cardiovascular: S1 and S2, RRR, no M/G/R  Gastrointestinal: BS+, soft, NT/ND  Extremities: No peripheral edema  Vascular: 2+ peripheral pulses  Neurological: A/O x 3, no focal deficits    LABS:                        13.6   4.79  )-----------( 250      ( 16 Nov 2022 05:52 )             41.4     11-16    138  |  102  |  8<L>  ----------------------------<  101<H>  4.1   |  28  |  0.8    Ca    9.0      16 Nov 2022 05:52    TPro  6.6  /  Alb  3.7  /  TBili  <0.2  /  DBili  x   /  AST  29  /  ALT  27  /  AlkPhos  100  11-16        Drug Screen Urine:  Alcohol Level        RADIOLOGY & ADDITIONAL STUDIES:

## 2022-11-17 NOTE — DISCHARGE NOTE PROVIDER - NSFOLLOWUPCLINICS_GEN_ALL_ED_FT
[facility]  Detox Mgmt  [address] [place] [address]  Phone: [phone]  Fax:

## 2022-11-17 NOTE — DISCHARGE NOTE PROVIDER - NSDCCPGOAL_GEN_ALL_CORE_FT
Shae Rahman attended and participated in 2/2 evening groups  Compliant with scheduled 2100 meds without prompting  Had evening snack  Resting quietly in bed at present, arouses easily  Will continue to monitor/assess for any changes  To get better and follow your care plan as instructed.

## 2022-11-17 NOTE — DISCHARGE NOTE PROVIDER - NSDCMRMEDTOKEN_GEN_ALL_CORE_FT
gabapentin 300 mg oral tablet: 1 tab(s) orally 2 times a day  lamoTRIgine 200 mg oral tablet: 1 tab(s) orally once a day (at bedtime)  Lexapro 20 mg oral tablet: 1 tab(s) orally once a day (at bedtime)

## 2022-11-17 NOTE — ED PROVIDER NOTE - PHYSICAL EXAMINATION
Letter done and signed for pickup   CONSTITUTIONAL:  in no acute distress.   SKIN: warm, dry  HEAD: Normocephalic; atraumatic.  EYES: PERRL, EOMI, normal sclera and conjunctiva   ENT: No nasal discharge; airway clear.  NECK: Supple; non tender.  CARD:  Regular rate and rhythm.   RESP: NO inc WOB   ABD: soft ntnd  EXT: Normal ROM.    LYMPH: No acute cervical adenopathy.  NEURO: Alert, oriented, grossly unremarkable  PSYCH: Cooperative, appropriate.

## 2022-11-17 NOTE — PROGRESS NOTE ADULT - PROBLEM SELECTOR PLAN 1
After evaluation at this time pt is stable for discharge to inpatient rehab or return to MMTP program and await placement to rehab.     Pt states she is doing well and ready to go to rehab today to [facility]. Pt does not want to go to any other rehab. Pt states she has a place to go if she cant get in today and will follow up on Monday to [facility]. ([facility] only accepts MMTP patients on MOnday and Thursday)     CATCH team involved for aftercare and pt will follow up with aftercare

## 2022-11-17 NOTE — ED PROVIDER NOTE - PROGRESS NOTE DETAILS
EK - spoke to Dr. Fofana - pt was nearly ready for d/c but walked out to smoke a cig despite multiple warnings about hospital policy against leaving the floor.  She is awaiting placement at [facility] for detox (must be Mon or Thurs given MMTP), organized by Yari from the CATCH team.  As per medicine, no need to readmit and can contact CATCH team directly to help facilitate the placement vs dc home with plan for later placement. EK - Pt has left the ED at least twice, reportedly to the cafeteria, but unable to have conversation w/ pt as she does not stay in the ED.  As per [facility] team - pt eloped from the ED with a friend who had an urgent dr appointment this afternoon, but [facility] has arranged admission for her to [facility] tomorrow after she picks up her daily methadone dose.  [facility] team did speak to pt to inform her about the plan for [facility] admission/dispo.

## 2022-11-17 NOTE — ED PROVIDER NOTE - ATTENDING CONTRIBUTION TO CARE
32yF polysubstance abuse (heroin, xanax) on methadone apparently walked off the floor earlier today where she was admitted bc she wanted to smoke a cig (despite multiple warnings from staff that she would lose her bed and admitted status if she kept leaving).  As per hospital protocol, pt must come through the ED to be readmitted in this case.  Pt says she is ready to be readmitted at this time.    Of note, review of EMR notes that pt was planned for dc today while CATCH team assists in placement at [facility] for further detox care.

## 2022-11-18 DIAGNOSIS — Z02.9 ENCOUNTER FOR ADMINISTRATIVE EXAMINATIONS, UNSPECIFIED: ICD-10-CM

## 2022-11-22 DIAGNOSIS — F19.10 OTHER PSYCHOACTIVE SUBSTANCE ABUSE, UNCOMPLICATED: ICD-10-CM

## 2022-11-22 DIAGNOSIS — J44.9 CHRONIC OBSTRUCTIVE PULMONARY DISEASE, UNSPECIFIED: ICD-10-CM

## 2022-11-22 DIAGNOSIS — F13.230 SEDATIVE, HYPNOTIC OR ANXIOLYTIC DEPENDENCE WITH WITHDRAWAL, UNCOMPLICATED: ICD-10-CM

## 2022-11-22 DIAGNOSIS — Z90.49 ACQUIRED ABSENCE OF OTHER SPECIFIED PARTS OF DIGESTIVE TRACT: ICD-10-CM

## 2022-11-22 DIAGNOSIS — F11.23 OPIOID DEPENDENCE WITH WITHDRAWAL: ICD-10-CM

## 2022-11-22 DIAGNOSIS — Z86.19 PERSONAL HISTORY OF OTHER INFECTIOUS AND PARASITIC DISEASES: ICD-10-CM

## 2022-11-22 DIAGNOSIS — Z79.899 OTHER LONG TERM (CURRENT) DRUG THERAPY: ICD-10-CM

## 2022-11-22 DIAGNOSIS — F17.200 NICOTINE DEPENDENCE, UNSPECIFIED, UNCOMPLICATED: ICD-10-CM

## 2022-11-22 DIAGNOSIS — F32.A DEPRESSION, UNSPECIFIED: ICD-10-CM

## 2022-11-22 DIAGNOSIS — F41.9 ANXIETY DISORDER, UNSPECIFIED: ICD-10-CM

## 2022-11-22 DIAGNOSIS — F60.3 BORDERLINE PERSONALITY DISORDER: ICD-10-CM

## 2022-11-22 LAB
6-ACETYLMORPHINE, UR RESULT: NEGATIVE NG/ML — SIGNIFICANT CHANGE UP
6MAM UR CFM-MCNC: NEGATIVE NG/ML — SIGNIFICANT CHANGE UP
ALFENTANIL UR QUANT: SIGNIFICANT CHANGE UP NG/MG CREAT
BENZOYLEGONINE, UR RESULT: 2352 NG/ML — SIGNIFICANT CHANGE UP
BZE UR QL SCN: 2352 NG/ML — SIGNIFICANT CHANGE UP
COCAINE IN-HOUSE INTERPRETATION: POSITIVE
COCAINE UR QL SCN: POSITIVE
CODEINE UR CFM-MCNC: NEGATIVE NG/ML — SIGNIFICANT CHANGE UP
CODEINE, UR RESULT: NEGATIVE NG/ML — SIGNIFICANT CHANGE UP
CREAT UR-MCNC: 119 MG/DL — SIGNIFICANT CHANGE UP
EDDP UR QL CFM: SIGNIFICANT CHANGE UP NG/ML
EDDP, UR RESULT: SIGNIFICANT CHANGE UP NG/ML
FENTANYL UR CFM-MCNC: >420 NG/MG CREAT — SIGNIFICANT CHANGE UP
FENTANYL UR CFM-MCNC: ABNORMAL
HYDROCODONE UR QL CFM: NEGATIVE NG/ML — SIGNIFICANT CHANGE UP
HYDROCODONE, UR RESULT: NEGATIVE NG/ML — SIGNIFICANT CHANGE UP
HYDROMORPHONE UR QL CFM: NEGATIVE NG/ML — SIGNIFICANT CHANGE UP
HYDROMORPHONE, UR RESULT: NEGATIVE NG/ML — SIGNIFICANT CHANGE UP
METHADONE IN-HOUSE INTERPRETATION: POSITIVE
METHADONE UR CFM-MCNC: POSITIVE
MORPHINE UR QL CFM: 6896 NG/ML — SIGNIFICANT CHANGE UP
MORPHINE, UR RESULT: 6896 NG/ML — SIGNIFICANT CHANGE UP
NORFENTANYL UR-MCNC: >840 NG/MG CREAT — SIGNIFICANT CHANGE UP
NOROXYCODONE (OPIATES), UR RESULT: NEGATIVE NG/ML — SIGNIFICANT CHANGE UP
NOROXYCODONE UR CFM-MCNC: NEGATIVE NG/ML — SIGNIFICANT CHANGE UP
OPIATES IN-HOUSE INTERPRETATION: POSITIVE
OPIATES UR QL CFM: POSITIVE
OXYCODONE (OPIATES), UR RESULT: NEGATIVE NG/ML — SIGNIFICANT CHANGE UP
OXYCODONE UR-MCNC: NEGATIVE NG/ML — SIGNIFICANT CHANGE UP
OXYMORPHONE (OPIATES), UR RESULT: NEGATIVE NG/ML — SIGNIFICANT CHANGE UP
OXYMORPHONE UR CFM-MCNC: NEGATIVE NG/ML — SIGNIFICANT CHANGE UP
SUFENTANIL UR QUANT: SIGNIFICANT CHANGE UP NG/MG CREAT

## 2023-01-05 NOTE — ED ADULT TRIAGE NOTE - DIRECT TO ROOM CARE INITIATED:
13-Oct-2021 12:02 - heparin SQ - glucose = 77  on CMP  - reportedly w/ FS 72 since admission and given juice, sandwich, but w/ repeat FS = 77  - FS Q2H x 2 (please fu)  - will not give Lantus 3 units  - ISS, per FS  - consistent carb diet

## 2023-02-06 ENCOUNTER — EMERGENCY (EMERGENCY)
Facility: HOSPITAL | Age: 33
LOS: 0 days | Discharge: HOME | End: 2023-02-06
Attending: EMERGENCY MEDICINE
Payer: MEDICAID

## 2023-02-06 VITALS
HEART RATE: 71 BPM | OXYGEN SATURATION: 97 % | SYSTOLIC BLOOD PRESSURE: 138 MMHG | DIASTOLIC BLOOD PRESSURE: 69 MMHG | TEMPERATURE: 97 F | WEIGHT: 119.93 LBS | RESPIRATION RATE: 19 BRPM

## 2023-02-06 DIAGNOSIS — F17.200 NICOTINE DEPENDENCE, UNSPECIFIED, UNCOMPLICATED: ICD-10-CM

## 2023-02-06 DIAGNOSIS — F41.8 OTHER SPECIFIED ANXIETY DISORDERS: ICD-10-CM

## 2023-02-06 DIAGNOSIS — F11.20 OPIOID DEPENDENCE, UNCOMPLICATED: ICD-10-CM

## 2023-02-06 DIAGNOSIS — J44.9 CHRONIC OBSTRUCTIVE PULMONARY DISEASE, UNSPECIFIED: ICD-10-CM

## 2023-02-06 DIAGNOSIS — R94.31 ABNORMAL ELECTROCARDIOGRAM [ECG] [EKG]: ICD-10-CM

## 2023-02-06 DIAGNOSIS — F60.3 BORDERLINE PERSONALITY DISORDER: ICD-10-CM

## 2023-02-06 DIAGNOSIS — Z86.19 PERSONAL HISTORY OF OTHER INFECTIOUS AND PARASITIC DISEASES: ICD-10-CM

## 2023-02-06 DIAGNOSIS — Z56.0 UNEMPLOYMENT, UNSPECIFIED: ICD-10-CM

## 2023-02-06 DIAGNOSIS — Z90.49 ACQUIRED ABSENCE OF OTHER SPECIFIED PARTS OF DIGESTIVE TRACT: ICD-10-CM

## 2023-02-06 DIAGNOSIS — Z90.49 ACQUIRED ABSENCE OF OTHER SPECIFIED PARTS OF DIGESTIVE TRACT: Chronic | ICD-10-CM

## 2023-02-06 DIAGNOSIS — K59.09 OTHER CONSTIPATION: ICD-10-CM

## 2023-02-06 DIAGNOSIS — R45.851 SUICIDAL IDEATIONS: ICD-10-CM

## 2023-02-06 DIAGNOSIS — Z20.822 CONTACT WITH AND (SUSPECTED) EXPOSURE TO COVID-19: ICD-10-CM

## 2023-02-06 LAB
ANION GAP SERPL CALC-SCNC: 8 MMOL/L — SIGNIFICANT CHANGE UP (ref 7–14)
APAP SERPL-MCNC: <5 UG/ML — LOW (ref 10–30)
BASOPHILS # BLD AUTO: 0.04 K/UL — SIGNIFICANT CHANGE UP (ref 0–0.2)
BASOPHILS NFR BLD AUTO: 0.6 % — SIGNIFICANT CHANGE UP (ref 0–1)
BUN SERPL-MCNC: 8 MG/DL — LOW (ref 10–20)
CALCIUM SERPL-MCNC: 9.4 MG/DL — SIGNIFICANT CHANGE UP (ref 8.4–10.5)
CHLORIDE SERPL-SCNC: 106 MMOL/L — SIGNIFICANT CHANGE UP (ref 98–110)
CO2 SERPL-SCNC: 27 MMOL/L — SIGNIFICANT CHANGE UP (ref 17–32)
CREAT SERPL-MCNC: 0.7 MG/DL — SIGNIFICANT CHANGE UP (ref 0.7–1.5)
EGFR: 117 ML/MIN/1.73M2 — SIGNIFICANT CHANGE UP
EOSINOPHIL # BLD AUTO: 0 K/UL — SIGNIFICANT CHANGE UP (ref 0–0.7)
EOSINOPHIL NFR BLD AUTO: 0 % — SIGNIFICANT CHANGE UP (ref 0–8)
ETHANOL SERPL-MCNC: <10 MG/DL — SIGNIFICANT CHANGE UP
GLUCOSE SERPL-MCNC: 106 MG/DL — HIGH (ref 70–99)
HCT VFR BLD CALC: 44.9 % — SIGNIFICANT CHANGE UP (ref 37–47)
HGB BLD-MCNC: 15.1 G/DL — SIGNIFICANT CHANGE UP (ref 12–16)
IMM GRANULOCYTES NFR BLD AUTO: 0.3 % — SIGNIFICANT CHANGE UP (ref 0.1–0.3)
LYMPHOCYTES # BLD AUTO: 1.13 K/UL — LOW (ref 1.2–3.4)
LYMPHOCYTES # BLD AUTO: 16.8 % — LOW (ref 20.5–51.1)
MCHC RBC-ENTMCNC: 30.6 PG — SIGNIFICANT CHANGE UP (ref 27–31)
MCHC RBC-ENTMCNC: 33.6 G/DL — SIGNIFICANT CHANGE UP (ref 32–37)
MCV RBC AUTO: 91.1 FL — SIGNIFICANT CHANGE UP (ref 81–99)
MONOCYTES # BLD AUTO: 0.19 K/UL — SIGNIFICANT CHANGE UP (ref 0.1–0.6)
MONOCYTES NFR BLD AUTO: 2.8 % — SIGNIFICANT CHANGE UP (ref 1.7–9.3)
NEUTROPHILS # BLD AUTO: 5.34 K/UL — SIGNIFICANT CHANGE UP (ref 1.4–6.5)
NEUTROPHILS NFR BLD AUTO: 79.5 % — HIGH (ref 42.2–75.2)
NRBC # BLD: 0 /100 WBCS — SIGNIFICANT CHANGE UP (ref 0–0)
PLATELET # BLD AUTO: 307 K/UL — SIGNIFICANT CHANGE UP (ref 130–400)
POTASSIUM SERPL-MCNC: 4.3 MMOL/L — SIGNIFICANT CHANGE UP (ref 3.5–5)
POTASSIUM SERPL-SCNC: 4.3 MMOL/L — SIGNIFICANT CHANGE UP (ref 3.5–5)
RBC # BLD: 4.93 M/UL — SIGNIFICANT CHANGE UP (ref 4.2–5.4)
RBC # FLD: 13.7 % — SIGNIFICANT CHANGE UP (ref 11.5–14.5)
SALICYLATES SERPL-MCNC: <0.3 MG/DL — LOW (ref 4–30)
SARS-COV-2 RNA SPEC QL NAA+PROBE: SIGNIFICANT CHANGE UP
SODIUM SERPL-SCNC: 141 MMOL/L — SIGNIFICANT CHANGE UP (ref 135–146)
WBC # BLD: 6.72 K/UL — SIGNIFICANT CHANGE UP (ref 4.8–10.8)
WBC # FLD AUTO: 6.72 K/UL — SIGNIFICANT CHANGE UP (ref 4.8–10.8)

## 2023-02-06 PROCEDURE — 90792 PSYCH DIAG EVAL W/MED SRVCS: CPT | Mod: 95

## 2023-02-06 PROCEDURE — 80048 BASIC METABOLIC PNL TOTAL CA: CPT

## 2023-02-06 PROCEDURE — 93010 ELECTROCARDIOGRAM REPORT: CPT

## 2023-02-06 PROCEDURE — 36415 COLL VENOUS BLD VENIPUNCTURE: CPT

## 2023-02-06 PROCEDURE — 85025 COMPLETE CBC W/AUTO DIFF WBC: CPT

## 2023-02-06 PROCEDURE — 93005 ELECTROCARDIOGRAM TRACING: CPT

## 2023-02-06 PROCEDURE — 80307 DRUG TEST PRSMV CHEM ANLYZR: CPT

## 2023-02-06 PROCEDURE — 99285 EMERGENCY DEPT VISIT HI MDM: CPT

## 2023-02-06 PROCEDURE — ZZZZZ: CPT

## 2023-02-06 PROCEDURE — 87635 SARS-COV-2 COVID-19 AMP PRB: CPT

## 2023-02-06 PROCEDURE — 93010 ELECTROCARDIOGRAM REPORT: CPT | Mod: 77

## 2023-02-06 RX ORDER — MAGNESIUM OXIDE 400 MG ORAL TABLET 241.3 MG
400 TABLET ORAL ONCE
Refills: 0 | Status: COMPLETED | OUTPATIENT
Start: 2023-02-06 | End: 2023-02-06

## 2023-02-06 RX ORDER — ACETAMINOPHEN 500 MG
975 TABLET ORAL ONCE
Refills: 0 | Status: COMPLETED | OUTPATIENT
Start: 2023-02-06 | End: 2023-02-06

## 2023-02-06 RX ORDER — HYDROXYZINE HCL 10 MG
50 TABLET ORAL ONCE
Refills: 0 | Status: COMPLETED | OUTPATIENT
Start: 2023-02-06 | End: 2023-02-06

## 2023-02-06 RX ADMIN — Medication 975 MILLIGRAM(S): at 15:14

## 2023-02-06 RX ADMIN — MAGNESIUM OXIDE 400 MG ORAL TABLET 400 MILLIGRAM(S): 241.3 TABLET ORAL at 15:14

## 2023-02-06 RX ADMIN — Medication 50 MILLIGRAM(S): at 19:45

## 2023-02-06 SDOH — ECONOMIC STABILITY - INCOME SECURITY: UNEMPLOYMENT, UNSPECIFIED: Z56.0
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
## 2023-02-06 NOTE — ED BEHAVIORAL HEALTH ASSESSMENT NOTE - SUMMARY
The patient is a 31 yo female, unemployed, living with her mother and grandmother in a studio apt, with PMH chronic constipation (opiate use related), psych hx of opiate use disorder, currently on methadone 180mg but still actively abusing fentanyl, remote hx of psychiatric admissions as an adolescent but otherwise only inpatient rehab more recently, who presents after visit to her methadone clinic this morning due to intolerable anxiety.    Patient has longstanding history of opiate dependence with relapse in the past couple years and particularly struggling with fentanyl addiction, which patient realizes is much more potent and difficult to wean off of. I spoke at length with the patient today, and she demonstrates an extremely high degree of insight into her addiction as well as strong motivation to quit and motivation to continue to self-educate in order to achieve this. This bodes well for her future treatment. She currently does not have suicidal thoughts and has firm plan to go to her methadone clinic tomorrow and seek further counseling from her providers there. She is motivated for anything to help with reaching sobriety. While inpatient psychiatric admission was also considered and discussed with patient, who was open to the possibility, it was decided together with the patient that she would derive significantly more benefit from substance focused treatment and then reassessing her needs again afterwards, as she may require more expertise in terms of ongoing pharmacologic management, and there is no imminent safety concern that would prevent her from safe discharge. Patient also commits to returning to the ED if her symptoms return or if she has any suicidal ideation in the future.    Plan:  - psychiatrically safe for discharge. Pt to follow up at her methadone clinic with providers there tomorrow AM.

## 2023-02-06 NOTE — CONSULT NOTE ADULT - ASSESSMENT
Patient is a 32 year old female with past medical history significant for polysubstance abuse, COPD, anxiety, borderline personality disorder, and hepatitis C presents to the Emergency Department with chief complaint of suicidal ideation.      1. Prolonged QTc  - patient with prolonged QTc in the setting of 180mg a day of methadone use  - patient denies syncope, dizziness, or other physical complaints.  - follow-up labs, acetaminophen level, salicylate level, and alcohol level  - repeat EKG to ensure that QTc remains prolonged  - methadone acts on the potassium rectifier channel reliably causing prolonged QTc at higher doses  - if QTc remains prolonged, would advise admission for electrophysiology evaluation and lowering dose of methadone or switching to buprenorphine (depending on patient compliance) to ensure that QTc shortens to safe range Patient is a 32 year old female with past medical history significant for polysubstance abuse, COPD, anxiety, borderline personality disorder, and hepatitis C presents to the Emergency Department with chief complaint of suicidal ideation.      1. Prolonged QTc  - patient with prolonged QTc in the setting of 180mg a day of methadone use  - patient denies syncope, dizziness, or other physical complaints.  - follow-up labs, acetaminophen level, salicylate level, and alcohol level  - repeat EKG with QTc of 414ms, which is patient's baseline, as per previous EKGs  - methadone acts on the potassium rectifier channel reliably causing prolonged QTc at higher doses  - QTc was within normal range on repeat EKG and patient without syncope, dizziness, or other concerning symptoms -- can be cleared from a toxicological perspective  Patient is a 32 year old female with past medical history significant for polysubstance abuse, COPD, anxiety, borderline personality disorder, and hepatitis C presents to the Emergency Department with chief complaint of suicidal ideation.      1. Prolonged QTc  - patient with prolonged QTc in the setting of 180mg a day of methadone use  - patient denies syncope, dizziness, or other physical complaints.  - follow-up labs, acetaminophen level, salicylate level, and alcohol level  - repeat EKG with QTc of 414ms, which is patient's baseline, as per previous EKGs  - methadone acts on the potassium rectifier channel reliably causing prolonged QTc at higher doses  - QTc was within normal range on repeat EKG and patient without syncope, dizziness, or other concerning symptoms -- can be cleared from a toxicological perspective       I personally discussed with ED team. I reviewed the med tox fellow’s note (as assigned above), and agree with the findings and plan except as documented in my note.  32-year-old female past medical history of substance abuse, anxiety, depression, COPD, hepatitis C presents for psychiatric evaluation.  Patient states she has had multiple times quitting her heroin and fentanyl abuse without success and believes that she is going to overdose.  called for Qtc prolongation noted in setting of daily methadone use 180 mg per day.  Labs were ok.  Repeat EKG improved.  Medically stable for psych eval and dispo.    -- Please call with any further questions    Lina    [phone] (pager)

## 2023-02-06 NOTE — ED PROVIDER NOTE - CLINICAL SUMMARY MEDICAL DECISION MAKING FREE TEXT BOX
Elevated QT on EKG to 600.  Discussed with toxicology patient to be closely monitored.  Will repeat EKG and reassess.

## 2023-02-06 NOTE — ED PROVIDER NOTE - NSFOLLOWUPCLINICS_GEN_ALL_ED_FT
[facility]  Detox Mgmt  [address] [place] [address]  Phone: [phone]  Fax:     [facility]  [facility]  [address] [place] [address]  Phone: [phone]  Fax:

## 2023-02-06 NOTE — ED BEHAVIORAL HEALTH ASSESSMENT NOTE - RISK ASSESSMENT
risk factors: chronic opiate use, recent social stressor, unemployment, past self-harm behavior  protective: highly motivated for treatment and engaged, good insight and education about illness
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
## 2023-02-06 NOTE — ED PROVIDER NOTE - NSFOLLOWUPINSTRUCTIONS_ED_ALL_ED_FT
Follow up with PMD and Psychiatry in 1-2 days.    Depression    Depression is a mental illness that usually causes feelings of sadness, hopelessness, or helplessness. Some people with this disorder do not feel particularly sad but lose interest in doing things they used to enjoy. Major depressive disorder also can cause physical symptoms. It can interfere with work, school, relationships, and other normal everyday activities. If you were started on a medication, make sure to take exactly as prescribed and follow up with a psychiatrist.    SEEK IMMEDIATE MEDICAL CARE IF YOU HAVE ANY OF THE FOLLOWING SYMPTOMS: thoughts about hurting or killing yourself, thoughts about hurting or killing somebody else, hallucinations, or worsening depression.    Substance Use Disorder  Substance use disorder happens when a person's repeated use of drugs or alcohol interferes with his or her ability to be productive. This disorder can cause problems with your mental and physical health. It can affect your ability to have healthy relationships, and it can keep you from being able to meet your responsibilities at work, home, or school. It can also lead to addiction.    The most commonly abused substances include:    Alcohol.  Tobacco.  Marijuana.  Stimulants, such as cocaine and methamphetamine.  Hallucinogens, such as LSD and PCP.  Opioids, such as some prescription pain medicines and heroin.    What are the causes?  This condition may develop due to social, psychological, or physical reasons. Causes include:    Stress.  Abuse.  Peer pressure.  Anxiety.  Depression.    What increases the risk?  This condition is more likely to develop in people who:    Are stressed.  Have been abused.  Have a mental health disorder, such as depression.  Are born with certain genes.    What are the signs or symptoms?  Symptoms of this condition include:    Using the substance for longer periods of time or at a higher dosage than what is normal or intended.  Having a lasting desire to use the substance.  Being unable to slow down or stop your use of the substance.  Spending an abnormal amount of time seeking the substance, using the substance, or recovering from using the substance.  Craving the substance.  Substance use that:    Interferes with your work, school, or home life.  Interferes with your personal and social relationships.  Makes you give up activities that you once enjoyed or found important.    Using the substance even though:    You know it is dangerous or bad for your health.  You know it is causing problems in your life.    Needing more and more of the substance to get the same effect (developing tolerance).  Experiencing physical symptoms if you do not use the substance (withdrawal).  Using the substance to avoid withdrawal.    How is this diagnosed?  This condition may be diagnosed based on:    Your history of substance use.  The way in which substance abuse affects your life.  Having at least two symptoms of substance use disorder within a 12-month period.    Your health care provider may also test your blood and urine for alcohol and drugs.    How is this treated?  ImageThis condition may be treated by:    Stopping substance use safely. This may require taking medicines and being closely observed for several days.  Taking part in group and individual counseling from mental health providers who help people with substance use disorder.  Staying at a residential treatment center for several days or weeks.  Attending daily counseling sessions at a treatment center.  Taking medicine as told by your health care provider:    To ease symptoms and prevent complications during withdrawal.  To treat other mental health issues, such as depression or anxiety.  To block cravings by causing the same effects as the substance.  To block the effects of the substance or replace good sensations with unpleasant ones.    Going to a support group to share your experience with others who are going through the same thing. These groups are an important part of long-term recovery for many people. They include 12-step programs like Alcoholics Anonymous and Narcotics Anonymous.    Recovery can be a long process. Many people who undergo treatment start using the substance again after stopping. This is called a relapse. If you have a relapse, that does not mean that treatment will not work.    Follow these instructions at home:  Take over-the-counter and prescription medicines only as told by your health care provider.  Do not use any drugs or alcohol.  Keep all follow-up visits as told by your health care provider. This is important. This includes continuing to work with therapists, health care providers, and support groups.  Contact a health care provider if:  You cannot take your medicines as told.  Your symptoms get worse.  You have trouble resisting the urge to use drugs or alcohol.  Get help right away if:  You have serious thoughts about hurting yourself or someone else.  You have a relapse.  This information is not intended to replace advice given to you by your health care provider. Make sure you discuss any questions you have with your health care provider.    Please check this website for help finding local Buprenorphine (Suboxone) providers or to find out if your PMD can prescribe Buprenorphine (Suboxone).  https://www.samhsa.gov/medication-assisted-treatment/practitioner-program-data/treatment-practitioner-

## 2023-02-06 NOTE — ED ADULT TRIAGE NOTE - CHIEF COMPLAINT QUOTE
BIBA from the methadone clinic. Pt states she was suicidal . Pt has been using Fentanyl to harm herself. Pt injected 10 bags yesterday. Pt takes Methadone 180 mg

## 2023-02-06 NOTE — ED BEHAVIORAL HEALTH ASSESSMENT NOTE - NSSUICPROTFACT_PSY_ALL_CORE
Identifies reasons for living/Cultural, spiritual and/or moral attitudes against suicide/Positive therapeutic relationships/Ability to cope with stress/Frustration tolerance

## 2023-02-06 NOTE — ED PROVIDER NOTE - PHYSICAL EXAMINATION
CONST: Tearful on exam. NAD  EYES: Sclera and conjunctiva clear.   ENT: No nasal discharge. Oropharynx normal appearing  CARD: S1 S2  RESP: Equal BS B/L, No wheezes, rhonchi or rales. No distress  GI: Soft, non-tender, non-distended. normal BS  MS: Normal ROM in all extremities. pulses 2 +. no calf tenderness or swelling  SKIN: Warm, dry, no acute rashes. Good turgor  NEURO: A&Ox3

## 2023-02-06 NOTE — ED PROVIDER NOTE - ATTENDING APP SHARED VISIT CONTRIBUTION OF CARE
32-year-old female to ED with suicidal thoughts.  Patient sent over from intake across the street for evaluation of suicidality.  Patient has longstanding opiate abuse on chronic methadone.  No medical complaints.  Afebrile vital signs stable exam as noted  , RRR, neuro nonfocal.

## 2023-02-06 NOTE — ED PROVIDER NOTE - PATIENT PORTAL LINK FT
You can access the FollowMyHealth Patient Portal offered by [facility] by registering at the following website: [email or web address]. By joining [facility]’s FollowMyHealth portal, you will also be able to view your health information using other applications (apps) compatible with our system.
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
## 2023-02-06 NOTE — ED PROVIDER NOTE - OBJECTIVE STATEMENT
32-year-old female past medical history of substance abuse, anxiety, depression, COPD, hepatitis C presents for psychiatric evaluation.  Patient states she has had multiple times quitting her heroin and fentanyl abuse without success and believes that she is going to overdose and commit suicide or turn to cry to sustain her habit.  Denies hallucinations, chest pain, shortness of breath, weakness, numbness.
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
## 2023-02-06 NOTE — CONSULT NOTE ADULT - SUBJECTIVE AND OBJECTIVE BOX
MEDICAL TOXICOLOGY CONSULT    HPI: Patient is a 32 year old female with past medical history significant for polysubstance abuse, COPD, anxiety, borderline personality disorder, and hepatitis C presents to the Emergency Department with chief complaint of suicidal ideation.  Patient reports attempting to overdose on Fentanyl the previous day and was sent to the Emergency Department by her methadone clinic.  Patient reports taking 180mg of methadone a day.  Patient denies syncope, dizziness, or other physical complaints.  Medical toxicology consulted for prolonged QTc in the setting of methadone use.    PAST MEDICAL & SURGICAL HISTORY:  Hepatitis C      COPD (chronic obstructive pulmonary disease)      Borderline personality disorder      Anxiety and depression      Nicotine dependence      Drug abuse      Heroin abuse      IV drug user      S/P cholecystectomy  in 2010          MEDICATION HISTORY:  acetaminophen     Tablet .. 975 milliGRAM(s) Oral once  magnesium oxide 400 milliGRAM(s) Oral Once      FAMILY HISTORY:  Family history of diverticulitis of colon (Grandparent)    Family history of diabetes mellitus (Grandparent)      Vital Signs Last 24 Hrs  T(C): 36.1 (06 Feb 2023 12:30), Max: 36.1 (06 Feb 2023 12:30)  T(F): 97 (06 Feb 2023 12:30), Max: 97 (06 Feb 2023 12:30)  HR: 71 (06 Feb 2023 12:30) (71 - 71)  BP: 138/69 (06 Feb 2023 12:30) (138/69 - 138/69)  BP(mean): --  RR: 19 (06 Feb 2023 12:30) (19 - 19)  SpO2: 97% (06 Feb 2023 12:30) (97% - 97%)    Parameters below as of 06 Feb 2023 12:30  Patient On (Oxygen Delivery Method): room air        SIGNIFICANT LABORATORY STUDIES:                        15.1   6.72  )-----------( 307      ( 06 Feb 2023 14:11 )             44.9       02-06    141  |  106  |  8<L>  ----------------------------<  106<H>  4.3   |  27  |  0.7    Ca    9.4      06 Feb 2023 14:11

## 2023-02-06 NOTE — ED BEHAVIORAL HEALTH ASSESSMENT NOTE - CURRENT MEDICATION
methadone 180mg  Lexapro 20mg  Lamictal 200mg  gabapentin 300mg BID    ?Xanax 2mg TID - last prescribed Nov 2022  ?Adderall 20mg - last prescribed Oct 2022

## 2023-02-09 ENCOUNTER — EMERGENCY (EMERGENCY)
Facility: HOSPITAL | Age: 33
LOS: 0 days | Discharge: HOME | End: 2023-02-09
Attending: EMERGENCY MEDICINE
Payer: MEDICAID

## 2023-02-09 VITALS
SYSTOLIC BLOOD PRESSURE: 115 MMHG | RESPIRATION RATE: 18 BRPM | DIASTOLIC BLOOD PRESSURE: 65 MMHG | HEIGHT: 62 IN | WEIGHT: 115.08 LBS | HEART RATE: 94 BPM | TEMPERATURE: 99 F | OXYGEN SATURATION: 99 %

## 2023-02-09 VITALS
OXYGEN SATURATION: 99 % | SYSTOLIC BLOOD PRESSURE: 118 MMHG | DIASTOLIC BLOOD PRESSURE: 70 MMHG | RESPIRATION RATE: 18 BRPM | HEART RATE: 89 BPM

## 2023-02-09 DIAGNOSIS — Z86.59 PERSONAL HISTORY OF OTHER MENTAL AND BEHAVIORAL DISORDERS: ICD-10-CM

## 2023-02-09 DIAGNOSIS — R45.1 RESTLESSNESS AND AGITATION: ICD-10-CM

## 2023-02-09 DIAGNOSIS — Z90.49 ACQUIRED ABSENCE OF OTHER SPECIFIED PARTS OF DIGESTIVE TRACT: ICD-10-CM

## 2023-02-09 DIAGNOSIS — Z90.49 ACQUIRED ABSENCE OF OTHER SPECIFIED PARTS OF DIGESTIVE TRACT: Chronic | ICD-10-CM

## 2023-02-09 DIAGNOSIS — Z20.822 CONTACT WITH AND (SUSPECTED) EXPOSURE TO COVID-19: ICD-10-CM

## 2023-02-09 DIAGNOSIS — Z86.19 PERSONAL HISTORY OF OTHER INFECTIOUS AND PARASITIC DISEASES: ICD-10-CM

## 2023-02-09 DIAGNOSIS — F17.210 NICOTINE DEPENDENCE, CIGARETTES, UNCOMPLICATED: ICD-10-CM

## 2023-02-09 DIAGNOSIS — F11.20 OPIOID DEPENDENCE, UNCOMPLICATED: ICD-10-CM

## 2023-02-09 DIAGNOSIS — F41.8 OTHER SPECIFIED ANXIETY DISORDERS: ICD-10-CM

## 2023-02-09 DIAGNOSIS — J44.9 CHRONIC OBSTRUCTIVE PULMONARY DISEASE, UNSPECIFIED: ICD-10-CM

## 2023-02-09 DIAGNOSIS — K59.09 OTHER CONSTIPATION: ICD-10-CM

## 2023-02-09 LAB
ALBUMIN SERPL ELPH-MCNC: 4.1 G/DL — SIGNIFICANT CHANGE UP (ref 3.5–5.2)
ALP SERPL-CCNC: 84 U/L — SIGNIFICANT CHANGE UP (ref 30–115)
ALT FLD-CCNC: 42 U/L — HIGH (ref 0–41)
ANION GAP SERPL CALC-SCNC: 12 MMOL/L — SIGNIFICANT CHANGE UP (ref 7–14)
APAP SERPL-MCNC: <5 UG/ML — LOW (ref 10–30)
AST SERPL-CCNC: 28 U/L — SIGNIFICANT CHANGE UP (ref 0–41)
BASOPHILS # BLD AUTO: 0.07 K/UL — SIGNIFICANT CHANGE UP (ref 0–0.2)
BASOPHILS NFR BLD AUTO: 1 % — SIGNIFICANT CHANGE UP (ref 0–1)
BILIRUB SERPL-MCNC: 0.4 MG/DL — SIGNIFICANT CHANGE UP (ref 0.2–1.2)
BUN SERPL-MCNC: 9 MG/DL — LOW (ref 10–20)
CALCIUM SERPL-MCNC: 9.2 MG/DL — SIGNIFICANT CHANGE UP (ref 8.4–10.5)
CHLORIDE SERPL-SCNC: 102 MMOL/L — SIGNIFICANT CHANGE UP (ref 98–110)
CO2 SERPL-SCNC: 23 MMOL/L — SIGNIFICANT CHANGE UP (ref 17–32)
CREAT SERPL-MCNC: 0.7 MG/DL — SIGNIFICANT CHANGE UP (ref 0.7–1.5)
EGFR: 117 ML/MIN/1.73M2 — SIGNIFICANT CHANGE UP
EOSINOPHIL # BLD AUTO: 0.04 K/UL — SIGNIFICANT CHANGE UP (ref 0–0.7)
EOSINOPHIL NFR BLD AUTO: 0.5 % — SIGNIFICANT CHANGE UP (ref 0–8)
ETHANOL SERPL-MCNC: <10 MG/DL — SIGNIFICANT CHANGE UP
GLUCOSE SERPL-MCNC: 121 MG/DL — HIGH (ref 70–99)
HCG SERPL QL: NEGATIVE — SIGNIFICANT CHANGE UP
HCT VFR BLD CALC: 38.9 % — SIGNIFICANT CHANGE UP (ref 37–47)
HGB BLD-MCNC: 13.3 G/DL — SIGNIFICANT CHANGE UP (ref 12–16)
IMM GRANULOCYTES NFR BLD AUTO: 0.1 % — SIGNIFICANT CHANGE UP (ref 0.1–0.3)
LYMPHOCYTES # BLD AUTO: 2.67 K/UL — SIGNIFICANT CHANGE UP (ref 1.2–3.4)
LYMPHOCYTES # BLD AUTO: 36.5 % — SIGNIFICANT CHANGE UP (ref 20.5–51.1)
MAGNESIUM SERPL-MCNC: 1.7 MG/DL — LOW (ref 1.8–2.4)
MCHC RBC-ENTMCNC: 30.8 PG — SIGNIFICANT CHANGE UP (ref 27–31)
MCHC RBC-ENTMCNC: 34.2 G/DL — SIGNIFICANT CHANGE UP (ref 32–37)
MCV RBC AUTO: 90 FL — SIGNIFICANT CHANGE UP (ref 81–99)
MONOCYTES # BLD AUTO: 0.52 K/UL — SIGNIFICANT CHANGE UP (ref 0.1–0.6)
MONOCYTES NFR BLD AUTO: 7.1 % — SIGNIFICANT CHANGE UP (ref 1.7–9.3)
NEUTROPHILS # BLD AUTO: 4 K/UL — SIGNIFICANT CHANGE UP (ref 1.4–6.5)
NEUTROPHILS NFR BLD AUTO: 54.8 % — SIGNIFICANT CHANGE UP (ref 42.2–75.2)
NRBC # BLD: 0 /100 WBCS — SIGNIFICANT CHANGE UP (ref 0–0)
PLATELET # BLD AUTO: 262 K/UL — SIGNIFICANT CHANGE UP (ref 130–400)
POTASSIUM SERPL-MCNC: 3.6 MMOL/L — SIGNIFICANT CHANGE UP (ref 3.5–5)
POTASSIUM SERPL-SCNC: 3.6 MMOL/L — SIGNIFICANT CHANGE UP (ref 3.5–5)
PROT SERPL-MCNC: 6.6 G/DL — SIGNIFICANT CHANGE UP (ref 6–8)
RBC # BLD: 4.32 M/UL — SIGNIFICANT CHANGE UP (ref 4.2–5.4)
RBC # FLD: 13.6 % — SIGNIFICANT CHANGE UP (ref 11.5–14.5)
SALICYLATES SERPL-MCNC: <0.3 MG/DL — LOW (ref 4–30)
SARS-COV-2 RNA SPEC QL NAA+PROBE: SIGNIFICANT CHANGE UP
SODIUM SERPL-SCNC: 137 MMOL/L — SIGNIFICANT CHANGE UP (ref 135–146)
WBC # BLD: 7.31 K/UL — SIGNIFICANT CHANGE UP (ref 4.8–10.8)
WBC # FLD AUTO: 7.31 K/UL — SIGNIFICANT CHANGE UP (ref 4.8–10.8)

## 2023-02-09 PROCEDURE — 80053 COMPREHEN METABOLIC PANEL: CPT

## 2023-02-09 PROCEDURE — 90792 PSYCH DIAG EVAL W/MED SRVCS: CPT | Mod: 95

## 2023-02-09 PROCEDURE — 99285 EMERGENCY DEPT VISIT HI MDM: CPT

## 2023-02-09 PROCEDURE — 93005 ELECTROCARDIOGRAM TRACING: CPT

## 2023-02-09 PROCEDURE — 93010 ELECTROCARDIOGRAM REPORT: CPT

## 2023-02-09 PROCEDURE — 83735 ASSAY OF MAGNESIUM: CPT

## 2023-02-09 PROCEDURE — 80307 DRUG TEST PRSMV CHEM ANLYZR: CPT

## 2023-02-09 PROCEDURE — 87635 SARS-COV-2 COVID-19 AMP PRB: CPT

## 2023-02-09 PROCEDURE — 85025 COMPLETE CBC W/AUTO DIFF WBC: CPT

## 2023-02-09 PROCEDURE — 84703 CHORIONIC GONADOTROPIN ASSAY: CPT

## 2023-02-09 NOTE — ED BEHAVIORAL HEALTH ASSESSMENT NOTE - NSBHSABEN_PSY_A_CORE FT
previously dependent on Xanax, reports occasional use of 0.25mg as needed from an old RX but states she hasn't used recently, was prescribed Xanax 2mg TID last dispended 11/12/2022 RX by Julien Frey

## 2023-02-09 NOTE — ED BEHAVIORAL HEALTH ASSESSMENT NOTE - PAST PSYCHOTROPIC MEDICATION
Patient presents to ED bed G24 via [facility] secondary to being out of insulin.  Pt reports her insulin was not able to be picked up from pharmacy \"because they were out\" and reports she took her last insulin dose this AM and is now out of insulin until 1/23.  Pt also reports right shoulder pain from a procedure last week where she had to keep her arms elevated above her head for over an hour. Pt with many chronic health conditions including pulmonary HTN and chronic back pain.  Took Morphine 15mg at 2200.  Baseline 4L NC  Endorses chronic pain, chronic SOB, chronic cough  Denies nausea, vomiting, diarrhea   
paliperidone ER, Seroquel, Latuda, clonidine, hydroxyzine, diazepam, buprenorphine, xanax, Adderall

## 2023-02-09 NOTE — ED PROVIDER NOTE - NS ED ROS FT
Svetlana Hughes  1950  [id number]    Situation:  Verbal report received from: Annie Andrade  Procedure: Procedure(s):  COLONOSCOPY  ENDOSCOPIC POLYPECTOMY  RESOLUTION CLIP    Background:    Preoperative diagnosis: FAMILY HISTORY OF MALIGNANT NEOPLASM OF GASTROINTESTINAL TRACT  Postoperative diagnosis: Diverticulosis, colon polyp    :  Dr. Bell Masters  Assistant(s): Endoscopy Technician-1: Juanpablo Benavides  Endoscopy RN-1: Yissel Romero    Specimens:   ID Type Source Tests Collected by Time Destination   1 : Ascending Colon Polyp Preservative Colon, Ascending  Pete Hendricks MD 10/22/2021 1048 Pathology     H. Pylori  no    Assessment:  Intra-procedure medications       Anesthesia gave intra-procedure sedation and medications, see anesthesia flow sheet yes    Intravenous fluids: NS@ KVO     Vital signs stable yes    Abdominal assessment: round and soft yes    Recommendation: Review of Systems    Constitutional: (-) fever/ chills (-)loss of appetite or  weight loss  Eyes (-) visual changes  ENT: (-) epistaxis (-) sore throat (-) ear pain  Cardiovascular: (-) chest pain, (-) syncope (-) palpitations  Respiratory: (-) cough, (-) shortness of breath  Gastrointestinal: (-) vomiting, (-) diarrhea (-) abdominal pain  : (-) dysuria , hematuria   neck: (-) neck pain or stiffness  Musculoskeletal:  (-) back pain, (-) joint pain   Integumentary: (-) rash, (-) swelling  Neurological: (-) headache, (-) altered mental status  Psychiatric: (-) hallucinations or depression

## 2023-02-09 NOTE — ED BEHAVIORAL HEALTH ASSESSMENT NOTE - RISK ASSESSMENT
Pt is at low imminent risk of suicide and harm to others in the context of mental illness. However, patient has chronically elevated risk for harm to self and others due to ongoing serious substance abuse, past history of suicide attempt, past history of psychiatric hospitalizations, and borderline personality disorder. Pt also has chronic risk factors of low frustration tolerance, poor impulse control and poor coping skills, which places her at elevated risk of harm to self or others. Due to ongoing IV Fentanyl use coupled with high dose Methadone use, patient is also at risk of an accidental overdose, though she expressed a desire to stop using Fentanyl and has not used this in the past two days. She states that she does not plan to use this medication as her Methadone dose is now stable and she feels she is getting relief from it and no longer in opioid withdrawal. Although patient has aforementioned risk factors, she has a myriad of protective factors that mitigate her risk factors. She is highly motivated for treatment and engaged, good insight and education about illness, she denies suicidal ideation ,intent, and plan, she denies violent ideation, intent, and plan, she is compliant with meeting with psychiatrist, she has no access to guns, she is future oriented (plans to go to methadone clinic tomorrow and meet with psychiatrist) and she identifies reasons to live, mainly her little sisters whom she feels very close to.
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
## 2023-02-09 NOTE — ED ADULT TRIAGE NOTE - CHIEF COMPLAINT QUOTE
Patient states "I'm Detoxing really bad. I've been so depressed lately. I've been using drugs to try and die. I also have been lashing out at people trying to hurt others".

## 2023-02-09 NOTE — ED PROVIDER NOTE - OBJECTIVE STATEMENT
31 y/o female with hx of borderline personality disorder, substance abuse, currently on methadone presents to the ED for evaluation of increased anger, agitation, mind racing and thoughts of harming others. no SI/ hearing voices. last heroin usage last night. pt denies any alcohol usage. patient denies any head injury or falls. ptaient denies any cp, sob, abdominal pain, recent illnesses. patient compliant w her medications including methadone.

## 2023-02-09 NOTE — ED BEHAVIORAL HEALTH ASSESSMENT NOTE - SAFETY PLAN ADDT'L DETAILS
Safety plan discussed with.../Education provided regarding environmental safety / lethal means restriction/Provision of National Suicide Prevention Lifeline 1-030-629-HJYV (2541)

## 2023-02-09 NOTE — ED BEHAVIORAL HEALTH ASSESSMENT NOTE - OTHER
[facility] Reference #: [id number], [facility] ED Visits, [facility] Outpatient BH, [facility] Outpatient Medical, [facility], [facility], [facility], [facility], [facility], [facility] ED,  [facility], [facility] Inpatient Psychiatry, [facility], [facility], [facility], [facility], [facility], [facility]

## 2023-02-09 NOTE — ED PROVIDER NOTE - NSFOLLOWUPINSTRUCTIONS_ED_ALL_ED_FT
Substance Use Disorder  Substance use disorder happens when a person's repeated use of drugs or alcohol interferes with his or her ability to be productive. This disorder can cause problems with your mental and physical health. It can affect your ability to have healthy relationships, and it can keep you from being able to meet your responsibilities at work, home, or school. It can also lead to addiction.    The most commonly abused substances include:    Alcohol.  Tobacco.  Marijuana.  Stimulants, such as cocaine and methamphetamine.  Hallucinogens, such as LSD and PCP.  Opioids, such as some prescription pain medicines and heroin.    What are the causes?  This condition may develop due to social, psychological, or physical reasons. Causes include:    Stress.  Abuse.  Peer pressure.  Anxiety.  Depression.    What increases the risk?  This condition is more likely to develop in people who:    Are stressed.  Have been abused.  Have a mental health disorder, such as depression.  Are born with certain genes.    What are the signs or symptoms?  Symptoms of this condition include:    Using the substance for longer periods of time or at a higher dosage than what is normal or intended.  Having a lasting desire to use the substance.  Being unable to slow down or stop your use of the substance.  Spending an abnormal amount of time seeking the substance, using the substance, or recovering from using the substance.  Craving the substance.  Substance use that:    Interferes with your work, school, or home life.  Interferes with your personal and social relationships.  Makes you give up activities that you once enjoyed or found important.    Using the substance even though:    You know it is dangerous or bad for your health.  You know it is causing problems in your life.    Needing more and more of the substance to get the same effect (developing tolerance).  Experiencing physical symptoms if you do not use the substance (withdrawal).  Using the substance to avoid withdrawal.    How is this diagnosed?  This condition may be diagnosed based on:    Your history of substance use.  The way in which substance abuse affects your life.  Having at least two symptoms of substance use disorder within a 12-month period.    Your health care provider may also test your blood and urine for alcohol and drugs.    How is this treated?  ImageThis condition may be treated by:    Stopping substance use safely. This may require taking medicines and being closely observed for several days.  Taking part in group and individual counseling from mental health providers who help people with substance use disorder.  Staying at a residential treatment center for several days or weeks.  Attending daily counseling sessions at a treatment center.  Taking medicine as told by your health care provider:    To ease symptoms and prevent complications during withdrawal.  To treat other mental health issues, such as depression or anxiety.  To block cravings by causing the same effects as the substance.  To block the effects of the substance or replace good sensations with unpleasant ones.    Going to a support group to share your experience with others who are going through the same thing. These groups are an important part of long-term recovery for many people. They include 12-step programs like Alcoholics Anonymous and Narcotics Anonymous.    Recovery can be a long process. Many people who undergo treatment start using the substance again after stopping. This is called a relapse. If you have a relapse, that does not mean that treatment will not work.    Follow these instructions at home:  Take over-the-counter and prescription medicines only as told by your health care provider.  Do not use any drugs or alcohol.  Keep all follow-up visits as told by your health care provider. This is important. This includes continuing to work with therapists, health care providers, and support groups.  Contact a health care provider if:  You cannot take your medicines as told.  Your symptoms get worse.  You have trouble resisting the urge to use drugs or alcohol.  Get help right away if:  You have serious thoughts about hurting yourself or someone else.  You have a relapse.  This information is not intended to replace advice given to you by your health care provider. Make sure you discuss any questions you have with your health care provider.    Please check this website for help finding local Buprenorphine (Suboxone) providers or to find out if your PMD can prescribe Buprenorphine (Suboxone).  https://[email or web address].gov/medication-assisted-treatment/practitioner-program-data/treatment-practitioner-

## 2023-02-09 NOTE — ED PROVIDER NOTE - CLINICAL SUMMARY MEDICAL DECISION MAKING FREE TEXT BOX
[address] [address]  [phone]      August 10, 2021      Sally Ware  [address] [place] [id number]      Dear Sally:     To better serve you, we are sending this letter to notify you that we have attempted to contact you by telephone to schedule the following procedure(s) ordered by your physician.     ___x____   Colonoscopy   _______   Upper GI Endoscopy (EGD)   _______   Colonoscopy and Upper GI Endoscopy    To provide the highest quality of care, we strongly encourage you to call and schedule the prescribed test/procedure at your earliest convenience.   The number to the Specialty Scheduling department is [phone] and the hours are 8:00am - 4:30pm Monday through Friday.   We look forward to hearing from you.    Sincerely,    [facility]             Patient medically cleared for psychiatric evaluation.  Patient seen by psych and is cleared for discharge.  Safety plan put in place.

## 2023-02-09 NOTE — ED PROVIDER NOTE - PATIENT PORTAL LINK FT
You can access the FollowMyHealth Patient Portal offered by Health system by registering at the following website: [email or web address]. By joining [facility] FollowMyHealth portal, you will also be able to view your health information using other applications (apps) compatible with our system.

## 2023-02-09 NOTE — ED PROVIDER NOTE - NSFOLLOWUPCLINICS_GEN_ALL_ED_FT
[facility]  OP Mental Health  [address] [place] [id number]  Phone: [phone]  Fax:

## 2023-02-09 NOTE — ED BEHAVIORAL HEALTH ASSESSMENT NOTE - SOURCE OF INFORMATION
[0] : 2) Feeling down, depressed, or hopeless: Not at all (0) [PHQ-2 Negative - No further assessment needed] : PHQ-2 Negative - No further assessment needed [[id number]] : 0 Patient

## 2023-02-09 NOTE — ED BEHAVIORAL HEALTH ASSESSMENT NOTE - SUMMARY
Ms. Lennon is a 32 year-old woman, unemployed, domiciled with her mother and grandmother in [place], reported psychiatric history of borderline personality disorder, and anxiety, with PMH chronic constipation, and opiate use disorder, currently on methadone 190mg, remote hx of psychiatric admissions as an adolescent with one suicide attempt via OD on Suboxone at age 18, history of multiple inpatient detox and rehab more recently, who presents after visit to her methadone clinic this morning due to difficulty getting off drugs/anger issues.     Patient has longstanding history of opiate dependence with relapse in the past couple years and she is particularly struggling with fentanyl addiction. Despite patient's history that she overdosed on Sunday; Pt was seen in the ED on Monday and at that time she adamantly denied that she had overdosed or tried to hurt herself and she did not present with any signs of symptoms of opioid overdose. At that time she also denied SI/HI and she was treated and released. Today, the patient presented similarly. She reported difficulty with mood lability/agitation in the context of opioid use disorder, but reported significant improvement in her mood since her Methadone was increased to 190mg this week.  Pt today denies SI/HI, she engaged in safety planning. She is future oriented and identifies reasons to live. She has good insight into her substance abuse and acknowledges that when on substances she can act impulsive, and has done so in the past which has led to aggression towards others. Pt is highly  motivated to stop using substances, has been successful in the past. Pt would benefit from long-term rehab given ongoing severe substance use; patient reports that she would consider this, but also wants to continue abstaining from fentanyl on her own. While inpatient admission was discussed with the patient, we both were in agreement that patient would benefit instead from intensive substance abuse treatment to address her opioid use disorder and intensive therapy such as DBT to address her borderline personality traits as her mood instability is chronic and is exacerbated by substance use disorder. Pt states she will meet with her psychiatrist tomorrow, she plans to continue to remain abstinent from fentanyl and will discuss the possibility of rehab versus continued outpatient tx tomorrow when she goes to [facility]. Of note, I called patient's psychiatrist and left a VM.

## 2023-02-09 NOTE — ED BEHAVIORAL HEALTH NOTE - BEHAVIORAL HEALTH NOTE
===================  PRE-HOSPITAL COURSE  ==================  SOURCE: RN and secondhand documentation.   DETAILS: Per Chart, BIBEMS activated by self requesting a psychiatric evaluation   ============  ED COURSE  ============  SOURCE: RN and secondhand documentation.  ARRIVAL: Per chart, BIBEMS activated by self for increased anger and wanting to harm others.   BELONGINGS: Patient did not want to change out of her clothes.   BEHAVIOR: As per chart, pt has been calm and cooperative while in the ED. There have been no outbursts while in the ED. Patient denies SI/VH/AH. As per chart, patient is AAOx3. As per RN, pt denies any substance use. Patient appears to be well-groomed.   TREATMENT: None required.   VISITORS: 1:1.   ------------------------------------------------  COVID Exposure Screen- collateral (i.e. third-party, chart review, belongings, etc; include EMS and ED staff)  ---------------------------------------------------  1. Has the patient had a COVID-19 test in the last 90 days? Unknown.  2. Has the patient tested positive for COVID-19 antibodies? Unknown.  3.Has the patient received 2 doses of the COVID-19 vaccine?  Unknown.  4. In the past 10 days, has the patient been around anyone with a positive COVID-19 test?* Unknown.  5.Has the patient been out of [place] within the past 10 days? Unknown.
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
## 2023-02-09 NOTE — ED BEHAVIORAL HEALTH ASSESSMENT NOTE - NS ED BHA PLAN TR BH CONTACTED FT
left VM for Dr. Malhotra at the [facility] [phone], also called alternative number [phone] but unable to leave message

## 2023-02-09 NOTE — ED PROVIDER NOTE - ATTENDING APP SHARED VISIT CONTRIBUTION OF CARE
Pacing with increasing agitation complaining of her mind racing.  She has vague thoughts of harming others associated with increasing anger which seems poorly focused.  She denies suicidal ideations.  She denies hallucinations.  Vital signs noted.  She is cooperative with her examination emergency department.  She does not seem intoxicated.  Diagnostic testing reviewed.  In my opinion, she is medically clear for psychiatric examination.

## 2023-02-12 NOTE — ED PROVIDER NOTE - CHIEF COMPLAINT
The patient is a 31y Female complaining of detox. RGUJRAL 29-year-old male history listed presents today with oozing blood from his sternotomy site.  As per patient and mother patient had right side thoracentesis since discharge from the hospital.  Patient was noted to be febrile last night and had noted to have purulent discharge from his sternotomy incision site for which he was started on doxycycline. today woke up this morning and at 930 noticed oozing blood at sternotomy site.  Patient denies any increasing chest pain difficulty breathing, last documented temperature was last night 100.9.  On exam patient is awake alert speaking full sentences not in acute distress.  +s1s2, Lungs are clear to auscultation bilaterally.  Sternotomy site mid incision with oozing of blood controlled with direct pressure.  No surrounding erythema.  Abdomen is soft nontender.  Check labs rectal temperature cultures x-ray chest CT surgery consult.  Continue to monitor. RGUJRAL 29-year-old male history listed presents today with oozing blood from his sternotomy site.  As per patient and mother patient had right side thoracentesis since discharge from the hospital.  Patient was noted to be febrile last night and had noted to have purulent discharge from his sternotomy incision site for which he was started on doxycycline. today woke up this morning and at 930 noticed oozing blood at sternotomy site.  Patient denies any increasing chest pain difficulty breathing, last documented temperature was last night 100.9.  On exam patient is awake alert speaking full sentences not in acute distress.  +s1s2, Lungs are clear to auscultation bilaterally.  Sternotomy site mid incision with oozing of blood controlled with direct pressure.  No surrounding erythema.  Abdomen is soft nontender.  Check labs rectal temperature cultures x-ray chest CT surgery consult.  Continue to monitor.    Multiple attempts to contact CT surgery. Case discussed with the team. CT surgery to see the patient, will obtain CT chest and antibiotics.

## 2023-02-28 ENCOUNTER — EMERGENCY (EMERGENCY)
Facility: HOSPITAL | Age: 33
LOS: 0 days | Discharge: HOME | End: 2023-02-28
Attending: EMERGENCY MEDICINE
Payer: MEDICAID

## 2023-02-28 VITALS
RESPIRATION RATE: 18 BRPM | DIASTOLIC BLOOD PRESSURE: 63 MMHG | TEMPERATURE: 96 F | HEIGHT: 62 IN | SYSTOLIC BLOOD PRESSURE: 140 MMHG | WEIGHT: 117.07 LBS | HEART RATE: 83 BPM | OXYGEN SATURATION: 99 %

## 2023-02-28 DIAGNOSIS — J44.9 CHRONIC OBSTRUCTIVE PULMONARY DISEASE, UNSPECIFIED: ICD-10-CM

## 2023-02-28 DIAGNOSIS — F41.8 OTHER SPECIFIED ANXIETY DISORDERS: ICD-10-CM

## 2023-02-28 DIAGNOSIS — Z90.49 ACQUIRED ABSENCE OF OTHER SPECIFIED PARTS OF DIGESTIVE TRACT: Chronic | ICD-10-CM

## 2023-02-28 DIAGNOSIS — F17.210 NICOTINE DEPENDENCE, CIGARETTES, UNCOMPLICATED: ICD-10-CM

## 2023-02-28 DIAGNOSIS — L03.115 CELLULITIS OF RIGHT LOWER LIMB: ICD-10-CM

## 2023-02-28 DIAGNOSIS — Z86.19 PERSONAL HISTORY OF OTHER INFECTIOUS AND PARASITIC DISEASES: ICD-10-CM

## 2023-02-28 DIAGNOSIS — M79.89 OTHER SPECIFIED SOFT TISSUE DISORDERS: ICD-10-CM

## 2023-02-28 DIAGNOSIS — F60.3 BORDERLINE PERSONALITY DISORDER: ICD-10-CM

## 2023-02-28 DIAGNOSIS — Z86.59 PERSONAL HISTORY OF OTHER MENTAL AND BEHAVIORAL DISORDERS: ICD-10-CM

## 2023-02-28 PROCEDURE — 73630 X-RAY EXAM OF FOOT: CPT | Mod: RT

## 2023-02-28 PROCEDURE — 73630 X-RAY EXAM OF FOOT: CPT | Mod: 26,RT

## 2023-02-28 PROCEDURE — 99284 EMERGENCY DEPT VISIT MOD MDM: CPT

## 2023-02-28 PROCEDURE — 99284 EMERGENCY DEPT VISIT MOD MDM: CPT | Mod: 25

## 2023-02-28 PROCEDURE — 93970 EXTREMITY STUDY: CPT

## 2023-02-28 PROCEDURE — 93970 EXTREMITY STUDY: CPT | Mod: 26

## 2023-02-28 NOTE — ED PROVIDER NOTE - CLINICAL SUMMARY MEDICAL DECISION MAKING FREE TEXT BOX
32-year-old female above past medical history complains of increased redness and swelling to right foot, denies trauma or recent injection, no fever, on exam right calf larger than left with patchy area of erythema to the foot without crepitus or lymphangitis, neurovascularly intact, imaging appreciated, will discharge on antibiotics with outpatient follow-up.  Patient counseled regarding conditions which should prompt return.

## 2023-02-28 NOTE — ED PROVIDER NOTE - PHYSICAL EXAMINATION
Vital Signs: I have reviewed the initial vital signs.  Constitutional: well-nourished, appears stated age, no acute distress  Head: atraumatic and normocephalic  msk: right foot - no bony tenderness, crepitation  right calf swelling  skin: erythema , warmth, no lymphangitis, healing abrasion to right dorsal foot   Cardiovascular: regular rate, regular rhythm, well-perfused extremities  Respiratory: unlabored respiratory effort, clear to auscultation bilaterally  Gastrointestinal: soft, non-tender abdomen, no pulsatile mass  Neuro: awake, alert, follows commands, oriented, no focal deficits, GCS 15  ;

## 2023-02-28 NOTE — ED PROVIDER NOTE - OBJECTIVE STATEMENT
31 y/o female with hx of polysubstance abuse, borderline personality disorder presents to the ED with right foot swelling and redness worsening over past few days. patient with hx of IVDA one year ago on right foot. patient self treated with antibx. no recent falls or injury. no fevers. patient c/o throbbing pain. patient with swelling extending to right calf. no sob, cp, back pain.

## 2023-02-28 NOTE — ED PROVIDER NOTE - PATIENT PORTAL LINK FT
You can access the FollowMyHealth Patient Portal offered by [facility] by registering at the following website: [email or web address]. By joining [employer]’s FollowMyHealth portal, you will also be able to view your health information using other applications (apps) compatible with our system.

## 2023-04-14 NOTE — CONSULT NOTE ADULT - SUBJECTIVE AND OBJECTIVE BOX
Pt interviewed, examined and EMR chart reviewed.  Pt denies any benzodiazipine use. Pt states she stated use to get admitted so she can get into rehab. Called [facility] to verify and they state patient has negative urines. Feel patient may be selling her pills to buy heroine/fentanyl Pt states using about a bundle of heroine/fentanyl per day  Hx of withdrawal   variable periods of sobriety in the past.  Has been in detox before __X___yes,   _____No    SOCIAL HISTORY:    REVIEW OF SYSTEMS:    Constitutional: No fever, weight loss or fatigue  ENT:  No difficulty hearing, tinnitus, vertigo; No sinus or throat pain  Neck: No pain or stiffness  Respiratory: No cough, wheezing, chills or hemoptysis  Cardiovascular: No chest pain, palpitations, shortness of breath, dizziness or leg swelling  Gastrointestinal: No abdominal or epigastric pain. No nausea, vomiting or hematemesis; No diarrhea or constipation. No melena or hematochezia.  Neurological: No headaches, memory loss, loss of strength, numbness or tremors  Musculoskeletal: No joint pain or swelling; No muscle, back or extremity pain  Psychiatric: No depression, anxiety, mood swings or difficulty sleeping    MEDICATIONS  (STANDING):  chlordiazePOXIDE 25 milliGRAM(s) Oral every 4 hours  chlordiazePOXIDE 20 milliGRAM(s) Oral every 4 hours  chlordiazePOXIDE   Oral   escitalopram 20 milliGRAM(s) Oral daily  gabapentin 300 milliGRAM(s) Oral two times a day  lamoTRIgine 200 milliGRAM(s) Oral at bedtime  methadone    Tablet 160 milliGRAM(s) Oral daily  multivitamin/minerals 1 Tablet(s) Oral daily  nicotine - 21 mG/24Hr(s) Patch 1 Patch Transdermal daily  pantoprazole    Tablet 40 milliGRAM(s) Oral before breakfast    MEDICATIONS  (PRN):  acetaminophen     Tablet .. 650 milliGRAM(s) Oral every 8 hours PRN Temp greater or equal to 38C (100.4F)  aluminum hydroxide/magnesium hydroxide/simethicone Suspension 30 milliLiter(s) Oral every 6 hours PRN Heartburn  chlordiazePOXIDE 25 milliGRAM(s) Oral every 4 hours PRN Withdrawal  hydrOXYzine hydrochloride 50 milliGRAM(s) Oral every 6 hours PRN Anxiety  hydrOXYzine hydrochloride 100 milliGRAM(s) Oral at bedtime PRN insomnia  ibuprofen  Tablet. 400 milliGRAM(s) Oral every 8 hours PRN Mild Pain (1 - 3)  methocarbamol 500 milliGRAM(s) Oral every 6 hours PRN muscle pain  pseudoephedrine 60 milliGRAM(s) Oral every 6 hours PRN Rhinitis      Vital Signs Last 24 Hrs  T(C): 35.8 (16 Nov 2022 05:34), Max: 36.3 (15 Nov 2022 16:33)  T(F): 96.5 (16 Nov 2022 05:34), Max: 97.4 (15 Nov 2022 16:33)  HR: 61 (16 Nov 2022 05:34) (54 - 78)  BP: 114/63 (16 Nov 2022 05:34) (97/62 - 118/76)  BP(mean): --  RR: 18 (16 Nov 2022 05:34) (18 - 20)  SpO2: 98% (15 Nov 2022 20:43) (98% - 99%)    Parameters below as of 15 Nov 2022 20:43  Patient On (Oxygen Delivery Method): room air        PHYSICAL EXAM:    Constitutional: NAD, well-groomed, well-developed  HEENT: PERRLA, EOMI, Normal Hearing, MMM  Neck: No LAD, No JVD  Back: Normal spine flexure, No CVA tenderness  Respiratory: CTAB/L  Cardiovascular: S1 and S2, RRR, no M/G/R  Gastrointestinal: BS+, soft, NT/ND  Extremities: No peripheral edema  Vascular: 2+ peripheral pulses  Neurological: A/O x 3, no focal deficits    LABS:                        13.6   4.79  )-----------( 250      ( 16 Nov 2022 05:52 )             41.4     11-16    138  |  102  |  8<L>  ----------------------------<  101<H>  4.1   |  28  |  0.8    Ca    9.0      16 Nov 2022 05:52    TPro  6.6  /  Alb  3.7  /  TBili  <0.2  /  DBili  x   /  AST  29  /  ALT  27  /  AlkPhos  100  11-16        Drug Screen Urine:  Alcohol Level        RADIOLOGY & ADDITIONAL STUDIES:   Z Plasty Text: The lesion was extirpated to the level of the fat with a #15 scalpel blade.  Given the location of the defect, shape of the defect and the proximity to free margins a Z-plasty was deemed most appropriate for repair.  Using a sterile surgical marker, the appropriate transposition arms of the Z-plasty were drawn incorporating the defect and placing the expected incisions within the relaxed skin tension lines where possible.    The area thus outlined was incised deep to adipose tissue with a #15 scalpel blade.  The skin margins were undermined to an appropriate distance in all directions utilizing iris scissors.  The opposing transposition arms were then transposed into place in opposite direction and anchored with interrupted buried subcutaneous sutures.

## 2023-05-05 ENCOUNTER — OUTPATIENT (OUTPATIENT)
Dept: OUTPATIENT SERVICES | Facility: HOSPITAL | Age: 33
LOS: 1 days | End: 2023-05-05
Payer: MEDICAID

## 2023-05-05 DIAGNOSIS — Z90.49 ACQUIRED ABSENCE OF OTHER SPECIFIED PARTS OF DIGESTIVE TRACT: Chronic | ICD-10-CM

## 2023-05-05 DIAGNOSIS — Z00.8 ENCOUNTER FOR OTHER GENERAL EXAMINATION: ICD-10-CM

## 2023-05-05 LAB
A1C WITH ESTIMATED AVERAGE GLUCOSE RESULT: 5 % — SIGNIFICANT CHANGE UP (ref 4–5.6)
ALBUMIN SERPL ELPH-MCNC: 4.8 G/DL — SIGNIFICANT CHANGE UP (ref 3.5–5.2)
ALP SERPL-CCNC: 95 U/L — SIGNIFICANT CHANGE UP (ref 30–115)
ALT FLD-CCNC: 27 U/L — SIGNIFICANT CHANGE UP (ref 0–41)
ANION GAP SERPL CALC-SCNC: 13 MMOL/L — SIGNIFICANT CHANGE UP (ref 7–14)
AST SERPL-CCNC: 28 U/L — SIGNIFICANT CHANGE UP (ref 0–41)
BILIRUB SERPL-MCNC: 0.3 MG/DL — SIGNIFICANT CHANGE UP (ref 0.2–1.2)
BUN SERPL-MCNC: 8 MG/DL — LOW (ref 10–20)
CALCIUM SERPL-MCNC: 9.4 MG/DL — SIGNIFICANT CHANGE UP (ref 8.4–10.5)
CHLORIDE SERPL-SCNC: 101 MMOL/L — SIGNIFICANT CHANGE UP (ref 98–110)
CHOLEST SERPL-MCNC: 161 MG/DL — SIGNIFICANT CHANGE UP
CO2 SERPL-SCNC: 24 MMOL/L — SIGNIFICANT CHANGE UP (ref 17–32)
CREAT SERPL-MCNC: 1 MG/DL — SIGNIFICANT CHANGE UP (ref 0.7–1.5)
EGFR: 76 ML/MIN/1.73M2 — SIGNIFICANT CHANGE UP
ESTIMATED AVERAGE GLUCOSE: 97 MG/DL — SIGNIFICANT CHANGE UP (ref 68–114)
GLUCOSE SERPL-MCNC: 93 MG/DL — SIGNIFICANT CHANGE UP (ref 70–99)
HCG UR QL: NEGATIVE — SIGNIFICANT CHANGE UP
HCT VFR BLD CALC: 45.2 % — SIGNIFICANT CHANGE UP (ref 37–47)
HDLC SERPL-MCNC: 47 MG/DL — LOW
HGB BLD-MCNC: 15 G/DL — SIGNIFICANT CHANGE UP (ref 12–16)
LIPID PNL WITH DIRECT LDL SERPL: 93 MG/DL — SIGNIFICANT CHANGE UP
MAGNESIUM SERPL-MCNC: 1.9 MG/DL — SIGNIFICANT CHANGE UP (ref 1.8–2.4)
MCHC RBC-ENTMCNC: 32.1 PG — HIGH (ref 27–31)
MCHC RBC-ENTMCNC: 33.2 G/DL — SIGNIFICANT CHANGE UP (ref 32–37)
MCV RBC AUTO: 96.6 FL — SIGNIFICANT CHANGE UP (ref 81–99)
NON HDL CHOLESTEROL: 114 MG/DL — SIGNIFICANT CHANGE UP
NRBC # BLD: 0 /100 WBCS — SIGNIFICANT CHANGE UP (ref 0–0)
PLATELET # BLD AUTO: 244 K/UL — SIGNIFICANT CHANGE UP (ref 130–400)
PMV BLD: 10.9 FL — HIGH (ref 7.4–10.4)
POTASSIUM SERPL-MCNC: 4.8 MMOL/L — SIGNIFICANT CHANGE UP (ref 3.5–5)
POTASSIUM SERPL-SCNC: 4.8 MMOL/L — SIGNIFICANT CHANGE UP (ref 3.5–5)
PROT SERPL-MCNC: 7.5 G/DL — SIGNIFICANT CHANGE UP (ref 6–8)
RBC # BLD: 4.68 M/UL — SIGNIFICANT CHANGE UP (ref 4.2–5.4)
RBC # FLD: 13.2 % — SIGNIFICANT CHANGE UP (ref 11.5–14.5)
SODIUM SERPL-SCNC: 138 MMOL/L — SIGNIFICANT CHANGE UP (ref 135–146)
TRIGL SERPL-MCNC: 105 MG/DL — SIGNIFICANT CHANGE UP
WBC # BLD: 7.57 K/UL — SIGNIFICANT CHANGE UP (ref 4.8–10.8)
WBC # FLD AUTO: 7.57 K/UL — SIGNIFICANT CHANGE UP (ref 4.8–10.8)

## 2023-05-05 PROCEDURE — 87521 HEPATITIS C PROBE&RVRS TRNSC: CPT

## 2023-05-05 PROCEDURE — 81001 URINALYSIS AUTO W/SCOPE: CPT

## 2023-05-05 PROCEDURE — 85027 COMPLETE CBC AUTOMATED: CPT

## 2023-05-05 PROCEDURE — 36415 COLL VENOUS BLD VENIPUNCTURE: CPT

## 2023-05-05 PROCEDURE — 83735 ASSAY OF MAGNESIUM: CPT

## 2023-05-05 PROCEDURE — 80074 ACUTE HEPATITIS PANEL: CPT

## 2023-05-05 PROCEDURE — 80053 COMPREHEN METABOLIC PANEL: CPT

## 2023-05-05 PROCEDURE — 86780 TREPONEMA PALLIDUM: CPT

## 2023-05-05 PROCEDURE — 80061 LIPID PANEL: CPT

## 2023-05-05 PROCEDURE — 81025 URINE PREGNANCY TEST: CPT

## 2023-05-05 PROCEDURE — 86480 TB TEST CELL IMMUN MEASURE: CPT

## 2023-05-05 PROCEDURE — 83036 HEMOGLOBIN GLYCOSYLATED A1C: CPT

## 2023-05-06 DIAGNOSIS — Z00.8 ENCOUNTER FOR OTHER GENERAL EXAMINATION: ICD-10-CM

## 2023-05-06 LAB
APPEARANCE UR: CLEAR — SIGNIFICANT CHANGE UP
BILIRUB UR-MCNC: NEGATIVE — SIGNIFICANT CHANGE UP
COLOR SPEC: YELLOW — SIGNIFICANT CHANGE UP
DIFF PNL FLD: ABNORMAL
GLUCOSE UR QL: NEGATIVE MG/DL — SIGNIFICANT CHANGE UP
HAV IGM SER-ACNC: SIGNIFICANT CHANGE UP
HBV CORE IGM SER-ACNC: SIGNIFICANT CHANGE UP
HBV SURFACE AG SER-ACNC: SIGNIFICANT CHANGE UP
HCV AB S/CO SERPL IA: 12.84 S/CO — HIGH (ref 0–0.99)
HCV AB SERPL-IMP: REACTIVE
KETONES UR-MCNC: NEGATIVE MG/DL — SIGNIFICANT CHANGE UP
LEUKOCYTE ESTERASE UR-ACNC: NEGATIVE — SIGNIFICANT CHANGE UP
NITRITE UR-MCNC: NEGATIVE — SIGNIFICANT CHANGE UP
PH UR: 7.5 — SIGNIFICANT CHANGE UP (ref 5–8)
PROT UR-MCNC: NEGATIVE MG/DL — SIGNIFICANT CHANGE UP
SP GR SPEC: 1.01 — SIGNIFICANT CHANGE UP (ref 1–1.03)
T PALLIDUM AB TITR SER: NEGATIVE — SIGNIFICANT CHANGE UP
UROBILINOGEN FLD QL: 0.2 MG/DL — SIGNIFICANT CHANGE UP (ref 0.2–1)

## 2023-05-07 LAB
BACTERIA # UR AUTO: NEGATIVE /HPF — SIGNIFICANT CHANGE UP
CAST: 0 /LPF — SIGNIFICANT CHANGE UP (ref 0–4)
GAMMA INTERFERON BACKGROUND BLD IA-ACNC: 0.02 IU/ML — SIGNIFICANT CHANGE UP
M TB IFN-G BLD-IMP: NEGATIVE — SIGNIFICANT CHANGE UP
M TB IFN-G CD4+ BCKGRND COR BLD-ACNC: 0 IU/ML — SIGNIFICANT CHANGE UP
M TB IFN-G CD4+CD8+ BCKGRND COR BLD-ACNC: 0 IU/ML — SIGNIFICANT CHANGE UP
QUANT TB PLUS MITOGEN MINUS NIL: 5.57 IU/ML — SIGNIFICANT CHANGE UP
RBC CASTS # UR COMP ASSIST: 1 /HPF — SIGNIFICANT CHANGE UP (ref 0–4)
SQUAMOUS # UR AUTO: 3 /HPF — SIGNIFICANT CHANGE UP (ref 0–5)
WBC UR QL: 1 /HPF — SIGNIFICANT CHANGE UP (ref 0–5)

## 2023-05-22 ENCOUNTER — OUTPATIENT (OUTPATIENT)
Dept: OUTPATIENT SERVICES | Facility: HOSPITAL | Age: 33
LOS: 1 days | End: 2023-05-22

## 2023-05-22 DIAGNOSIS — Z00.8 ENCOUNTER FOR OTHER GENERAL EXAMINATION: ICD-10-CM

## 2023-05-22 DIAGNOSIS — Z90.49 ACQUIRED ABSENCE OF OTHER SPECIFIED PARTS OF DIGESTIVE TRACT: Chronic | ICD-10-CM

## 2023-05-22 PROCEDURE — 36415 COLL VENOUS BLD VENIPUNCTURE: CPT

## 2023-05-22 PROCEDURE — 87521 HEPATITIS C PROBE&RVRS TRNSC: CPT

## 2023-05-23 DIAGNOSIS — Z00.8 ENCOUNTER FOR OTHER GENERAL EXAMINATION: ICD-10-CM

## 2023-06-07 ENCOUNTER — OUTPATIENT (OUTPATIENT)
Dept: OUTPATIENT SERVICES | Facility: HOSPITAL | Age: 33
LOS: 1 days | End: 2023-06-07
Payer: MEDICAID

## 2023-06-07 DIAGNOSIS — Z90.49 ACQUIRED ABSENCE OF OTHER SPECIFIED PARTS OF DIGESTIVE TRACT: Chronic | ICD-10-CM

## 2023-06-07 DIAGNOSIS — I49.9 CARDIAC ARRHYTHMIA, UNSPECIFIED: ICD-10-CM

## 2023-06-07 PROCEDURE — 93005 ELECTROCARDIOGRAM TRACING: CPT

## 2023-06-07 PROCEDURE — 93010 ELECTROCARDIOGRAM REPORT: CPT

## 2023-06-08 DIAGNOSIS — I49.9 CARDIAC ARRHYTHMIA, UNSPECIFIED: ICD-10-CM

## 2023-07-18 ENCOUNTER — EMERGENCY (EMERGENCY)
Facility: HOSPITAL | Age: 33
LOS: 0 days | Discharge: HOME | End: 2023-07-18
Attending: EMERGENCY MEDICINE
Payer: MEDICAID

## 2023-07-18 VITALS
SYSTOLIC BLOOD PRESSURE: 128 MMHG | HEIGHT: 62 IN | WEIGHT: 117.95 LBS | RESPIRATION RATE: 18 BRPM | HEART RATE: 80 BPM | DIASTOLIC BLOOD PRESSURE: 75 MMHG | OXYGEN SATURATION: 99 % | TEMPERATURE: 97 F
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
## 2023-07-18 DIAGNOSIS — F60.3 BORDERLINE PERSONALITY DISORDER: ICD-10-CM

## 2023-07-18 DIAGNOSIS — M79.671 PAIN IN RIGHT FOOT: ICD-10-CM

## 2023-07-18 DIAGNOSIS — Z90.49 ACQUIRED ABSENCE OF OTHER SPECIFIED PARTS OF DIGESTIVE TRACT: Chronic | ICD-10-CM
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
## 2023-07-18 DIAGNOSIS — F17.200 NICOTINE DEPENDENCE, UNSPECIFIED, UNCOMPLICATED: ICD-10-CM

## 2023-07-18 PROCEDURE — 99284 EMERGENCY DEPT VISIT MOD MDM: CPT

## 2023-07-18 PROCEDURE — 99283 EMERGENCY DEPT VISIT LOW MDM: CPT | Mod: 25

## 2023-07-18 PROCEDURE — 73630 X-RAY EXAM OF FOOT: CPT | Mod: RT

## 2023-07-18 PROCEDURE — 73630 X-RAY EXAM OF FOOT: CPT | Mod: 26,RT

## 2023-07-18 RX ORDER — IBUPROFEN 200 MG
800 TABLET ORAL ONCE
Refills: 0 | Status: COMPLETED | OUTPATIENT
Start: 2023-07-18 | End: 2023-07-18

## 2023-07-18 RX ADMIN — Medication 800 MILLIGRAM(S): at 16:26

## 2023-07-18 NOTE — ED PROVIDER NOTE - OBJECTIVE STATEMENT
32-year-old female with past medical history of substance use on methadone, borderline personality disorder, presents to the ED complaining of right foot pain since last night.  Patient reports pain began after she kicked a garbage can in frustration.  Patient took ibuprofen 800 mg x 2 last night with no relief.  Patient has been unable to bear weight since the injury.  Has not had any recent fever, headache, difficulty breathing, vomiting, or diarrhea.

## 2023-07-18 NOTE — ED PROVIDER NOTE - IV ALTEPLASE ADMIN OUTSIDE HIDDEN
show
Mandeep Joyce)  Internal Medicine  [address], [place] [id number]  Phone: [phone]  Fax: [phone]  Follow Up Time: 1-3 Days

## 2023-07-18 NOTE — ED ADULT NURSE NOTE - LATERALITY
[facility]  Tamanna Garcia 60 [id number] [facility], [address] [place] [address]    Consent for Operation    Date: __________________    Time: _______________    1.  I authorize the performance upon Radha Burnett the following operation:                              Primary procedure has been videotaped, the surgeon will obtain the original videotape. The hospital will not be responsible for storage or maintenance of this tape.     6. For the purpose of advancing medical education, I consent to the admittance of observers to t STATEMENTS REQUIRING INSERTION OR COMPLETION WERE FILLED IN.     Signature of Patient:   ___________________________    When the patient is a minor or mentally incompetent to give consent:  Signature of person authorized to consent for patient: ____________ these medicines may increase my risk of anesthetic complications. · If I am allergic to anything or have had a reaction to anesthesia before. 3. I understand how the anesthesia medicine will help me (benefits).     4. I understand that with any type of patient’s representative) and answered their questions. The patient or their representative has agreed to have anesthesia services.     _____________________________________________________________________________  Witness        Date   Time  I have maxim right

## 2023-07-18 NOTE — ED PROVIDER NOTE - PHYSICAL EXAMINATION
PHYSICAL EXAM: I have reviewed current vital signs.  GENERAL: NAD, well-nourished; well-developed.  HEAD:  Normocephalic, atraumatic.  EYES: Conjunctiva and sclera clear.  ENT: MMM, no erythema/exudates.  NECK: Supple, no JVD.  CHEST/LUNG: Clear to auscultation bilaterally; no wheezes, rales, or rhonchi.  HEART: Regular rate and rhythm, normal S1 and S2; no murmurs, rubs, or gallops.  ABDOMEN: Soft, nontender, nondistended.  EXTREMITIES:  Tenderness to the dorsal midfoot.  PSYCH: Cooperative, appropriate, normal mood and affect.  NEUROLOGY: A&O x 3. No focal neurological deficits.   SKIN: Warm and dry.

## 2023-07-18 NOTE — ED PROVIDER NOTE - PATIENT PORTAL LINK FT
You can access the FollowMyHealth Patient Portal offered by [facility] by registering at the following website: [email or web address]. By joining [employer]’s FollowMyHealth portal, you will also be able to view your health information using other applications (apps) compatible with our system.

## 2023-07-18 NOTE — ED ADULT NURSE NOTE - NSFALLUNIVINTERV_ED_ALL_ED
Bed/Stretcher in lowest position, wheels locked, appropriate side rails in place/Call bell, personal items and telephone in reach/Instruct patient to call for assistance before getting out of bed/chair/stretcher/Non-slip footwear applied when patient is off stretcher/[place] to call system/Physically safe environment - no spills, clutter or unnecessary equipment/Purposeful proactive rounding/Room/bathroom lighting operational, light cord in reach

## 2023-07-18 NOTE — ED PROVIDER NOTE - NSFOLLOWUPINSTRUCTIONS_ED_ALL_ED_FT
FOLLOW UP WITH PODIATRY.   Our Emergency Department Referral Coordinators will be reaching out to you in the next 24-48 hours from 9:00am to 5:00pm with a follow up appointment. Please expect a phone call from the hospital in that time frame. If you do not receive a call or if you have any questions or concerns, you can reach them at   [phone]      Foot Pain  Many things can cause foot pain. Some common causes are:  An injury.  A sprain.  Arthritis.  Blisters.  Bunions.  Follow these instructions at home:  Managing pain, stiffness, and swelling    Bag of ice on a towel on the skin.  If directed, put ice on the painful area:  Put ice in a plastic bag.  Place a towel between your skin and the bag.  Leave the ice on for 20 minutes, 2–3 times a day.  Activity    Do not stand or walk for long periods.  Return to your normal activities as told by your health care provider. Ask your health care provider what activities are safe for you.  Do stretches to relieve foot pain and stiffness as told by your health care provider.  Do not lift anything that is heavier than 10 lb (4.5 kg), or the limit that you are told, until your health care provider says that it is safe. Lifting a lot of weight can put added pressure on your feet.  Lifestyle    Wear comfortable, supportive shoes that fit you well. Do not wear high heels.  Keep your feet clean and dry.  General instructions    Take over-the-counter and prescription medicines only as told by your health care provider.  Rub your foot gently.  Pay attention to any changes in your symptoms.  Keep all follow-up visits as told by your health care provider. This is important.  Contact a health care provider if:  Your pain does not get better after a few days of self-care.  Your pain gets worse.  You cannot stand on your foot.  Get help right away if:  Your foot is numb or tingling.  Your foot or toes are swollen.  Your foot or toes turn white or blue.  You have warmth and redness along your foot.  Summary  Common causes of foot pain are injury, sprain, arthritis, blisters, or bunions.  Ice, medicines, and comfortable shoes may help foot pain.  Contact your health care provider if your pain does not get better after a few days of self-care.  This information is not intended to replace advice given to you by your health care provider. Make sure you discuss any questions you have with your health care provider.

## 2023-07-18 NOTE — ED PROVIDER NOTE - CLINICAL SUMMARY MEDICAL DECISION MAKING FREE TEXT BOX
01/27 no voicemail
Claritin D, flonase and cough medications sent
Pt is vaccinated  About 3 days pt started feeling fatigued, fever, chills, diarehha, not hungry  Pt req covid swab  What over the counter medicine should he take?   He feels awful
right foot injury,  XRAY reveals possible small chip fracture vs. avulsion.  Splint, RICE, and podiatry follow up.

## 2023-07-18 NOTE — ED PROVIDER NOTE - PROGRESS NOTE DETAILS
AE: Patient is well appearing, will discharge with strict return precautions and podiatry follow up.

## 2023-08-01 ENCOUNTER — OUTPATIENT (OUTPATIENT)
Dept: OUTPATIENT SERVICES | Facility: HOSPITAL | Age: 33
LOS: 1 days | End: 2023-08-01

## 2023-08-01 DIAGNOSIS — Z00.8 ENCOUNTER FOR OTHER GENERAL EXAMINATION: ICD-10-CM

## 2023-08-01 DIAGNOSIS — Z90.49 ACQUIRED ABSENCE OF OTHER SPECIFIED PARTS OF DIGESTIVE TRACT: Chronic | ICD-10-CM

## 2023-08-02 DIAGNOSIS — Z00.8 ENCOUNTER FOR OTHER GENERAL EXAMINATION: ICD-10-CM

## 2023-08-09 NOTE — ED ADULT NURSE NOTE - NS ED NOTE ABUSE RESPONSE YN
Pt in clinic today for bronchoscopy pre-op. Per Dr. Drew, he would like cardiology input on patient's Eliquis if a bridge will be needed for the procedure.     Routing to Dr. Dwyer's office to please advise.   Yes

## 2024-04-01 NOTE — ED BEHAVIORAL HEALTH ASSESSMENT NOTE - DIFFERENTIAL
Received request via: Patient    Was the patient seen in the last year in this department? Yes    Does the patient have an active prescription (recently filled or refills available) for medication(s) requested? No    Pharm[facility] Name[email or web address]    Does the patient have [facility] Plus and need 100 day supply (blood pressure, diabetes and cholesterol meds only)? Patient does not have SCP  
Opiate use disorder  Adjustment disorder  Generalized anxiety disorder

## 2024-05-10 NOTE — ED ADULT NURSE NOTE - FINAL NURSING ELECTRONIC SIGNATURE
[FreeTextEntry1] : Advanced dementia-stable Continue with Namenda - Follow-up in 6-month
28-Feb-2023 14:54

## 2024-05-16 NOTE — ED ADULT NURSE NOTE - PATIENT ON (OXYGEN DELIVERY METHOD)
Subjective   Cinthya Negro is a 72 y.o. female    Chief Complaint   Patient presents with    Earache    Cough    Chills     Patient states she has had the cough for about a month, the ear pain and feeling achy has been going on just this week.      Earache   There is pain in both ears. This is a new problem. The current episode started in the past 7 days. The problem occurs constantly. The problem has been unchanged. There has been no fever. The pain is mild. Associated symptoms include coughing and headaches. Pertinent negatives include no abdominal pain, diarrhea, drainage, hearing loss, neck pain, rash, rhinorrhea, sore throat or vomiting. The treatment provided no relief.   Cough  This is a recurrent problem. The current episode started in the past 7 days. The problem has been comes and goes. The problem occurs every few minutes. The cough is Productive of sputum. Associated symptoms include chills, ear congestion, ear pain, headaches and nasal congestion. Pertinent negatives include no chest pain, fever, heartburn, hemoptysis, myalgias, postnasal drip, rash, rhinorrhea, sore throat, shortness of breath, sweats, weight loss or wheezing. Nothing aggravates the symptoms. Treatments tried: regular inhalers. The treatment provided no relief. Her past medical history is significant for COPD and environmental allergies.   Chills  Associated symptoms include chills, congestion, coughing and headaches. Pertinent negatives include no abdominal pain, arthralgias, chest pain, fatigue, fever, myalgias, nausea, neck pain, rash, sore throat or vomiting.        The following portions of the patient's history were reviewed and updated as appropriate: allergies, current medications, past family history, past medical history, past social history, past surgical history, and problem list.    Current Outpatient Medications:     albuterol (ACCUNEB) 1.25 MG/3ML nebulizer solution, INHALE THREE MILLILITERS VIA NEBULIZER BY MOUTH  EVERY 6 HOURS AS NEEDED WHEEZING (TAKE WITH ATROVENT), Disp: 75 mL, Rfl: 0    albuterol sulfate  (90 Base) MCG/ACT inhaler, Inhale 2 puffs Every 4 (Four) Hours As Needed for Wheezing or Shortness of Air., Disp: 18 g, Rfl: 5    amLODIPine (NORVASC) 5 MG tablet, Take 1 tablet by mouth Daily., Disp: 90 tablet, Rfl: 1    aspirin 81 MG EC tablet, Take 1 tablet by mouth Daily., Disp: , Rfl:     atorvastatin (LIPITOR) 20 MG tablet, Take 1 tablet by mouth Daily., Disp: 90 tablet, Rfl: 1    azelastine (ASTELIN) 0.1 % nasal spray, 2 sprays into the nostril(s) as directed by provider 2 (Two) Times a Day. Use in each nostril as directed, Disp: 30 mL, Rfl: 11    Budeson-Glycopyrrol-Formoterol (Breztri Aerosphere) 160-9-4.8 MCG/ACT aerosol inhaler, Inhale 2 puffs 2 (Two) Times a Day., Disp: 10.7 g, Rfl: 5    Cholecalciferol (VITAMIN D3) 5000 units capsule capsule, Take 1 capsule by mouth Daily., Disp: , Rfl:     Cyanocobalamin (B-12) 1000 MCG capsule, Take 1 capsule by mouth Daily., Disp: , Rfl:     cyclobenzaprine (FLEXERIL) 5 MG tablet, TAKE 1-2 TABLETS BY MOUTH TWO TIMES A DAY AS NEEDED FOR MUSCLE SPASMS, Disp: 40 tablet, Rfl: 0    Diclofenac Sodium (VOLTAREN) 1 % gel gel, Apply 4 g topically to the appropriate area as directed 4 (Four) Times a Day As Needed (elbow pain)., Disp: 100 g, Rfl: 5    docusate sodium (COLACE) 250 MG capsule, Take 1 capsule by mouth Daily As Needed for Constipation., Disp: 90 capsule, Rfl: 1    DULoxetine (CYMBALTA) 60 MG capsule, Take 1 capsule by mouth Daily., Disp: 90 capsule, Rfl: 1    fluocinonide (LIDEX) 0.05 % cream, Apply Topically two times a day as needed., Disp: 15 g, Rfl: 5    fluticasone (FLONASE) 50 MCG/ACT nasal spray, 2 sprays into the nostril(s) as directed by provider Daily., Disp: 16 g, Rfl: 5    furosemide (LASIX) 20 MG tablet, TAKE 1 TABLET BY MOUTH DAILY, Disp: 90 tablet, Rfl: 0    gabapentin (NEURONTIN) 300 MG capsule, TAKE ONE CAPSULE BY MOUTH THREE TIMES A DAY, Disp:  90 capsule, Rfl: 2    glycerin adult 2 g suppository, Insert 1 suppository into the rectum As Needed for Constipation., Disp: 12 each, Rfl: 0    hydrOXYzine (ATARAX) 25 MG tablet, Take 1 tablet by mouth 3 (Three) Times a Day As Needed for Anxiety. Replacing the 10 mg dose, Disp: 90 tablet, Rfl: 2    ipratropium-albuterol (DUO-NEB) 0.5-2.5 mg/3 ml nebulizer, Take 3 mL by nebulization Every 4 (Four) Hours As Needed for Wheezing., Disp: 360 mL, Rfl: 5    Linzess 145 MCG capsule capsule, TAKE ONE CAPSULE BY MOUTH EVERY MORNING BEFORE BREAKFAST FOR CONSTIPATION, Disp: 30 capsule, Rfl: 2    loratadine (CLARITIN) 10 MG tablet, Take 1 tablet by mouth Daily., Disp: 30 tablet, Rfl: 2    meclizine (ANTIVERT) 25 MG tablet, Take 1 tablet by mouth 3 (Three) Times a Day As Needed for Dizziness., Disp: 90 tablet, Rfl: 1    meloxicam (MOBIC) 7.5 MG tablet, Take 1 tablet by mouth Daily As Needed (pain)., Disp: 90 tablet, Rfl: 0    metoprolol succinate XL (TOPROL-XL) 50 MG 24 hr tablet, Take 1 tablet by mouth Daily. Need f/u appt for further refills., Disp: 90 tablet, Rfl: 3    montelukast (SINGULAIR) 10 MG tablet, Take 1 tablet by mouth Every Night., Disp: 90 tablet, Rfl: 3    naphazoline-pheniramine (NAPHCON-A) 0.025-0.3 % ophthalmic solution, Administer 1 drop into the left eye 4 (Four) Times a Day As Needed for Irritation., Disp: 5 mL, Rfl: 0    nitroglycerin (Nitrostat) 0.4 MG SL tablet, Place 1 tablet under the tongue Every 5 (Five) Minutes As Needed for Chest Pain. Take no more than 3 doses in 15 minutes., Disp: 25 tablet, Rfl: 2    pantoprazole (PROTONIX) 40 MG EC tablet, Take 1 tablet by mouth Daily., Disp: 90 tablet, Rfl: 3    potassium chloride (K-DUR,KLOR-CON) 10 MEQ CR tablet, TAKE 1 TABLET BY MOUTH DAILY, Disp: 90 tablet, Rfl: 0    promethazine (PHENERGAN) 12.5 MG tablet, Take 1 tablet by mouth Every 8 (Eight) Hours As Needed for Nausea or Vomiting., Disp: 30 tablet, Rfl: 0    Skin Protectants, Misc. (EUCERIN) cream,  Apply  topically to the appropriate area as directed As Needed for Dry Skin., Disp: 454 g, Rfl: 2    triamcinolone (KENALOG) 0.5 % cream, APPLY TOPICALLY TO AFFECTED AREA(S) THREE TIMES A DAY, Disp: 60 g, Rfl: 4    doxycycline (VIBRAMYCIN) 100 MG capsule, Take 1 capsule by mouth 2 (Two) Times a Day for 10 days., Disp: 20 capsule, Rfl: 0    fluconazole (DIFLUCAN) 150 MG tablet, Take 1 tablet by mouth Daily. Take one tablet at onset of antibiotics and one tablet after antibiotics are complete (q 7 days) (Patient not taking: Reported on 5/16/2024), Disp: 2 tablet, Rfl: 0    predniSONE (DELTASONE) 20 MG tablet, Take 2 tablets by mouth Daily for 5 days., Disp: 10 tablet, Rfl: 0     Review of Systems   Constitutional:  Positive for chills. Negative for fatigue, fever and weight loss.   HENT:  Positive for congestion and ear pain. Negative for hearing loss, hoarse voice, postnasal drip, rhinorrhea, sore throat and tinnitus.    Respiratory:  Positive for cough. Negative for hemoptysis, chest tightness, shortness of breath and wheezing.    Cardiovascular:  Negative for chest pain.   Gastrointestinal:  Negative for abdominal pain, diarrhea, heartburn, nausea and vomiting.   Endocrine: Negative for cold intolerance and heat intolerance.   Musculoskeletal:  Negative for arthralgias, myalgias and neck pain.   Skin:  Negative for rash.   Allergic/Immunologic: Positive for environmental allergies.   Neurological:  Positive for headaches. Negative for dizziness.   Hematological:  Negative for adenopathy.       Objective   Physical Exam  Constitutional:       Appearance: She is well-developed.   HENT:      Head: Normocephalic and atraumatic.      Right Ear: A middle ear effusion is present.      Left Ear: A middle ear effusion is present.      Nose:      Right Sinus: Maxillary sinus tenderness and frontal sinus tenderness present.      Left Sinus: Maxillary sinus tenderness and frontal sinus tenderness present.   Eyes:       "Conjunctiva/sclera: Conjunctivae normal.      Pupils: Pupils are equal, round, and reactive to light.   Cardiovascular:      Rate and Rhythm: Normal rate and regular rhythm.      Heart sounds: Normal heart sounds.   Pulmonary:      Effort: Pulmonary effort is normal.      Breath sounds: Decreased breath sounds present.   Abdominal:      General: Bowel sounds are normal.      Palpations: Abdomen is soft.   Musculoskeletal:         General: Normal range of motion.      Cervical back: Normal range of motion.   Skin:     General: Skin is warm and dry.   Neurological:      Mental Status: She is alert and oriented to person, place, and time.      Deep Tendon Reflexes: Reflexes are normal and symmetric.   Psychiatric:         Behavior: Behavior normal.         Thought Content: Thought content normal.         Judgment: Judgment normal.       Vitals:    05/16/24 1022   BP: 116/70   Pulse: 70   Resp: 14   Temp: 98.6 °F (37 °C)   TempSrc: Temporal   SpO2: 94%   Weight: 90.3 kg (199 lb 1.9 oz)   Height: 177.8 cm (70\")         Assessment & Plan   Diagnoses and all orders for this visit:    1. Acute sinusitis, recurrence not specified, unspecified location (Primary)  -     predniSONE (DELTASONE) 20 MG tablet; Take 2 tablets by mouth Daily for 5 days.  Dispense: 10 tablet; Refill: 0  -     doxycycline (VIBRAMYCIN) 100 MG capsule; Take 1 capsule by mouth 2 (Two) Times a Day for 10 days.  Dispense: 20 capsule; Refill: 0    2. COPD exacerbation  -     predniSONE (DELTASONE) 20 MG tablet; Take 2 tablets by mouth Daily for 5 days.  Dispense: 10 tablet; Refill: 0  -     doxycycline (VIBRAMYCIN) 100 MG capsule; Take 1 capsule by mouth 2 (Two) Times a Day for 10 days.  Dispense: 20 capsule; Refill: 0    3. Acute cough  -     POCT SARS-CoV-2 Antigen GRIFFIN + Flu  -     POCT rapid strep A      Covid, strep and flu tests negative  Covered with doxy and prednisone  Increase fluids  RTC if sx's worsen or do not improve  Return for Next scheduled " follow up.              room air

## 2024-06-12 ENCOUNTER — OUTPATIENT (OUTPATIENT)
Dept: OUTPATIENT SERVICES | Facility: HOSPITAL | Age: 34
LOS: 1 days | End: 2024-06-12
Payer: MEDICAID

## 2024-06-12 DIAGNOSIS — Z90.49 ACQUIRED ABSENCE OF OTHER SPECIFIED PARTS OF DIGESTIVE TRACT: Chronic | ICD-10-CM

## 2024-06-12 DIAGNOSIS — I49.9 CARDIAC ARRHYTHMIA, UNSPECIFIED: ICD-10-CM

## 2024-06-12 PROCEDURE — 93005 ELECTROCARDIOGRAM TRACING: CPT

## 2024-06-12 PROCEDURE — 93010 ELECTROCARDIOGRAM REPORT: CPT

## 2024-06-13 DIAGNOSIS — I49.9 CARDIAC ARRHYTHMIA, UNSPECIFIED: ICD-10-CM

## 2024-06-25 ENCOUNTER — INPATIENT (INPATIENT)
Facility: HOSPITAL | Age: 34
LOS: 1 days | Discharge: LEFT BEFORE DISCHARGE | DRG: 770 | End: 2024-06-27
Attending: INTERNAL MEDICINE | Admitting: INTERNAL MEDICINE
Payer: MEDICAID

## 2024-06-25 VITALS
RESPIRATION RATE: 18 BRPM | HEART RATE: 96 BPM | WEIGHT: 108.03 LBS | TEMPERATURE: 98 F | DIASTOLIC BLOOD PRESSURE: 84 MMHG | OXYGEN SATURATION: 99 % | SYSTOLIC BLOOD PRESSURE: 135 MMHG

## 2024-06-25 DIAGNOSIS — Z90.49 ACQUIRED ABSENCE OF OTHER SPECIFIED PARTS OF DIGESTIVE TRACT: Chronic | ICD-10-CM

## 2024-06-25 DIAGNOSIS — F13.939 SEDATIVE, HYPNOTIC OR ANXIOLYTIC USE, UNSPECIFIED WITH WITHDRAWAL, UNSPECIFIED: ICD-10-CM

## 2024-06-25 LAB
ALBUMIN SERPL ELPH-MCNC: 4.1 G/DL — SIGNIFICANT CHANGE UP (ref 3.5–5.2)
ALP SERPL-CCNC: 102 U/L — SIGNIFICANT CHANGE UP (ref 30–115)
ALT FLD-CCNC: 21 U/L — SIGNIFICANT CHANGE UP (ref 0–41)
ANION GAP SERPL CALC-SCNC: 10 MMOL/L — SIGNIFICANT CHANGE UP (ref 7–14)
APAP SERPL-MCNC: <5 UG/ML — LOW (ref 10–30)
AST SERPL-CCNC: 32 U/L — SIGNIFICANT CHANGE UP (ref 0–41)
BILIRUB SERPL-MCNC: 0.2 MG/DL — SIGNIFICANT CHANGE UP (ref 0.2–1.2)
BUN SERPL-MCNC: 4 MG/DL — LOW (ref 10–20)
CALCIUM SERPL-MCNC: 9.4 MG/DL — SIGNIFICANT CHANGE UP (ref 8.4–10.5)
CHLORIDE SERPL-SCNC: 105 MMOL/L — SIGNIFICANT CHANGE UP (ref 98–110)
CO2 SERPL-SCNC: 29 MMOL/L — SIGNIFICANT CHANGE UP (ref 17–32)
CREAT SERPL-MCNC: 0.7 MG/DL — SIGNIFICANT CHANGE UP (ref 0.7–1.5)
EGFR: 116 ML/MIN/1.73M2 — SIGNIFICANT CHANGE UP
ETHANOL SERPL-MCNC: <10 MG/DL — SIGNIFICANT CHANGE UP
GLUCOSE SERPL-MCNC: 105 MG/DL — HIGH (ref 70–99)
HCT VFR BLD CALC: 39.7 % — SIGNIFICANT CHANGE UP (ref 37–47)
HGB BLD-MCNC: 12.9 G/DL — SIGNIFICANT CHANGE UP (ref 12–16)
MCHC RBC-ENTMCNC: 28.4 PG — SIGNIFICANT CHANGE UP (ref 27–31)
MCHC RBC-ENTMCNC: 32.5 G/DL — SIGNIFICANT CHANGE UP (ref 32–37)
MCV RBC AUTO: 87.3 FL — SIGNIFICANT CHANGE UP (ref 81–99)
NRBC # BLD: 0 /100 WBCS — SIGNIFICANT CHANGE UP (ref 0–0)
PLATELET # BLD AUTO: 331 K/UL — SIGNIFICANT CHANGE UP (ref 130–400)
PMV BLD: 9.8 FL — SIGNIFICANT CHANGE UP (ref 7.4–10.4)
POTASSIUM SERPL-MCNC: 3.7 MMOL/L — SIGNIFICANT CHANGE UP (ref 3.5–5)
POTASSIUM SERPL-SCNC: 3.7 MMOL/L — SIGNIFICANT CHANGE UP (ref 3.5–5)
PROT SERPL-MCNC: 7.1 G/DL — SIGNIFICANT CHANGE UP (ref 6–8)
RBC # BLD: 4.55 M/UL — SIGNIFICANT CHANGE UP (ref 4.2–5.4)
RBC # FLD: 14.6 % — HIGH (ref 11.5–14.5)
SALICYLATES SERPL-MCNC: <0.3 MG/DL — LOW (ref 4–30)
SODIUM SERPL-SCNC: 144 MMOL/L — SIGNIFICANT CHANGE UP (ref 135–146)
WBC # BLD: 5.62 K/UL — SIGNIFICANT CHANGE UP (ref 4.8–10.8)
WBC # FLD AUTO: 5.62 K/UL — SIGNIFICANT CHANGE UP (ref 4.8–10.8)

## 2024-06-25 PROCEDURE — 99285 EMERGENCY DEPT VISIT HI MDM: CPT

## 2024-06-25 PROCEDURE — 87040 BLOOD CULTURE FOR BACTERIA: CPT

## 2024-06-25 PROCEDURE — 80048 BASIC METABOLIC PNL TOTAL CA: CPT

## 2024-06-25 PROCEDURE — 36415 COLL VENOUS BLD VENIPUNCTURE: CPT

## 2024-06-25 PROCEDURE — 85027 COMPLETE CBC AUTOMATED: CPT

## 2024-06-25 PROCEDURE — 99223 1ST HOSP IP/OBS HIGH 75: CPT

## 2024-06-25 RX ORDER — ACETAMINOPHEN 325 MG
650 TABLET ORAL EVERY 4 HOURS
Refills: 0 | Status: DISCONTINUED | OUTPATIENT
Start: 2024-06-25 | End: 2024-06-27

## 2024-06-25 RX ORDER — DIAZEPAM 10 MG/1
10 TABLET ORAL ONCE
Refills: 0 | Status: DISCONTINUED | OUTPATIENT
Start: 2024-06-25 | End: 2024-06-25

## 2024-06-25 RX ORDER — HYDROXYZINE PAMOATE 50 MG/1
50 CAPSULE ORAL EVERY 6 HOURS
Refills: 0 | Status: DISCONTINUED | OUTPATIENT
Start: 2024-06-25 | End: 2024-06-27

## 2024-06-25 RX ORDER — CHLORDIAZEPOXIDE HYDROCHLORIDE 10 MG/1
10 CAPSULE ORAL EVERY 4 HOURS
Refills: 0 | Status: DISCONTINUED | OUTPATIENT
Start: 2024-06-28 | End: 2024-06-27

## 2024-06-25 RX ORDER — SENNOSIDES 8.6 MG
2 TABLET ORAL AT BEDTIME
Refills: 0 | Status: DISCONTINUED | OUTPATIENT
Start: 2024-06-25 | End: 2024-06-27

## 2024-06-25 RX ORDER — AMOXICILLIN/POTASSIUM CLAV 250-125 MG
1 TABLET ORAL
Refills: 0 | Status: DISCONTINUED | OUTPATIENT
Start: 2024-06-25 | End: 2024-06-27

## 2024-06-25 RX ORDER — HYDROXYZINE PAMOATE 50 MG/1
100 CAPSULE ORAL AT BEDTIME
Refills: 0 | Status: DISCONTINUED | OUTPATIENT
Start: 2024-06-25 | End: 2024-06-27

## 2024-06-25 RX ORDER — ONDANSETRON HYDROCHLORIDE 2 MG/ML
4 INJECTION INTRAMUSCULAR; INTRAVENOUS EVERY 6 HOURS
Refills: 0 | Status: DISCONTINUED | OUTPATIENT
Start: 2024-06-25 | End: 2024-06-27

## 2024-06-25 RX ORDER — GABAPENTIN
300 POWDER (GRAM) MISCELLANEOUS
Refills: 0 | Status: DISCONTINUED | OUTPATIENT
Start: 2024-06-25 | End: 2024-06-27

## 2024-06-25 RX ORDER — METHOCARBAMOL 500 MG
500 TABLET ORAL EVERY 6 HOURS
Refills: 0 | Status: DISCONTINUED | OUTPATIENT
Start: 2024-06-25 | End: 2024-06-27

## 2024-06-25 RX ORDER — LAMOTRIGINE 100 MG/1
200 TABLET ORAL AT BEDTIME
Refills: 0 | Status: DISCONTINUED | OUTPATIENT
Start: 2024-06-25 | End: 2024-06-27

## 2024-06-25 RX ORDER — NICOTINE POLACRILEX 2 MG/1
1 LOZENGE ORAL DAILY
Refills: 0 | Status: DISCONTINUED | OUTPATIENT
Start: 2024-06-25 | End: 2024-06-27

## 2024-06-25 RX ORDER — CHLORDIAZEPOXIDE HYDROCHLORIDE 10 MG/1
25 CAPSULE ORAL EVERY 4 HOURS
Refills: 0 | Status: DISCONTINUED | OUTPATIENT
Start: 2024-06-25 | End: 2024-06-27

## 2024-06-25 RX ORDER — CLONIDINE HYDROCHLORIDE 0.3 MG/1
0.1 TABLET ORAL EVERY 8 HOURS
Refills: 0 | Status: DISCONTINUED | OUTPATIENT
Start: 2024-06-25 | End: 2024-06-27

## 2024-06-25 RX ORDER — MUPIROCIN 20 MG/G
1 OINTMENT TOPICAL
Refills: 0 | Status: DISCONTINUED | OUTPATIENT
Start: 2024-06-25 | End: 2024-06-27

## 2024-06-25 RX ORDER — CHLORDIAZEPOXIDE HYDROCHLORIDE 10 MG/1
25 CAPSULE ORAL EVERY 4 HOURS
Refills: 0 | Status: DISCONTINUED | OUTPATIENT
Start: 2024-06-25 | End: 2024-06-26

## 2024-06-25 RX ORDER — CHLORDIAZEPOXIDE HYDROCHLORIDE 10 MG/1
CAPSULE ORAL
Refills: 0 | Status: DISCONTINUED | OUTPATIENT
Start: 2024-06-25 | End: 2024-06-27

## 2024-06-25 RX ORDER — SODIUM CHLORIDE 0.9 % (FLUSH) 0.9 %
1000 SYRINGE (ML) INJECTION ONCE
Refills: 0 | Status: COMPLETED | OUTPATIENT
Start: 2024-06-25 | End: 2024-06-25

## 2024-06-25 RX ORDER — ESCITALOPRAM OXALATE 20 MG/1
20 TABLET, FILM COATED ORAL DAILY
Refills: 0 | Status: DISCONTINUED | OUTPATIENT
Start: 2024-06-25 | End: 2024-06-27

## 2024-06-25 RX ORDER — FOLIC ACID
1 POWDER (GRAM) MISCELLANEOUS DAILY
Refills: 0 | Status: DISCONTINUED | OUTPATIENT
Start: 2024-06-25 | End: 2024-06-27

## 2024-06-25 RX ORDER — MAGNESIUM, ALUMINUM HYDROXIDE 400-400
30 TABLET,CHEWABLE ORAL EVERY 6 HOURS
Refills: 0 | Status: DISCONTINUED | OUTPATIENT
Start: 2024-06-25 | End: 2024-06-27

## 2024-06-25 RX ORDER — THIAMINE HCL 100 MG
100 TABLET ORAL DAILY
Refills: 0 | Status: DISCONTINUED | OUTPATIENT
Start: 2024-06-25 | End: 2024-06-27

## 2024-06-25 RX ORDER — CHLORDIAZEPOXIDE HYDROCHLORIDE 10 MG/1
15 CAPSULE ORAL EVERY 4 HOURS
Refills: 0 | Status: DISCONTINUED | OUTPATIENT
Start: 2024-06-27 | End: 2024-06-27

## 2024-06-25 RX ORDER — CHLORDIAZEPOXIDE HYDROCHLORIDE 10 MG/1
20 CAPSULE ORAL EVERY 4 HOURS
Refills: 0 | Status: DISCONTINUED | OUTPATIENT
Start: 2024-06-26 | End: 2024-06-27

## 2024-06-25 RX ORDER — BIOTIN/FOLIC AC/VIT BCOMP,C/ZN 3MG-0.8MG
1 TABLET ORAL DAILY
Refills: 0 | Status: DISCONTINUED | OUTPATIENT
Start: 2024-06-25 | End: 2024-06-27

## 2024-06-25 RX ADMIN — CHLORDIAZEPOXIDE HYDROCHLORIDE 25 MILLIGRAM(S): 10 CAPSULE ORAL at 22:30

## 2024-06-25 RX ADMIN — DIAZEPAM 10 MILLIGRAM(S): 10 TABLET ORAL at 16:02

## 2024-06-25 RX ADMIN — NICOTINE POLACRILEX 1 PATCH: 2 LOZENGE ORAL at 22:36

## 2024-06-25 RX ADMIN — Medication 1000 MILLILITER(S): at 15:17

## 2024-06-25 RX ADMIN — LAMOTRIGINE 200 MILLIGRAM(S): 100 TABLET ORAL at 22:30

## 2024-06-26 PROCEDURE — 99232 SBSQ HOSP IP/OBS MODERATE 35: CPT

## 2024-06-26 RX ADMIN — Medication 650 MILLIGRAM(S): at 15:08

## 2024-06-26 RX ADMIN — CHLORDIAZEPOXIDE HYDROCHLORIDE 25 MILLIGRAM(S): 10 CAPSULE ORAL at 10:26

## 2024-06-26 RX ADMIN — Medication 140 MILLIGRAM(S): at 10:26

## 2024-06-26 RX ADMIN — Medication 650 MILLIGRAM(S): at 02:00

## 2024-06-26 RX ADMIN — CHLORDIAZEPOXIDE HYDROCHLORIDE 25 MILLIGRAM(S): 10 CAPSULE ORAL at 02:04

## 2024-06-26 RX ADMIN — CHLORDIAZEPOXIDE HYDROCHLORIDE 20 MILLIGRAM(S): 10 CAPSULE ORAL at 21:24

## 2024-06-26 RX ADMIN — Medication 1 TABLET(S): at 06:11

## 2024-06-26 RX ADMIN — Medication 1 TABLET(S): at 18:18

## 2024-06-26 RX ADMIN — LAMOTRIGINE 200 MILLIGRAM(S): 100 TABLET ORAL at 21:19

## 2024-06-26 RX ADMIN — MUPIROCIN 1 APPLICATION(S): 20 OINTMENT TOPICAL at 06:14

## 2024-06-26 RX ADMIN — NICOTINE POLACRILEX 1 PATCH: 2 LOZENGE ORAL at 13:11

## 2024-06-26 RX ADMIN — Medication 650 MILLIGRAM(S): at 16:41

## 2024-06-26 RX ADMIN — CHLORDIAZEPOXIDE HYDROCHLORIDE 25 MILLIGRAM(S): 10 CAPSULE ORAL at 06:11

## 2024-06-26 RX ADMIN — CHLORDIAZEPOXIDE HYDROCHLORIDE 25 MILLIGRAM(S): 10 CAPSULE ORAL at 18:18

## 2024-06-26 RX ADMIN — ESCITALOPRAM OXALATE 20 MILLIGRAM(S): 20 TABLET, FILM COATED ORAL at 13:12

## 2024-06-26 RX ADMIN — Medication 650 MILLIGRAM(S): at 01:19

## 2024-06-26 RX ADMIN — MUPIROCIN 1 APPLICATION(S): 20 OINTMENT TOPICAL at 18:20

## 2024-06-26 RX ADMIN — Medication 300 MILLIGRAM(S): at 18:18

## 2024-06-26 RX ADMIN — Medication 300 MILLIGRAM(S): at 06:11

## 2024-06-26 RX ADMIN — CHLORDIAZEPOXIDE HYDROCHLORIDE 25 MILLIGRAM(S): 10 CAPSULE ORAL at 14:13

## 2024-06-27 VITALS
TEMPERATURE: 98 F | RESPIRATION RATE: 16 BRPM | DIASTOLIC BLOOD PRESSURE: 49 MMHG | HEART RATE: 73 BPM | OXYGEN SATURATION: 98 % | SYSTOLIC BLOOD PRESSURE: 95 MMHG

## 2024-06-27 DIAGNOSIS — F19.20 OTHER PSYCHOACTIVE SUBSTANCE DEPENDENCE, UNCOMPLICATED: ICD-10-CM

## 2024-06-27 LAB
ANION GAP SERPL CALC-SCNC: 8 MMOL/L — SIGNIFICANT CHANGE UP (ref 7–14)
BUN SERPL-MCNC: 5 MG/DL — LOW (ref 10–20)
CALCIUM SERPL-MCNC: 8.8 MG/DL — SIGNIFICANT CHANGE UP (ref 8.4–10.5)
CHLORIDE SERPL-SCNC: 104 MMOL/L — SIGNIFICANT CHANGE UP (ref 98–110)
CO2 SERPL-SCNC: 29 MMOL/L — SIGNIFICANT CHANGE UP (ref 17–32)
CREAT SERPL-MCNC: 0.6 MG/DL — LOW (ref 0.7–1.5)
EGFR: 121 ML/MIN/1.73M2 — SIGNIFICANT CHANGE UP
GLUCOSE SERPL-MCNC: 86 MG/DL — SIGNIFICANT CHANGE UP (ref 70–99)
HCT VFR BLD CALC: 34.8 % — LOW (ref 37–47)
HGB BLD-MCNC: 11.1 G/DL — LOW (ref 12–16)
MCHC RBC-ENTMCNC: 28.5 PG — SIGNIFICANT CHANGE UP (ref 27–31)
MCHC RBC-ENTMCNC: 31.9 G/DL — LOW (ref 32–37)
MCV RBC AUTO: 89.2 FL — SIGNIFICANT CHANGE UP (ref 81–99)
NRBC # BLD: 0 /100 WBCS — SIGNIFICANT CHANGE UP (ref 0–0)
PLATELET # BLD AUTO: 303 K/UL — SIGNIFICANT CHANGE UP (ref 130–400)
PMV BLD: 10.5 FL — HIGH (ref 7.4–10.4)
POTASSIUM SERPL-MCNC: 3.9 MMOL/L — SIGNIFICANT CHANGE UP (ref 3.5–5)
POTASSIUM SERPL-SCNC: 3.9 MMOL/L — SIGNIFICANT CHANGE UP (ref 3.5–5)
RBC # BLD: 3.9 M/UL — LOW (ref 4.2–5.4)
RBC # FLD: 14.6 % — HIGH (ref 11.5–14.5)
SODIUM SERPL-SCNC: 141 MMOL/L — SIGNIFICANT CHANGE UP (ref 135–146)
WBC # BLD: 5.4 K/UL — SIGNIFICANT CHANGE UP (ref 4.8–10.8)
WBC # FLD AUTO: 5.4 K/UL — SIGNIFICANT CHANGE UP (ref 4.8–10.8)

## 2024-06-27 PROCEDURE — 99231 SBSQ HOSP IP/OBS SF/LOW 25: CPT

## 2024-06-27 PROCEDURE — 99239 HOSP IP/OBS DSCHRG MGMT >30: CPT

## 2024-06-27 PROCEDURE — 36415 COLL VENOUS BLD VENIPUNCTURE: CPT

## 2024-06-27 PROCEDURE — 76937 US GUIDE VASCULAR ACCESS: CPT | Mod: 26

## 2024-06-27 RX ORDER — LACTULOSE 10 G/15ML
20 SOLUTION ORAL DAILY
Refills: 0 | Status: DISCONTINUED | OUTPATIENT
Start: 2024-06-27 | End: 2024-06-27

## 2024-06-27 RX ORDER — IPRATROPIUM BROMIDE AND ALBUTEROL SULFATE .5; 3 MG/3ML; MG/3ML
3 SOLUTION RESPIRATORY (INHALATION) EVERY 6 HOURS
Refills: 0 | Status: DISCONTINUED | OUTPATIENT
Start: 2024-06-27 | End: 2024-06-27

## 2024-06-27 RX ORDER — BUDESONIDE/FORMOTEROL FUMARATE 160-4.5MCG
2 HFA AEROSOL WITH ADAPTER (GRAM) INHALATION
Refills: 0 | Status: DISCONTINUED | OUTPATIENT
Start: 2024-06-27 | End: 2024-06-27

## 2024-06-27 RX ORDER — POLYETHYLENE GLYCOL 3350 1 G/G
17 POWDER ORAL DAILY
Refills: 0 | Status: DISCONTINUED | OUTPATIENT
Start: 2024-06-27 | End: 2024-06-27

## 2024-06-27 RX ORDER — VANCOMYCIN HYDROCHLORIDE 50 MG/ML
500 KIT ORAL EVERY 12 HOURS
Refills: 0 | Status: DISCONTINUED | OUTPATIENT
Start: 2024-06-27 | End: 2024-06-27

## 2024-06-27 RX ADMIN — MUPIROCIN 1 APPLICATION(S): 20 OINTMENT TOPICAL at 06:01

## 2024-06-27 RX ADMIN — Medication 140 MILLIGRAM(S): at 12:15

## 2024-06-27 RX ADMIN — Medication 300 MILLIGRAM(S): at 17:36

## 2024-06-27 RX ADMIN — ESCITALOPRAM OXALATE 20 MILLIGRAM(S): 20 TABLET, FILM COATED ORAL at 12:15

## 2024-06-27 RX ADMIN — NICOTINE POLACRILEX 1 PATCH: 2 LOZENGE ORAL at 12:21

## 2024-06-27 RX ADMIN — Medication 1 TABLET(S): at 06:04

## 2024-06-27 RX ADMIN — CHLORDIAZEPOXIDE HYDROCHLORIDE 20 MILLIGRAM(S): 10 CAPSULE ORAL at 10:08

## 2024-06-27 RX ADMIN — Medication 300 MILLIGRAM(S): at 06:00

## 2024-06-27 RX ADMIN — VANCOMYCIN HYDROCHLORIDE 100 MILLIGRAM(S): KIT at 17:35

## 2024-06-27 RX ADMIN — CHLORDIAZEPOXIDE HYDROCHLORIDE 20 MILLIGRAM(S): 10 CAPSULE ORAL at 06:01

## 2024-06-27 RX ADMIN — CHLORDIAZEPOXIDE HYDROCHLORIDE 20 MILLIGRAM(S): 10 CAPSULE ORAL at 13:56

## 2024-06-27 RX ADMIN — NICOTINE POLACRILEX 1 PATCH: 2 LOZENGE ORAL at 12:15

## 2024-06-27 RX ADMIN — MUPIROCIN 1 APPLICATION(S): 20 OINTMENT TOPICAL at 17:45

## 2024-06-27 RX ADMIN — CHLORDIAZEPOXIDE HYDROCHLORIDE 20 MILLIGRAM(S): 10 CAPSULE ORAL at 17:35

## 2024-07-02 LAB
CULTURE RESULTS: SIGNIFICANT CHANGE UP
SPECIMEN SOURCE: SIGNIFICANT CHANGE UP

## 2024-07-03 DIAGNOSIS — F60.3 BORDERLINE PERSONALITY DISORDER: ICD-10-CM

## 2024-07-03 DIAGNOSIS — F19.239 OTHER PSYCHOACTIVE SUBSTANCE DEPENDENCE WITH WITHDRAWAL, UNSPECIFIED: ICD-10-CM

## 2024-07-03 DIAGNOSIS — J44.9 CHRONIC OBSTRUCTIVE PULMONARY DISEASE, UNSPECIFIED: ICD-10-CM

## 2024-07-03 DIAGNOSIS — F41.9 ANXIETY DISORDER, UNSPECIFIED: ICD-10-CM

## 2024-07-03 DIAGNOSIS — F32.A DEPRESSION, UNSPECIFIED: ICD-10-CM

## 2024-07-03 DIAGNOSIS — Z53.29 PROCEDURE AND TREATMENT NOT CARRIED OUT BECAUSE OF PATIENT'S DECISION FOR OTHER REASONS: ICD-10-CM

## 2024-07-03 DIAGNOSIS — Z86.19 PERSONAL HISTORY OF OTHER INFECTIOUS AND PARASITIC DISEASES: ICD-10-CM

## 2024-07-03 DIAGNOSIS — L03.113 CELLULITIS OF RIGHT UPPER LIMB: ICD-10-CM

## 2024-07-03 DIAGNOSIS — F17.200 NICOTINE DEPENDENCE, UNSPECIFIED, UNCOMPLICATED: ICD-10-CM

## 2024-07-03 DIAGNOSIS — I80.8 PHLEBITIS AND THROMBOPHLEBITIS OF OTHER SITES: ICD-10-CM

## 2024-07-10 ENCOUNTER — EMERGENCY (EMERGENCY)
Facility: HOSPITAL | Age: 34
LOS: 0 days | Discharge: HOME | End: 2024-07-10
Attending: EMERGENCY MEDICINE
Payer: MEDICAID

## 2024-07-10 VITALS
HEART RATE: 70 BPM | DIASTOLIC BLOOD PRESSURE: 75 MMHG | RESPIRATION RATE: 18 BRPM | TEMPERATURE: 98 F | WEIGHT: 104.94 LBS | HEIGHT: 62 IN | SYSTOLIC BLOOD PRESSURE: 114 MMHG | OXYGEN SATURATION: 98 %

## 2024-07-10 DIAGNOSIS — L03.113 CELLULITIS OF RIGHT UPPER LIMB: ICD-10-CM

## 2024-07-10 DIAGNOSIS — Z90.49 ACQUIRED ABSENCE OF OTHER SPECIFIED PARTS OF DIGESTIVE TRACT: Chronic | ICD-10-CM

## 2024-07-10 PROCEDURE — 99283 EMERGENCY DEPT VISIT LOW MDM: CPT

## 2024-07-10 RX ORDER — DOXYCYCLINE 100 MG/1
1 CAPSULE ORAL
Qty: 14 | Refills: 0
Start: 2024-07-10 | End: 2024-07-16

## 2024-07-27 ENCOUNTER — INPATIENT (INPATIENT)
Facility: HOSPITAL | Age: 34
LOS: 1 days | Discharge: LEFT BEFORE DISCHARGE | DRG: 770 | End: 2024-07-29
Attending: HOSPITALIST | Admitting: FAMILY MEDICINE
Payer: MEDICAID

## 2024-07-27 VITALS
TEMPERATURE: 98 F | SYSTOLIC BLOOD PRESSURE: 112 MMHG | WEIGHT: 108.03 LBS | HEIGHT: 62 IN | DIASTOLIC BLOOD PRESSURE: 68 MMHG | OXYGEN SATURATION: 97 %

## 2024-07-27 DIAGNOSIS — Z90.49 ACQUIRED ABSENCE OF OTHER SPECIFIED PARTS OF DIGESTIVE TRACT: Chronic | ICD-10-CM

## 2024-07-27 DIAGNOSIS — F11.20 OPIOID DEPENDENCE, UNCOMPLICATED: ICD-10-CM

## 2024-07-27 LAB
ALBUMIN SERPL ELPH-MCNC: 3.5 G/DL — SIGNIFICANT CHANGE UP (ref 3.5–5.2)
ALP SERPL-CCNC: 102 U/L — SIGNIFICANT CHANGE UP (ref 30–115)
ALT FLD-CCNC: 15 U/L — SIGNIFICANT CHANGE UP (ref 0–41)
ANION GAP SERPL CALC-SCNC: 10 MMOL/L — SIGNIFICANT CHANGE UP (ref 7–14)
AST SERPL-CCNC: 21 U/L — SIGNIFICANT CHANGE UP (ref 0–41)
BASOPHILS # BLD AUTO: 0.05 K/UL — SIGNIFICANT CHANGE UP (ref 0–0.2)
BASOPHILS NFR BLD AUTO: 0.9 % — SIGNIFICANT CHANGE UP (ref 0–1)
BILIRUB SERPL-MCNC: 0.3 MG/DL — SIGNIFICANT CHANGE UP (ref 0.2–1.2)
BUN SERPL-MCNC: 7 MG/DL — LOW (ref 10–20)
CALCIUM SERPL-MCNC: 9 MG/DL — SIGNIFICANT CHANGE UP (ref 8.4–10.5)
CHLORIDE SERPL-SCNC: 105 MMOL/L — SIGNIFICANT CHANGE UP (ref 98–110)
CO2 SERPL-SCNC: 22 MMOL/L — SIGNIFICANT CHANGE UP (ref 17–32)
CREAT SERPL-MCNC: 0.6 MG/DL — LOW (ref 0.7–1.5)
EGFR: 121 ML/MIN/1.73M2 — SIGNIFICANT CHANGE UP
EOSINOPHIL # BLD AUTO: 0.11 K/UL — SIGNIFICANT CHANGE UP (ref 0–0.7)
EOSINOPHIL NFR BLD AUTO: 1.9 % — SIGNIFICANT CHANGE UP (ref 0–8)
ETHANOL SERPL-MCNC: <10 MG/DL — SIGNIFICANT CHANGE UP
GLUCOSE SERPL-MCNC: 108 MG/DL — HIGH (ref 70–99)
HCG SERPL QL: NEGATIVE — SIGNIFICANT CHANGE UP
HCT VFR BLD CALC: 33.7 % — LOW (ref 37–47)
HGB BLD-MCNC: 11 G/DL — LOW (ref 12–16)
IMM GRANULOCYTES NFR BLD AUTO: 0.2 % — SIGNIFICANT CHANGE UP (ref 0.1–0.3)
LYMPHOCYTES # BLD AUTO: 2.16 K/UL — SIGNIFICANT CHANGE UP (ref 1.2–3.4)
LYMPHOCYTES # BLD AUTO: 36.8 % — SIGNIFICANT CHANGE UP (ref 20.5–51.1)
MAGNESIUM SERPL-MCNC: 1.9 MG/DL — SIGNIFICANT CHANGE UP (ref 1.8–2.4)
MCHC RBC-ENTMCNC: 28.8 PG — SIGNIFICANT CHANGE UP (ref 27–31)
MCHC RBC-ENTMCNC: 32.6 G/DL — SIGNIFICANT CHANGE UP (ref 32–37)
MCV RBC AUTO: 88.2 FL — SIGNIFICANT CHANGE UP (ref 81–99)
MONOCYTES # BLD AUTO: 0.55 K/UL — SIGNIFICANT CHANGE UP (ref 0.1–0.6)
MONOCYTES NFR BLD AUTO: 9.4 % — HIGH (ref 1.7–9.3)
NEUTROPHILS # BLD AUTO: 2.99 K/UL — SIGNIFICANT CHANGE UP (ref 1.4–6.5)
NEUTROPHILS NFR BLD AUTO: 50.8 % — SIGNIFICANT CHANGE UP (ref 42.2–75.2)
NRBC # BLD: 0 /100 WBCS — SIGNIFICANT CHANGE UP (ref 0–0)
PLATELET # BLD AUTO: 278 K/UL — SIGNIFICANT CHANGE UP (ref 130–400)
PMV BLD: 9.9 FL — SIGNIFICANT CHANGE UP (ref 7.4–10.4)
POTASSIUM SERPL-MCNC: 4.6 MMOL/L — SIGNIFICANT CHANGE UP (ref 3.5–5)
POTASSIUM SERPL-SCNC: 4.6 MMOL/L — SIGNIFICANT CHANGE UP (ref 3.5–5)
PROT SERPL-MCNC: 6.4 G/DL — SIGNIFICANT CHANGE UP (ref 6–8)
RBC # BLD: 3.82 M/UL — LOW (ref 4.2–5.4)
RBC # FLD: 14.4 % — SIGNIFICANT CHANGE UP (ref 11.5–14.5)
SODIUM SERPL-SCNC: 137 MMOL/L — SIGNIFICANT CHANGE UP (ref 135–146)
WBC # BLD: 5.87 K/UL — SIGNIFICANT CHANGE UP (ref 4.8–10.8)
WBC # FLD AUTO: 5.87 K/UL — SIGNIFICANT CHANGE UP (ref 4.8–10.8)

## 2024-07-27 PROCEDURE — 80048 BASIC METABOLIC PNL TOTAL CA: CPT

## 2024-07-27 PROCEDURE — 99285 EMERGENCY DEPT VISIT HI MDM: CPT

## 2024-07-27 PROCEDURE — 87040 BLOOD CULTURE FOR BACTERIA: CPT

## 2024-07-27 PROCEDURE — 84100 ASSAY OF PHOSPHORUS: CPT

## 2024-07-27 PROCEDURE — 99222 1ST HOSP IP/OBS MODERATE 55: CPT

## 2024-07-27 PROCEDURE — 80076 HEPATIC FUNCTION PANEL: CPT

## 2024-07-27 PROCEDURE — 93010 ELECTROCARDIOGRAM REPORT: CPT

## 2024-07-27 PROCEDURE — 83735 ASSAY OF MAGNESIUM: CPT

## 2024-07-27 PROCEDURE — 85027 COMPLETE CBC AUTOMATED: CPT

## 2024-07-27 PROCEDURE — 36415 COLL VENOUS BLD VENIPUNCTURE: CPT

## 2024-07-27 PROCEDURE — 76882 US LMTD JT/FCL EVL NVASC XTR: CPT | Mod: RT

## 2024-07-27 RX ORDER — LORAZEPAM 1 MG/1
1 TABLET ORAL EVERY 4 HOURS
Refills: 0 | Status: DISCONTINUED | OUTPATIENT
Start: 2024-07-29 | End: 2024-07-29

## 2024-07-27 RX ORDER — NICOTINE 21 MG/24HR
1 PATCH, TRANSDERMAL 24 HOURS TRANSDERMAL DAILY
Refills: 0 | Status: DISCONTINUED | OUTPATIENT
Start: 2024-07-28 | End: 2024-07-29

## 2024-07-27 RX ORDER — ACETAMINOPHEN 500 MG
650 TABLET ORAL EVERY 6 HOURS
Refills: 0 | Status: DISCONTINUED | OUTPATIENT
Start: 2024-07-27 | End: 2024-07-29

## 2024-07-27 RX ORDER — ONDANSETRON HCL/PF 4 MG/2 ML
4 VIAL (ML) INJECTION EVERY 8 HOURS
Refills: 0 | Status: DISCONTINUED | OUTPATIENT
Start: 2024-07-27 | End: 2024-07-29

## 2024-07-27 RX ORDER — LORAZEPAM 1 MG/1
TABLET ORAL
Refills: 0 | Status: DISCONTINUED | OUTPATIENT
Start: 2024-07-27 | End: 2024-07-29

## 2024-07-27 RX ORDER — LORAZEPAM 1 MG/1
1.5 TABLET ORAL EVERY 4 HOURS
Refills: 0 | Status: DISCONTINUED | OUTPATIENT
Start: 2024-07-28 | End: 2024-07-29

## 2024-07-27 RX ORDER — LORAZEPAM 1 MG/1
2 TABLET ORAL EVERY 4 HOURS
Refills: 0 | Status: DISCONTINUED | OUTPATIENT
Start: 2024-07-27 | End: 2024-07-28

## 2024-07-27 RX ORDER — MAGNESIUM, ALUMINUM HYDROXIDE 200-225/5
30 SUSPENSION, ORAL (FINAL DOSE FORM) ORAL EVERY 4 HOURS
Refills: 0 | Status: DISCONTINUED | OUTPATIENT
Start: 2024-07-27 | End: 2024-07-29

## 2024-07-27 RX ORDER — DOXYCYCLINE 100 MG/1
100 CAPSULE ORAL EVERY 12 HOURS
Refills: 0 | Status: DISCONTINUED | OUTPATIENT
Start: 2024-07-27 | End: 2024-07-28

## 2024-07-27 RX ORDER — LORAZEPAM 1 MG/1
1 TABLET ORAL EVERY 4 HOURS
Refills: 0 | Status: DISCONTINUED | OUTPATIENT
Start: 2024-07-27 | End: 2024-07-29

## 2024-07-27 RX ORDER — DOXYCYCLINE 100 MG/1
100 CAPSULE ORAL ONCE
Refills: 0 | Status: COMPLETED | OUTPATIENT
Start: 2024-07-27 | End: 2024-07-27

## 2024-07-27 RX ADMIN — DOXYCYCLINE 100 MILLIGRAM(S): 100 CAPSULE ORAL at 21:20

## 2024-07-27 NOTE — ED PROVIDER NOTE - PHYSICAL EXAMINATION
general: disheveled  no distress  eyes: clear conjunctiva  ent: no tongue fasiculations  cardiac: no murmurs, extrasystole, regular rate, regular rhythm  resp: clear throughout all lung fields, no respiratory distress  abdomen: no CVA tenderness, BS x 4, non tender, no distention, no rashes, no rebound or guarding  msk: pelvis stable, gait steady  skin: multiple abscess with induration and erythema with ttp to b/l upper extremities, no spontaneous drainage

## 2024-07-27 NOTE — ED PROVIDER NOTE - ATTENDING APP SHARED VISIT CONTRIBUTION OF CARE
33-year-old female past medical history noted presents requesting detox.  Patient was seen by  [facility] psychiatry yesterday who recommended patient come to the ED for detox.  Patient last used heroin yesterday and took Xanax around 4 AM.  States no alcohol.  No vomiting, not suicidal, on exam patient in NAD, AAOx3, lungs CTA B/L, multiple areas of induration to bilateral upper extremities,

## 2024-07-27 NOTE — ED PROVIDER NOTE - CLINICAL SUMMARY MEDICAL DECISION MAKING FREE TEXT BOX
I spoke with patient's psychiatrist Dr. Malhotra who confirms patient's story.  He recommends patient be admitted for detox.  Labs were obtained.  Antibiotics were given for skin findings.

## 2024-07-27 NOTE — H&P ADULT - NSHPPHYSICALEXAM_GEN_ALL_CORE
GENERAL:  34y/o Female NAD, resting comfortably.  HEAD:  Atraumatic, Normocephalic  EYES: EOMI, PERRLA, conjunctiva and sclera clear  NECK: Supple, No JVD, no cervical lymphadenopathy, non-tender  CHEST/LUNG: Clear to auscultation bilaterally; No wheeze, rhonchi, or rales  HEART: Regular rate and rhythm; S1&S2  ABDOMEN: Soft, Nontender, Nondistended x 4 quadrants; Bowel sounds present  EXTREMITIES:   Peripheral Pulses Present, No clubbing, no cyanosis, or no edema, no calf tenderness  PSYCH: AAOx3, cooperative, appropriate  NEUROLOGY: WNL  SKIN: b/l forearms IV track lines from IVDA with abscess Vital Signs Last 24 Hrs  T(C): 36.7 (27 Jul 2024 22:33), Max: 36.8 (27 Jul 2024 17:44)  T(F): 98.1 (27 Jul 2024 22:33), Max: 98.2 (27 Jul 2024 17:44)  HR: 47 (27 Jul 2024 22:33) (47 - 50)  BP: 91/51 (27 Jul 2024 22:33) (86/47 - 112/68)  BP(mean): --  RR: 16 (27 Jul 2024 22:33) (16 - 16)  SpO2: 100% (27 Jul 2024 22:33) (97% - 100%)    Parameters below as of 27 Jul 2024 22:33  Patient On (Oxygen Delivery Method): room air        GENERAL:  34y/o Female NAD, resting comfortably.  HEAD:  Atraumatic, Normocephalic  EYES: EOMI, PERRLA, conjunctiva and sclera clear  NECK: Supple, No JVD, no cervical lymphadenopathy, non-tender  CHEST/LUNG: Clear to auscultation bilaterally; No wheeze, rhonchi, or rales  HEART: Regular rate and rhythm; S1&S2  ABDOMEN: Soft, Nontender, Nondistended x 4 quadrants; Bowel sounds present  EXTREMITIES:   Peripheral Pulses Present, No clubbing, no cyanosis, or no edema, no calf tenderness  PSYCH: AAOx3, cooperative, appropriate  NEUROLOGY: WNL  SKIN: b/l forearms IV track lines from IVDA with erythema and tenderness

## 2024-07-27 NOTE — H&P ADULT - NSHPLABSRESULTS_GEN_ALL_CORE
11.0   5.87  )-----------( 278      ( 27 Jul 2024 19:20 )             33.7       07-27    137  |  105  |  7<L>  ----------------------------<  108<H>  4.6   |  22  |  0.6<L>    Ca    9.0      27 Jul 2024 19:20  Mg     1.9     07-27    TPro  6.4  /  Alb  3.5  /  TBili  0.3  /  DBili  x   /  AST  21  /  ALT  15  /  AlkPhos  102  07-27              Urinalysis Basic - ( 27 Jul 2024 19:20 )    Color: x / Appearance: x / SG: x / pH: x  Gluc: 108 mg/dL / Ketone: x  / Bili: x / Urobili: x   Blood: x / Protein: x / Nitrite: x   Leuk Esterase: x / RBC: x / WBC x   Sq Epi: x / Non Sq Epi: x / Bacteria: x                Lactate Trend      CAPILLARY BLOOD GLUCOSE

## 2024-07-27 NOTE — H&P ADULT - HISTORY OF PRESENT ILLNESS
32 y/o female accompanied by her friend at bed side presents to the Ed for benzodiazepan withdrawal sent in by dr Malhotra.  Pt states that   last use  yesterday of heroin 5 bags  IV and  cocaine last night at 0400 and xanax 6 tablets  of 2 mg per day   Pt  with hx of personality disorders smoker , polysubstance abuse, IV drug usage . Patient with prior hx of withdrawal seizures( last seizure 1 year ago) . no alcohol usage. Patient has been using drugs for over 5 years. In addition Pt gets her methadone MMTP from [facility] 110mg po QD . Pt states that she has tomorrow's dose due to clinic closed on Sunday. Pt denies vomiting or diarrhea. no cp, sob, palpitations. patient denies any SI/HI or auditory hallucinations. patient with b/l upper extremity abscesses secondary to IV drug usage.    urine drug screen Pending.    Usage as above: yesterday of heroin : 5 bags  IV and  cocaine last night at 0400 and xanax 6 tablets  of 2 mg per day. Konopin 0.5mg  today to get the edge off.  32 y/o female accompanied by her friend at bed side presents to the Ed for benzodiazepan withdrawal sent in by dr Malhotra.  Pt states that   last use  yesterday of heroin 5 bags  IV and  cocaine last night at 0400 and xanax 5  tablets  of 2 mg per day   Pt  with hx of personality disorders smoker , polysubstance abuse, IV drug usage . Patient with prior hx of withdrawal seizures( last seizure 1 year ago) . no alcohol usage. Patient has been using drugs for over 5 years. In addition Pt gets her methadone MMTP from [facility] 110mg po QD . Pt states that she received today dose.  Pt denies vomiting or diarrhea. no cp, sob, palpitations. patient denies any SI/HI or auditory hallucinations. patient with b/l upper extremity infection secondary to IV drug usage.    urine drug screen Pending.    Usage as above: yesterday of heroin : 5 bags  IV and  cocaine last night at 0400 and xanax 5 tablets  of 2 mg per day. Konopin 0.5mg  today to get the edge off.

## 2024-07-27 NOTE — ED PROVIDER NOTE - OBJECTIVE STATEMENT
34 y/o female with hx of polysubstance abuse, IV drug usage, last use of heroin 5 bags yesterday, IV cocaine last night at 0400 and xanax 6 tablets per day presents to the Ed for withdrawal sent in by dr christian . patient with prior hx of withdrawal seizures. no alcohol usage. patient has been using drugs for over 5 years. no vomiting or diarrhea. no cp, sob, palpitations. patient denies any SI/HI or auditory hallucinations. patient with b/l upper extremity abscesses secondary to IV drug usage. no streaking up arm or fevers. no spontaneous drainage

## 2024-07-27 NOTE — PATIENT PROFILE ADULT - FALL HARM RISK - HARM RISK INTERVENTIONS

## 2024-07-27 NOTE — H&P ADULT - ASSESSMENT
32 y/o female accompanied by her friend at bed side presents to the Ed for benzodiazepan withdrawal sent in by dr Malhotra.  Pt states that   last use  yesterday of heroin 5 bags  IV and  cocaine last night at 0400 and xanax 6 tablets  of 2 mg per day   Pt  with hx of personality disorders smoker , polysubstance abuse, IV drug usage . Patient with prior hx of withdrawal seizures( last seizure 1 year ago) . no alcohol usage. Patient has been using drugs for over 5 years. In addition Pt gets her methadone MMTP from [facility] 110mg po QD . Pt states that she has tomorrow's dose due to clinic closed on Sunday. Pt denies vomiting or diarrhea. no cp, sob, palpitations. patient denies any SI/HI or auditory hallucinations. patient with b/l upper extremity abscesses secondary to IV drug usage.    Benzo Withdrawl:  ativan taper  Addiction medicine /detox consult    Skin infection/ IV drug use :  doxyclycline 100mg poIV BID    Pt on Methadone program/ [facility]:   to be verified  MMTP: methadone 110 mg po qd. pT HAS HER TOMORROW'S DOSE WITH HER  . pt uses 5 bags of heroine in addition to methadone .    psych disorder/ personality disorder:  Lamictal 200mg po QHS  LExapro 20 mg po QHS  Pt claims to be on Klonopin 2mg po Q12, but also use street xanax 2mg po 5 to 6 times daily.    Nicotine addiction:  nicotine patch 21mg  daily    prophylaxis Gi/VTE    Case D/W Inga 34 y/o female accompanied by her friend at bed side presents to the Ed for benzodiazepan withdrawal sent in by dr Malhotra.  Pt states that   last use  yesterday of heroin 5 bags  IV and  cocaine last night at 0400 and xanax 6 tablets  of 2 mg per day   Pt  with hx of personality disorders smoker , polysubstance abuse, IV drug usage . Patient with prior hx of withdrawal seizures( last seizure 1 year ago) . no alcohol usage. Patient has been using drugs for over 5 years. In addition Pt gets her methadone MMTP from [facility] 110mg po QD . Pt states that she has tomorrow's dose due to clinic closed on Sunday. Pt denies vomiting or diarrhea. no cp, sob, palpitations. patient denies any SI/HI or auditory hallucinations. patient with b/l upper extremity abscesses secondary to IV drug usage.    Benzo Withdrawl:  ativan taper  Addiction medicine /detox consult    cellulitis/Skin infection/ IV drug use :  doxyclycline 100mg poIV BID    Pt on Methadone program/ [facility]:   to be verified  MMTP: methadone 110 mg po qd. pT HAS HER TOMORROW'S DOSE WITH HER  . pt uses 5 bags of heroine in addition to methadone .    psych disorder/ personality disorder:  Lamictal 200mg po QHS  LExapro 20 mg po QHS  Pt claims to be on Klonopin 2mg po Q12, but also use street xanax 2mg po 5 to 6 times daily.    Nicotine addiction:  nicotine patch 21mg  daily    prophylaxis Gi/VTE    Case D/W Inga 34 y/o female accompanied by her friend at bed side presents to the Ed for benzodiazepan withdrawal sent in by dr Malhotra.  Pt states that   last use  yesterday of heroin 5 bags  IV and  cocaine last night at 0400 and xanax 5 tablets  of 2 mg per day   Pt  with hx of personality disorders smoker , polysubstance abuse, IV drug usage . Patient with prior hx of withdrawal seizures( last seizure 1 year ago) . no alcohol usage. Patient has been using drugs for over 5 years. In addition Pt gets her methadone MMTP from [facility] 110mg po QD . Pt states that she received today dose.   patient denies any SI/HI or auditory hallucinations. patient with b/l upper extremity infection secondary to IV drug usage.    Benzo Withdrawl:  ativan taper  thiamine folate  counselled  ativan PRN   seizure  precautions  Addiction medicine /detox consult    cellulitis/Skin infection/ IV drug use :  doxyclycline 100mg poIV BID    Pt on Methadone program/ [facility]:  recieved methadone dose today   to be verified  MMTP: methadone 110 mg po qd in am . pT HAS HER TOMORROW'S DOSE WITH HER  . pt uses 5 bags of heroine in addition to methadone .    psych disorder/ personality disorder:  Lamictal 200mg po QHS  LExapro 20 mg po QHS  Pt claims to be on Klonopin 2mg po Q12, but also use street xanax 2mg po 5 to 6 times daily.    Nicotine addiction:  nicotine patch 21mg  daily    prophylaxis Gi/VTE    Case D/W Inga

## 2024-07-27 NOTE — ED PROVIDER NOTE - CARE PLAN
1 Principal Discharge DX:	Benzodiazepine dependence  Secondary Diagnosis:	Opiate abuse, continuous  Secondary Diagnosis:	Abscess of multiple sites of upper arm

## 2024-07-28 LAB
ALBUMIN SERPL ELPH-MCNC: 3 G/DL — LOW (ref 3.5–5.2)
ALP SERPL-CCNC: 85 U/L — SIGNIFICANT CHANGE UP (ref 30–115)
ALT FLD-CCNC: 15 U/L — SIGNIFICANT CHANGE UP (ref 0–41)
ANION GAP SERPL CALC-SCNC: 11 MMOL/L — SIGNIFICANT CHANGE UP (ref 7–14)
AST SERPL-CCNC: 29 U/L — SIGNIFICANT CHANGE UP (ref 0–41)
BILIRUB DIRECT SERPL-MCNC: <0.2 MG/DL — SIGNIFICANT CHANGE UP (ref 0–0.3)
BILIRUB INDIRECT FLD-MCNC: >0.2 MG/DL — SIGNIFICANT CHANGE UP (ref 0.2–1.2)
BILIRUB SERPL-MCNC: 0.4 MG/DL — SIGNIFICANT CHANGE UP (ref 0.2–1.2)
BUN SERPL-MCNC: 12 MG/DL — SIGNIFICANT CHANGE UP (ref 10–20)
CALCIUM SERPL-MCNC: 8.8 MG/DL — SIGNIFICANT CHANGE UP (ref 8.4–10.5)
CHLORIDE SERPL-SCNC: 107 MMOL/L — SIGNIFICANT CHANGE UP (ref 98–110)
CO2 SERPL-SCNC: 21 MMOL/L — SIGNIFICANT CHANGE UP (ref 17–32)
CREAT SERPL-MCNC: 0.7 MG/DL — SIGNIFICANT CHANGE UP (ref 0.7–1.5)
EGFR: 116 ML/MIN/1.73M2 — SIGNIFICANT CHANGE UP
GLUCOSE SERPL-MCNC: 86 MG/DL — SIGNIFICANT CHANGE UP (ref 70–99)
HCT VFR BLD CALC: 33.6 % — LOW (ref 37–47)
HGB BLD-MCNC: 11 G/DL — LOW (ref 12–16)
MAGNESIUM SERPL-MCNC: 1.8 MG/DL — SIGNIFICANT CHANGE UP (ref 1.8–2.4)
MCHC RBC-ENTMCNC: 29.3 PG — SIGNIFICANT CHANGE UP (ref 27–31)
MCHC RBC-ENTMCNC: 32.7 G/DL — SIGNIFICANT CHANGE UP (ref 32–37)
MCV RBC AUTO: 89.6 FL — SIGNIFICANT CHANGE UP (ref 81–99)
NRBC # BLD: 0 /100 WBCS — SIGNIFICANT CHANGE UP (ref 0–0)
PHOSPHATE SERPL-MCNC: 3 MG/DL — SIGNIFICANT CHANGE UP (ref 2.1–4.9)
PLATELET # BLD AUTO: 187 K/UL — SIGNIFICANT CHANGE UP (ref 130–400)
PMV BLD: 10.6 FL — HIGH (ref 7.4–10.4)
POTASSIUM SERPL-MCNC: 5.2 MMOL/L — HIGH (ref 3.5–5)
POTASSIUM SERPL-SCNC: 5.2 MMOL/L — HIGH (ref 3.5–5)
PROT SERPL-MCNC: 5.9 G/DL — LOW (ref 6–8)
RBC # BLD: 3.75 M/UL — LOW (ref 4.2–5.4)
RBC # FLD: 14.5 % — SIGNIFICANT CHANGE UP (ref 11.5–14.5)
SODIUM SERPL-SCNC: 139 MMOL/L — SIGNIFICANT CHANGE UP (ref 135–146)
WBC # BLD: 4.93 K/UL — SIGNIFICANT CHANGE UP (ref 4.8–10.8)
WBC # FLD AUTO: 4.93 K/UL — SIGNIFICANT CHANGE UP (ref 4.8–10.8)

## 2024-07-28 PROCEDURE — 76882 US LMTD JT/FCL EVL NVASC XTR: CPT | Mod: 26,RT

## 2024-07-28 PROCEDURE — 76937 US GUIDE VASCULAR ACCESS: CPT | Mod: 26,59

## 2024-07-28 PROCEDURE — 36569 INSJ PICC 5 YR+ W/O IMAGING: CPT

## 2024-07-28 PROCEDURE — 99232 SBSQ HOSP IP/OBS MODERATE 35: CPT

## 2024-07-28 RX ORDER — CEFAZOLIN SODIUM 10 G
2000 VIAL (EA) INJECTION EVERY 8 HOURS
Refills: 0 | Status: DISCONTINUED | OUTPATIENT
Start: 2024-07-28 | End: 2024-07-29

## 2024-07-28 RX ORDER — CEFAZOLIN SODIUM 10 G
2000 VIAL (EA) INJECTION ONCE
Refills: 0 | Status: COMPLETED | OUTPATIENT
Start: 2024-07-28 | End: 2024-07-28

## 2024-07-28 RX ORDER — BACTERIOSTATIC SODIUM CHLORIDE 0.9 %
250 VIAL (ML) INJECTION ONCE
Refills: 0 | Status: COMPLETED | OUTPATIENT
Start: 2024-07-28 | End: 2024-07-28

## 2024-07-28 RX ORDER — METHADONE HCL 10 MG
110 TABLET ORAL DAILY
Refills: 0 | Status: DISCONTINUED | OUTPATIENT
Start: 2024-07-28 | End: 2024-07-29

## 2024-07-28 RX ORDER — CEFAZOLIN SODIUM 10 G
VIAL (EA) INJECTION
Refills: 0 | Status: DISCONTINUED | OUTPATIENT
Start: 2024-07-28 | End: 2024-07-29

## 2024-07-28 RX ADMIN — Medication 110 MILLIGRAM(S): at 11:40

## 2024-07-28 RX ADMIN — Medication 100 MILLIGRAM(S): at 12:59

## 2024-07-28 RX ADMIN — Medication 1 PATCH: at 11:41

## 2024-07-28 RX ADMIN — LORAZEPAM 2 MILLIGRAM(S): 1 TABLET ORAL at 18:06

## 2024-07-28 RX ADMIN — LORAZEPAM 1.5 MILLIGRAM(S): 1 TABLET ORAL at 22:24

## 2024-07-28 RX ADMIN — Medication 250 MILLILITER(S): at 02:01

## 2024-07-28 RX ADMIN — DOXYCYCLINE 100 MILLIGRAM(S): 100 CAPSULE ORAL at 05:32

## 2024-07-28 RX ADMIN — LORAZEPAM 2 MILLIGRAM(S): 1 TABLET ORAL at 14:02

## 2024-07-28 RX ADMIN — Medication 100 MILLIGRAM(S): at 22:24

## 2024-07-28 RX ADMIN — LORAZEPAM 2 MILLIGRAM(S): 1 TABLET ORAL at 11:08

## 2024-07-28 NOTE — PROGRESS NOTE ADULT - SUBJECTIVE AND OBJECTIVE BOX
Patient seen and examined. Events over the last 24 hours noted.   Pt seen sleeping, states she does not feel well due to withdrawal, otherwise no acute complaints    T(F): 98.4 (07-28-24 @ 11:03), Max: 98.4 (07-28-24 @ 11:03)  HR: 60 (07-28-24 @ 11:03)  BP: 98/61 (07-28-24 @ 11:03)  RR: 18 (07-28-24 @ 11:03)  SpO2: 98% (07-28-24 @ 11:03) (97% - 100%)    PHYSICAL EXAM:  GEN: No acute distress  HEENT: normocephalic, atraumatic, anicteric  LUNGS: b/l breath sounds present, clear to auscultation bilaterally   HEART: S1/S2 present. RRR  ABD: Soft, non-tender, non-distended. Bowel sounds present  EXT: warm, left forearm induration, no erythema   NEURO: awake, no new focal deficits    LABS  07-28    139  |  107  |  12  ----------------------------<  86  5.2<H>   |  21  |  0.7    Ca    8.8      28 Jul 2024 07:36  Phos  3.0     07-28  Mg     1.8     07-28    TPro  5.9<L>  /  Alb  3.0<L>  /  TBili  0.4  /  DBili  <0.2  /  AST  29  /  ALT  15  /  AlkPhos  85  07-28                          11.0   4.93  )-----------( 187      ( 28 Jul 2024 07:36 )             33.6            MEDICATIONS  (STANDING):  doxycycline IVPB 100 milliGRAM(s) IV Intermittent every 12 hours  LORazepam     Tablet 2 milliGRAM(s) Oral every 4 hours  LORazepam     Tablet   Oral   LORazepam     Tablet 1.5 milliGRAM(s) Oral every 4 hours  methadone    Tablet 110 milliGRAM(s) Oral daily  nicotine - 21 mG/24Hr(s) Patch 1 Patch Transdermal daily    MEDICATIONS  (PRN):  acetaminophen     Tablet .. 650 milliGRAM(s) Oral every 6 hours PRN Temp greater or equal to 38C (100.4F), Mild Pain (1 - 3)  aluminum hydroxide/magnesium hydroxide/simethicone Suspension 30 milliLiter(s) Oral every 4 hours PRN Dyspepsia  LORazepam   Injectable 1 milliGRAM(s) IV Push every 4 hours PRN CIWA above 8  ondansetron Injectable 4 milliGRAM(s) IV Push every 8 hours PRN Nausea and/or Vomiting

## 2024-07-28 NOTE — CONSULT NOTE ADULT - ASSESSMENT
33 yr old F w/PMHX: polysubstance abuse, IVDA, personality disorder now admitted for benzo withdrawal. Pt also with RUE cellulitis, poss abscess    1. RUE cellulitis, r/o abscess  2. Benzo withdrawal      INCOMPLETE 33 yr old F w/PMHX: polysubstance abuse, IVDA, personality disorder now admitted for benzo withdrawal. Pt also with RUE cellulitis, poss abscess    1. RUE cellulitis, r/o abscess  2. Benzo withdrawal    -no discreet area of fluctuance appreciated on either UE - to be further evaluated by attending in AM  -cont antibiotics  -arm elevation  -will follow

## 2024-07-28 NOTE — CONSULT NOTE ADULT - SUBJECTIVE AND OBJECTIVE BOX
RADU CONTRERAS [id number]  33y Female  1d    HPI:   34 y/o female accompanied by her friend at bed side presents to the Ed for benzodiazepan withdrawal sent in by dr Malhotra.  Pt states that   last use  yesterday of heroin 5 bags  IV and  cocaine last night at 0400 and xanax 5  tablets  of 2 mg per day   Pt  with hx of personality disorders smoker , polysubstance abuse, IV drug usage . Patient with prior hx of withdrawal seizures( last seizure 1 year ago) . no alcohol usage. Patient has been using drugs for over 5 years. In addition Pt gets her methadone MMTP from [facility] 110mg po QD . Pt states that she received today dose.  Pt denies vomiting or diarrhea. no cp, sob, palpitations. patient denies any SI/HI or auditory hallucinations. patient with b/l upper extremity infection secondary to IV drug usage.    urine drug screen Pending.    Usage as above: yesterday of heroin : 5 bags  IV and  cocaine last night at 0400 and xanax 5 tablets  of 2 mg per day. Konopin 0.5mg  today to get the edge off. (27 Jul 2024 21:12)      PAST MEDICAL & SURGICAL HISTORY:  Hepatitis C      COPD (chronic obstructive pulmonary disease)      Borderline personality disorder      Anxiety and depression      Nicotine dependence      Drug abuse      Heroin abuse      IV drug user      S/P cholecystectomy  in 2010            MEDICATIONS  (STANDING):  ceFAZolin   IVPB      ceFAZolin   IVPB 2000 milliGRAM(s) IV Intermittent every 8 hours  ceFAZolin   IVPB 2000 milliGRAM(s) IV Intermittent once  LORazepam     Tablet 2 milliGRAM(s) Oral every 4 hours  LORazepam     Tablet 1.5 milliGRAM(s) Oral every 4 hours  LORazepam     Tablet   Oral   methadone    Tablet 110 milliGRAM(s) Oral daily  nicotine - 21 mG/24Hr(s) Patch 1 Patch Transdermal daily    MEDICATIONS  (PRN):  acetaminophen     Tablet .. 650 milliGRAM(s) Oral every 6 hours PRN Temp greater or equal to 38C (100.4F), Mild Pain (1 - 3)  aluminum hydroxide/magnesium hydroxide/simethicone Suspension 30 milliLiter(s) Oral every 4 hours PRN Dyspepsia  LORazepam   Injectable 1 milliGRAM(s) IV Push every 4 hours PRN CIWA above 8  ondansetron Injectable 4 milliGRAM(s) IV Push every 8 hours PRN Nausea and/or Vomiting      Allergies    No Known Allergies    Intolerances        REVIEW OF SYSTEMS    [ ] A ten-point review of systems was otherwise negative except as noted.  [ ] Due to altered mental status/intubation, subjective information were not able to be obtained from the patient. History was obtained, to the extent possible, from review of the chart and collateral sources of information.      Vital Signs Last 24 Hrs  T(C): 36.9 (28 Jul 2024 11:03), Max: 36.9 (28 Jul 2024 11:03)  T(F): 98.4 (28 Jul 2024 11:03), Max: 98.4 (28 Jul 2024 11:03)  HR: 60 (28 Jul 2024 11:03) (47 - 60)  BP: 98/61 (28 Jul 2024 11:03) (86/47 - 112/68)  BP(mean): --  RR: 18 (28 Jul 2024 11:03) (16 - 18)  SpO2: 98% (28 Jul 2024 11:03) (97% - 100%)    Parameters below as of 28 Jul 2024 04:40  Patient On (Oxygen Delivery Method): room air        PHYSICAL EXAM:  GENERAL: NAD, well-appearing  CHEST/LUNG: Clear to auscultation bilaterally  HEART: Regular rate and rhythm  ABDOMEN: Soft, Nontender, Nondistended;   EXTREMITIES:  No clubbing, cyanosis, or edema      LABS:  Labs:  CAPILLARY BLOOD GLUCOSE                              11.0   4.93  )-----------( 187      ( 28 Jul 2024 07:36 )             33.6       Auto Neutrophil %: 50.8 % (07-27-24 @ 19:20)  Auto Immature Granulocyte %: 0.2 % (07-27-24 @ 19:20)    07-28    139  |  107  |  12  ----------------------------<  86  5.2<H>   |  21  |  0.7      Calcium: 8.8 mg/dL (07-28-24 @ 07:36)      LFTs:             5.9  | 0.4  | 29       ------------------[85      ( 28 Jul 2024 07:36 )  3.0  | <0.2 | 15          Lipase:x      Amylase:x             Coags:          Alcohol, Blood: <10 mg/dL (07-27-24 @ 19:20)    Urinalysis Basic - ( 28 Jul 2024 07:36 )    Color: x / Appearance: x / SG: x / pH: x  Gluc: 86 mg/dL / Ketone: x  / Bili: x / Urobili: x   Blood: x / Protein: x / Nitrite: x   Leuk Esterase: x / RBC: x / WBC x   Sq Epi: x / Non Sq Epi: x / Bacteria: x    RADIOLOGY & ADDITIONAL STUDIES:  US Extremity Nonvasc Limited, Right (07.28.24 @ 10:46) >  FINDINGS/  IMPRESSION:  Focused evaluation was performed of the right forearm using grayscale and   color Doppler imaging in the antecubital fossa.    In the region of concern there is mild-to-moderate soft tissue edema   additionally within the subcutaneous tissues there is a hypoechoic region   measuring 0.8 x 0.3 x 0.7 cm with minimally increased surrounding   hyperemia which can represent phlegmonous change or possible early   abscess. This does not appear entirely encapsulated and probably not   drainable at this time.    < end of copied text >   RADU CONTRERAS [id number]  33y Female  1d    HPI:   34 y/o female accompanied by her friend at bed side presents to the Ed for benzodiazepan withdrawal sent in by dr Malhotra.  Pt states that   last use  yesterday of heroin 5 bags  IV and  cocaine last night at 0400 and xanax 5  tablets  of 2 mg per day   Pt  with hx of personality disorders smoker , polysubstance abuse, IV drug usage . Patient with prior hx of withdrawal seizures( last seizure 1 year ago) . no alcohol usage. Patient has been using drugs for over 5 years. In addition Pt gets her methadone MMTP from [facility] 110mg po QD . Pt states that she received today dose.  Pt denies vomiting or diarrhea. no cp, sob, palpitations. patient denies any SI/HI or auditory hallucinations. patient with b/l upper extremity infection secondary to IV drug usage.    urine drug screen Pending.    Usage as above: yesterday of heroin : 5 bags  IV and  cocaine last night at 0400 and xanax 5 tablets  of 2 mg per day. Konopin 0.5mg  today to get the edge off. (27 Jul 2024 21:12)      PAST MEDICAL & SURGICAL HISTORY:  Hepatitis C      COPD (chronic obstructive pulmonary disease)      Borderline personality disorder      Anxiety and depression      Nicotine dependence      Drug abuse      Heroin abuse      IV drug user      S/P cholecystectomy  in 2010            MEDICATIONS  (STANDING):  ceFAZolin   IVPB      ceFAZolin   IVPB 2000 milliGRAM(s) IV Intermittent every 8 hours  ceFAZolin   IVPB 2000 milliGRAM(s) IV Intermittent once  LORazepam     Tablet 2 milliGRAM(s) Oral every 4 hours  LORazepam     Tablet 1.5 milliGRAM(s) Oral every 4 hours  LORazepam     Tablet   Oral   methadone    Tablet 110 milliGRAM(s) Oral daily  nicotine - 21 mG/24Hr(s) Patch 1 Patch Transdermal daily    MEDICATIONS  (PRN):  acetaminophen     Tablet .. 650 milliGRAM(s) Oral every 6 hours PRN Temp greater or equal to 38C (100.4F), Mild Pain (1 - 3)  aluminum hydroxide/magnesium hydroxide/simethicone Suspension 30 milliLiter(s) Oral every 4 hours PRN Dyspepsia  LORazepam   Injectable 1 milliGRAM(s) IV Push every 4 hours PRN CIWA above 8  ondansetron Injectable 4 milliGRAM(s) IV Push every 8 hours PRN Nausea and/or Vomiting      Allergies    No Known Allergies    Intolerances        REVIEW OF SYSTEMS    [ ] A ten-point review of systems was otherwise negative except as noted.  [ ] Due to altered mental status/intubation, subjective information were not able to be obtained from the patient. History was obtained, to the extent possible, from review of the chart and collateral sources of information.      Vital Signs Last 24 Hrs  T(C): 36.9 (28 Jul 2024 11:03), Max: 36.9 (28 Jul 2024 11:03)  T(F): 98.4 (28 Jul 2024 11:03), Max: 98.4 (28 Jul 2024 11:03)  HR: 60 (28 Jul 2024 11:03) (47 - 60)  BP: 98/61 (28 Jul 2024 11:03) (86/47 - 112/68)  BP(mean): --  RR: 18 (28 Jul 2024 11:03) (16 - 18)  SpO2: 98% (28 Jul 2024 11:03) (97% - 100%)    Parameters below as of 28 Jul 2024 04:40  Patient On (Oxygen Delivery Method): room air        PHYSICAL EXAM:  GENERAL: NAD,  EXTREMITIES:  multiple b/l UE palpable cords; no discreet area of fluctuance on either upper extremity: +mild edema noted to distal right forearm    LABS:  Labs:  CAPILLARY BLOOD GLUCOSE                              11.0   4.93  )-----------( 187      ( 28 Jul 2024 07:36 )             33.6       Auto Neutrophil %: 50.8 % (07-27-24 @ 19:20)  Auto Immature Granulocyte %: 0.2 % (07-27-24 @ 19:20)    07-28    139  |  107  |  12  ----------------------------<  86  5.2<H>   |  21  |  0.7      Calcium: 8.8 mg/dL (07-28-24 @ 07:36)      LFTs:             5.9  | 0.4  | 29       ------------------[85      ( 28 Jul 2024 07:36 )  3.0  | <0.2 | 15          Lipase:x      Amylase:x             Coags:          Alcohol, Blood: <10 mg/dL (07-27-24 @ 19:20)    Urinalysis Basic - ( 28 Jul 2024 07:36 )    Color: x / Appearance: x / SG: x / pH: x  Gluc: 86 mg/dL / Ketone: x  / Bili: x / Urobili: x   Blood: x / Protein: x / Nitrite: x   Leuk Esterase: x / RBC: x / WBC x   Sq Epi: x / Non Sq Epi: x / Bacteria: x    RADIOLOGY & ADDITIONAL STUDIES:  US Extremity Nonvasc Limited, Right (07.28.24 @ 10:46) >  FINDINGS/  IMPRESSION:  Focused evaluation was performed of the right forearm using grayscale and   color Doppler imaging in the antecubital fossa.    In the region of concern there is mild-to-moderate soft tissue edema   additionally within the subcutaneous tissues there is a hypoechoic region   measuring 0.8 x 0.3 x 0.7 cm with minimally increased surrounding   hyperemia which can represent phlegmonous change or possible early   abscess. This does not appear entirely encapsulated and probably not   drainable at this time.    < end of copied text >

## 2024-07-28 NOTE — PROCEDURE NOTE - NSUS ED ADDITIONAL DETAIL1 FT
RN unable to get peripheral IV 2/2 poor veins. Under US guidance I was able to get a 10x1.88cm IV in R basilic vein. Excellent blood return and flushes easily.
no

## 2024-07-28 NOTE — CONSULT NOTE ADULT - ATTENDING COMMENTS
above noted discussed case with surgical PA on 07/28 sonogram noted no multiple areas of induration of RT arm and left leg no abcess

## 2024-07-29 VITALS
DIASTOLIC BLOOD PRESSURE: 67 MMHG | HEART RATE: 58 BPM | OXYGEN SATURATION: 93 % | SYSTOLIC BLOOD PRESSURE: 104 MMHG | RESPIRATION RATE: 18 BRPM | TEMPERATURE: 98 F

## 2024-07-29 DIAGNOSIS — F19.20 OTHER PSYCHOACTIVE SUBSTANCE DEPENDENCE, UNCOMPLICATED: ICD-10-CM

## 2024-07-29 PROCEDURE — 99252 IP/OBS CONSLTJ NEW/EST SF 35: CPT

## 2024-07-29 PROCEDURE — 90792 PSYCH DIAG EVAL W/MED SRVCS: CPT | Mod: 95

## 2024-07-29 PROCEDURE — 99239 HOSP IP/OBS DSCHRG MGMT >30: CPT

## 2024-07-29 RX ORDER — PANTOPRAZOLE SODIUM 20 MG/1
40 TABLET, DELAYED RELEASE ORAL ONCE
Refills: 0 | Status: DISCONTINUED | OUTPATIENT
Start: 2024-07-29 | End: 2024-07-29

## 2024-07-29 RX ORDER — CHLORDIAZEPOXIDE HCL 10 MG
15 CAPSULE ORAL EVERY 4 HOURS
Refills: 0 | Status: DISCONTINUED | OUTPATIENT
Start: 2024-07-30 | End: 2024-07-29

## 2024-07-29 RX ORDER — CHLORDIAZEPOXIDE HCL 10 MG
20 CAPSULE ORAL EVERY 4 HOURS
Refills: 0 | Status: COMPLETED | OUTPATIENT
Start: 2024-07-29 | End: 2024-07-29

## 2024-07-29 RX ORDER — CHLORDIAZEPOXIDE HCL 10 MG
25 CAPSULE ORAL EVERY 4 HOURS
Refills: 0 | Status: DISCONTINUED | OUTPATIENT
Start: 2024-07-29 | End: 2024-07-29

## 2024-07-29 RX ORDER — VANCOMYCIN HYDROCHLORIDE 5 G/100ML
INJECTION, POWDER, LYOPHILIZED, FOR SOLUTION INTRAVENOUS
Refills: 0 | Status: DISCONTINUED | OUTPATIENT
Start: 2024-07-29 | End: 2024-07-29

## 2024-07-29 RX ORDER — VANCOMYCIN HYDROCHLORIDE 5 G/100ML
1000 INJECTION, POWDER, LYOPHILIZED, FOR SOLUTION INTRAVENOUS EVERY 12 HOURS
Refills: 0 | Status: DISCONTINUED | OUTPATIENT
Start: 2024-07-30 | End: 2024-07-29

## 2024-07-29 RX ORDER — VANCOMYCIN HYDROCHLORIDE 5 G/100ML
1000 INJECTION, POWDER, LYOPHILIZED, FOR SOLUTION INTRAVENOUS ONCE
Refills: 0 | Status: DISCONTINUED | OUTPATIENT
Start: 2024-07-29 | End: 2024-07-29

## 2024-07-29 RX ORDER — CHLORDIAZEPOXIDE HCL 10 MG
10 CAPSULE ORAL EVERY 4 HOURS
Refills: 0 | Status: DISCONTINUED | OUTPATIENT
Start: 2024-07-30 | End: 2024-07-29

## 2024-07-29 RX ORDER — CHLORDIAZEPOXIDE HCL 10 MG
CAPSULE ORAL
Refills: 0 | Status: DISCONTINUED | OUTPATIENT
Start: 2024-07-29 | End: 2024-07-29

## 2024-07-29 RX ORDER — LORAZEPAM 1 MG/1
1 TABLET ORAL ONCE
Refills: 0 | Status: DISCONTINUED | OUTPATIENT
Start: 2024-07-29 | End: 2024-07-29

## 2024-07-29 RX ADMIN — Medication 1 PATCH: at 06:50

## 2024-07-29 RX ADMIN — Medication 1 PATCH: at 11:01

## 2024-07-29 RX ADMIN — Medication 100 MILLIGRAM(S): at 06:10

## 2024-07-29 RX ADMIN — LORAZEPAM 1.5 MILLIGRAM(S): 1 TABLET ORAL at 06:29

## 2024-07-29 RX ADMIN — Medication 1 PATCH: at 14:35

## 2024-07-29 RX ADMIN — Medication 1 PATCH: at 06:51

## 2024-07-29 RX ADMIN — LORAZEPAM 1 MILLIGRAM(S): 1 TABLET ORAL at 14:01

## 2024-07-29 RX ADMIN — Medication 20 MILLIGRAM(S): at 14:35

## 2024-07-29 RX ADMIN — Medication 110 MILLIGRAM(S): at 16:58

## 2024-07-29 NOTE — PROGRESS NOTE ADULT - SUBJECTIVE AND OBJECTIVE BOX
Patient seen and examined. Events over the last 24 hours noted.   Pt seen     T(F): 97.8 (07-29-24 @ 05:00), Max: 98 (07-28-24 @ 20:30)  HR: 68 (07-29-24 @ 05:00)  BP: 115/66 (07-29-24 @ 05:00)  RR: 18 (07-29-24 @ 05:00)  SpO2: 98% (07-29-24 @ 05:00) (98% - 98%)    PHYSICAL EXAM:  GEN: No acute distress  HEENT: normocephalic, atraumatic, anicteric  LUNGS: b/l breath sounds present, clear to auscultation bilaterally   HEART: S1/S2 present. RRR  ABD: Soft, non-tender, non-distended. Bowel sounds present  EXT: warm   NEURO: awake, no new focal deficits    LABS  07-28    139  |  107  |  12  ----------------------------<  86  5.2<H>   |  21  |  0.7    Ca    8.8      28 Jul 2024 07:36  Phos  3.0     07-28  Mg     1.8     07-28    TPro  5.9<L>  /  Alb  3.0<L>  /  TBili  0.4  /  DBili  <0.2  /  AST  29  /  ALT  15  /  AlkPhos  85  07-28                          11.0   4.93  )-----------( 187      ( 28 Jul 2024 07:36 )             33.6            MEDICATIONS  (STANDING):  ceFAZolin   IVPB      ceFAZolin   IVPB 2000 milliGRAM(s) IV Intermittent every 8 hours  chlordiazePOXIDE 20 milliGRAM(s) Oral every 4 hours  chlordiazePOXIDE   Oral   methadone    Tablet 110 milliGRAM(s) Oral daily  nicotine - 21 mG/24Hr(s) Patch 1 Patch Transdermal daily  pantoprazole    Tablet 40 milliGRAM(s) Oral once    MEDICATIONS  (PRN):  acetaminophen     Tablet .. 650 milliGRAM(s) Oral every 6 hours PRN Temp greater or equal to 38C (100.4F), Mild Pain (1 - 3)  aluminum hydroxide/magnesium hydroxide/simethicone Suspension 30 milliLiter(s) Oral every 4 hours PRN Dyspepsia  chlordiazePOXIDE 25 milliGRAM(s) Oral every 4 hours PRN benzo withdrawal symptoms  LORazepam   Injectable 1 milliGRAM(s) IV Push every 4 hours PRN CIWA above 8  ondansetron Injectable 4 milliGRAM(s) IV Push every 8 hours PRN Nausea and/or Vomiting     Patient seen and examined. Events over the last 24 hours noted.   Pt expressed wanting to leave AMA multiple times throughout the day  Non-compliant with medications and testing (ultrasound)   Per nursing staff pt was found to be using heroin and snorting xanax in her hospital room after visitor was with patient    T(F): 97.8 (07-29-24 @ 05:00), Max: 98 (07-28-24 @ 20:30)  HR: 68 (07-29-24 @ 05:00)  BP: 115/66 (07-29-24 @ 05:00)  RR: 18 (07-29-24 @ 05:00)  SpO2: 98% (07-29-24 @ 05:00) (98% - 98%)    PHYSICAL EXAM:  GEN: No acute distress  HEENT: normocephalic, atraumatic, anicteric  LUNGS: b/l breath sounds present, clear to auscultation bilaterally   HEART: S1/S2 present. RRR  ABD: Soft, non-tender, non-distended. Bowel sounds present  EXT: warm. RUE area of induration. LLE area of induration with erythema   NEURO: awake, no new focal deficits    LABS  07-28    139  |  107  |  12  ----------------------------<  86  5.2<H>   |  21  |  0.7    Ca    8.8      28 Jul 2024 07:36  Phos  3.0     07-28  Mg     1.8     07-28    TPro  5.9<L>  /  Alb  3.0<L>  /  TBili  0.4  /  DBili  <0.2  /  AST  29  /  ALT  15  /  AlkPhos  85  07-28                          11.0   4.93  )-----------( 187      ( 28 Jul 2024 07:36 )             33.6            MEDICATIONS  (STANDING):  ceFAZolin   IVPB      ceFAZolin   IVPB 2000 milliGRAM(s) IV Intermittent every 8 hours  chlordiazePOXIDE 20 milliGRAM(s) Oral every 4 hours  chlordiazePOXIDE   Oral   methadone    Tablet 110 milliGRAM(s) Oral daily  nicotine - 21 mG/24Hr(s) Patch 1 Patch Transdermal daily  pantoprazole    Tablet 40 milliGRAM(s) Oral once    MEDICATIONS  (PRN):  acetaminophen     Tablet .. 650 milliGRAM(s) Oral every 6 hours PRN Temp greater or equal to 38C (100.4F), Mild Pain (1 - 3)  aluminum hydroxide/magnesium hydroxide/simethicone Suspension 30 milliLiter(s) Oral every 4 hours PRN Dyspepsia  chlordiazePOXIDE 25 milliGRAM(s) Oral every 4 hours PRN benzo withdrawal symptoms  LORazepam   Injectable 1 milliGRAM(s) IV Push every 4 hours PRN CIWA above 8  ondansetron Injectable 4 milliGRAM(s) IV Push every 8 hours PRN Nausea and/or Vomiting

## 2024-07-29 NOTE — BH CONSULTATION LIAISON ASSESSMENT NOTE - NSBHROSSTATEMENT_PSY_A_CORE
. Normal and symmetric appearance/Without webbing/Clavicles of normal shape, contour & nontender on palpation

## 2024-07-29 NOTE — BH CONSULTATION LIAISON ASSESSMENT NOTE - NSBHCHARTREVIEWLAB_PSY_A_CORE FT
11.0   4.93  )-----------( 187      ( 28 Jul 2024 07:36 )             33.6   07-28    139  |  107  |  12  ----------------------------<  86  5.2<H>   |  21  |  0.7    Ca    8.8      28 Jul 2024 07:36  Phos  3.0     07-28  Mg     1.8     07-28    TPro  5.9<L>  /  Alb  3.0<L>  /  TBili  0.4  /  DBili  <0.2  /  AST  29  /  ALT  15  /  AlkPhos  85  07-28

## 2024-07-29 NOTE — BH CONSULTATION LIAISON ASSESSMENT NOTE - NSBHCONSULTFOLLOWAFTERCARE_PSY_A_CORE FT
CATCH team is involved and is helping coordinate inpatient rehab with patient  If pt does not pursue inpatient rehab, should f/u asap w/ Dr. Malhotra at [facility] program as well as outpatient mental health provider Psych NP Jesse Frey

## 2024-07-29 NOTE — BH CONSULTATION LIAISON ASSESSMENT NOTE - NSBHSABEN_PSY_A_CORE FT
previously dependent on Xanax, reports occasional use of 0.25mg as needed from an old RX but states she hasn't used recently, was prescribed Xanax 2mg TID last dispensed 02/01/2024 RX by Julien Frey

## 2024-07-29 NOTE — DISCHARGE NOTE PROVIDER - HOSPITAL COURSE
AMA (AGAINST MEDICAL ADVICE) DISCHARGE  32 y/o female accompanied by her friend at bed side presents to the Ed for benzodiazepine withdrawal sent in by Dr Malhotra.  Pt states that   last use  yesterday of heroin 5 bags  IV and  cocaine last night at 0400 and xanax 5 tablets  of 2 mg per day   Pt  with hx of personality disorders smoker , polysubstance abuse, IV drug usage . Patient with prior hx of withdrawal seizures( last seizure 1 year ago) . no alcohol usage. Patient has been using drugs for over 5 years. In addition Pt gets her methadone MMTP from [facility] 110mg po QD . Pt states that she received today dose.   patient denies any SI/HI or auditory hallucinations. patient with b/l upper extremity and lower extremity infection secondary to IV drug usage.    Pt was admitted. She was seen by Addiction Medicine team and underwent treatment with Librium taper and methadone. She was seen by ID and Surgery team due to upper and lower extremity cellulitis with concern for abscess. A LLE ultrasound was ordered to rule out abscess but pt refused the test. Pt expressed wanting to leave AMA multiple times throughout the day. Pt was non-compliant with medications and testing (ultrasound). Per nursing staff pt was found to be using heroin and snorting xanax in her hospital room after visitor was with patient. Pt intermittently refused medications but would then complain she was not being treated appropriately.  She expressed strongly she wanted to be discharged against medical advice. Pt also expressed depression and passive SI. Pt was seen by Psychiatry team and was cleared for discharge, stating pt has capacity to leave against medical advice. Pt signed AMA form and left against medical advice. When asked for which pharmacy to send antibiotic she refused to provide pharmacy.

## 2024-07-29 NOTE — BH CONSULTATION LIAISON ASSESSMENT NOTE - NSBHCONSULTRECOMMENDOTHER_PSY_A_CORE FT
If patient remains in the hospital, can restart lexapro at 10mg qhs   Lamotrigine will need to be retitrated from the beginning given pt's reported noncompliance x2-3 weeks --> Starting dose 25mg po qhs

## 2024-07-29 NOTE — BH CONSULTATION LIAISON ASSESSMENT NOTE - NSBHCHARTREVIEWVS_PSY_A_CORE FT
Vital Signs Last 24 Hrs  T(C): 36.6 (29 Jul 2024 05:00), Max: 36.8 (28 Jul 2024 13:09)  T(F): 97.8 (29 Jul 2024 05:00), Max: 98.2 (28 Jul 2024 13:09)  HR: 68 (29 Jul 2024 05:00) (60 - 84)  BP: 115/66 (29 Jul 2024 05:00) (101/62 - 115/66)  BP(mean): --  RR: 18 (29 Jul 2024 05:00) (18 - 18)  SpO2: 98% (29 Jul 2024 05:00) (94% - 98%)    Parameters below as of 29 Jul 2024 05:00  Patient On (Oxygen Delivery Method): room air

## 2024-07-29 NOTE — PROGRESS NOTE ADULT - SUBJECTIVE AND OBJECTIVE BOX
S; Pt seen with Dr. Samayoa: she states the right arm swelling/redness is improved but the redness/swelling on left lower leg is worse. Afebrile  O; Vital Signs Last 24 Hrs  T(C): 36.6 (29 Jul 2024 05:00), Max: 36.9 (28 Jul 2024 11:03)  T(F): 97.8 (29 Jul 2024 05:00), Max: 98.4 (28 Jul 2024 11:03)  HR: 68 (29 Jul 2024 05:00) (60 - 84)  BP: 115/66 (29 Jul 2024 05:00) (98/61 - 115/66)  BP(mean): --  RR: 18 (29 Jul 2024 05:00) (18 - 18)  SpO2: 98% (29 Jul 2024 05:00) (94% - 98%)    Parameters below as of 29 Jul 2024 05:00  Patient On (Oxygen Delivery Method): room air      EXAM:  ext: B/L UE with multiple palpable cords - no erythema/edema noted. RUE with small area of induration (no erythema/fluctuance) to medial elbow area. Left lower extremity with area of erythema/edema/induration to medial aspect below know.      Labs:  CAPILLARY BLOOD GLUCOSE                              11.0   4.93  )-----------( 187      ( 28 Jul 2024 07:36 )             33.6         07-28    139  |  107  |  12  ----------------------------<  86  5.2<H>   |  21  |  0.7          LFTs:             5.9  | 0.4  | 29       ------------------[85      ( 28 Jul 2024 07:36 )  3.0  | <0.2 | 15          Lipase:x      Amylase:x             Coags:        Urinalysis Basic - ( 28 Jul 2024 07:36 )    Color: x / Appearance: x / SG: x / pH: x  Gluc: 86 mg/dL / Ketone: x  / Bili: x / Urobili: x   Blood: x / Protein: x / Nitrite: x   Leuk Esterase: x / RBC: x / WBC x   Sq Epi: x / Non Sq Epi: x / Bacteria: x

## 2024-07-29 NOTE — BH CONSULTATION LIAISON ASSESSMENT NOTE - NSACTIVEVENT_PSY_ALL_CORE
Triggering events leading to humiliation, shame, and/or despair (e.g., Loss of relationship, financial or health status) (real or anticipated)/Substance intoxication or withdrawal Triggering events leading to humiliation, shame, and/or despair (e.g., Loss of relationship, financial or health status) (real or anticipated)/Chronic pain or other acute medical condition/Substance intoxication or withdrawal

## 2024-07-29 NOTE — PROGRESS NOTE ADULT - ASSESSMENT
33 yr old F w/PMHX: polysubstance abuse, IVDA, personality disorder now admitted for benzo withdrawal. Pt also with RUE cellulitis, poss abscess    1. RUE cellulitis, r/o abscess  2. Benzo withdrawal    -no discreet area of fluctuance appreciated on either UE - no surgical intervention warranted  - recommend sonogram of LLE to r/o abscess  -cont antibiotics  -may feed patient  -will follow  
34 y/o female accompanied by her friend at bed side presents to the Ed for benzodiazepan withdrawal sent in by dr Malhotra.  Pt states that   last use  yesterday of heroin 5 bags  IV and  cocaine last night at 0400 and xanax 5 tablets  of 2 mg per day   Pt  with hx of personality disorders smoker , polysubstance abuse, IV drug usage . Patient with prior hx of withdrawal seizures( last seizure 1 year ago) . no alcohol usage. Patient has been using drugs for over 5 years. In addition Pt gets her methadone MMTP from [facility] 110mg po QD . Pt states that she received today dose.   patient denies any SI/HI or auditory hallucinations. patient with b/l upper extremity infection secondary to IV drug usage.    Benzo Withdrawl:  ativan taper  thiamine folate  counselled  ativan PRN   seizure  precautions  Addiction medicine /detox consult    cellulitis/Skin infection/ IV drug use :  doxyclycline 100mg poIV BID  Right forearm US ordered to r/o abscess    Pt on Methadone program/ [facility]:  recieved methadone dose today   to be verified  MMTP: methadone 110 mg po qd in am . pT HAS HER TOMORROW'S DOSE WITH HER  . pt uses 5 bags of heroine in addition to methadone .    psych disorder/ personality disorder:  Lamictal 200mg po QHS  LExapro 20 mg po QHS  Pt claims to be on Klonopin 2mg po Q12, but also use street xanax 2mg po 5 to 6 times daily.    Nicotine addiction:  nicotine patch 21mg  daily    prophylaxis Gi/VTE 
34 y/o female accompanied by her friend at bed side presents to the Ed for benzodiazepan withdrawal sent in by dr Malhotra.  Pt states that   last use  yesterday of heroin 5 bags  IV and  cocaine last night at 0400 and xanax 5 tablets  of 2 mg per day   Pt  with hx of personality disorders smoker , polysubstance abuse, IV drug usage . Patient with prior hx of withdrawal seizures( last seizure 1 year ago) . no alcohol usage. Patient has been using drugs for over 5 years. In addition Pt gets her methadone MMTP from [facility] 110mg po QD . Pt states that she received today dose.   patient denies any SI/HI or auditory hallucinations. patient with b/l upper extremity infection secondary to IV drug usage.    Benzo Withdrawl:  ativan taper  thiamine folate  counselled  ativan PRN   seizure  precautions  Addiction medicine /detox consult    cellulitis/Skin infection/ IV drug use, possible   - right forearm US ordered to r/o abscess: Focused evaluation was performed of the right forearm using grayscale and   color Doppler imaging in the antecubital fossa. In the region of concern there is mild-to-moderate soft tissue edema   additionally within the subcutaneous tissues there is a hypoechoic region  measuring 0.8 x 0.3 x 0.7 cm with minimally increased surrounding  hyperemia which can represent phlegmonous change or possible early  abscess. This does not appear entirely encapsulated and probably not  drainable at this time.  - left lower ext US ordered to r/o abscess: pt refused  - ID consulted, started on IV vanco    Pt on Methadone program/ [facility]:  cont home methadone  . pt uses 5 bags of heroine in addition to methadone .    psych disorder/ personality disorder:  Lamictal 200mg po QHS  LExapro 20 mg po QHS  Pt claims to be on Klonopin 2mg po Q12, but also use street xanax 2mg po 5 to 6 times daily.  Psych consulted, they said pt has capacity to leave AMA     Nicotine addiction:  nicotine patch 21mg  daily    prophylaxis GI/VTE

## 2024-07-29 NOTE — BH CONSULTATION LIAISON ASSESSMENT NOTE - HPI (INCLUDE ILLNESS QUALITY, SEVERITY, DURATION, TIMING, CONTEXT, MODIFYING FACTORS, ASSOCIATED SIGNS AND SYMPTOMS)
34 y/o F, living with family, pphx of opioid use disorder on methadone, benzo abuse, borderline PD, anxiety, pmhx of IVDU c/b Hep C and abscesses, COPD, chronic constipation admitted for benzo withdrawal. Also found to have abscess,  now on antibiotics, surgery and ID following for skin infection. Pt endorsed passive SI to team.    Psychiatry consulted for evaluation of depression and SI.     Of note, pt was reported to have used heroin and xanax while in the hospital, possibly brought in by family members. 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 Also found to have abscesses, now on antibiotics, surgery and ID following for skin infection. Pt endorsed passive SI to team.    Psychiatry consulted for evaluation of depression and SI.     Of note, pt was reported to have used xanax and heroin while in the hospital, possibly brought in by family members. Currently on a CO, no family members allowed to visit or must be supervised.    On assessment, pt was received sitting hunched over at bedside. Upon learning psychiatry was there, she became upset, yelling, not engaging, attempted to leave and had to be stopped by security. Eventually she was brought back in and interview began about 30 minutes later. Patient initially remained irritable, hostile, guarded, defensive and antagonistic toward writer but eventually was able to gain self control and engage throughout the rest of the encounter.    States she is upset because "I don't need to talk to psychiatry. I'm not suicidal. I'm not homicidal. I never have been. If I was I would have figured it out a long time ago". Writer discussed with her that pt made a statement earlier alluding to wanting to die and pt reports she made the statement purely out of frustration, anger, and pain and did not in fact have any intent behind it. States that it only entered her head because she was upset over what she deemed to be inadequate care and how she was being treated in the hospital. She says the thought was fleeting, has not had any active suicidal ideation, intention, method, planning, or preparation. She states that although she is in a lot of discomfort right now, she is motivated to get better and attend rehab although remains set on doing it on her own terms.   She further denies any acute sxs of depression. She denies any acute sxs of nena. She denies any acute sxs of psychosis. No delusions elicited.     Reports abusing multiple substances most recently including crack cocaine, non prescribed xanax, MJ.     States she is in outpatient treatment with Dr. Malhotra and sees him as many days a week as she wants, is currently on methadone 110mg Qam with good effect (denies any withdrawal sxs from opioids).     Reports she is supposed to be taking lexapro and lamictal but has been noncompliant with both of these medications "but I always am and my doctor just restarts them because i've done it so many times"    Psych SW contacted Dr. Malhotra (see collateral note from [facility]). Briefly, he notes multiple personality traits with patient and can go from calm to volatile, be manipulative. Reports she is compliant with her MMTP.  She met with him earlier this week prior to being admitted medically and did not appear far from her baseline and did not endorse any acute psychiatric safety concerns.     [facility] attempted to contact pt's outpatient psych NP however no answer or return phone call.     Pt was able to state her ongoing medical issues and is aware of what could happen if she chooses to treat vs not treat them including her abscesses and ongoing etoh withdrawal. Prefers to be at home however rather than wait in the hospital based on how she has been treated. States that if things worsen she is able to go another hospital. Understands that receiving treatment for both conditions could lead to improvement in her physical outcomes and that not treating abscesses can lead to infection, bone loss, death and that etoh withdrawal can lead to seizures, dts, death. Adamant about leaving. Denies SI/HI/AVH.

## 2024-07-29 NOTE — PROGRESS NOTE ADULT - ATTENDING COMMENTS
above noted discussed case with surgical resident , PA, patient and patient family multiple indurated areas no abcess

## 2024-07-29 NOTE — CONSULT NOTE ADULT - SUBJECTIVE AND OBJECTIVE BOX
Pt interviewed, examined and EMR chart reviewed.  Pt evaluation for polysubstance abuse.  Pt admits to using 4mg of xanax besides her 4mg prescribed klonopin.  Last use day of admission   Hx of withdrawal   variable periods of sobriety in the past.  Pt admits to using   heroine on top of her MMTP up to 5 bags per day. Last use day of admission     Has been in detox before __X___yes,   _____No        MEDICATIONS  (STANDING):  ceFAZolin   IVPB      ceFAZolin   IVPB 2000 milliGRAM(s) IV Intermittent every 8 hours  LORazepam     Tablet 1 milliGRAM(s) Oral every 4 hours  LORazepam     Tablet   Oral   LORazepam     Tablet 1.5 milliGRAM(s) Oral every 4 hours  methadone    Tablet 110 milliGRAM(s) Oral daily  nicotine - 21 mG/24Hr(s) Patch 1 Patch Transdermal daily    MEDICATIONS  (PRN):  acetaminophen     Tablet .. 650 milliGRAM(s) Oral every 6 hours PRN Temp greater or equal to 38C (100.4F), Mild Pain (1 - 3)  aluminum hydroxide/magnesium hydroxide/simethicone Suspension 30 milliLiter(s) Oral every 4 hours PRN Dyspepsia  LORazepam   Injectable 1 milliGRAM(s) IV Push every 4 hours PRN CIWA above 8  ondansetron Injectable 4 milliGRAM(s) IV Push every 8 hours PRN Nausea and/or Vomiting      Vital Signs Last 24 Hrs  T(C): 36.6 (29 Jul 2024 05:00), Max: 36.9 (28 Jul 2024 11:03)  T(F): 97.8 (29 Jul 2024 05:00), Max: 98.4 (28 Jul 2024 11:03)  HR: 68 (29 Jul 2024 05:00) (60 - 84)  BP: 115/66 (29 Jul 2024 05:00) (98/61 - 115/66)  BP(mean): --  RR: 18 (29 Jul 2024 05:00) (18 - 18)  SpO2: 98% (29 Jul 2024 05:00) (94% - 98%)    Parameters below as of 29 Jul 2024 05:00  Patient On (Oxygen Delivery Method): room air        PHYSICAL EXAM:    Constitutional: NAD, well-groomed, well-developed  HEENT: PERRLA, EOMI, Normal Hearing,   Neck: No LAD, No JVD  Back: Normal spine flexure, No CVA tenderness  Respiratory: CTAB/L  Cardiovascular: S1 and S2, RRR, no M/G/R  Gastrointestinal: BS+, soft, NT/ND  Extremities: No peripheral edema  Neurological: A/O x 3, no focal deficits    LABS:                        11.0   4.93  )-----------( 187      ( 28 Jul 2024 07:36 )             33.6     07-28    139  |  107  |  12  ----------------------------<  86  5.2<H>   |  21  |  0.7    Ca    8.8      28 Jul 2024 07:36  Phos  3.0     07-28  Mg     1.8     07-28    TPro  5.9<L>  /  Alb  3.0<L>  /  TBili  0.4  /  DBili  <0.2  /  AST  29  /  ALT  15  /  AlkPhos  85  07-28      Urinalysis Basic - ( 28 Jul 2024 07:36 )    Color: x / Appearance: x / SG: x / pH: x  Gluc: 86 mg/dL / Ketone: x  / Bili: x / Urobili: x   Blood: x / Protein: x / Nitrite: x   Leuk Esterase: x / RBC: x / WBC x   Sq Epi: x / Non Sq Epi: x / Bacteria: x      Drug Screen Urine:  Alcohol Level  Alcohol, Blood: <10 mg/dL (07-27-24 @ 19:20)        RADIOLOGY & ADDITIONAL STUDIES:

## 2024-07-29 NOTE — CONSULT NOTE ADULT - SUBJECTIVE AND OBJECTIVE BOX
DIANE RADU  33y, Female  Allergies    No Known Allergies    Intolerances    LOS  2d    HPI  HPI:   34 y/o female accompanied by her friend at bed side presents to the Ed for benzodiazepan withdrawal sent in by dr Malhotra.  Pt states that   last use  yesterday of heroin 5 bags  IV and  cocaine last night at 0400 and xanax 5  tablets  of 2 mg per day   Pt  with hx of personality disorders smoker , polysubstance abuse, IV drug usage . Patient with prior hx of withdrawal seizures( last seizure 1 year ago) . no alcohol usage. Patient has been using drugs for over 5 years. In addition Pt gets her methadone MMTP from [facility] 110mg po QD . Pt states that she received today dose.  Pt denies vomiting or diarrhea. no cp, sob, palpitations. patient denies any SI/HI or auditory hallucinations. patient with b/l upper extremity infection secondary to IV drug usage.    urine drug screen Pending.    Usage as above: yesterday of heroin : 5 bags  IV and  cocaine last night at 0400 and xanax 5 tablets  of 2 mg per day. Konopin 0.5mg  today to get the edge off. (27 Jul 2024 21:12)      INFECTIOUS DISEASE HISTORY:  Hospital course-  ID consulted for antimicrobial recommendations.     Prior hospital charts reviewed [Yes]  Primary team notes reviewed [Yes]  Other consultant notes reviewed [Yes]    REVIEW OF SYSTEMS:  CONSTITUTIONAL: No fever or chills  HEENT: No sore throat  RESPIRATORY: No cough, no shortness of breath  CARDIOVASCULAR: No chest pain or palpitations  GASTROINTESTINAL: No abdominal or epigastric pain  GENITOURINARY: No dysuria  NEUROLOGICAL: No headache/dizziness  MSK: No joint pain, erythema, or swelling; no back pain  SKIN: No itching, rashes  All other ROS negative except noted above    PAST MEDICAL & SURGICAL HISTORY:  Hepatitis C      COPD (chronic obstructive pulmonary disease)      Borderline personality disorder      Anxiety and depression      Nicotine dependence      Drug abuse      Heroin abuse      IV drug user      S/P cholecystectomy  in 2010          SOCIAL HISTORY:  -No recent travelling history.    FAMILY HISTORY:  Family history of diverticulitis of colon (Grandparent)    Family history of diabetes mellitus (Grandparent)    ANTIMICROBIALS:  vancomycin  IVPB    vancomycin  IVPB 1000 once      ANTIMICROBIALS (past 90 days):  MEDICATIONS  (STANDING):    ceFAZolin   IVPB   100 mL/Hr IV Intermittent (07-29-24 @ 06:10)   100 mL/Hr IV Intermittent (07-28-24 @ 22:24)    ceFAZolin   IVPB   100 mL/Hr IV Intermittent (07-28-24 @ 12:59)    doxycycline IVPB   100 mL/Hr IV Intermittent (07-28-24 @ 05:32)    doxycycline monohydrate Capsule   100 milliGRAM(s) Oral (07-27-24 @ 21:20)        OTHER MEDS:   MEDICATIONS  (STANDING):  acetaminophen     Tablet .. 650 every 6 hours PRN  aluminum hydroxide/magnesium hydroxide/simethicone Suspension 30 every 4 hours PRN  chlordiazePOXIDE 20 every 4 hours  chlordiazePOXIDE    chlordiazePOXIDE 25 every 4 hours PRN  LORazepam   Injectable 1 every 4 hours PRN  methadone    Tablet 110 daily  ondansetron Injectable 4 every 8 hours PRN  pantoprazole    Tablet 40 once      VITALS:  Vital Signs Last 24 Hrs  T(F): 98.2 (07-29-24 @ 17:08), Max: 98.4 (07-28-24 @ 11:03)    Vital Signs Last 24 Hrs  HR: 58 (07-29-24 @ 17:08) (58 - 68)  BP: 104/67 (07-29-24 @ 17:08) (101/62 - 115/66)  RR: 18 (07-29-24 @ 17:08)  SpO2: 93% (07-29-24 @ 17:08) (93% - 98%)  Wt(kg): --    EXAM:  GENERAL: NAD  HEAD: No head lesions  NECK: Supple  CHEST/LUNG: Clear to auscultation bilaterally  HEART: S1 S2  ABDOMEN: Soft, nontender  EXTREMITIES: Left upper extremity with multiple scar marks and nodules. no fluctuance. Right extremity with similar marks noted. Left lower extremity calf tenderness.   NERVOUS SYSTEM: Alert and oriented to person  Labs:                        11.0   4.93  )-----------( 187      ( 28 Jul 2024 07:36 )             33.6     07-28    139  |  107  |  12  ----------------------------<  86  5.2<H>   |  21  |  0.7    Ca    8.8      28 Jul 2024 07:36  Phos  3.0     07-28  Mg     1.8     07-28    TPro  5.9<L>  /  Alb  3.0<L>  /  TBili  0.4  /  DBili  <0.2  /  AST  29  /  ALT  15  /  AlkPhos  85  07-28      WBC Trend:  WBC Count: 4.93 (07-28-24 @ 07:36)  WBC Count: 5.87 (07-27-24 @ 19:20)      Auto Neutrophil #: 2.99 K/uL (07-27-24 @ 19:20)      Creatine Trend:  Creatinine: 0.7 (07-28)  Creatinine: 0.6 (07-27)      Liver Biochemical Testing Trend:  Alanine Aminotransferase (ALT/SGPT): 15 (07-28)  Alanine Aminotransferase (ALT/SGPT): 15 (07-27)  Alanine Aminotransferase (ALT/SGPT): 21 (06-25)  Aspartate Aminotransferase (AST/SGOT): 29 (07-28-24 @ 07:36)  Aspartate Aminotransferase (AST/SGOT): 21 (07-27-24 @ 19:20)  Aspartate Aminotransferase (AST/SGOT): 32 (06-25-24 @ 15:04)  Bilirubin Direct: <0.2 (07-28)  Bilirubin Total: 0.4 (07-28)  Bilirubin Total: 0.3 (07-27)  Bilirubin Total: 0.2 (06-25)      Trend LDH      Auto Eosinophil %: 1.9 % (07-27-24 @ 19:20)      Urinalysis Basic - ( 28 Jul 2024 07:36 )    Color: x / Appearance: x / SG: x / pH: x  Gluc: 86 mg/dL / Ketone: x  / Bili: x / Urobili: x   Blood: x / Protein: x / Nitrite: x   Leuk Esterase: x / RBC: x / WBC x   Sq Epi: x / Non Sq Epi: x / Bacteria: x        MICROBIOLOGY:    Female    Culture - Blood (collected 28 Jul 2024 07:36)  Source: .Blood None  Preliminary Report (29 Jul 2024 18:01):    No growth at 24 hours                        INFLAMMATORY MARKERS      RADIOLOGY & ADDITIONAL TESTS:  I have personally reviewed the imagings.  CXR      CT    < from: US Extremity Nonvasc Limited, Right (07.28.24 @ 10:46) >    FINDINGS/  IMPRESSION:  Focused evaluation was performed of the right forearm using grayscale and   color Doppler imaging in the antecubital fossa.    In the region of concern there is mild-to-moderate soft tissue edema   additionally within the subcutaneous tissues there is a hypoechoic region   measuring 0.8 x 0.3 x 0.7 cm with minimally increased surrounding   hyperemia which can represent phlegmonous change or possible early   abscess. This does not appear entirely encapsulated and probably not   drainable at this time.    --- End of Report ---      < end of copied text >    CARDIOLOGY TESTING  12 Lead ECG:   Ventricular Rate 51 BPM    Atrial Rate 51 BPM    P-R Interval 90 ms    QRS Duration 94 ms    Q-T Interval 470 ms    QTC Calculation(Bazett) 433 ms    P Axis 74 degrees    R Axis 97 degrees    T Axis 57 degrees    Diagnosis Line Sinus bradycardia with short AL  Rightward axis  Borderline ECG    Confirmed by SHAYLEE BROOKS MD ([id number]) on 7/29/2024 7:01:23 AM (07-27-24 @ 18:39)

## 2024-07-29 NOTE — BH CONSULTATION LIAISON ASSESSMENT NOTE - ADDITIONAL PSYCHIATRIC MEDICATIONS
istop checked Reference #: [id number]    Since April this year, patient has been rx'd Klonopin 1mg BID for 30 days by Dr. Quinten Bishop

## 2024-07-29 NOTE — DISCHARGE NOTE PROVIDER - NSDCCPCAREPLAN_GEN_ALL_CORE_FT
PRINCIPAL DISCHARGE DIAGNOSIS  Diagnosis: Benzodiazepine dependence  Assessment and Plan of Treatment: You were seen by Addiction Medicine team. You were treated for benzodiazepine addiction with librium taper but you decided to leave against medical advice.      SECONDARY DISCHARGE DIAGNOSES  Diagnosis: Opiate abuse, continuous  Assessment and Plan of Treatment: You were continued on methadone.    Diagnosis: Abscess of multiple sites of upper arm  Assessment and Plan of Treatment: You were being treated for skin infection with IV antibiotic but left against medical advice. Return to ED if you wish to continue treatment or if skin infection worsens or if you develop fever or chills.

## 2024-07-29 NOTE — BH CONSULTATION LIAISON ASSESSMENT NOTE - RISK ASSESSMENT
Pt is at low imminent risk of suicide and harm to others in the context of mental illness. However, patient has chronically elevated risk for harm to self and others due to ongoing serious substance abuse, past history of suicide attempt, past history of psychiatric hospitalization, and borderline personality disorder. Pt also has chronic risk factors of low frustration tolerance, poor impulse control and poor coping skills, which places her at elevated risk of harm to self or others. Due to ongoing Heroin use coupled with high dose Methadone use, patient is also at risk of an accidental overdose, though she expressed a desire to stop using Fentanyl and has not used this in the past two days. She states that she does not plan to use this medication as her Methadone dose is now stable and she feels she is getting relief from it and no longer in opioid withdrawal. Although patient has aforementioned risk factors, she has a myriad of protective factors that mitigate her risk factors. She is highly motivated for treatment and engaged, good insight and education about illness, she denies suicidal ideation ,intent, and plan, she denies violent ideation, intent, and plan, she is compliant with meeting with psychiatrist, she has no access to guns, she is future oriented (plans to go to methadone clinic tomorrow and meet with psychiatrist) and she identifies reasons to live, mainly her little sisters whom she feels very close to. Pt is at low imminent risk of suicide and harm to others in the context of mental illness. However, patient has chronically elevated risk for harm to self and others due to ongoing serious substance abuse, past history of suicide attempt, past history of psychiatric hospitalization, and borderline personality disorder. Pt also has chronic risk factors of low frustration tolerance, poor impulse control and poor coping skills, which places her at elevated risk of harm to self or others. Due to ongoing Heroin use coupled with high dose Methadone use, patient is also at risk of an accidental overdose, though she expressed a desire to stop. She states that she does not plan to use this medication as her Methadone dose is now stable and she feels she is getting relief from it and no longer in opioid withdrawal. Although patient has aforementioned risk factors, she has a myriad of protective factors that mitigate her risk factors. She is motivated for treatment and engaged, good insight and education about illness, she denies suicidal ideation, intent, and plan, she denies violent ideation, intent, and plan, she is compliant with meeting with psychiatrist at methadone clinic, she has no access to guns, she is future oriented (plans to go to methadone clinic tomorrow and meet with psychiatrist, also wants to attend inpatient rehab) and she identifies reasons to live

## 2024-07-29 NOTE — BH CONSULTATION LIAISON ASSESSMENT NOTE - NSBHREFERDETAILS_PSY_A_CORE_FT
34 y/o F, hx of polysubstance abuse, admitted for benzo withdrawal. Endorsed depression and passive SI. Consult for eval of depression and SI

## 2024-07-29 NOTE — CONSULT NOTE ADULT - PROBLEM SELECTOR RECOMMENDATION 9
After evaluation at this time would change protocol to librium long acting since abusing klonopin and xanax protocol. Pt should be on Thiamine and Folic acid. Pt will be monitored and supportive care provided.  Obtain UDS if not done already.  Use of Vistaril for anxiety.    Consider adding gabapentin for anxiety standing order and follow up with PMD/Program for continuation of treatment.    counseling provided   CATCH team involved for aftercare and pt will follow up with aftercare to [facility]hab and [facility]    Pt is high risk to use while on unit. secondary to past behaviors including last visit.   Pt states she didn't even know it was with her. As a result of this would recommend search of belongings for patient safety, in case she didn't realize she brought drugs with her into hospital again.

## 2024-07-29 NOTE — BH CONSULTATION LIAISON ASSESSMENT NOTE - CURRENT MEDICATION
MEDICATIONS  (STANDING):  ceFAZolin   IVPB      ceFAZolin   IVPB 2000 milliGRAM(s) IV Intermittent every 8 hours  chlordiazePOXIDE 20 milliGRAM(s) Oral every 4 hours  chlordiazePOXIDE   Oral   methadone    Tablet 110 milliGRAM(s) Oral daily  nicotine - 21 mG/24Hr(s) Patch 1 Patch Transdermal daily    MEDICATIONS  (PRN):  acetaminophen     Tablet .. 650 milliGRAM(s) Oral every 6 hours PRN Temp greater or equal to 38C (100.4F), Mild Pain (1 - 3)  aluminum hydroxide/magnesium hydroxide/simethicone Suspension 30 milliLiter(s) Oral every 4 hours PRN Dyspepsia  chlordiazePOXIDE 25 milliGRAM(s) Oral every 4 hours PRN benzo withdrawal symptoms  LORazepam   Injectable 1 milliGRAM(s) IV Push every 4 hours PRN CIWA above 8  ondansetron Injectable 4 milliGRAM(s) IV Push every 8 hours PRN Nausea and/or Vomiting

## 2024-07-29 NOTE — DISCHARGE NOTE PROVIDER - NSDCMRMEDTOKEN_GEN_ALL_CORE_FT
doxycycline hyclate 100 mg oral tablet: 1 tab(s) orally 2 times a day  gabapentin 300 mg oral tablet: 1 tab(s) orally 2 times a day  lamoTRIgine 200 mg oral tablet: 1 tab(s) orally once a day (at bedtime)  Lexapro 20 mg oral tablet: 1 tab(s) orally once a day (at bedtime)  methadone 10 mg oral tablet: 11 tab(s) orally once a day MMTP

## 2024-07-29 NOTE — BH CONSULTATION LIAISON ASSESSMENT NOTE - SUMMARY
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 Also found to have abscesses, now on antibiotics, surgery and ID following for skin infection. Pt endorsed passive SI to team.    Psychiatry consulted for evaluation of depression and SI and capacity to leave AMA.    On assessment, pt is irritable, hostile, yelling, guarded, but then engages. She admits to making passive suicidal statement out of frustration. Denies this being constant. Denies any active suicidal ideation, intention, method planning, preparation. Denies HI. Denies psychotic sxs. She has been engaging in multiple high risk behaviors that appear to stem from partially treated substance use disorders coupled with her personality disorder. Additionally, her behavior during this admission has not reflected any desire to hurt herself or end her life, but likely reflects the severity of her addiction.   Although patient was extremely upset with having to speak to writer initially, she was able to calm down enough to effectively engage and cooperate, discuss her plans and motivation to eventually attend inpatient rehab. She is not acutely suicidal or homicidal. Discussed potential for inpatient admission which she adamantly refused, and is currently not meeting criteria for involuntary inpatient admission, and would rather follow up with her psychiatric treatment as an outpatient.   Regarding her capacity to leave AMA, pt consistently expressed a choice to leave, showed an understanding of her active medical issues being treated, is aware she has each of them and is able to talk through scenarios of what could happen with treatment vs nontreatment and how she could proceed with each option. Given my assessment, patient at this time does have capacity to leave AMA.

## 2024-07-29 NOTE — DISCHARGE NOTE PROVIDER - CARE PROVIDER_API CALL
Quinten Bishop  Internal Medicine  [address] [place] [address]  Phone: [phone]  Fax: [phone]  Follow Up Time: 1 week

## 2024-07-29 NOTE — CONSULT NOTE ADULT - ASSESSMENT
ASSESSMENT  This is a 32 y/o with history of polysubstance use disorder is presenting for abscesses.     IMPRESSION  #Bilateral extremity abscesses, cellulitis   #History of poly substance use disorder. On methadone?  #Screen for STD  #Mood and anxiety disorders.  #History of Hep C (likely Self cleared), Hep B Immune.   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      RECOMMENDATIONS  -Check HIV and Hepatitis C screen. Screen for Syphilis and urine G/C   -Follow up with blood cultures.   -Obtain left upper extremity US to rule out abscess. May need to be drained.   -D/C cefazolin.   -Keep on IV vancomycin. Dosing as per the pharmacy protocol.   -Catch team/addiction medicine following.     If any questions, please send a message or call on [employer] Teams  Please continue to update ID with any pertinent new laboratory or radiographic findings.    Bob Gallegos M.D  Infectious Diseases Attending/   [employer] at \[facility]\""/[facility]

## 2024-07-29 NOTE — PHARMACOTHERAPY INTERVENTION NOTE - COMMENTS
As discussed, agreed to change cefazolin to vancomycin 1g IV q12h (predicted steady-state AUC/SANJU of 463 mg/L*h, as per PrecisePK calculations).    Kiera Hamlin, PharmD, [facility]  Clinical Pharmacy Specialist, Infectious Diseases  Tele-Antimicrobial Stewardship Program (Tele-ASP)  Tele-ASP Phone: [phone]  
As per policy, ordered a vancomycin level for 7/31 at 11am in order to assist with vancomycin pharmacokinetic monitoring.    Kiera Hamlin, PharmD, [facility]  Clinical Pharmacy Specialist, Infectious Diseases  Tele-Antimicrobial Stewardship Program (Tele-ASP)  Tele-ASP Phone: [phone]

## 2024-07-29 NOTE — CHART NOTE - NSCHARTNOTEFT_GEN_A_CORE
================  FOR EACH COLLATERAL   ================  NAME: Dr. Malhotra     NUMBER: Spoke via [employer] Teams  RELATIONSHIP: Psychiatrist from [facility]  RELIABILITY: High  Patient gives permission to obtain collateral from Dr. Malhotra:  ( x ) Yes  (  )  No  Rationale for overriding objection            (  ) Lack of capacity. Details: ________            (  ) Assessing risk of danger to self/others. Details: ________  Rationale for selecting specific collateral source            (  ) Potential to impact risk of danger to self/others and no alternative equivalent. Details:  Collateral has requested that the information provided remain confidential: Yes [  ] No [ x ]  Collateral has provided information that patient is/may be unaware of: Yes [  ] No [ x ]    Patient is a 33F with Hx/o polysubstance use, currently attending [facility] program, with PPHx/o Borderline Personality D/o. Patient is medically admitted for skin abscess, and was evaluated by telepsych for suicidality. Dr Malhotra states at baseline patient ranges from calm to volatile, and has had a hx/o signing out AMA after requesting admission to rehab. Dr. Malhotra states patient is compliant with methadone. Dr. Malhotra states patient has not endorsed SI, and denies acute psychiatric safety concern at this time. Dr. Malhotra expresses there is concern for patient's polysubstance use - patient does not have an upcoming appointment however patient comes to the clinic regularly on a walk-in basis.

## 2024-07-29 NOTE — BH CONSULTATION LIAISON ASSESSMENT NOTE - NSBHCHARTREVIEWINVESTIGATE_PSY_A_CORE FT
[phone]
  Ventricular Rate 51 BPM    Atrial Rate 51 BPM    P-R Interval 90 ms    QRS Duration 94 ms    Q-T Interval 470 ms    QTC Calculation(Bazett) 433 ms    P Axis 74 degrees    R Axis 97 degrees    T Axis 57 degrees    Diagnosis Line Sinus bradycardia with short UT  Rightward axis  Borderline ECG    Confirmed by SHAYLEE BROOKS MD ([id number]) on 7/29/2024 7:01:23 AM

## 2024-07-29 NOTE — BH CONSULTATION LIAISON ASSESSMENT NOTE - VIOLENCE RISK FACTORS:
Substance abuse/Affective dysregulation/Impulsivity/Community stressors that increase the risk of destabilization Substance abuse/Affective dysregulation/Impulsivity/Community stressors that increase the risk of destabilization/Irritability

## 2024-07-29 NOTE — DISCHARGE NOTE PROVIDER - ATTENDING DISCHARGE PHYSICAL EXAMINATION:
GEN: No acute distress  HEENT: normocephalic, atraumatic, anicteric  LUNGS: b/l breath sounds present, clear to auscultation bilaterally   HEART: S1/S2 present. RRR  ABD: Soft, non-tender, non-distended. Bowel sounds present  EXT: warm. RUE area of induration. LLE area of induration with erythema   NEURO: awake, no new focal deficits

## 2024-07-29 NOTE — BH CONSULTATION LIAISON ASSESSMENT NOTE - DESCRIPTION
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 Currently engaged. Boyfriend  of overdose 11 years ago

## 2024-07-29 NOTE — BH CONSULTATION LIAISON ASSESSMENT NOTE - ADDITIONAL DETAILS ALL
reports trying to overdose on Suboxone at age 18 - "didn't realize you can't do that" Per chart review, pt tried to overdose on Suboxone at age 18 - "didn't realize you can't do that"

## 2024-07-29 NOTE — CONSULT NOTE ADULT - TIME BILLING
On this date of service, level of risk to patient is considered: Moderate.     Due to: High risk medications that require intesive monitoring: IV vancomycin  Reviewed consultant notes  Reviewed labs     I have personally seen and examined this patient.    I have reviewed all pertinent clinical information and reviewed all relevant imaging and diagnostic studies personally.   I discussed recommendations with the primary team.I discussed recommendations with the primary team.

## 2024-08-02 LAB
CULTURE RESULTS: SIGNIFICANT CHANGE UP
SPECIMEN SOURCE: SIGNIFICANT CHANGE UP

## 2024-08-03 ENCOUNTER — EMERGENCY (EMERGENCY)
Facility: HOSPITAL | Age: 34
LOS: 0 days | Discharge: HOME | End: 2024-08-03
Attending: EMERGENCY MEDICINE
Payer: MEDICAID

## 2024-08-03 VITALS
OXYGEN SATURATION: 97 % | DIASTOLIC BLOOD PRESSURE: 70 MMHG | RESPIRATION RATE: 18 BRPM | HEART RATE: 64 BPM | SYSTOLIC BLOOD PRESSURE: 108 MMHG | HEIGHT: 62 IN | TEMPERATURE: 98 F

## 2024-08-03 VITALS — WEIGHT: 149.91 LBS

## 2024-08-03 DIAGNOSIS — F17.200 NICOTINE DEPENDENCE, UNSPECIFIED, UNCOMPLICATED: ICD-10-CM

## 2024-08-03 DIAGNOSIS — F14.19 COCAINE ABUSE WITH UNSPECIFIED COCAINE-INDUCED DISORDER: ICD-10-CM

## 2024-08-03 DIAGNOSIS — L05.91 PILONIDAL CYST WITHOUT ABSCESS: ICD-10-CM

## 2024-08-03 DIAGNOSIS — F41.9 ANXIETY DISORDER, UNSPECIFIED: ICD-10-CM

## 2024-08-03 DIAGNOSIS — Z90.49 ACQUIRED ABSENCE OF OTHER SPECIFIED PARTS OF DIGESTIVE TRACT: Chronic | ICD-10-CM

## 2024-08-03 DIAGNOSIS — J44.9 CHRONIC OBSTRUCTIVE PULMONARY DISEASE, UNSPECIFIED: ICD-10-CM

## 2024-08-03 DIAGNOSIS — F11.10 OPIOID ABUSE, UNCOMPLICATED: ICD-10-CM

## 2024-08-03 DIAGNOSIS — F32.A DEPRESSION, UNSPECIFIED: ICD-10-CM

## 2024-08-03 PROCEDURE — 99283 EMERGENCY DEPT VISIT LOW MDM: CPT

## 2024-08-03 RX ORDER — SULFAMETHOXAZOLE/TRIMETHOPRIM 800-160 MG
1 TABLET ORAL
Qty: 20 | Refills: 0
Start: 2024-08-03 | End: 2024-08-12

## 2024-08-03 RX ORDER — AMOXICILLIN AND CLAVULANATE POTASSIUM 500; 125 MG/1; MG/1
1 TABLET, FILM COATED ORAL
Qty: 20 | Refills: 0
Start: 2024-08-03 | End: 2024-08-12

## 2024-08-03 RX ORDER — SULFAMETHOXAZOLE/TRIMETHOPRIM 800-160 MG
1 TABLET ORAL ONCE
Refills: 0 | Status: COMPLETED | OUTPATIENT
Start: 2024-08-03 | End: 2024-08-03

## 2024-08-03 RX ORDER — AMOXICILLIN AND CLAVULANATE POTASSIUM 500; 125 MG/1; MG/1
1 TABLET, FILM COATED ORAL ONCE
Refills: 0 | Status: COMPLETED | OUTPATIENT
Start: 2024-08-03 | End: 2024-08-03

## 2024-08-03 RX ADMIN — Medication 1 TABLET(S): at 19:28

## 2024-08-03 RX ADMIN — AMOXICILLIN AND CLAVULANATE POTASSIUM 1 TABLET(S): 500; 125 TABLET, FILM COATED ORAL at 19:28

## 2024-08-06 DIAGNOSIS — F41.9 ANXIETY DISORDER, UNSPECIFIED: ICD-10-CM

## 2024-08-06 DIAGNOSIS — Z90.49 ACQUIRED ABSENCE OF OTHER SPECIFIED PARTS OF DIGESTIVE TRACT: ICD-10-CM

## 2024-08-06 DIAGNOSIS — L03.113 CELLULITIS OF RIGHT UPPER LIMB: ICD-10-CM

## 2024-08-06 DIAGNOSIS — F11.10 OPIOID ABUSE, UNCOMPLICATED: ICD-10-CM

## 2024-08-06 DIAGNOSIS — F39 UNSPECIFIED MOOD [AFFECTIVE] DISORDER: ICD-10-CM

## 2024-08-06 DIAGNOSIS — F17.290 NICOTINE DEPENDENCE, OTHER TOBACCO PRODUCT, UNCOMPLICATED: ICD-10-CM

## 2024-08-06 DIAGNOSIS — F13.239 SEDATIVE, HYPNOTIC OR ANXIOLYTIC DEPENDENCE WITH WITHDRAWAL, UNSPECIFIED: ICD-10-CM

## 2024-08-06 DIAGNOSIS — D84.9 IMMUNODEFICIENCY, UNSPECIFIED: ICD-10-CM

## 2024-08-06 DIAGNOSIS — F32.A DEPRESSION, UNSPECIFIED: ICD-10-CM

## 2024-08-06 DIAGNOSIS — Z91.148 PATIENT'S OTHER NONCOMPLIANCE WITH MEDICATION REGIMEN FOR OTHER REASON: ICD-10-CM

## 2024-08-06 DIAGNOSIS — Z86.19 PERSONAL HISTORY OF OTHER INFECTIOUS AND PARASITIC DISEASES: ICD-10-CM

## 2024-08-06 DIAGNOSIS — Z91.199 PATIENT'S NONCOMPLIANCE WITH OTHER MEDICAL TREATMENT AND REGIMEN DUE TO UNSPECIFIED REASON: ICD-10-CM

## 2024-08-06 DIAGNOSIS — F14.19 COCAINE ABUSE WITH UNSPECIFIED COCAINE-INDUCED DISORDER: ICD-10-CM

## 2024-08-06 DIAGNOSIS — Z71.51 DRUG ABUSE COUNSELING AND SURVEILLANCE OF DRUG ABUSER: ICD-10-CM

## 2024-08-06 DIAGNOSIS — L02.414 CUTANEOUS ABSCESS OF LEFT UPPER LIMB: ICD-10-CM

## 2024-08-06 DIAGNOSIS — Z79.899 OTHER LONG TERM (CURRENT) DRUG THERAPY: ICD-10-CM

## 2024-08-06 DIAGNOSIS — Z91.51 PERSONAL HISTORY OF SUICIDAL BEHAVIOR: ICD-10-CM

## 2024-08-06 DIAGNOSIS — L02.415 CUTANEOUS ABSCESS OF RIGHT LOWER LIMB: ICD-10-CM

## 2024-08-09 RX ORDER — METHADONE HCL 10 MG
11 TABLET ORAL
Refills: 0 | DISCHARGE

## 2024-08-18 ENCOUNTER — INPATIENT (INPATIENT)
Facility: HOSPITAL | Age: 34
LOS: 7 days | Discharge: HOME | DRG: 776 | End: 2024-08-26
Attending: STUDENT IN AN ORGANIZED HEALTH CARE EDUCATION/TRAINING PROGRAM | Admitting: INTERNAL MEDICINE
Payer: MEDICAID

## 2024-08-18 VITALS
SYSTOLIC BLOOD PRESSURE: 105 MMHG | HEIGHT: 62 IN | DIASTOLIC BLOOD PRESSURE: 46 MMHG | RESPIRATION RATE: 18 BRPM | OXYGEN SATURATION: 99 % | TEMPERATURE: 98 F | HEART RATE: 99 BPM

## 2024-08-18 DIAGNOSIS — Z90.49 ACQUIRED ABSENCE OF OTHER SPECIFIED PARTS OF DIGESTIVE TRACT: Chronic | ICD-10-CM

## 2024-08-18 PROCEDURE — 99285 EMERGENCY DEPT VISIT HI MDM: CPT

## 2024-08-19 DIAGNOSIS — F11.20 OPIOID DEPENDENCE, UNCOMPLICATED: ICD-10-CM

## 2024-08-19 DIAGNOSIS — F19.20 OTHER PSYCHOACTIVE SUBSTANCE DEPENDENCE, UNCOMPLICATED: ICD-10-CM

## 2024-08-19 LAB
ALBUMIN SERPL ELPH-MCNC: 3.5 G/DL — SIGNIFICANT CHANGE UP (ref 3.5–5.2)
ALP SERPL-CCNC: 197 U/L — HIGH (ref 30–115)
ALT FLD-CCNC: 50 U/L — HIGH (ref 0–41)
ANION GAP SERPL CALC-SCNC: 13 MMOL/L — SIGNIFICANT CHANGE UP (ref 7–14)
APAP SERPL-MCNC: <5 UG/ML — LOW (ref 10–30)
AST SERPL-CCNC: 87 U/L — HIGH (ref 0–41)
BASOPHILS # BLD AUTO: 0.04 K/UL — SIGNIFICANT CHANGE UP (ref 0–0.2)
BASOPHILS NFR BLD AUTO: 0.3 % — SIGNIFICANT CHANGE UP (ref 0–1)
BILIRUB SERPL-MCNC: 0.3 MG/DL — SIGNIFICANT CHANGE UP (ref 0.2–1.2)
BUN SERPL-MCNC: 8 MG/DL — LOW (ref 10–20)
CALCIUM SERPL-MCNC: 9.2 MG/DL — SIGNIFICANT CHANGE UP (ref 8.4–10.5)
CHLORIDE SERPL-SCNC: 100 MMOL/L — SIGNIFICANT CHANGE UP (ref 98–110)
CO2 SERPL-SCNC: 24 MMOL/L — SIGNIFICANT CHANGE UP (ref 17–32)
CREAT SERPL-MCNC: 0.7 MG/DL — SIGNIFICANT CHANGE UP (ref 0.7–1.5)
EGFR: 116 ML/MIN/1.73M2 — SIGNIFICANT CHANGE UP
EOSINOPHIL # BLD AUTO: 0.03 K/UL — SIGNIFICANT CHANGE UP (ref 0–0.7)
EOSINOPHIL NFR BLD AUTO: 0.3 % — SIGNIFICANT CHANGE UP (ref 0–8)
ETHANOL SERPL-MCNC: <10 MG/DL — SIGNIFICANT CHANGE UP
GLUCOSE SERPL-MCNC: 109 MG/DL — HIGH (ref 70–99)
HCT VFR BLD CALC: 30.7 % — LOW (ref 37–47)
HGB BLD-MCNC: 10.3 G/DL — LOW (ref 12–16)
IMM GRANULOCYTES NFR BLD AUTO: 0.4 % — HIGH (ref 0.1–0.3)
LACTATE SERPL-SCNC: 1.1 MMOL/L — SIGNIFICANT CHANGE UP (ref 0.7–2)
LYMPHOCYTES # BLD AUTO: 0.83 K/UL — LOW (ref 1.2–3.4)
LYMPHOCYTES # BLD AUTO: 7 % — LOW (ref 20.5–51.1)
MCHC RBC-ENTMCNC: 28.9 PG — SIGNIFICANT CHANGE UP (ref 27–31)
MCHC RBC-ENTMCNC: 33.6 G/DL — SIGNIFICANT CHANGE UP (ref 32–37)
MCV RBC AUTO: 86 FL — SIGNIFICANT CHANGE UP (ref 81–99)
MONOCYTES # BLD AUTO: 0.28 K/UL — SIGNIFICANT CHANGE UP (ref 0.1–0.6)
MONOCYTES NFR BLD AUTO: 2.4 % — SIGNIFICANT CHANGE UP (ref 1.7–9.3)
NEUTROPHILS # BLD AUTO: 10.59 K/UL — HIGH (ref 1.4–6.5)
NEUTROPHILS NFR BLD AUTO: 89.6 % — HIGH (ref 42.2–75.2)
NRBC # BLD: 0 /100 WBCS — SIGNIFICANT CHANGE UP (ref 0–0)
PLATELET # BLD AUTO: 321 K/UL — SIGNIFICANT CHANGE UP (ref 130–400)
PMV BLD: 9.8 FL — SIGNIFICANT CHANGE UP (ref 7.4–10.4)
POTASSIUM SERPL-MCNC: 3.6 MMOL/L — SIGNIFICANT CHANGE UP (ref 3.5–5)
POTASSIUM SERPL-SCNC: 3.6 MMOL/L — SIGNIFICANT CHANGE UP (ref 3.5–5)
PROT SERPL-MCNC: 6.6 G/DL — SIGNIFICANT CHANGE UP (ref 6–8)
RBC # BLD: 3.57 M/UL — LOW (ref 4.2–5.4)
RBC # FLD: 14.6 % — HIGH (ref 11.5–14.5)
SALICYLATES SERPL-MCNC: <0.3 MG/DL — LOW (ref 4–30)
SODIUM SERPL-SCNC: 137 MMOL/L — SIGNIFICANT CHANGE UP (ref 135–146)
WBC # BLD: 11.82 K/UL — HIGH (ref 4.8–10.8)
WBC # FLD AUTO: 11.82 K/UL — HIGH (ref 4.8–10.8)

## 2024-08-19 PROCEDURE — 99222 1ST HOSP IP/OBS MODERATE 55: CPT

## 2024-08-19 PROCEDURE — 80358 DRUG SCREENING METHADONE: CPT

## 2024-08-19 PROCEDURE — 76705 ECHO EXAM OF ABDOMEN: CPT | Mod: 26

## 2024-08-19 PROCEDURE — 76705 ECHO EXAM OF ABDOMEN: CPT

## 2024-08-19 PROCEDURE — 85025 COMPLETE CBC W/AUTO DIFF WBC: CPT

## 2024-08-19 PROCEDURE — 80353 DRUG SCREENING COCAINE: CPT

## 2024-08-19 PROCEDURE — 80202 ASSAY OF VANCOMYCIN: CPT

## 2024-08-19 PROCEDURE — 80349 CANNABINOIDS NATURAL: CPT

## 2024-08-19 PROCEDURE — 80346 BENZODIAZEPINES1-12: CPT

## 2024-08-19 PROCEDURE — 93010 ELECTROCARDIOGRAM REPORT: CPT

## 2024-08-19 PROCEDURE — 87522 HEPATITIS C REVRS TRNSCRPJ: CPT

## 2024-08-19 PROCEDURE — 80354 DRUG SCREENING FENTANYL: CPT

## 2024-08-19 PROCEDURE — 85027 COMPLETE CBC AUTOMATED: CPT

## 2024-08-19 PROCEDURE — 76937 US GUIDE VASCULAR ACCESS: CPT | Mod: 26,59

## 2024-08-19 PROCEDURE — 87086 URINE CULTURE/COLONY COUNT: CPT

## 2024-08-19 PROCEDURE — 99406 BEHAV CHNG SMOKING 3-10 MIN: CPT

## 2024-08-19 PROCEDURE — 80361 OPIATES 1 OR MORE: CPT

## 2024-08-19 PROCEDURE — 71045 X-RAY EXAM CHEST 1 VIEW: CPT | Mod: 26

## 2024-08-19 PROCEDURE — 76882 US LMTD JT/FCL EVL NVASC XTR: CPT | Mod: RT

## 2024-08-19 PROCEDURE — 86780 TREPONEMA PALLIDUM: CPT

## 2024-08-19 PROCEDURE — 99233 SBSQ HOSP IP/OBS HIGH 50: CPT

## 2024-08-19 PROCEDURE — 83735 ASSAY OF MAGNESIUM: CPT

## 2024-08-19 PROCEDURE — 36415 COLL VENOUS BLD VENIPUNCTURE: CPT

## 2024-08-19 PROCEDURE — 85610 PROTHROMBIN TIME: CPT

## 2024-08-19 PROCEDURE — 80053 COMPREHEN METABOLIC PANEL: CPT

## 2024-08-19 PROCEDURE — 80307 DRUG TEST PRSMV CHEM ANLYZR: CPT

## 2024-08-19 PROCEDURE — 36000 PLACE NEEDLE IN VEIN: CPT

## 2024-08-19 RX ORDER — LORAZEPAM 4 MG/ML
2 INJECTION INTRAMUSCULAR; INTRAVENOUS EVERY 4 HOURS
Refills: 0 | Status: DISCONTINUED | OUTPATIENT
Start: 2024-08-19 | End: 2024-08-21

## 2024-08-19 RX ORDER — THIAMINE HCL 250 MG
100 TABLET ORAL DAILY
Refills: 0 | Status: DISCONTINUED | OUTPATIENT
Start: 2024-08-19 | End: 2024-08-26

## 2024-08-19 RX ORDER — FOLIC ACID 1 MG
1 TABLET ORAL DAILY
Refills: 0 | Status: DISCONTINUED | OUTPATIENT
Start: 2024-08-19 | End: 2024-08-26

## 2024-08-19 RX ORDER — ESCITALOPRAM OXALATE 10 MG/1
20 TABLET ORAL AT BEDTIME
Refills: 0 | Status: DISCONTINUED | OUTPATIENT
Start: 2024-08-19 | End: 2024-08-26

## 2024-08-19 RX ORDER — VANCOMYCIN/0.9 % SOD CHLORIDE 1.75G/25
1000 PLASTIC BAG, INJECTION (ML) INTRAVENOUS ONCE
Refills: 0 | Status: COMPLETED | OUTPATIENT
Start: 2024-08-19 | End: 2024-08-19

## 2024-08-19 RX ORDER — CHLORDIAZEPOXIDE HCL 5 MG
50 CAPSULE ORAL EVERY 4 HOURS
Refills: 0 | Status: DISCONTINUED | OUTPATIENT
Start: 2024-08-19 | End: 2024-08-20

## 2024-08-19 RX ORDER — HYDROXYZINE HCL 25 MG
50 TABLET ORAL EVERY 6 HOURS
Refills: 0 | Status: DISCONTINUED | OUTPATIENT
Start: 2024-08-19 | End: 2024-08-26

## 2024-08-19 RX ORDER — METHADONE HYDROCHLORIDE 1 G/G
110 POWDER ORAL DAILY
Refills: 0 | Status: DISCONTINUED | OUTPATIENT
Start: 2024-08-19 | End: 2024-08-19

## 2024-08-19 RX ORDER — VANCOMYCIN/0.9 % SOD CHLORIDE 1.75G/25
1000 PLASTIC BAG, INJECTION (ML) INTRAVENOUS EVERY 12 HOURS
Refills: 0 | Status: DISCONTINUED | OUTPATIENT
Start: 2024-08-19 | End: 2024-08-21

## 2024-08-19 RX ORDER — KETOROLAC TROMETHAMINE 30 MG/ML
15 INJECTION, SOLUTION INTRAMUSCULAR ONCE
Refills: 0 | Status: DISCONTINUED | OUTPATIENT
Start: 2024-08-19 | End: 2024-08-19

## 2024-08-19 RX ORDER — IPRATROPIUM BROMIDE AND ALBUTEROL SULFATE .5; 3 MG/3ML; MG/3ML
3 SOLUTION RESPIRATORY (INHALATION) EVERY 6 HOURS
Refills: 0 | Status: DISCONTINUED | OUTPATIENT
Start: 2024-08-19 | End: 2024-08-26

## 2024-08-19 RX ORDER — CEFEPIME 2 G/1
1000 INJECTION, POWDER, FOR SOLUTION INTRAVENOUS EVERY 12 HOURS
Refills: 0 | Status: DISCONTINUED | OUTPATIENT
Start: 2024-08-19 | End: 2024-08-19

## 2024-08-19 RX ORDER — LAMOTRIGINE 100 MG/1
200 TABLET, EXTENDED RELEASE ORAL DAILY
Refills: 0 | Status: DISCONTINUED | OUTPATIENT
Start: 2024-08-19 | End: 2024-08-26

## 2024-08-19 RX ORDER — SODIUM CHLORIDE 9 MG/ML
1000 INJECTION INTRAMUSCULAR; INTRAVENOUS; SUBCUTANEOUS ONCE
Refills: 0 | Status: COMPLETED | OUTPATIENT
Start: 2024-08-19 | End: 2024-08-19

## 2024-08-19 RX ORDER — CLONIDINE HCL 0.2 MG
0.1 TABLET ORAL EVERY 8 HOURS
Refills: 0 | Status: DISCONTINUED | OUTPATIENT
Start: 2024-08-19 | End: 2024-08-19

## 2024-08-19 RX ORDER — CEFEPIME 2 G/1
1000 INJECTION, POWDER, FOR SOLUTION INTRAVENOUS ONCE
Refills: 0 | Status: COMPLETED | OUTPATIENT
Start: 2024-08-19 | End: 2024-08-19

## 2024-08-19 RX ORDER — CHLORDIAZEPOXIDE HCL 5 MG
20 CAPSULE ORAL EVERY 4 HOURS
Refills: 0 | Status: DISCONTINUED | OUTPATIENT
Start: 2024-08-21 | End: 2024-08-22

## 2024-08-19 RX ORDER — CHLORDIAZEPOXIDE HCL 5 MG
CAPSULE ORAL
Refills: 0 | Status: COMPLETED | OUTPATIENT
Start: 2024-08-19 | End: 2024-08-23

## 2024-08-19 RX ORDER — METHADONE HYDROCHLORIDE 1 G/G
110 POWDER ORAL DAILY
Refills: 0 | Status: DISCONTINUED | OUTPATIENT
Start: 2024-08-19 | End: 2024-08-26

## 2024-08-19 RX ORDER — ONDANSETRON 2 MG/ML
4 INJECTION, SOLUTION INTRAMUSCULAR; INTRAVENOUS EVERY 8 HOURS
Refills: 0 | Status: DISCONTINUED | OUTPATIENT
Start: 2024-08-19 | End: 2024-08-26

## 2024-08-19 RX ORDER — CHLORDIAZEPOXIDE HCL 5 MG
10 CAPSULE ORAL EVERY 4 HOURS
Refills: 0 | Status: DISCONTINUED | OUTPATIENT
Start: 2024-08-22 | End: 2024-08-23

## 2024-08-19 RX ORDER — CHLORDIAZEPOXIDE HCL 5 MG
25 CAPSULE ORAL EVERY 4 HOURS
Refills: 0 | Status: DISCONTINUED | OUTPATIENT
Start: 2024-08-20 | End: 2024-08-21

## 2024-08-19 RX ADMIN — Medication 250 MILLIGRAM(S): at 14:20

## 2024-08-19 RX ADMIN — LORAZEPAM 2 MILLIGRAM(S): 4 INJECTION INTRAMUSCULAR; INTRAVENOUS at 21:39

## 2024-08-19 RX ADMIN — KETOROLAC TROMETHAMINE 15 MILLIGRAM(S): 30 INJECTION, SOLUTION INTRAMUSCULAR at 12:21

## 2024-08-19 RX ADMIN — CEFEPIME 100 MILLIGRAM(S): 2 INJECTION, POWDER, FOR SOLUTION INTRAVENOUS at 01:55

## 2024-08-19 RX ADMIN — Medication 1 PATCH: at 11:44

## 2024-08-19 RX ADMIN — LORAZEPAM 2 MILLIGRAM(S): 4 INJECTION INTRAMUSCULAR; INTRAVENOUS at 18:20

## 2024-08-19 RX ADMIN — KETOROLAC TROMETHAMINE 15 MILLIGRAM(S): 30 INJECTION, SOLUTION INTRAMUSCULAR at 11:51

## 2024-08-19 RX ADMIN — SODIUM CHLORIDE 1000 MILLILITER(S): 9 INJECTION INTRAMUSCULAR; INTRAVENOUS; SUBCUTANEOUS at 01:55

## 2024-08-19 RX ADMIN — ONDANSETRON 4 MILLIGRAM(S): 2 INJECTION, SOLUTION INTRAMUSCULAR; INTRAVENOUS at 21:39

## 2024-08-19 RX ADMIN — Medication 100 MILLILITER(S): at 11:53

## 2024-08-19 RX ADMIN — METHADONE HYDROCHLORIDE 110 MILLIGRAM(S): 1 POWDER ORAL at 11:18

## 2024-08-19 RX ADMIN — ONDANSETRON 4 MILLIGRAM(S): 2 INJECTION, SOLUTION INTRAMUSCULAR; INTRAVENOUS at 11:52

## 2024-08-19 RX ADMIN — Medication 100 MILLIGRAM(S): at 16:11

## 2024-08-19 RX ADMIN — LORAZEPAM 2 MILLIGRAM(S): 4 INJECTION INTRAMUSCULAR; INTRAVENOUS at 12:09

## 2024-08-19 RX ADMIN — Medication 50 MILLIGRAM(S): at 18:01

## 2024-08-19 RX ADMIN — Medication 250 MILLIGRAM(S): at 02:31

## 2024-08-19 RX ADMIN — Medication 100 MILLILITER(S): at 06:48

## 2024-08-19 RX ADMIN — CEFEPIME 100 MILLIGRAM(S): 2 INJECTION, POWDER, FOR SOLUTION INTRAVENOUS at 14:20

## 2024-08-19 RX ADMIN — LAMOTRIGINE 200 MILLIGRAM(S): 100 TABLET, EXTENDED RELEASE ORAL at 11:44

## 2024-08-19 NOTE — H&P ADULT - NSHPLABSRESULTS_GEN_ALL_CORE
10.3   11.82 )-----------( 321      ( 19 Aug 2024 02:07 )             30.7     08-19    137  |  100  |  8<L>  ----------------------------<  109<H>  3.6   |  24  |  0.7    Ca    9.2      19 Aug 2024 02:07    TPro  6.6  /  Alb  3.5  /  TBili  0.3  /  DBili  x   /  AST  87<H>  /  ALT  50<H>  /  AlkPhos  197<H>  08-19        Urinalysis Basic - ( 19 Aug 2024 02:07 )    Color: x / Appearance: x / SG: x / pH: x  Gluc: 109 mg/dL / Ketone: x  / Bili: x / Urobili: x   Blood: x / Protein: x / Nitrite: x   Leuk Esterase: x / RBC: x / WBC x   Sq Epi: x / Non Sq Epi: x / Bacteria: x    Lactate Trend  08-19 @ 02:07 Lactate:1.1     Culture Results:   No growth at 5 days (08-12 @ 19:51)  Culture Results:   No growth at 5 days (07-28 @ 07:36)

## 2024-08-19 NOTE — H&P ADULT - SKIN COMMENTS
she showed me right arm which had several indented areas present (ER notes similar findings on all 4 extremities)

## 2024-08-19 NOTE — PATIENT PROFILE ADULT - FALL HARM RISK - HARM RISK INTERVENTIONS

## 2024-08-19 NOTE — CHART NOTE - NSCHARTNOTEFT_GEN_A_CORE
Brief Nutrition Note      Consult received for MST Score =/>2. Upon chart review, pt with stable weight, does not currently meet criteria for PCM risk per MST. RD remains available for assessment per protocol or as needed.

## 2024-08-19 NOTE — H&P ADULT - HISTORY OF PRESENT ILLNESS
(Patient is sleepy and keeping blanket over her head, answers only some simple questions and very limited cooperation with physical. Used ER records extensively) 32yo female with documented history of polysubstance abuse, hepatitis C,  COPD and borderline personality, presented to the ER with headache, shortness of breath, chest pain and body aches. She also told the ER staff that she had multiple abscesses.

## 2024-08-19 NOTE — H&P ADULT - ASSESSMENT
Polysubstance abuse (UDS - benzo, cocaine, opioids, methadone (MMTP?), THC) - for now request confirmation of usual methadone dose, Ativan prn and consult to Addiction medicine    skin infection - continue Vancomycin and Cefepime started in the ER with ID consult    transaminitis - monitor for now   Polysubstance abuse (UDS - benzo, cocaine, opioids, methadone (MMTP?), THC) - for now request confirmation of usual methadone dose, Ativan prn and consult to Addiction medicine    skin infection - continue Vancomycin and Cefepime started in the ER with ID consult    transaminitis - monitor for now      Old records reviewed  Polysubstance abuse (UDS - benzo, cocaine, opioids, methadone (MMTP?), THC) - for now request confirmation of usual methadone dose, Ativan prn and consult to Addiction medicine    skin infection - continue Vancomycin and Cefepime started in the ER with ID consult    Tobacco use -     transaminitis - monitor for now      Old records reviewed     Medication management - will use EMR list for now but need to verify when patient more awake   Polysubstance abuse (UDS - benzo (uses xanax), cocaine (snorts), opioids, methadone (MMTP?), THC) - for now request confirmation of usual methadone dose, Ativan prn and consult to Addiction medicine    skin infection - continue Vancomycin and Cefepime started in the ER with ID consult    Tobacco use - Nicotine patch    transaminitis - monitor for now      Old records reviewed     Medication management - will use EMR list (from this month) for now but need to verify when patient more awake

## 2024-08-19 NOTE — CHART NOTE - NSCHARTNOTEFT_GEN_A_CORE
Tele-psychiatry consult request received today at 10:21 as such: 33 yof, hx of PSU, anxiety, borderline personality disorder, admitted for withdrawal mgt and isn't interacting much on exam today, but she is supposed to go to rehab and needs psych clearance first due to chronic behavioral issues & hx of passive suicidality.     This writer attempted to see patient at this time for psychiatric assessment. However, patient is lethargic and uncomfortable in appearance, politely asking "is there any way we can do this tomorrow morning?" She denies any acute psychiatric needs at this time.     This writer will follow up to complete full psychiatric assessment tomorrow morning.

## 2024-08-19 NOTE — CONSULT NOTE ADULT - SUBJECTIVE AND OBJECTIVE BOX
Was called to see patient who was observed to be in bed  in a fetal position, on her knees with her head depressed to the bed and her hands behind her.  She was writhing  and moaning.  When approached by the writer she said "I can't  talk".  the writer attempted to convince her to speak at this time, asking if she was in withdrawal and if there was anything she "needed".  She replied that she was "not in withdrawal" and that her abdomen hurt and she could not speak.   Despite attempts to convince her to speak she continued to refuse. Was called to see patient who was observed to be in bed  in a fetal position, on her knees with her head depressed to the bed and her hands behind her.  She was writhing  and moaning.  When approached by the writer she said "I can't  talk".  The writer attempted to convince her to speak at this time, asking if she was in withdrawal and if there was anything she "needed".  She replied that she was "on my methadone" "not in withdrawal" and that her abdomen hurt and she could not speak.   Despite attempts to convince her to speak she continued to refuse.    .        PAST MEDICAL & SURGICAL HISTORY  Hepatitis C    COPD (chronic obstructive pulmonary disease)    Borderline personality disorder    Anxiety and depression    Nicotine dependence    Drug abuse    Heroin abuse    IV drug user    S/P cholecystectomy  in 2010      SOCIAL HISTORY:    ALLERGIES:  No Known Allergies    MEDICATIONS:  STANDING MEDICATIONS  cefepime   IVPB 1000 milliGRAM(s) IV Intermittent every 12 hours  chlordiazePOXIDE 50 milliGRAM(s) Oral every 4 hours  chlordiazePOXIDE   Oral   escitalopram 20 milliGRAM(s) Oral at bedtime  lactated ringers. 1000 milliLiter(s) IV Continuous <Continuous>  lamoTRIgine 200 milliGRAM(s) Oral daily  methadone    Tablet 110 milliGRAM(s) Oral daily  nicotine - 21 mG/24Hr(s) Patch 1 Patch Transdermal daily  vancomycin  IVPB 1000 milliGRAM(s) IV Intermittent every 12 hours    PRN MEDICATIONS  albuterol/ipratropium for Nebulization 3 milliLiter(s) Nebulizer every 6 hours PRN  hydrOXYzine hydrochloride 50 milliGRAM(s) Oral every 6 hours PRN  LORazepam   Injectable 2 milliGRAM(s) IV Push every 4 hours PRN  ondansetron Injectable 4 milliGRAM(s) IV Push every 8 hours PRN    VITALS:   T(F): 98.5  HR: 63  BP: 101/50  RR: 18  SpO2: 96%    LABS:  Negative for smoking/alcohol/drug use.                         10.3   11.82 )-----------( 321      ( 19 Aug 2024 02:07 )             30.7

## 2024-08-19 NOTE — ED PROVIDER NOTE - OBJECTIVE STATEMENT
33 years old female history of polysubstance abuse, COPD, borderline personality, anxiety/depression, hepatitis C presents to ED with multiple medical complaints.  As per patient, she was admitted to hospital few days ago for drug withdrawal.  Patient signed AMA and left the hospital 5 days ago.  Admitted she was using cocaine and meth after she left the hospital.  She has been injecting into her arms and noted swelling and abscesses to her left arm.  Patient reports subjective fever, chills, headache, body ache, chest tightness, abdominal discomfort, nausea, loose bowel.  Denies urinary symptoms. Further denies SI HI and A/V hallucination.

## 2024-08-19 NOTE — ED PROVIDER NOTE - CLINICAL SUMMARY MEDICAL DECISION MAKING FREE TEXT BOX
33-year-old female, history of polysubstance abuse, COPD, borderline personality, here in ED for withdrawal symptoms and "multiple abscesses."  Labs noted for WBC 11.8, hemoglobin 10.3, BUN 8, creatinine 0.7, lactate 1.1.  EKG normal sinus rhythm no acute changes.  Chest x-ray no acute disease.  Given cefepime and vancomycin.  Will admit.

## 2024-08-19 NOTE — PROGRESS NOTE ADULT - SUBJECTIVE AND OBJECTIVE BOX
SUBJECTIVE:    Patient is a 33y old Female who presents with a chief complaint of   Currently admitted to medicine with the primary diagnosis of Polysubstance dependence       Today is hospital day . This morning she is resting comfortably in bed and reports no new issues or overnight events.     ROS:   CONSTITUTIONAL: No weakness, fevers or chills   EYES/ENT: No visual changes; No vertigo or throat pain   NECK: No pain or stiffness   RESPIRATORY: No cough, wheezing, hemoptysis; No shortness of breath   CARDIOVASCULAR: No chest pain or palpitations   GASTROINTESTINAL: No abdominal or epigastric pain. No nausea, vomiting, or hematemesis; No diarrhea or constipation. No melena or hematochezia.  GENITOURINARY: No dysuria, frequency or hematuria  NEUROLOGICAL: No numbness or weakness  SKIN: No itching, rashes      PAST MEDICAL & SURGICAL HISTORY  Hepatitis C    COPD (chronic obstructive pulmonary disease)    Borderline personality disorder    Anxiety and depression    Nicotine dependence    Drug abuse    Heroin abuse    IV drug user    S/P cholecystectomy  in 2010      SOCIAL HISTORY:    ALLERGIES:  No Known Allergies    MEDICATIONS:  STANDING MEDICATIONS  cefepime   IVPB 1000 milliGRAM(s) IV Intermittent every 12 hours  chlordiazePOXIDE 50 milliGRAM(s) Oral every 4 hours  chlordiazePOXIDE   Oral   escitalopram 20 milliGRAM(s) Oral at bedtime  lactated ringers. 1000 milliLiter(s) IV Continuous <Continuous>  lamoTRIgine 200 milliGRAM(s) Oral daily  methadone    Tablet 110 milliGRAM(s) Oral daily  nicotine - 21 mG/24Hr(s) Patch 1 Patch Transdermal daily  vancomycin  IVPB 1000 milliGRAM(s) IV Intermittent every 12 hours    PRN MEDICATIONS  albuterol/ipratropium for Nebulization 3 milliLiter(s) Nebulizer every 6 hours PRN  hydrOXYzine hydrochloride 50 milliGRAM(s) Oral every 6 hours PRN  LORazepam   Injectable 2 milliGRAM(s) IV Push every 4 hours PRN  ondansetron Injectable 4 milliGRAM(s) IV Push every 8 hours PRN    VITALS:   T(F): 98.5  HR: 63  BP: 101/50  RR: 18  SpO2: 96%    LABS:  Negative for smoking/alcohol/drug use.                         10.3   11.82 )-----------( 321      ( 19 Aug 2024 02:07 )             30.7     08-19    137  |  100  |  8<L>  ----------------------------<  109<H>  3.6   |  24  |  0.7    Ca    9.2      19 Aug 2024 02:07    TPro  6.6  /  Alb  3.5  /  TBili  0.3  /  DBili  x   /  AST  87<H>  /  ALT  50<H>  /  AlkPhos  197<H>  08-19      Urinalysis Basic - ( 19 Aug 2024 02:07 )    Color: x / Appearance: x / SG: x / pH: x  Gluc: 109 mg/dL / Ketone: x  / Bili: x / Urobili: x   Blood: x / Protein: x / Nitrite: x   Leuk Esterase: x / RBC: x / WBC x   Sq Epi: x / Non Sq Epi: x / Bacteria: x        Lactate, Blood: 1.1 mmol/L (08-19-24 @ 02:07)          RADIOLOGY:    PHYSICAL EXAM:  GEN: No acute distress , disheveled   HEENT: normocephalic, atraumatic, aniceteric  LUNGS: Clear to auscultation bilaterally, no rales/wheezing/ rhonchi  HEART: S1/S2 present. RRR, no murmurs  ABD: Soft, non-tender, non-distended. Bowel sounds present  EXT: no edema , warm   NEURO: AAOX3, normal affect      ASSESSMENT AND PLAN:    33-year-old female with PMH of polysubstance abuse (on MMTP 110 mg), COPD, borderline personality disorder, anxiety presents to ED for multiple medical complaints ,found to have withdrawal     #Multiple medical complaints reported on admission - resolved   suspect 2/2 :   #Substance Withdrawal  #Suspected thiamine and folate deficiency  # H/O Polysubstance abuse   - patient eloped on 8/14 - had inpatient substance rehab in place - now readmitted   - CIWA protocol  - librium taper, atarax q6hr prn for anxiety while hospitalized  - cw methadone 110 mg po   - c/w thiamine and folate  - tox screen   - addiction medicine   - psych eval     # R/O UTI  # H/O H/O BL extremity abscesses in 7/24   - physical exam of extremities unremarkable   - U/A wbc 12 , LE +    - fu urine clx   - fu blood clx   - IV ABX   - ID EVAL     # H/O Nicotine addiction - counseling done    # H/O  COPD - not in exacerbation - reports not taking any medications    dvt/ gi ppx/diet  dispo: acute  handoff: work up as above      SUBJECTIVE:    Patient is a 33y old Female who presents with a chief complaint of   Currently admitted to medicine with the primary diagnosis of Polysubstance dependence       Today is hospital day . This morning she is resting comfortably in bed and reports no new issues or overnight events.     ROS:   CONSTITUTIONAL: No weakness, fevers or chills   EYES/ENT: No visual changes; No vertigo or throat pain   NECK: No pain or stiffness   RESPIRATORY: No cough, wheezing, hemoptysis; No shortness of breath   CARDIOVASCULAR: No chest pain or palpitations   GASTROINTESTINAL: No abdominal or epigastric pain. No nausea, vomiting, or hematemesis; No diarrhea or constipation. No melena or hematochezia.  GENITOURINARY: No dysuria, frequency or hematuria  NEUROLOGICAL: No numbness or weakness  SKIN: No itching, rashes      PAST MEDICAL & SURGICAL HISTORY  Hepatitis C    COPD (chronic obstructive pulmonary disease)    Borderline personality disorder    Anxiety and depression    Nicotine dependence    Drug abuse    Heroin abuse    IV drug user    S/P cholecystectomy  in 2010      SOCIAL HISTORY:    ALLERGIES:  No Known Allergies    MEDICATIONS:  STANDING MEDICATIONS  cefepime   IVPB 1000 milliGRAM(s) IV Intermittent every 12 hours  chlordiazePOXIDE 50 milliGRAM(s) Oral every 4 hours  chlordiazePOXIDE   Oral   escitalopram 20 milliGRAM(s) Oral at bedtime  lactated ringers. 1000 milliLiter(s) IV Continuous <Continuous>  lamoTRIgine 200 milliGRAM(s) Oral daily  methadone    Tablet 110 milliGRAM(s) Oral daily  nicotine - 21 mG/24Hr(s) Patch 1 Patch Transdermal daily  vancomycin  IVPB 1000 milliGRAM(s) IV Intermittent every 12 hours    PRN MEDICATIONS  albuterol/ipratropium for Nebulization 3 milliLiter(s) Nebulizer every 6 hours PRN  hydrOXYzine hydrochloride 50 milliGRAM(s) Oral every 6 hours PRN  LORazepam   Injectable 2 milliGRAM(s) IV Push every 4 hours PRN  ondansetron Injectable 4 milliGRAM(s) IV Push every 8 hours PRN    VITALS:   T(F): 98.5  HR: 63  BP: 101/50  RR: 18  SpO2: 96%    LABS:  Negative for smoking/alcohol/drug use.                         10.3   11.82 )-----------( 321      ( 19 Aug 2024 02:07 )             30.7     08-19    137  |  100  |  8<L>  ----------------------------<  109<H>  3.6   |  24  |  0.7    Ca    9.2      19 Aug 2024 02:07    TPro  6.6  /  Alb  3.5  /  TBili  0.3  /  DBili  x   /  AST  87<H>  /  ALT  50<H>  /  AlkPhos  197<H>  08-19      Urinalysis Basic - ( 19 Aug 2024 02:07 )    Color: x / Appearance: x / SG: x / pH: x  Gluc: 109 mg/dL / Ketone: x  / Bili: x / Urobili: x   Blood: x / Protein: x / Nitrite: x   Leuk Esterase: x / RBC: x / WBC x   Sq Epi: x / Non Sq Epi: x / Bacteria: x        Lactate, Blood: 1.1 mmol/L (08-19-24 @ 02:07)          RADIOLOGY:    PHYSICAL EXAM:  GEN: No acute distress , disheveled   HEENT: normocephalic, atraumatic, aniceteric  LUNGS: Clear to auscultation bilaterally, no rales/wheezing/ rhonchi  HEART: S1/S2 present. RRR, no murmurs  ABD: Soft, non-tender, non-distended. Bowel sounds present  EXT: no edema , warm   NEURO: AAOX3, normal affect      ASSESSMENT AND PLAN:    33-year-old female with PMH of polysubstance abuse (on MMTP 110 mg), COPD, borderline personality disorder, anxiety presents to ED for multiple medical complaints ,found to have withdrawal     #Multiple medical complaints reported on admission - resolved   suspect 2/2 :   #Substance Withdrawal  #Suspected thiamine and folate deficiency  # H/O Polysubstance abuse   - patient eloped on 8/14 - had inpatient substance rehab in place - now readmitted   - CIWA protocol  - librium taper, atarax q6hr prn for anxiety while hospitalized  - cw methadone 110 mg po   - c/w thiamine and folate  - tox screen   - addiction medicine   - psych eval     # R/O UTI  # H/O H/O BL extremity abscesses in 7/24   - physical exam of extremities unremarkable   - U/A wbc 12 , LE +    - fu urine clx   - fu blood clx   - IV ABX   - ID EVAL     # Transaminitis  - no abdominal pain   - check acute hepatitis panel   - INR , LDT's daily   - RUQ sonogram    - GI EVAL if not improving     # H/O Nicotine addiction - counseling done    # H/O  COPD - not in exacerbation - reports not taking any medications    dvt/ gi ppx/diet  dispo: acute  handoff: work up as above

## 2024-08-19 NOTE — ED ADULT NURSE NOTE - NSFALLUNIVINTERV_ED_ALL_ED
Bed/Stretcher in lowest position, wheels locked, appropriate side rails in place/Call bell, personal items and telephone in reach/Instruct patient to call for assistance before getting out of bed/chair/stretcher/Non-slip footwear applied when patient is off stretcher/[place] to call system/Physically safe environment - no spills, clutter or unnecessary equipment/Purposeful proactive rounding/Room/bathroom lighting operational, light cord in reach

## 2024-08-19 NOTE — ED PROVIDER NOTE - ATTENDING APP SHARED VISIT CONTRIBUTION OF CARE
33-year-old female, history of polysubstance abuse, COPD, borderline personality, here in ED headache, shortness of breath, chest pain, body aches and multiple abscesses.  Admits to shooting up cocaine and taking Xanax.  Was recently admitted for benzodiazepine withdrawal and eloped from the hospital.  Exam shows alert patient in no distress, HEENT NCAT PERRL, neck supple, lungs clear, RR S1S2, abdomen soft NT +BS, no CCE, multiple indurated areas both arms and legs, neuro A&OX3 GCS 15 no deficits.

## 2024-08-19 NOTE — ED PROVIDER NOTE - PHYSICAL EXAMINATION
CONSTITUTIONAL: Well-appearing;  in no apparent distress.   EYES: PERRL; EOM intact. Pupils 1 mm bilaterally.  CARDIOVASCULAR: Normal S1, S2; no murmurs, rubs, or gallops.   RESPIRATORY: Normal chest excursion with respiration; breath sounds clear and equal bilaterally; no wheezes, rhonchi, or rales.  GI/: Normal bowel sounds; non-distended; non-tender; no palpable organomegaly.   MS: Bilateral wrist range of motion.  Bilateral hand N/V intact.  SKIN: Few skin indurations noted to bilateral forearms without softness and fluctuance.  NEURO/PSYCH: A & O x 4; grossly unremarkable.  Somnolence.

## 2024-08-19 NOTE — H&P ADULT - CLICK TO LAUNCH ORM
Patient is calling today and said she needed to see Dr. Flo Limon again and when she called to schedule they said after 3 years they need a new referral but I had looked and technically it has only been 2 years. Coming this July it would be 3 years. Patient is wondering if there is anyway a new referral can be placed for this.  Please advise 6/9 .

## 2024-08-19 NOTE — ED PROVIDER NOTE - CARE PLAN
Render In Strict Bullet Format?: No Detail Level: Zone Modify Regimen: Only apply thin layer of Halobetasol Ointment 0.5% QAM Mon-Thurs, stopping Fri-Sun \\nCan apply Vaseline Friday-Sunday Principal Discharge DX:	Polysubstance dependence  Secondary Diagnosis:	Skin infection  Secondary Diagnosis:	Intravenous drug abuse   1

## 2024-08-19 NOTE — CONSULT NOTE ADULT - SUBJECTIVE AND OBJECTIVE BOX
RADU CONTRERAS  33y, Female  Allergies    No Known Allergies    Intolerances    LOS      HPI  HPI:  (Patient is sleepy and keeping blanket over her head, answers only some simple questions and very limited cooperation with physical. Used ER records extensively) 32yo female with documented history of polysubstance abuse, hepatitis C,  COPD and borderline personality, presented to the ER with headache, shortness of breath, chest pain and body aches. She also told the ER staff that she had multiple abscesses.    (19 Aug 2024 04:54)      INFECTIOUS DISEASE HISTORY:  Hospital course-  ID consulted for antimicrobial recommendations.     Prior hospital charts reviewed [Yes]  Primary team notes reviewed [Yes]  Other consultant notes reviewed [Yes]    REVIEW OF SYSTEMS:  CONSTITUTIONAL: No fever or chills  HEENT: No sore throat  RESPIRATORY: No cough, no shortness of breath  CARDIOVASCULAR: No chest pain or palpitations  GASTROINTESTINAL: No abdominal or epigastric pain  GENITOURINARY: No dysuria  NEUROLOGICAL: No headache/dizziness  MSK: No joint pain, erythema, or swelling; no back pain  SKIN: No itching, rashes  All other ROS negative except noted above    PAST MEDICAL & SURGICAL HISTORY:  Hepatitis C      COPD (chronic obstructive pulmonary disease)      Borderline personality disorder      Anxiety and depression      Nicotine dependence      Drug abuse      Heroin abuse      IV drug user      S/P cholecystectomy  in 2010    SOCIAL HISTORY:  - No recent travel    FAMILY HISTORY:  Family history of diverticulitis of colon (Grandparent)    Family history of diabetes mellitus (Grandparent)    ANTIMICROBIALS:  cefepime   IVPB 1000 every 12 hours  vancomycin  IVPB 1000 every 12 hours      ANTIMICROBIALS (past 90 days):  MEDICATIONS  (STANDING):  cefepime   IVPB   100 mL/Hr IV Intermittent (08-19-24 @ 14:20)    cefepime   IVPB   100 mL/Hr IV Intermittent (08-19-24 @ 01:55)    vancomycin  IVPB   250 mL/Hr IV Intermittent (08-19-24 @ 14:20)    vancomycin  IVPB.   250 mL/Hr IV Intermittent (08-19-24 @ 02:31)        OTHER MEDS:   MEDICATIONS  (STANDING):  albuterol/ipratropium for Nebulization 3 every 6 hours PRN  chlordiazePOXIDE    chlordiazePOXIDE 50 every 4 hours  escitalopram 20 at bedtime  hydrOXYzine hydrochloride 50 every 6 hours PRN  lamoTRIgine 200 daily  LORazepam   Injectable 2 every 4 hours PRN  methadone    Tablet 110 daily  ondansetron Injectable 4 every 8 hours PRN      VITALS:  Vital Signs Last 24 Hrs  T(F): 98 (08-19-24 @ 14:19), Max: 98.5 (08-13-24 @ 21:38)    Vital Signs Last 24 Hrs  HR: 45 (08-19-24 @ 14:19) (45 - 99)  BP: 125/63 (08-19-24 @ 14:19) (101/50 - 125/63)  RR: 18 (08-19-24 @ 14:19)  SpO2: 96% (08-19-24 @ 14:19) (96% - 99%)  Wt(kg): --    EXAM:  GENERAL: Tired, nauseous. In distress.  Refusing other exam currently.     Labs:                        10.3   11.82 )-----------( 321      ( 19 Aug 2024 02:07 )             30.7     08-19    137  |  100  |  8<L>  ----------------------------<  109<H>  3.6   |  24  |  0.7    Ca    9.2      19 Aug 2024 02:07    TPro  6.6  /  Alb  3.5  /  TBili  0.3  /  DBili  x   /  AST  87<H>  /  ALT  50<H>  /  AlkPhos  197<H>  08-19      WBC Trend:  WBC Count: 11.82 (08-19-24 @ 02:07)      Auto Neutrophil #: 10.59 K/uL (08-19-24 @ 02:07)  Auto Neutrophil #: 2.76 K/uL (08-09-24 @ 15:55)  Auto Neutrophil #: 2.99 K/uL (07-27-24 @ 19:20)      Creatine Trend:  Creatinine: 0.7 (08-19)      Liver Biochemical Testing Trend:  Alanine Aminotransferase (ALT/SGPT): 50 *H* (08-19)  Alanine Aminotransferase (ALT/SGPT): 9 (08-10)  Alanine Aminotransferase (ALT/SGPT): 8 (08-09)  Alanine Aminotransferase (ALT/SGPT): 15 (07-28)  Alanine Aminotransferase (ALT/SGPT): 15 (07-27)  Aspartate Aminotransferase (AST/SGOT): 87 (08-19-24 @ 02:07)  Aspartate Aminotransferase (AST/SGOT): 15 (08-10-24 @ 06:18)  Aspartate Aminotransferase (AST/SGOT): 15 (08-09-24 @ 15:55)  Aspartate Aminotransferase (AST/SGOT): 29 (07-28-24 @ 07:36)  Aspartate Aminotransferase (AST/SGOT): 21 (07-27-24 @ 19:20)  Bilirubin Total: 0.3 (08-19)  Bilirubin Total: <0.2 (08-10)  Bilirubin Total: 0.8 (08-09)  Bilirubin Direct: <0.2 (07-28)  Bilirubin Total: 0.4 (07-28)      Trend LDH      Auto Eosinophil %: 0.3 % (08-19-24 @ 02:07)      Urinalysis Basic - ( 19 Aug 2024 02:07 )    Color: x / Appearance: x / SG: x / pH: x  Gluc: 109 mg/dL / Ketone: x  / Bili: x / Urobili: x   Blood: x / Protein: x / Nitrite: x   Leuk Esterase: x / RBC: x / WBC x   Sq Epi: x / Non Sq Epi: x / Bacteria: x        MICROBIOLOGY:    Female          HIV-1/2 Combo Result: Nonreact (08-12-24 @ 19:51)    Lactate, Blood: 1.1 (08-19 @ 02:07)        INFLAMMATORY MARKERS      RADIOLOGY & ADDITIONAL TESTS:  I have personally reviewed the imagings.  CXR      CT    < from: Xray Chest 1 View-PORTABLE IMMEDIATE (Xray Chest 1 View-PORTABLE IMMEDIATE .) (08.19.24 @ 00:51) >      INTERPRETATION:  Clinical History / Reason for exam: Shortness of breath    Comparison : Chest radiograph arch second 2021.    Technique/Positioning: AP chest.    Findings:    Support devices: None.    Cardiac/mediastinum/hilum: Unremarkable.    Lung parenchyma/Pleura: Within normal limits.    Skeleton/soft tissues: Rotation of the spine, which may be positional in   nature.Surgical clips right upper quadrant.    Impression:    No radiographic evidence of acute cardiopulmonary disease.        --- End of Report ---      < end of copied text >  < from: [facility] Duplex Upper Ext Vein Scan, Left (08.12.24 @ 08:53) >  INTERPRETATION:  CLINICAL INFORMATION: 33-year-old female with arm   swelling    TECHNIQUE: Duplex sonography of the LEFT UPPERextremity veins with color   and spectral Doppler, with and without compression.    FINDINGS:    The left internal jugular, subclavian, axillary and brachial veins are   patent and compressible where applicable.  The basilic vein (superficial   vein)is thrombosed.  The cephalic vein (superficial vein) is patent and   without thrombus.    Doppler examination shows normal spontaneous and phasic flow.    IMPRESSION:  No evidence of left upper extremity deep venous thrombosis. Superficial   thrombophlebitis of the left basilic vein.        --- End of Report ---      < end of copied text >  < from: US Extremity Nonvasc Limited, Right (07.28.24 @ 10:46) >  FINDINGS/  IMPRESSION:  Focused evaluation was performed of the right forearm using grayscale and   color Doppler imaging in the antecubital fossa.    In the region of concern there is mild-to-moderate soft tissue edema   additionally within the subcutaneous tissues there is a hypoechoic region   measuring 0.8 x 0.3 x 0.7 cm with minimally increased surrounding   hyperemia which can represent phlegmonous change or possible early   abscess. This does not appear entirely encapsulated and probably not   drainable at this time.    --- End of Report ---            VICTOR MANUEL HENRY MD; Attending Radiologist  This document has been electronically signed. Jul 28 2024 10:55AM    < end of copied text >    CARDIOLOGY TESTING  12 Lead ECG:   Ventricular Rate 70 BPM    Atrial Rate 70 BPM    P-R Interval 102 ms    QRS Duration 94 ms    Q-T Interval 440 ms    QTC Calculation(Bazett) 475 ms    P Axis 60 degrees    R Axis 89 degrees    T Axis 58 degrees    Diagnosis Line Sinus rhythm with short UT  Nonspecific T wave abnormality  Abnormal ECG    Confirmed by SHAYLEE BROOKS MD ([id number]) on 8/19/2024 7:03:24 AM (08-19-24 @ 00:07)  12 Lead ECG:   Ventricular Rate 65 BPM    Atrial Rate 65 BPM    P-R Interval 114 ms    QRS Duration 98 ms    Q-T Interval 426 ms    QTC Calculation(Bazett) 443 ms    P Axis 62 degrees    R Axis 89 degrees    T Axis 58 degrees    Diagnosis Line Normal sinus rhythm  Normal ECG    Confirmed by SHAYLEE BROOKS MD ([id number]) on 8/12/2024 1:59:50 PM (08-12-24 @ 12:09)

## 2024-08-20 DIAGNOSIS — F19.94 OTHER PSYCHOACTIVE SUBSTANCE USE, UNSPECIFIED WITH PSYCHOACTIVE SUBSTANCE-INDUCED MOOD DISORDER: ICD-10-CM

## 2024-08-20 DIAGNOSIS — F60.3 BORDERLINE PERSONALITY DISORDER: ICD-10-CM

## 2024-08-20 LAB
ALBUMIN SERPL ELPH-MCNC: 3.1 G/DL — LOW (ref 3.5–5.2)
ALP SERPL-CCNC: 168 U/L — HIGH (ref 30–115)
ALT FLD-CCNC: 36 U/L — SIGNIFICANT CHANGE UP (ref 0–41)
ANION GAP SERPL CALC-SCNC: 14 MMOL/L — SIGNIFICANT CHANGE UP (ref 7–14)
AST SERPL-CCNC: 33 U/L — SIGNIFICANT CHANGE UP (ref 0–41)
BASOPHILS # BLD AUTO: 0.05 K/UL — SIGNIFICANT CHANGE UP (ref 0–0.2)
BASOPHILS NFR BLD AUTO: 0.5 % — SIGNIFICANT CHANGE UP (ref 0–1)
BILIRUB SERPL-MCNC: 0.2 MG/DL — SIGNIFICANT CHANGE UP (ref 0.2–1.2)
BUN SERPL-MCNC: 8 MG/DL — LOW (ref 10–20)
CALCIUM SERPL-MCNC: 8.6 MG/DL — SIGNIFICANT CHANGE UP (ref 8.4–10.5)
CHLORIDE SERPL-SCNC: 104 MMOL/L — SIGNIFICANT CHANGE UP (ref 98–110)
CO2 SERPL-SCNC: 21 MMOL/L — SIGNIFICANT CHANGE UP (ref 17–32)
CREAT SERPL-MCNC: 0.8 MG/DL — SIGNIFICANT CHANGE UP (ref 0.7–1.5)
DRUG SCREEN 1, URINE RESULT: SIGNIFICANT CHANGE UP
EGFR: 99 ML/MIN/1.73M2 — SIGNIFICANT CHANGE UP
EOSINOPHIL # BLD AUTO: 0.02 K/UL — SIGNIFICANT CHANGE UP (ref 0–0.7)
EOSINOPHIL NFR BLD AUTO: 0.2 % — SIGNIFICANT CHANGE UP (ref 0–8)
GLUCOSE SERPL-MCNC: 83 MG/DL — SIGNIFICANT CHANGE UP (ref 70–99)
HCT VFR BLD CALC: 32.7 % — LOW (ref 37–47)
HGB BLD-MCNC: 10.6 G/DL — LOW (ref 12–16)
IMM GRANULOCYTES NFR BLD AUTO: 0.4 % — HIGH (ref 0.1–0.3)
INR BLD: 1.26 RATIO — SIGNIFICANT CHANGE UP (ref 0.65–1.3)
LYMPHOCYTES # BLD AUTO: 2.08 K/UL — SIGNIFICANT CHANGE UP (ref 1.2–3.4)
LYMPHOCYTES # BLD AUTO: 22.5 % — SIGNIFICANT CHANGE UP (ref 20.5–51.1)
MAGNESIUM SERPL-MCNC: 1.7 MG/DL — LOW (ref 1.8–2.4)
MCHC RBC-ENTMCNC: 28.3 PG — SIGNIFICANT CHANGE UP (ref 27–31)
MCHC RBC-ENTMCNC: 32.4 G/DL — SIGNIFICANT CHANGE UP (ref 32–37)
MCV RBC AUTO: 87.2 FL — SIGNIFICANT CHANGE UP (ref 81–99)
MONOCYTES # BLD AUTO: 1.08 K/UL — HIGH (ref 0.1–0.6)
MONOCYTES NFR BLD AUTO: 11.7 % — HIGH (ref 1.7–9.3)
NEUTROPHILS # BLD AUTO: 5.96 K/UL — SIGNIFICANT CHANGE UP (ref 1.4–6.5)
NEUTROPHILS NFR BLD AUTO: 64.7 % — SIGNIFICANT CHANGE UP (ref 42.2–75.2)
NRBC # BLD: 0 /100 WBCS — SIGNIFICANT CHANGE UP (ref 0–0)
PLATELET # BLD AUTO: 304 K/UL — SIGNIFICANT CHANGE UP (ref 130–400)
PMV BLD: 10.6 FL — HIGH (ref 7.4–10.4)
POTASSIUM SERPL-MCNC: 3.9 MMOL/L — SIGNIFICANT CHANGE UP (ref 3.5–5)
POTASSIUM SERPL-SCNC: 3.9 MMOL/L — SIGNIFICANT CHANGE UP (ref 3.5–5)
PROT SERPL-MCNC: 6 G/DL — SIGNIFICANT CHANGE UP (ref 6–8)
PROTHROM AB SERPL-ACNC: 14.4 SEC — HIGH (ref 9.95–12.87)
RBC # BLD: 3.75 M/UL — LOW (ref 4.2–5.4)
RBC # FLD: 15 % — HIGH (ref 11.5–14.5)
SODIUM SERPL-SCNC: 139 MMOL/L — SIGNIFICANT CHANGE UP (ref 135–146)
WBC # BLD: 9.23 K/UL — SIGNIFICANT CHANGE UP (ref 4.8–10.8)
WBC # FLD AUTO: 9.23 K/UL — SIGNIFICANT CHANGE UP (ref 4.8–10.8)

## 2024-08-20 PROCEDURE — 99232 SBSQ HOSP IP/OBS MODERATE 35: CPT

## 2024-08-20 PROCEDURE — 90792 PSYCH DIAG EVAL W/MED SRVCS: CPT | Mod: 95

## 2024-08-20 RX ADMIN — Medication 25 MILLIGRAM(S): at 21:05

## 2024-08-20 RX ADMIN — Medication 50 MILLIGRAM(S): at 05:29

## 2024-08-20 RX ADMIN — METHADONE HYDROCHLORIDE 110 MILLIGRAM(S): 1 POWDER ORAL at 10:37

## 2024-08-20 RX ADMIN — Medication 50 MILLIGRAM(S): at 08:56

## 2024-08-20 RX ADMIN — Medication 50 MILLIGRAM(S): at 11:59

## 2024-08-20 RX ADMIN — Medication 100 MILLIGRAM(S): at 17:25

## 2024-08-20 RX ADMIN — Medication 100 MILLILITER(S): at 05:28

## 2024-08-20 RX ADMIN — Medication 100 MILLIGRAM(S): at 11:59

## 2024-08-20 RX ADMIN — LAMOTRIGINE 200 MILLIGRAM(S): 100 TABLET, EXTENDED RELEASE ORAL at 11:59

## 2024-08-20 RX ADMIN — Medication 250 MILLIGRAM(S): at 05:28

## 2024-08-20 RX ADMIN — Medication 1 PATCH: at 12:01

## 2024-08-20 RX ADMIN — Medication 1 MILLIGRAM(S): at 11:59

## 2024-08-20 RX ADMIN — Medication 25 MILLIGRAM(S): at 17:22

## 2024-08-20 NOTE — BH CONSULTATION LIAISON ASSESSMENT NOTE - DIFFERENTIAL
Polysubstance abuse, including opioid use with withdrawal  Substance induced mood disorder  Borderline personality disorder  Unspecified anxiety

## 2024-08-20 NOTE — BH CONSULTATION LIAISON ASSESSMENT NOTE - NSBHCONSULTRECOMMENDOTHER_PSY_A_CORE FT
- Continue PTA Lexapro 20 mg daily and Lamotrigine 200 mg HS as currently prescribed with the following caveats  	-Lexapro doses may be held at patient's discretion until GI symptoms of withdrawal resolve as SSRIs may exacerbate this  	-Pt and primary team should monitor for rash w/ Lamotrigine given her longitudinal adherence issues (hold dose if rash develops or re-titrate medication if pt had missed up to 3 days of the medication prior to admission)  - Continue Hydroxyzine 50 mg q6 PRN anxiety (first line)  - For severe agitation w/ safety threats, would recommend Haldol 5 mg + Ativan 2 mg IM PRN  	-dose can be repeated after 15 minutes if still agitated (max TDD Haldol of 30 mg)  	-obtain ECG after any administered IM Haldol to ensure QTc < 500 ms  	-maintain K+>=4.0 and Mag >=2.0 to minimize risk of torsades   - Withdrawal monitoring and management as you are  - 1:1 observation for substance use behavioral (non-psychiatric) reasons as above  - Aftercare planning as per addiction medicine/CATCH team

## 2024-08-20 NOTE — BH CONSULTATION LIAISON ASSESSMENT NOTE - OTHER
Engaged selective attention chronically impaired; current calm physically but verbally impulsive as noted in HPI chronically impaired dismissive content; otherwise devoid of suicidal, homicidal, psychotic or manic material  irritable with limited cooperativeness with assessment

## 2024-08-20 NOTE — BH CONSULTATION LIAISON ASSESSMENT NOTE - CURRENT MEDICATION
MEDICATIONS  (STANDING):  cefTRIAXone   IVPB 1000 milliGRAM(s) IV Intermittent every 24 hours  chlordiazePOXIDE 50 milliGRAM(s) Oral every 4 hours  chlordiazePOXIDE 25 milliGRAM(s) Oral every 4 hours  chlordiazePOXIDE   Oral   escitalopram 20 milliGRAM(s) Oral at bedtime  folic acid 1 milliGRAM(s) Oral daily  lactated ringers. 1000 milliLiter(s) (100 mL/Hr) IV Continuous <Continuous>  lamoTRIgine 200 milliGRAM(s) Oral daily  methadone    Tablet 110 milliGRAM(s) Oral daily  nicotine - 21 mG/24Hr(s) Patch 1 Patch Transdermal daily  thiamine 100 milliGRAM(s) Oral daily  vancomycin  IVPB 1000 milliGRAM(s) IV Intermittent every 12 hours    MEDICATIONS  (PRN):  albuterol/ipratropium for Nebulization 3 milliLiter(s) Nebulizer every 6 hours PRN Shortness of Breath and/or Wheezing  hydrOXYzine hydrochloride 50 milliGRAM(s) Oral every 6 hours PRN Anxiety  LORazepam   Injectable 2 milliGRAM(s) IV Push every 4 hours PRN withdrawal or agitation  ondansetron Injectable 4 milliGRAM(s) IV Push every 8 hours PRN Nausea and/or Vomiting

## 2024-08-20 NOTE — PROGRESS NOTE ADULT - SUBJECTIVE AND OBJECTIVE BOX
RADU CONTRERAS  33y, Female    LOS  1d    INTERVAL EVENTS/HPI  - No acute events overnight  - T(F): , Max: 98.5 (08-20-24 @ 04:38)  - WBC Count: 11.82 (08-19-24 @ 02:07)  - Creatinine: 0.7 (08-19-24 @ 02:07)    REVIEW OF SYSTEMS:  CONSTITUTIONAL: No fever or chills  HEENT: No sore throat  RESPIRATORY: No cough, no shortness of breath  CARDIOVASCULAR: No chest pain or palpitations  GASTROINTESTINAL: No abdominal or epigastric pain  GENITOURINARY: No dysuria  NEUROLOGICAL: No headache/dizziness  MSK: No joint pain, erythema, or swelling; no back pain  SKIN: No itching, rashes  All other ROS negative except noted above    Prior hospital charts reviewed [Yes]  Primary team notes reviewed [Yes]  Other consultant notes reviewed [Yes]    ANTIMICROBIALS:   cefTRIAXone   IVPB 1000 every 24 hours  vancomycin  IVPB 1000 every 12 hours      OTHER MEDS: MEDICATIONS  (STANDING):  albuterol/ipratropium for Nebulization 3 every 6 hours PRN  chlordiazePOXIDE    chlordiazePOXIDE 50 every 4 hours  chlordiazePOXIDE 25 every 4 hours  escitalopram 20 at bedtime  hydrOXYzine hydrochloride 50 every 6 hours PRN  lamoTRIgine 200 daily  LORazepam   Injectable 2 every 4 hours PRN  methadone    Tablet 110 daily  ondansetron Injectable 4 every 8 hours PRN      Vital Signs Last 24 Hrs  T(F): 98.5 (08-20-24 @ 04:38), Max: 98.5 (08-13-24 @ 21:38)    Vital Signs Last 24 Hrs  HR: 82 (08-20-24 @ 04:38) (45 - 82)  BP: 115/52 (08-20-24 @ 04:38) (101/63 - 125/63)  RR: 18 (08-20-24 @ 04:38)  SpO2: 98% (08-20-24 @ 04:38) (96% - 100%)  Wt(kg): --    EXAM:  GENERAL: NAD  HEAD: No head lesions  NECK: Supple  CHEST/LUNG: Clear to auscultation bilaterally  HEART: S1 S2  ABDOMEN: Soft, nontender  EXTREMITIES: Left upper extremity with multiple scar marks and nodules. no fluctuance. Right extremity with similar marks noted. Left lower extremity calf tenderness.   NERVOUS SYSTEM: Alert and oriented to person    Labs:                        10.3   11.82 )-----------( 321      ( 19 Aug 2024 02:07 )             30.7     08-19    137  |  100  |  8<L>  ----------------------------<  109<H>  3.6   |  24  |  0.7    Ca    9.2      19 Aug 2024 02:07    TPro  6.6  /  Alb  3.5  /  TBili  0.3  /  DBili  x   /  AST  87<H>  /  ALT  50<H>  /  AlkPhos  197<H>  08-19      WBC Trend:  WBC Count: 11.82 (08-19-24 @ 02:07)      Creatine Trend:  Creatinine: 0.7 (08-19)      Liver Biochemical Testing Trend:  Alanine Aminotransferase (ALT/SGPT): 50 *H* (08-19)  Alanine Aminotransferase (ALT/SGPT): 9 (08-10)  Alanine Aminotransferase (ALT/SGPT): 8 (08-09)  Alanine Aminotransferase (ALT/SGPT): 15 (07-28)  Alanine Aminotransferase (ALT/SGPT): 15 (07-27)  Aspartate Aminotransferase (AST/SGOT): 87 (08-19-24 @ 02:07)  Aspartate Aminotransferase (AST/SGOT): 15 (08-10-24 @ 06:18)  Aspartate Aminotransferase (AST/SGOT): 15 (08-09-24 @ 15:55)  Aspartate Aminotransferase (AST/SGOT): 29 (07-28-24 @ 07:36)  Aspartate Aminotransferase (AST/SGOT): 21 (07-27-24 @ 19:20)  Bilirubin Total: 0.3 (08-19)  Bilirubin Total: <0.2 (08-10)  Bilirubin Total: 0.8 (08-09)  Bilirubin Direct: <0.2 (07-28)  Bilirubin Total: 0.4 (07-28)      Trend LDH      Urinalysis Basic - ( 19 Aug 2024 02:07 )    Color: x / Appearance: x / SG: x / pH: x  Gluc: 109 mg/dL / Ketone: x  / Bili: x / Urobili: x   Blood: x / Protein: x / Nitrite: x   Leuk Esterase: x / RBC: x / WBC x   Sq Epi: x / Non Sq Epi: x / Bacteria: x        MICROBIOLOGY:    Female    Culture - Blood (collected 12 Aug 2024 19:51)  Source: .Blood None  Final Report:    No growth at 5 days    Culture - Blood (collected 28 Jul 2024 07:36)  Source: .Blood None  Final Report:    No growth at 5 days    Culture - Blood (collected 27 Jun 2024 13:42)  Source: .Blood Blood  Final Report:    No growth at 5 days      HIV-1/2 Combo Result: Nonreact (08-12-24 @ 19:51)  Lactate, Blood: 1.1 (08-19 @ 02:07)        RADIOLOGY & ADDITIONAL TESTS:  I have personally reviewed the relevant images.   CXR      CT  < from: Xray Chest 1 View-PORTABLE IMMEDIATE (Xray Chest 1 View-PORTABLE IMMEDIATE .) (08.19.24 @ 00:51) >      INTERPRETATION:  Clinical History / Reason for exam: Shortness of breath    Comparison : Chest radiograph arch second 2021.    Technique/Positioning: AP chest.    Findings:    Support devices: None.    Cardiac/mediastinum/hilum: Unremarkable.    Lung parenchyma/Pleura: Within normal limits.    Skeleton/soft tissues: Rotation of the spine, which may be positional in   nature.Surgical clips right upper quadrant.    Impression:    No radiographic evidence of acute cardiopulmonary disease.        --- End of Report ---    < end of copied text >        WEIGHT  Weight (kg): 50.7 (08-19-24 @ 06:33)      All available historical records have been reviewed       RADU CONTRERAS  33y, Female    LOS  1d    INTERVAL EVENTS/HPI  - No acute events overnight  - T(F): , Max: 98.5 (08-20-24 @ 04:38)  - WBC Count: 11.82 (08-19-24 @ 02:07)  - Creatinine: 0.7 (08-19-24 @ 02:07)    REVIEW OF SYSTEMS:  CONSTITUTIONAL: No fever or chills  HEENT: No sore throat  RESPIRATORY: No cough, no shortness of breath  CARDIOVASCULAR: No chest pain or palpitations  GASTROINTESTINAL: No abdominal or epigastric pain  GENITOURINARY: No dysuria  NEUROLOGICAL: No headache/dizziness  MSK: No joint pain, erythema, or swelling; no back pain  SKIN: No itching, rashes  All other ROS negative except noted above    Prior hospital charts reviewed [Yes]  Primary team notes reviewed [Yes]  Other consultant notes reviewed [Yes]    ANTIMICROBIALS:   cefTRIAXone   IVPB 1000 every 24 hours  vancomycin  IVPB 1000 every 12 hours    MEDICATIONS  (STANDING):  cefepime   IVPB   100 mL/Hr IV Intermittent (08-19-24 @ 14:20)    cefepime   IVPB   100 mL/Hr IV Intermittent (08-19-24 @ 01:55)    cefTRIAXone   IVPB   100 mL/Hr IV Intermittent (08-19-24 @ 16:11)    vancomycin  IVPB   250 mL/Hr IV Intermittent (08-20-24 @ 05:28)   250 mL/Hr IV Intermittent (08-19-24 @ 14:20)    vancomycin  IVPB.   250 mL/Hr IV Intermittent (08-19-24 @ 02:31)        OTHER MEDS: MEDICATIONS  (STANDING):  albuterol/ipratropium for Nebulization 3 every 6 hours PRN  chlordiazePOXIDE    chlordiazePOXIDE 50 every 4 hours  chlordiazePOXIDE 25 every 4 hours  escitalopram 20 at bedtime  hydrOXYzine hydrochloride 50 every 6 hours PRN  lamoTRIgine 200 daily  LORazepam   Injectable 2 every 4 hours PRN  methadone    Tablet 110 daily  ondansetron Injectable 4 every 8 hours PRN      Vital Signs Last 24 Hrs  T(F): 98.5 (08-20-24 @ 04:38), Max: 98.5 (08-13-24 @ 21:38)    Vital Signs Last 24 Hrs  HR: 82 (08-20-24 @ 04:38) (45 - 82)  BP: 115/52 (08-20-24 @ 04:38) (101/63 - 125/63)  RR: 18 (08-20-24 @ 04:38)  SpO2: 98% (08-20-24 @ 04:38) (96% - 100%)  Wt(kg): --    EXAM:  GENERAL: NAD  HEAD: No head lesions  NECK: Supple  CHEST/LUNG: Clear to auscultation bilaterally  HEART: S1 S2  ABDOMEN: Soft, nontender  EXTREMITIES: Left upper extremity with multiple scar marks and nodules. no fluctuance. Right extremity with similar marks noted. Left lower extremity calf tenderness.   NERVOUS SYSTEM: Alert and oriented to person    Labs:                        10.3   11.82 )-----------( 321      ( 19 Aug 2024 02:07 )             30.7     08-19    137  |  100  |  8<L>  ----------------------------<  109<H>  3.6   |  24  |  0.7    Ca    9.2      19 Aug 2024 02:07    TPro  6.6  /  Alb  3.5  /  TBili  0.3  /  DBili  x   /  AST  87<H>  /  ALT  50<H>  /  AlkPhos  197<H>  08-19      WBC Trend:  WBC Count: 11.82 (08-19-24 @ 02:07)      Creatine Trend:  Creatinine: 0.7 (08-19)      Liver Biochemical Testing Trend:  Alanine Aminotransferase (ALT/SGPT): 50 *H* (08-19)  Alanine Aminotransferase (ALT/SGPT): 9 (08-10)  Alanine Aminotransferase (ALT/SGPT): 8 (08-09)  Alanine Aminotransferase (ALT/SGPT): 15 (07-28)  Alanine Aminotransferase (ALT/SGPT): 15 (07-27)  Aspartate Aminotransferase (AST/SGOT): 87 (08-19-24 @ 02:07)  Aspartate Aminotransferase (AST/SGOT): 15 (08-10-24 @ 06:18)  Aspartate Aminotransferase (AST/SGOT): 15 (08-09-24 @ 15:55)  Aspartate Aminotransferase (AST/SGOT): 29 (07-28-24 @ 07:36)  Aspartate Aminotransferase (AST/SGOT): 21 (07-27-24 @ 19:20)  Bilirubin Total: 0.3 (08-19)  Bilirubin Total: <0.2 (08-10)  Bilirubin Total: 0.8 (08-09)  Bilirubin Direct: <0.2 (07-28)  Bilirubin Total: 0.4 (07-28)      Trend LDH      Urinalysis Basic - ( 19 Aug 2024 02:07 )    Color: x / Appearance: x / SG: x / pH: x  Gluc: 109 mg/dL / Ketone: x  / Bili: x / Urobili: x   Blood: x / Protein: x / Nitrite: x   Leuk Esterase: x / RBC: x / WBC x   Sq Epi: x / Non Sq Epi: x / Bacteria: x        MICROBIOLOGY:    Female    Culture - Blood (collected 12 Aug 2024 19:51)  Source: .Blood None  Final Report:    No growth at 5 days    Culture - Blood (collected 28 Jul 2024 07:36)  Source: .Blood None  Final Report:    No growth at 5 days    Culture - Blood (collected 27 Jun 2024 13:42)  Source: .Blood Blood  Final Report:    No growth at 5 days      HIV-1/2 Combo Result: Nonreact (08-12-24 @ 19:51)  Lactate, Blood: 1.1 (08-19 @ 02:07)        RADIOLOGY & ADDITIONAL TESTS:  I have personally reviewed the relevant images.   CXR      CT  < from: Xray Chest 1 View-PORTABLE IMMEDIATE (Xray Chest 1 View-PORTABLE IMMEDIATE .) (08.19.24 @ 00:51) >      INTERPRETATION:  Clinical History / Reason for exam: Shortness of breath    Comparison : Chest radiograph arch second 2021.    Technique/Positioning: AP chest.    Findings:    Support devices: None.    Cardiac/mediastinum/hilum: Unremarkable.    Lung parenchyma/Pleura: Within normal limits.    Skeleton/soft tissues: Rotation of the spine, which may be positional in   nature.Surgical clips right upper quadrant.    Impression:    No radiographic evidence of acute cardiopulmonary disease.        --- End of Report ---    < end of copied text >        WEIGHT  Weight (kg): 50.7 (08-19-24 @ 06:33)      All available historical records have been reviewed

## 2024-08-20 NOTE — CONSULT NOTE ADULT - SUBJECTIVE AND OBJECTIVE BOX
As per EMR: "33 years old female history of polysubstance abuse, COPD, borderline personality, anxiety/depression, hepatitis C presents to ED with multiple medical complaints.  As per patient, she was admitted to hospital few days ago for drug withdrawal.  Patient signed AMA and left the hospital 5 days ago.  Admitted she was using cocaine and meth after she left the hospital.  She has been injecting into her arms and noted swelling and abscesses to her left arm.  Patient reports subjective fever, chills, headache, body ache, chest tightness, abdominal discomfort, nausea, loose bowel.  Denies urinary symptoms. Further denies SI HI and A/V hallucination."    Patient is well known for me from [facility] and multiple in-patient admissions for substance use related problems. She is highly impulsive and has a h/o frequently leaving the hospital AMA. Currently she was seen with CATCH team, was observed resting in bed covered by blanket, reports feeling "not so great", "throwing up on and off all night", also suffering from UTI. She is mildly irritable but tries to cooperate. Mood is dysphoric. She reports use of opioids, "a lot of benzos" and cocaine PTA. She states she is motivated for rehab. Interview is limited due to patient's discomfort.    Objectively, A,Ox3, mood dysphoric, affect mildly irritable but not aggressive, tries to cooperate, no psychotic sx, I good, J fair.      Vital Signs Last 24 Hrs  T(C): 36.9 (20 Aug 2024 04:38), Max: 36.9 (20 Aug 2024 04:38)  T(F): 98.5 (20 Aug 2024 04:38), Max: 98.5 (20 Aug 2024 04:38)  HR: 82 (20 Aug 2024 04:38) (45 - 82)  BP: 115/52 (20 Aug 2024 04:38) (101/63 - 125/63)  BP(mean): --  RR: 18 (20 Aug 2024 04:38) (18 - 18)  SpO2: 98% (20 Aug 2024 04:38) (96% - 100%)    Parameters below as of 19 Aug 2024 20:30  Patient On (Oxygen Delivery Method): room air    LABS:                      10.6   9.23  )-----------( 304      ( 20 Aug 2024 09:30 )             32.7     08-20    139  |  104  |  8<L>  ----------------------------<  83  3.9   |  21  |  0.8    Ca    8.6      20 Aug 2024 09:30  Mg     1.7     08-20    TPro  6.0  /  Alb  3.1<L>  /  TBili  0.2  /  DBili  x   /  AST  33  /  ALT  36  /  AlkPhos  168<H>  08-20      MEDICATIONS  (STANDING):  cefTRIAXone   IVPB 1000 milliGRAM(s) IV Intermittent every 24 hours  chlordiazePOXIDE 50 milliGRAM(s) Oral every 4 hours  chlordiazePOXIDE   Oral   chlordiazePOXIDE 25 milliGRAM(s) Oral every 4 hours  escitalopram 20 milliGRAM(s) Oral at bedtime  folic acid 1 milliGRAM(s) Oral daily  lactated ringers. 1000 milliLiter(s) (100 mL/Hr) IV Continuous <Continuous>  lamoTRIgine 200 milliGRAM(s) Oral daily  methadone    Tablet 110 milliGRAM(s) Oral daily  nicotine - 21 mG/24Hr(s) Patch 1 Patch Transdermal daily  thiamine 100 milliGRAM(s) Oral daily  vancomycin  IVPB 1000 milliGRAM(s) IV Intermittent every 12 hours    MEDICATIONS  (PRN):  albuterol/ipratropium for Nebulization 3 milliLiter(s) Nebulizer every 6 hours PRN Shortness of Breath and/or Wheezing  hydrOXYzine hydrochloride 50 milliGRAM(s) Oral every 6 hours PRN Anxiety  LORazepam   Injectable 2 milliGRAM(s) IV Push every 4 hours PRN withdrawal or agitation  ondansetron Injectable 4 milliGRAM(s) IV Push every 8 hours PRN Nausea and/or Vomiting

## 2024-08-20 NOTE — BH CONSULTATION LIAISON ASSESSMENT NOTE - VIOLENCE RISK FACTORS:
Substance abuse/Impulsivity/Community stressors that increase the risk of destabilization/Irritability/Elopement history or risk

## 2024-08-20 NOTE — BH CONSULTATION LIAISON ASSESSMENT NOTE - PAST PSYCHOTROPIC MEDICATION
Per chart: past trials of Paliperidone ER, Seroquel, Latuda, Clonidine, Hydroxyzine, Diazepam, Buprenorphine, Xanax and Adderall

## 2024-08-20 NOTE — BH CONSULTATION LIAISON ASSESSMENT NOTE - NSBHCHARTREVIEWVS_PSY_A_CORE FT
Vital Signs Last 24 Hrs  T(C): 36.9 (20 Aug 2024 04:38), Max: 36.9 (20 Aug 2024 04:38)  T(F): 98.5 (20 Aug 2024 04:38), Max: 98.5 (20 Aug 2024 04:38)  HR: 82 (20 Aug 2024 04:38) (45 - 82)  BP: 115/52 (20 Aug 2024 04:38) (101/63 - 125/63)  BP(mean): --  RR: 18 (20 Aug 2024 04:38) (18 - 18)  SpO2: 98% (20 Aug 2024 04:38) (96% - 100%)    Parameters below as of 19 Aug 2024 20:30  Patient On (Oxygen Delivery Method): room air

## 2024-08-20 NOTE — BH CONSULTATION LIAISON ASSESSMENT NOTE - DESCRIPTION
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

## 2024-08-20 NOTE — BH CONSULTATION LIAISON ASSESSMENT NOTE - HPI (INCLUDE ILLNESS QUALITY, SEVERITY, DURATION, TIMING, CONTEXT, MODIFYING FACTORS, ASSOCIATED SIGNS AND SYMPTOMS)
This is a 33 year old female, engaged, domiciled with her mother, with past psychiatric history of depression, anxiety, borderline personality disorder and polysubstance use disorder (crack cocaine, cannabis, opioids, benzos), in outpatient treatment and currently on methadone maintenance therapy, one reported inpatient psychiatric admission and suicide attempt as a teenager, multiple inpatient rehabs (30+), no known history of non-suicidal self injury, known history of irritability and poor impulse control (evident on recurrent med admissions) and history of aggression while on Fentanyl, multiple legal issues and past medical history of IVDU c/b Hep C and abscesses, COPD and chronic constipation...    On assessment, patient expresses that she is not feeling well physically and requests to postpone the assessment once more. She agrees to engage briefly in safety screening to ascertain if there are any acute psychiatric symptoms she requires assistance with. She denies recently feeling suicidal or homicidal and denies experiencing any AVH or paranoia currently or historically. She reiterates her main issues at this time as pertaining to substance use and withdrawal symptoms, expressing concerns of nausea and potential to "start throwing up" if she continues to speak. She conveys irritability from feeling unwell at this time but otherwise denies any issues with mood, anxiety or sleep warranting further psychiatric input at this time. She affirms interest in rehab but conveys a definitive plan is yet to be made. Assessment is otherwise limited by patient's current mental state.  This is a 33 year old female, engaged, domiciled with her mother, with past psychiatric history of depression, anxiety, borderline personality disorder and polysubstance use disorder (crack cocaine, cannabis, opioids, benzos), in outpatient treatment and currently on methadone maintenance therapy (110 mg daily), one reported inpatient psychiatric admission and suicide attempt as a teenager (Suboxone OD at age 18), multiple inpatient rehabs (30+), no known history of non-suicidal self injury, known history of irritability, poor impulse control and hospital misconduct (evident on past presentations including inconsistent hx provision, verbal agitation outbursts and use of illicit substances in the hospital), history of aggression while on Fentanyl, multiple legal issues and past medical history of IVDU c/b Hep C and abscesses, COPD and chronic constipation who presented on 8/19 with complaints consistent with substance withdrawal. Psychiatry consulted for routine psych clearance evaluation in anticipation of discharge plan to inpatient substance rehabilitation.     On assessment, patient expresses that she is not feeling well physically and requests to postpone the assessment once more. She agrees to engage briefly in safety screening to ascertain if there are any acute psychiatric symptoms she requires assistance with. She denies recently feeling suicidal or homicidal and denies experiencing any AVH or paranoia currently or historically. She reiterates her main issues at this time as pertaining to substance use and withdrawal symptoms, expressing concerns of nausea and potential to "start throwing up" if she continues to speak. She conveys irritability from feeling unwell at this time but otherwise denies any issues with mood, anxiety or sleep warranting further psychiatric input at this time. She affirms interest in rehab but conveys a definitive plan is yet to be made. Assessment is otherwise limited by patient's current mental state.  This is a 33 year old female, engaged, domiciled with her mother, with past psychiatric history of depression, anxiety, borderline personality disorder and polysubstance use disorder (crack cocaine, cannabis, opioids, benzos), in outpatient treatment and currently on methadone maintenance therapy (110 mg daily), one reported inpatient psychiatric admission and suicide attempt as a teenager (Suboxone OD at age 18), multiple inpatient rehabs (30+), no known history of non-suicidal self injury, known history of irritability, poor impulse control and hospital misconduct (evident on past presentations including inconsistent hx provision, verbal agitation outbursts, AMA discharges and use of illicit substances in the hospital), history of aggression while on Fentanyl, multiple legal issues and past medical history of IVDU c/b Hep C and abscesses, COPD and chronic constipation who presented on 8/19 with complaints consistent with polysubstance withdrawal. Psychiatry consulted for routine psych clearance evaluation in anticipation of discharge plan to inpatient substance rehabilitation.     On assessment, patient expresses that she is not feeling well physically and requests to postpone the assessment once more. She agrees to engage briefly in assessment to ascertain if there are any acute psychiatric symptoms she requires assistance with. She denies recently feeling suicidal or homicidal and denies experiencing any AVH or paranoia currently or historically. She reiterates her main issues at this time as pertaining to substance use and withdrawal symptoms, expressing concerns of nausea and potential to "start throwing up" if she continues to speak. She conveys irritability from feeling unwell at this time but otherwise denies any issues with mood, anxiety or sleep warranting further psychiatric input at this time, expressing disinterest otherwise in talking about her life and her feelings. She also expresses frustration at having to be seen by psychiatry as part of the process of getting help for what she "actually came here for," "I keep telling them I don't need this." She affirms interest in rehab but conveys a definitive plan is yet to be made. Assessment is otherwise limited by patient's current mental state.

## 2024-08-20 NOTE — BH CONSULTATION LIAISON ASSESSMENT NOTE - ADDITIONAL DETAILS ALL
Per chart review, pt reported she tried to overdose on Suboxone at age 18 and "didn't realize you can't do that"

## 2024-08-20 NOTE — PROGRESS NOTE ADULT - SUBJECTIVE AND OBJECTIVE BOX
SUBJECTIVE:    Patient is a 33y old Female who presents with a chief complaint of "IV drug abuse while on methadone" (19 Aug 2024 17:06)    Currently admitted to medicine with the primary diagnosis of Polysubstance dependence       Today is hospital day 1d. This morning she is resting comfortably in bed and reports no new issues or overnight events.     ROS:   CONSTITUTIONAL: No weakness, fevers or chills   EYES/ENT: No visual changes; No vertigo or throat pain   NECK: No pain or stiffness   RESPIRATORY: No cough, wheezing, hemoptysis; No shortness of breath   CARDIOVASCULAR: No chest pain or palpitations   GASTROINTESTINAL: No abdominal or epigastric pain. No nausea, vomiting, or hematemesis; No diarrhea or constipation. No melena or hematochezia.  GENITOURINARY: No dysuria, frequency or hematuria  NEUROLOGICAL: No numbness or weakness  SKIN: No itching, rashes      PAST MEDICAL & SURGICAL HISTORY  Hepatitis C    COPD (chronic obstructive pulmonary disease)    Borderline personality disorder    Anxiety and depression    Nicotine dependence    Drug abuse    Heroin abuse    IV drug user    S/P cholecystectomy  in 2010      SOCIAL HISTORY:    ALLERGIES:  No Known Allergies    MEDICATIONS:  STANDING MEDICATIONS  cefTRIAXone   IVPB 1000 milliGRAM(s) IV Intermittent every 24 hours  chlordiazePOXIDE   Oral   chlordiazePOXIDE 25 milliGRAM(s) Oral every 4 hours  escitalopram 20 milliGRAM(s) Oral at bedtime  folic acid 1 milliGRAM(s) Oral daily  lactated ringers. 1000 milliLiter(s) IV Continuous <Continuous>  lamoTRIgine 200 milliGRAM(s) Oral daily  methadone    Tablet 110 milliGRAM(s) Oral daily  nicotine - 21 mG/24Hr(s) Patch 1 Patch Transdermal daily  thiamine 100 milliGRAM(s) Oral daily  vancomycin  IVPB 1000 milliGRAM(s) IV Intermittent every 12 hours    PRN MEDICATIONS  albuterol/ipratropium for Nebulization 3 milliLiter(s) Nebulizer every 6 hours PRN  hydrOXYzine hydrochloride 50 milliGRAM(s) Oral every 6 hours PRN  LORazepam   Injectable 2 milliGRAM(s) IV Push every 4 hours PRN  ondansetron Injectable 4 milliGRAM(s) IV Push every 8 hours PRN    VITALS:   T(F): 98.6  HR: 79  BP: 152/77  RR: 24  SpO2: 99%    LABS:  Negative for smoking/alcohol/drug use.                         10.6   9.23  )-----------( 304      ( 20 Aug 2024 09:30 )             32.7     08-20    139  |  104  |  8<L>  ----------------------------<  83  3.9   |  21  |  0.8    Ca    8.6      20 Aug 2024 09:30  Mg     1.7     08-20    TPro  6.0  /  Alb  3.1<L>  /  TBili  0.2  /  DBili  x   /  AST  33  /  ALT  36  /  AlkPhos  168<H>  08-20    PT/INR - ( 20 Aug 2024 09:30 )   PT: 14.40 sec;   INR: 1.26 ratio           Urinalysis Basic - ( 20 Aug 2024 09:30 )    Color: x / Appearance: x / SG: x / pH: x  Gluc: 83 mg/dL / Ketone: x  / Bili: x / Urobili: x   Blood: x / Protein: x / Nitrite: x   Leuk Esterase: x / RBC: x / WBC x   Sq Epi: x / Non Sq Epi: x / Bacteria: x    RADIOLOGY:    PHYSICAL EXAM:  GEN: No acute distress  HEENT: normocephalic, atraumatic, aniceteric  LUNGS: bl breath sounds   HEART: S1/S2 present. RRR, no murmurs  ABD: Soft, non-tender, non-distended. Bowel sounds present  EXT: declined   NEURO: AAOX3       ASSESSMENT AND PLAN:    33-year-old female with PMH of polysubstance abuse (on MMTP 110 mg), COPD, borderline personality disorder, anxiety presents to ED for multiple medical complaints ,found to have withdrawal     #Multiple medical complaints reported on admission - resolved   suspect 2/2 :   #Substance Withdrawal  #Suspected thiamine and folate deficiency  # H/O Polysubstance abuse   - patient eloped on 8/14 - had inpatient substance rehab in place - now readmitted   - CIWA protocol  - librium taper, atarax q6hr prn for anxiety while hospitalized  - cw methadone 110 mg po   - c/w thiamine and folate  - tox screen   - addiction medicine eval appreciated   - psych eval appreciated     # R/O UTI  # H/O H/O BL extremity abscesses in 7/24   - physical exam of extremities unremarkable   - U/A wbc 12 , LE +    - fu urine clx/ syphillis/ g+c - patient declining to give urine sample - will re attempt   - fu blood clx   - ID EVAL appreciated- Rocephin and Vanc ; vanc trough and dosing per clinical pharmacist     # Transaminitis - improved   - no abdominal pain   - fu repeat acute hepatitis panel   - monitor LFT  US Abdomen Upper Quadrant Right (08.19.24 @ 16:40) >Post cholecystectomy. CBD dilated to 8 mm, can be seen postcholecystectomy.  - GI EVAL if not improving     # H/O Nicotine addiction - counseling done    # H/O COPD - not in exacerbation - reports not taking any medications    dvt/ gi ppx/diet  dispo: acute (inpatient substance rehab)   handoff: complete librium protocol  ; patient non cooperative with collecting urine studies or physical exam

## 2024-08-20 NOTE — BH CONSULTATION LIAISON ASSESSMENT NOTE - NSBHINDICATION_PSY_ALL_CORE
Patient's history of substance use in the hospital during past admissions would indicate a benefit in use of one-to-one observation during hospital stays for behavioral monitoring/chronic risk mitigation. Otherwise, there is no psychiatric specific indication for constant/one-to-one observation at this time and patient's observation status can be upgraded or downgraded according to patient's supervision needs as primary team sees fit.

## 2024-08-20 NOTE — BH CONSULTATION LIAISON ASSESSMENT NOTE - NSBHREFERDETAILS_PSY_A_CORE_FT
33 yof, hx of PSU, anxiety, borderline personality disorder, admitted for withdrawal mgt and isn't interacting much on exam today, but she is supposed to go to rehab and needs psych clearance first due to chronic behavioral issues & hx of passive suicidality.

## 2024-08-20 NOTE — CONSULT NOTE ADULT - ASSESSMENT
32yo w with h/o opioid, stimulant, sedative use d/o, admitted for detox and UTI. Currently on Librium protocol.     Recommend:    Continue Librium taper for sedative use d/o  Continue methadone 110mg daily which is her regular dose at [facility]  Referral to rehab, CATCH team will follow. 
Initial SW/CM Assessment/Plan of Care Note     Baseline Assessment  49 year old admitted 9/9/2023 as Inpatient with a diagnosis of CVA. Prior to admission patient was living with Family members, Siblings and residing at House.  Patient does  have a Power of  for Healthcare.  Document is not activated. Patient’s Primary Care Provider is Chaim Wynn MD.     Progress Note  Case opened per stroke consult for discharge planning. Chart reviewed and case discussed in OFTs. Writer met with pt and introduced self and role. Pt alert, oriented, and own decision maker. Pt agreeable to SW involvement.     Pt lives in a house with his two sisters and one of the sister's significant other. He is independent in all ADLs and IADLs without the use of any assistive devices. 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 Pt reported he is used to walking 8-10 miles/day for work, but has had prior a stroke in the past.     Aware pt to undergo echo and further medical workup after to determine cause of stroke, however pt concerned about losing his job as he reports today is his last \"sick day.\" Inquired if pt could apply for leave. Pt stated he wanted to apply for [employer], however he has only been employed with the company since March and they would not katharine him leave since he has been employed for less than a year. He also reported that the company will terminate him if he is not present at the job after today. Pt explained that if workup cannot be completed today, he will leave AMA in order to maintain his employment.     Disposition is home with no needs. Will continue to follow for needs that arise.     Plan  Patient/Family Discharge Goal: Home   Is patient/family goal achievable: Yes    SW/CM - Recommendations for Discharge: Home   PT - Recommendations for Discharge:      Last Filed Values       Value Time User    PT Discharge Needs  does not require ongoing therapy 9/10/2023  9:47 AM 
April Sandoval, PT        OT - Recommendations for Discharge:      Last Filed Values     None        SLP - Recommendations for Discharge:    Last Filed Values     None        Barriers to Discharge  Anticipate patient will not need post-hospital services. Necessary services are available.  Anticipate patient can return to the environment from which patient entered the hospital.   Anticipate patient can provide self-care at discharge.    Refer to / Flowsheet for objective data.     Medical History  Past Medical History:   Diagnosis Date   • Acute UTI 02/2017   • Anxiety    • Chronic pain    • Depression    • Diabetes mellitus (CMD)     type II: 1/9/17: no longer on meds   • Encounter for long-term (current) use of other medications    • Fracture 03/2007    R index finger r/t traumatic fall   • Hyperglycemia 01/09/2017   • Knee pain, bilateral    • Left ankle sprain 03/2007    r/t traumatic fall   • Left wrist pain 2007    chronic; r/t to traumatic fall   • Low back pain    • Renal stone 2015   • Renal stone 2017   • Retained ureteral stent    • Sciatica    • Smoking addiction    • Vasovagal near syncope 01/09/2017     Prior to Admission Status  Functional Status  Ambulation: Independent/Self  Bathing: Independent/Self  Dressing: Independent/Self  Toileting: Independent/Self  Meal Preparation: Independent/Self  Shopping: Independent/Self  Medication Preparation: Independent/Self  Medication Administration: Independent/Self  Housekeeping: Independent/Self  Laundry: Independent/Self  Transportation: Independent/Self    Agency/Support  Type of Services Prior to Hospitalization: None  Support Systems: Siblings   Home Devices/Equipment: None   Mobility Assist Devices: None  Sensory Support Devices: None    Current Status  PT Ambulation Tips: Walks independently  PT Transfer Tips: Transfers independently   Current Mental Status: Alert, Oriented to Person, Oriented to Place, Oriented to Reason for Hospitalization, 
This is a 32 y/o with history of polysubstance use disorder is presenting for abscesses.     IMPRESSION  #Bilateral extremity abscesses, cellulitis?  #History of poly substance use disorder. On methadone  #Withdrawal symptoms  #Screen for STD  #Pyuria (WBC 12)   #Mood and anxiety disorders.  #History of Hep C (likely Self cleared), Hep B Immune.   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  HIV and acute hepatitis panel negative (8/2024)    RECOMMENDATIONS  -Screen for Syphilis and urine G/C. Check Hep C Viral load to insure hep C clearance.   -Follow up with blood cultures.   -Keep on IV vancomycin. Dosing as per the pharmacy protocol.   -Keep on IV ceftriaxone 1 gram q 24 hours.   -Catch team/addiction medicine following. Monitor for withdrawals.     If any questions, please send a message or call on [employer] Teams  Please continue to update ID with any pertinent new laboratory or radiographic findings.    Bob Gallegos M.D  Infectious Diseases Attending/   [employer] at [facility]  
Oriented to Time    Insurance  Primary: [facility]  Secondary: N/A    Disposition Recommendations:  SW/CM recommendation for discharge: Home         
Patient is a woman with  history  of OUD  currently on opiate replacement therapy with methadone and was reported to have been using  IV opiates.  She was observed to be in bed  in a fetal position, on her knees with her head depressed to the bed and her hands behind her.  She was writhing  and moaning and with the exception of denying that she was in withdrawal  and noting the fact that she was on methadone she refused all conversation.

## 2024-08-20 NOTE — BH CONSULTATION LIAISON ASSESSMENT NOTE - NSBHSABEN_PSY_A_CORE FT
Per chart: pt previously dependent on Xanax and reported occasional use from an old prescription during a recent hospital visit

## 2024-08-20 NOTE — BH CONSULTATION LIAISON ASSESSMENT NOTE - SUMMARY
This is a 33 year old female, engaged, domiciled with her mother, with past psychiatric history of depression, anxiety, borderline personality disorder and polysubstance use disorder (crack cocaine, cannabis, opioids, benzos), in outpatient treatment and currently on methadone maintenance therapy (110 mg daily), one reported inpatient psychiatric admission and suicide attempt as a teenager (Suboxone OD at age 18), multiple inpatient rehabs (30+), no known history of non-suicidal self injury, known history of irritability, poor impulse control and hospital misconduct (evident on past presentations including inconsistent hx provision, verbal agitation outbursts and use of illicit substances in the hospital), history of aggression while on Fentanyl, multiple legal issues and past medical history of IVDU c/b Hep C and abscesses, COPD and chronic constipation who presented on 8/19 with complaints consistent with substance withdrawal. Psychiatry consulted for routine psych clearance evaluation in anticipation of discharge plan to inpatient substance rehabilitation.  This is a 33 year old female, engaged, domiciled with her mother, with past psychiatric history of depression, anxiety, borderline personality disorder and polysubstance use disorder (crack cocaine, cannabis, opioids, benzos), in outpatient treatment and currently on methadone maintenance therapy (110 mg daily), one reported inpatient psychiatric admission and suicide attempt as a teenager (Suboxone OD at age 18), multiple inpatient rehabs (30+), no known history of non-suicidal self injury, known history of irritability, poor impulse control and hospital misconduct (evident on past presentations including inconsistent hx provision, verbal agitation outbursts, AMA discharges and use of illicit substances in the hospital), history of aggression while on Fentanyl, multiple legal issues and past medical history of IVDU c/b Hep C and abscesses, COPD and chronic constipation who presented on 8/19 with complaints consistent with polysubstance withdrawal. Psychiatry consulted for routine psych clearance evaluation in anticipation of discharge plan to inpatient substance rehabilitation. Her brief assessment at this time is consistent with known substance related symptoms amidst baseline character pathology without evident acute psychiatric concerns at this time. She does not evidence any active suicidality, homicidality, nena or psychosis and does not meet criteria for involuntary psychiatric hospitalization. There are no acute psychiatric contraindications to discharge, inpatient substance rehabilitation or other disposition options when medically cleared.

## 2024-08-20 NOTE — PROGRESS NOTE ADULT - ASSESSMENT
This is a 34 y/o with history of polysubstance use disorder is presenting for abscesses.     IMPRESSION  #Bilateral extremity abscesses, cellulitis?  #History of poly substance use disorder. On methadone  #Withdrawal symptoms  #Screen for STD  #Pyuria (WBC 12)   #Mood and anxiety disorders.  #History of Hep C (likely Self cleared), Hep B Immune.   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  HIV and acute hepatitis panel negative (8/2024)    RECOMMENDATIONS  -Screen for Syphilis and urine G/C. Check Hep C Viral load to insure hep C clearance.   -Follow up with blood cultures.   -Keep on IV vancomycin. Dosing as per the pharmacy protocol.   -Keep on IV ceftriaxone 1 gram q 24 hours.   -Catch team/addiction medicine following. Monitor for withdrawals.     If any questions, please send a message or call on [employer] Teams  Please continue to update ID with any pertinent new laboratory or radiographic findings.    Bob Gallegos M.D  Infectious Diseases Attending/   [employer] at [facility]   This is a 34 y/o with history of polysubstance use disorder is presenting for abscesses.     IMPRESSION  #Bilateral extremity abscesses, cellulitis?  #History of poly substance use disorder. On methadone  #Withdrawal symptoms  #Screen for STD  #Pyuria (WBC 12)   #Mood and anxiety disorders.  #History of Hep C (likely Self cleared), Hep B Immune.   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  HIV and acute hepatitis panel negative (8/2024)    RECOMMENDATIONS  -Screen for Syphilis and urine G/C. Check Hep C Viral load to insure hep C clearance.   -Follow up with blood cultures.   -Keep on IV vancomycin. Dosing as per the pharmacy protocol.   -Keep on IV ceftriaxone 1 gram q 24 hours.   -Can be discharged on PO Bactrim 1 DS BID for total of 7 days. (S/D 8/19)   -Catch team/addiction medicine following. Monitor for withdrawals.     If any questions, please send a message or call on [employer] Teams  Please continue to update ID with any pertinent new laboratory or radiographic findings.    Bob Gallegos M.D  Infectious Diseases Attending/   [employer] at [facility]

## 2024-08-21 LAB
AMPHET UR-MCNC: NEGATIVE — SIGNIFICANT CHANGE UP
BARBITURATES UR SCN-MCNC: NEGATIVE — SIGNIFICANT CHANGE UP
BENZODIAZ UR-MCNC: POSITIVE
COCAINE METAB.OTHER UR-MCNC: POSITIVE
CULTURE RESULTS: NO GROWTH — SIGNIFICANT CHANGE UP
FENTANYL UR QL: POSITIVE
HCV RNA SPEC NAA+PROBE-LOG IU: SIGNIFICANT CHANGE UP IU/ML
HCV RNA SPEC NAA+PROBE-LOG IU: SIGNIFICANT CHANGE UP LOGIU/ML
METHADONE UR-MCNC: POSITIVE
OPIATES UR-MCNC: POSITIVE
OXYCODONE UR-MCNC: NEGATIVE — SIGNIFICANT CHANGE UP
PCP UR-MCNC: NEGATIVE — SIGNIFICANT CHANGE UP
PROPOXYPHENE QUALITATIVE URINE RESULT: NEGATIVE — SIGNIFICANT CHANGE UP
SPECIMEN SOURCE: SIGNIFICANT CHANGE UP
T PALLIDUM AB TITR SER: NEGATIVE — SIGNIFICANT CHANGE UP
T PALLIDUM AB TITR SER: NEGATIVE — SIGNIFICANT CHANGE UP
THC UR QL: POSITIVE
VANCOMYCIN FLD-MCNC: <4 UG/ML — LOW (ref 5–10)

## 2024-08-21 PROCEDURE — 99232 SBSQ HOSP IP/OBS MODERATE 35: CPT

## 2024-08-21 RX ORDER — VANCOMYCIN/0.9 % SOD CHLORIDE 1.75G/25
1500 PLASTIC BAG, INJECTION (ML) INTRAVENOUS EVERY 12 HOURS
Refills: 0 | Status: DISCONTINUED | OUTPATIENT
Start: 2024-08-21 | End: 2024-08-21

## 2024-08-21 RX ORDER — MAGNESIUM OXIDE TAB 400 MG (240 MG ELEMENTAL MG) 400 (240 MG) MG
400 TAB ORAL
Refills: 0 | Status: COMPLETED | OUTPATIENT
Start: 2024-08-21 | End: 2024-08-23

## 2024-08-21 RX ORDER — SULFAMETHOXAZOLE AND TRIMETHOPRIM 800; 160 MG/1; MG/1
1 TABLET ORAL
Refills: 0 | Status: DISCONTINUED | OUTPATIENT
Start: 2024-08-21 | End: 2024-08-26

## 2024-08-21 RX ORDER — ALPRAZOLAM 0.25 MG
0.5 TABLET ORAL
Refills: 0 | Status: DISCONTINUED | OUTPATIENT
Start: 2024-08-21 | End: 2024-08-22

## 2024-08-21 RX ORDER — LORAZEPAM 4 MG/ML
2 INJECTION INTRAMUSCULAR; INTRAVENOUS ONCE
Refills: 0 | Status: DISCONTINUED | OUTPATIENT
Start: 2024-08-21 | End: 2024-08-21

## 2024-08-21 RX ADMIN — Medication 25 MILLIGRAM(S): at 10:39

## 2024-08-21 RX ADMIN — Medication 25 MILLIGRAM(S): at 03:23

## 2024-08-21 RX ADMIN — ESCITALOPRAM OXALATE 20 MILLIGRAM(S): 10 TABLET ORAL at 21:56

## 2024-08-21 RX ADMIN — Medication 300 MILLIGRAM(S): at 06:00

## 2024-08-21 RX ADMIN — SULFAMETHOXAZOLE AND TRIMETHOPRIM 1 TABLET(S): 800; 160 TABLET ORAL at 17:50

## 2024-08-21 RX ADMIN — Medication 1 PATCH: at 08:34

## 2024-08-21 RX ADMIN — LORAZEPAM 2 MILLIGRAM(S): 4 INJECTION INTRAMUSCULAR; INTRAVENOUS at 01:25

## 2024-08-21 RX ADMIN — Medication 25 MILLIGRAM(S): at 08:18

## 2024-08-21 RX ADMIN — Medication 25 GRAM(S): at 09:03

## 2024-08-21 RX ADMIN — Medication 100 MILLILITER(S): at 09:04

## 2024-08-21 RX ADMIN — Medication 1 PATCH: at 12:00

## 2024-08-21 RX ADMIN — LORAZEPAM 2 MILLIGRAM(S): 4 INJECTION INTRAMUSCULAR; INTRAVENOUS at 01:41

## 2024-08-21 RX ADMIN — MAGNESIUM OXIDE TAB 400 MG (240 MG ELEMENTAL MG) 400 MILLIGRAM(S): 400 (240 MG) TAB at 16:45

## 2024-08-21 RX ADMIN — Medication 20 MILLIGRAM(S): at 21:56

## 2024-08-21 RX ADMIN — LAMOTRIGINE 200 MILLIGRAM(S): 100 TABLET, EXTENDED RELEASE ORAL at 11:52

## 2024-08-21 RX ADMIN — Medication 20 MILLIGRAM(S): at 16:46

## 2024-08-21 RX ADMIN — Medication 0.5 MILLIGRAM(S): at 17:50

## 2024-08-21 RX ADMIN — MAGNESIUM OXIDE TAB 400 MG (240 MG ELEMENTAL MG) 400 MILLIGRAM(S): 400 (240 MG) TAB at 11:52

## 2024-08-21 RX ADMIN — ESCITALOPRAM OXALATE 20 MILLIGRAM(S): 10 TABLET ORAL at 00:05

## 2024-08-21 RX ADMIN — Medication 0.5 MILLIGRAM(S): at 11:52

## 2024-08-21 RX ADMIN — Medication 1 PATCH: at 11:52

## 2024-08-21 RX ADMIN — Medication 25 MILLIGRAM(S): at 00:05

## 2024-08-21 RX ADMIN — METHADONE HYDROCHLORIDE 110 MILLIGRAM(S): 1 POWDER ORAL at 10:39

## 2024-08-21 NOTE — PHARMACOTHERAPY INTERVENTION NOTE - COMMENTS
As per policy, adjusted vancomycin dose from 1000 mg IV q12h to 1500 mg IV q12h since the vancomycin level today, 8/20 was <4.0 mg/L. As per Precise[employer] calculations, the steady-state vancomycin AUC/SANJU on the former dose is 284.88mg/L*h, which is greater than the therapeutic range of 400 - 600 mg/L*h. Decreasing the dose is predicted to yield an AUC/SANJU of 427.32 mg/L*h.    Kimmy Herrera, PharmD  Clinical Pharmacy Specialist, Infectious Diseases  Tele-Antimicrobial Stewardship Program (Tele-ASP)  Tele-ASP Phone: [phone] 
As per policy, ordered a vancomycin trough level for 8/22 @1600 prior to the 4th dose on vancomycin regimen 1500mg IV q12hrs to assist with further vancomycin dosing optimization.     Kimmy Herrera, PharmD  Clinical Pharmacy Specialist, Infectious Diseases  Tele-Antimicrobial Stewardship Program (Tele-ASP)  Tele-ASP Phone: [phone] 
Patient has received two doses of vancomycin 1000mg IV q12h (8/19 2:31 and 14:20). Per [employer] Bayesian Vancomycin Dosing Software, the current regimen predicts a therapeutic AUC/SANJU of 457.76 mg/L*hr (goal 400-600). Recommend continuing vancomycin 1000mg IV q12h and obtaining vancomycin level on 8/20 at 11AM before the 4th dose of vancomycin (it does not need to be a true trough as PrecisePK will adjust accordingly).

## 2024-08-21 NOTE — DISCHARGE NOTE PROVIDER - NSDCCPCAREPLAN_GEN_ALL_CORE_FT
PRINCIPAL DISCHARGE DIAGNOSIS  Diagnosis: Polysubstance dependence  Assessment and Plan of Treatment: 33-year-old female with PMH of polysubstance abuse (on MMTP 110 mg), COPD, borderline personality disorder, anxiety presents to ED for multiple medical complaints ,found to have withdrawal. Assessed by Medicine and CATCH team and started on librium taper that was set to be completed on 8/23 at noon. However pt became agitated on the morning of 8/21 at 9:30 AM because she wished to smoke a cigarette and had no outlet. Pt left AMA and refused to sign AMA paperwork  Plan:   - outpt f/u with Methadone clinic and counselor   - c/w home meds      SECONDARY DISCHARGE DIAGNOSES  Diagnosis: Skin infection  Assessment and Plan of Treatment:     Diagnosis: Intravenous drug abuse  Assessment and Plan of Treatment:      PRINCIPAL DISCHARGE DIAGNOSIS  Diagnosis: Polysubstance dependence  Assessment and Plan of Treatment: 33-year-old female with PMH of polysubstance abuse (on MMTP 110 mg), COPD, borderline personality disorder, anxiety presents to ED for multiple medical complaints ,found to have withdrawal. Assessed by Medicine and CATCH team and started on librium taper that was set to be completed on 8/23 at noon.   Plan:   - outpt f/u with Methadone clinic and counselor   - c/w home meds      SECONDARY DISCHARGE DIAGNOSES  Diagnosis: Skin infection  Assessment and Plan of Treatment:     Diagnosis: Intravenous drug abuse  Assessment and Plan of Treatment:

## 2024-08-21 NOTE — PROGRESS NOTE ADULT - SUBJECTIVE AND OBJECTIVE BOX
SUBJECTIVE:    Patient is a 33y old Female who presents with a chief complaint of "IV drug abuse while on methadone" (19 Aug 2024 17:06)    Currently admitted to medicine with the primary diagnosis of Polysubstance dependence       Today is hospital day 1d. This morning she is resting comfortably in bed and reports no new issues or overnight events.     ROS:   CONSTITUTIONAL: No weakness, fevers or chills   EYES/ENT: No visual changes; No vertigo or throat pain   NECK: No pain or stiffness   RESPIRATORY: No cough, wheezing, hemoptysis; No shortness of breath   CARDIOVASCULAR: No chest pain or palpitations   GASTROINTESTINAL: No abdominal or epigastric pain. No nausea, vomiting, or hematemesis; No diarrhea or constipation. No melena or hematochezia.  GENITOURINARY: No dysuria, frequency or hematuria  NEUROLOGICAL: No numbness or weakness  SKIN: No itching, rashes      PAST MEDICAL & SURGICAL HISTORY  Hepatitis C    COPD (chronic obstructive pulmonary disease)    Borderline personality disorder    Anxiety and depression    Nicotine dependence    Drug abuse    Heroin abuse    IV drug user    S/P cholecystectomy  in 2010      SOCIAL HISTORY:    ALLERGIES:  No Known Allergies    MEDICATIONS:  STANDING MEDICATIONS  cefTRIAXone   IVPB 1000 milliGRAM(s) IV Intermittent every 24 hours  chlordiazePOXIDE   Oral   chlordiazePOXIDE 25 milliGRAM(s) Oral every 4 hours  escitalopram 20 milliGRAM(s) Oral at bedtime  folic acid 1 milliGRAM(s) Oral daily  lactated ringers. 1000 milliLiter(s) IV Continuous <Continuous>  lamoTRIgine 200 milliGRAM(s) Oral daily  methadone    Tablet 110 milliGRAM(s) Oral daily  nicotine - 21 mG/24Hr(s) Patch 1 Patch Transdermal daily  thiamine 100 milliGRAM(s) Oral daily  vancomycin  IVPB 1000 milliGRAM(s) IV Intermittent every 12 hours    PRN MEDICATIONS  albuterol/ipratropium for Nebulization 3 milliLiter(s) Nebulizer every 6 hours PRN  hydrOXYzine hydrochloride 50 milliGRAM(s) Oral every 6 hours PRN  LORazepam   Injectable 2 milliGRAM(s) IV Push every 4 hours PRN  ondansetron Injectable 4 milliGRAM(s) IV Push every 8 hours PRN    VITALS:   Vital Signs Last 24 Hrs  T(C): 36.5 (21 Aug 2024 05:50), Max: 37 (20 Aug 2024 13:49)  T(F): 97.7 (21 Aug 2024 05:50), Max: 98.6 (20 Aug 2024 13:49)  HR: 49 (21 Aug 2024 05:50) (48 - 79)  BP: 93/48 (21 Aug 2024 05:50) (93/48 - 152/77)  RR: 18 (21 Aug 2024 05:50) (18 - 24)  SpO2: 94% (21 Aug 2024 05:50) (94% - 100%)    Parameters below as of 21 Aug 2024 05:50  Patient On (Oxygen Delivery Method): room air      LABS:                        10.6   9.23  )-----------( 304      ( 20 Aug 2024 09:30 )             32.7     08-20    139  |  104  |  8<L>  ----------------------------<  83  3.9   |  21  |  0.8    Ca    8.6      20 Aug 2024 09:30  Mg     1.7     08-20    TPro  6.0  /  Alb  3.1<L>  /  TBili  0.2  /  DBili  x   /  AST  33  /  ALT  36  /  AlkPhos  168<H>  08-20    PT/INR - ( 20 Aug 2024 09:30 )   PT: 14.40 sec;   INR: 1.26 ratio           Urinalysis Basic - ( 20 Aug 2024 09:30 )    Color: x / Appearance: x / SG: x / pH: x  Gluc: 83 mg/dL / Ketone: x  / Bili: x / Urobili: x   Blood: x / Protein: x / Nitrite: x   Leuk Esterase: x / RBC: x / WBC x   Sq Epi: x / Non Sq Epi: x / Bacteria: x    RADIOLOGY:    PHYSICAL EXAM:  GEN: No acute distress  HEENT: normocephalic, atraumatic, aniceteric  LUNGS: bl breath sounds   HEART: S1/S2 present. RRR, no murmurs  ABD: Soft, non-tender, non-distended. Bowel sounds present  EXT: declined   NEURO: AAOX3       ASSESSMENT AND PLAN:    33-year-old female with PMH of polysubstance abuse (on MMTP 110 mg), COPD, borderline personality disorder, anxiety presents to ED for multiple medical complaints ,found to have withdrawal     #Multiple medical complaints reported on admission - resolved   suspect 2/2 :   #Substance Withdrawal  #Suspected thiamine and folate deficiency  # H/O Polysubstance abuse   - patient eloped on 8/14 - had inpatient substance rehab in place - now readmitted   - [facility] protocol  - librium taper, atarax q6hr prn for anxiety while hospitalized  - cw methadone 110 mg po   - c/w thiamine and folate  - tox screen   - addiction medicine eval appreciated   - psych eval appreciated     # R/O UTI  # H/O H/O BL extremity abscesses in 7/24   - physical exam of extremities unremarkable   - U/A wbc 12 , LE +    - fu urine clx/ syphillis/ g+c - patient declining to give urine sample - will re attempt   - fu blood clx   - ID EVAL appreciated- Rocephin and Vanc ; vanc trough and dosing per clinical pharmacist     # Transaminitis - improved   - no abdominal pain   - fu repeat acute hepatitis panel   - monitor LFT  US Abdomen Upper Quadrant Right (08.19.24 @ 16:40) >Post cholecystectomy. CBD dilated to 8 mm, can be seen postcholecystectomy.  - GI EVAL if not improving     # H/O Nicotine addiction - counseling done    # H/O COPD - not in exacerbation - reports not taking any medications    dvt/ gi ppx/diet  dispo: acute (inpatient substance rehab)   handoff: complete librium protocol  ; patient non cooperative with collecting urine studies or physical exam  SUBJECTIVE:    Patient is a 33y old Female who presents with a chief complaint of "IV drug abuse while on methadone" (19 Aug 2024 17:06)    Currently admitted to medicine with the primary diagnosis of Polysubstance dependence       Today is hospital day 1d. Patient seen and examined at bedside. This morning she is resting comfortably in bed and reports no new issues or overnight events.     ROS:   CONSTITUTIONAL: No weakness, fevers or chills   EYES/ENT: No visual changes; No vertigo or throat pain   NECK: No pain or stiffness   RESPIRATORY: No cough, wheezing, hemoptysis; No shortness of breath   CARDIOVASCULAR: No chest pain or palpitations   GASTROINTESTINAL: No abdominal or epigastric pain. No nausea, vomiting, or hematemesis; No diarrhea or constipation. No melena or hematochezia.  GENITOURINARY: No dysuria, frequency or hematuria  NEUROLOGICAL: No numbness or weakness  SKIN: No itching, rashes      PAST MEDICAL & SURGICAL HISTORY  Hepatitis C    COPD (chronic obstructive pulmonary disease)    Borderline personality disorder    Anxiety and depression    Nicotine dependence    Drug abuse    Heroin abuse    IV drug user    S/P cholecystectomy  in 2010      SOCIAL HISTORY:    ALLERGIES:  No Known Allergies    MEDICATIONS:  STANDING MEDICATIONS  cefTRIAXone   IVPB 1000 milliGRAM(s) IV Intermittent every 24 hours  chlordiazePOXIDE   Oral   chlordiazePOXIDE 25 milliGRAM(s) Oral every 4 hours  escitalopram 20 milliGRAM(s) Oral at bedtime  folic acid 1 milliGRAM(s) Oral daily  lactated ringers. 1000 milliLiter(s) IV Continuous <Continuous>  lamoTRIgine 200 milliGRAM(s) Oral daily  methadone    Tablet 110 milliGRAM(s) Oral daily  nicotine - 21 mG/24Hr(s) Patch 1 Patch Transdermal daily  thiamine 100 milliGRAM(s) Oral daily  vancomycin  IVPB 1000 milliGRAM(s) IV Intermittent every 12 hours    PRN MEDICATIONS  albuterol/ipratropium for Nebulization 3 milliLiter(s) Nebulizer every 6 hours PRN  hydrOXYzine hydrochloride 50 milliGRAM(s) Oral every 6 hours PRN  LORazepam   Injectable 2 milliGRAM(s) IV Push every 4 hours PRN  ondansetron Injectable 4 milliGRAM(s) IV Push every 8 hours PRN    VITALS:   Vital Signs Last 24 Hrs  T(C): 36.5 (21 Aug 2024 05:50), Max: 37 (20 Aug 2024 13:49)  T(F): 97.7 (21 Aug 2024 05:50), Max: 98.6 (20 Aug 2024 13:49)  HR: 49 (21 Aug 2024 05:50) (48 - 79)  BP: 93/48 (21 Aug 2024 05:50) (93/48 - 152/77)  RR: 18 (21 Aug 2024 05:50) (18 - 24)  SpO2: 94% (21 Aug 2024 05:50) (94% - 100%)    Parameters below as of 21 Aug 2024 05:50  Patient On (Oxygen Delivery Method): room air      LABS:                        10.6   9.23  )-----------( 304      ( 20 Aug 2024 09:30 )             32.7     08-20    139  |  104  |  8<L>  ----------------------------<  83  3.9   |  21  |  0.8    Ca    8.6      20 Aug 2024 09:30  Mg     1.7     08-20    TPro  6.0  /  Alb  3.1<L>  /  TBili  0.2  /  DBili  x   /  AST  33  /  ALT  36  /  AlkPhos  168<H>  08-20    PT/INR - ( 20 Aug 2024 09:30 )   PT: 14.40 sec;   INR: 1.26 ratio           Urinalysis Basic - ( 20 Aug 2024 09:30 )    Color: x / Appearance: x / SG: x / pH: x  Gluc: 83 mg/dL / Ketone: x  / Bili: x / Urobili: x   Blood: x / Protein: x / Nitrite: x   Leuk Esterase: x / RBC: x / WBC x   Sq Epi: x / Non Sq Epi: x / Bacteria: x    RADIOLOGY:    PHYSICAL EXAM:  GEN: No acute distress  HEENT: normocephalic, atraumatic, aniceteric  LUNGS: bl breath sounds   HEART: S1/S2 present. RRR, no murmurs  ABD: Soft, non-tender, non-distended. Bowel sounds present  EXT: declined   NEURO: AAOX3       ASSESSMENT AND PLAN:    33-year-old female with PMH of polysubstance abuse (on MMTP 110 mg), COPD, borderline personality disorder, anxiety presents to ED for multiple medical complaints ,found to have withdrawal     #Multiple medical complaints reported on admission - resolved   suspect 2/2 :   #Substance Withdrawal  #Suspected thiamine and folate deficiency  # H/O Polysubstance abuse   - patient eloped on 8/14 - had inpatient substance rehab in place - now readmitted   - [facility] protocol  - librium taper, atarax q6hr prn for anxiety while hospitalized  - cw methadone 110 mg po   - c/w thiamine and folate  - tox screen   - addiction medicine eval appreciated   - psych eval appreciated     # R/O UTI  # H/O H/O BL extremity abscesses in 7/24   - physical exam of extremities unremarkable   - U/A wbc 12 , LE +    - fu urine clx/ syphillis/ g+c - patient declining to give urine sample - will re attempt   - fu blood clx   - ID EVAL appreciated- Rocephin and Vanc ; vanc trough and dosing per clinical pharmacist     # Transaminitis - improved   - no abdominal pain   - fu repeat acute hepatitis panel   - monitor LFT  US Abdomen Upper Quadrant Right (08.19.24 @ 16:40) >Post cholecystectomy. CBD dilated to 8 mm, can be seen postcholecystectomy.  - GI EVAL if not improving     # H/O Nicotine addiction - counseling done    # H/O COPD - not in exacerbation - reports not taking any medications    dvt/ gi ppx/diet  dispo: acute (inpatient substance rehab)   handoff: complete librium protocol  ; patient non cooperative with collecting urine studies or physical exam  SUBJECTIVE:    Patient is a 33y old Female who presents with a chief complaint of "IV drug abuse while on methadone" (19 Aug 2024 17:06)    Currently admitted to medicine with the primary diagnosis of Polysubstance dependence        Patient seen and examined at bedside. This morning she is resting comfortably in bed and reports no new issues or overnight events.     ROS:   CONSTITUTIONAL: No weakness, fevers or chills   EYES/ENT: No visual changes; No vertigo or throat pain   NECK: No pain or stiffness   RESPIRATORY: No cough, wheezing, hemoptysis; No shortness of breath   CARDIOVASCULAR: No chest pain or palpitations   GASTROINTESTINAL: No abdominal or epigastric pain. No nausea, vomiting, or hematemesis; No diarrhea or constipation. No melena or hematochezia.  GENITOURINARY: No dysuria, frequency or hematuria  NEUROLOGICAL: No numbness or weakness  SKIN: No itching, rashes      PAST MEDICAL & SURGICAL HISTORY  Hepatitis C    COPD (chronic obstructive pulmonary disease)    Borderline personality disorder    Anxiety and depression    Nicotine dependence    Drug abuse    Heroin abuse    IV drug user    S/P cholecystectomy  in 2010      SOCIAL HISTORY:    ALLERGIES:  No Known Allergies    MEDICATIONS:    MEDICATIONS  (STANDING):  ALPRAZolam 0.5 milliGRAM(s) Oral four times a day  chlordiazePOXIDE   Oral   chlordiazePOXIDE 20 milliGRAM(s) Oral every 4 hours  escitalopram 20 milliGRAM(s) Oral at bedtime  folic acid 1 milliGRAM(s) Oral daily  lactated ringers. 1000 milliLiter(s) (100 mL/Hr) IV Continuous <Continuous>  lamoTRIgine 200 milliGRAM(s) Oral daily  magnesium oxide 400 milliGRAM(s) Oral three times a day with meals  methadone    Tablet 110 milliGRAM(s) Oral daily  nicotine - 21 mG/24Hr(s) Patch 1 Patch Transdermal daily  thiamine 100 milliGRAM(s) Oral daily  trimethoprim  160 mG/sulfamethoxazole 800 mG 1 Tablet(s) Oral two times a day    MEDICATIONS  (PRN):  albuterol/ipratropium for Nebulization 3 milliLiter(s) Nebulizer every 6 hours PRN Shortness of Breath and/or Wheezing  hydrOXYzine hydrochloride 50 milliGRAM(s) Oral every 6 hours PRN Anxiety  ondansetron Injectable 4 milliGRAM(s) IV Push every 8 hours PRN Nausea and/or Vomiting      VITALS:   Vital Signs Last 24 Hrs  T(C): 36.5 (21 Aug 2024 05:50), Max: 37 (20 Aug 2024 13:49)  T(F): 97.7 (21 Aug 2024 05:50), Max: 98.6 (20 Aug 2024 13:49)  HR: 49 (21 Aug 2024 05:50) (48 - 79)  BP: 93/48 (21 Aug 2024 05:50) (93/48 - 152/77)  RR: 18 (21 Aug 2024 05:50) (18 - 24)  SpO2: 94% (21 Aug 2024 05:50) (94% - 100%)    Parameters below as of 21 Aug 2024 05:50  Patient On (Oxygen Delivery Method): room air      LABS:                                   10.6   9.23  )-----------( 304      ( 20 Aug 2024 09:30 )             32.7     08-20    139  |  104  |  8<L>  ----------------------------<  83  3.9   |  21  |  0.8    Ca    8.6      20 Aug 2024 09:30  Mg     1.7     08-20    TPro  6.0  /  Alb  3.1<L>  /  TBili  0.2  /  DBili  x   /  AST  33  /  ALT  36  /  AlkPhos  168<H>  08-20      PT/INR - ( 20 Aug 2024 09:30 )   PT: 14.40 sec;   INR: 1.26 ratio           Urinalysis Basic - ( 20 Aug 2024 09:30 )    Color: x / Appearance: x / SG: x / pH: x  Gluc: 83 mg/dL / Ketone: x  / Bili: x / Urobili: x   Blood: x / Protein: x / Nitrite: x   Leuk Esterase: x / RBC: x / WBC x   Sq Epi: x / Non Sq Epi: x / Bacteria: x        PHYSICAL EXAM:  GEN: No acute distress  HEENT: normocephalic, atraumatic, aniceteric  LUNGS: bl breath sounds   HEART: S1/S2 present. RRR, no murmurs  ABD: Soft, non-tender, non-distended. Bowel sounds present  EXT: declined   NEURO: AAOX3     RADIOLOGY:    < from: US Abdomen Upper Quadrant Right (08.19.24 @ 16:40) >    FINDINGS:  Liver: Within normal limits.  Bile ducts: Dilated common bile duct measures 8 mm.  Gallbladder: Cholecystectomy.  Pancreas: Poorly visualized.  Right kidney: 9.4 cm. No hydronephrosis.  Ascites: None.    IMPRESSION:    Post cholecystectomy. CBD dilated to 8 mm, can be seen   postcholecystectomy.    --- End of Report ---    < end of copied text >  < from: Xray Chest 1 View-PORTABLE IMMEDIATE (Xray Chest 1 View-PORTABLE IMMEDIATE .) (08.19.24 @ 00:51) >  Findings:    Support devices: None.    Cardiac/mediastinum/hilum: Unremarkable.    Lung parenchyma/Pleura: Within normal limits.    Skeleton/soft tissues: Rotation of the spine, which may be positional in   nature.Surgical clips right upper quadrant.    Impression:    No radiographic evidence of acute cardiopulmonary disease.        --- End of Report ---          < end of copied text >  < from: [facility] Duplex Upper Ext Vein Scan, Left (08.12.24 @ 08:53) >  FINDINGS:    The left internal jugular, subclavian, axillary and brachial veins are   patent and compressible where applicable.  The basilic vein (superficial   vein)is thrombosed.  The cephalic vein (superficial vein) is patent and   without thrombus.    Doppler examination shows normal spontaneous and phasic flow.    IMPRESSION:  No evidence of left upper extremity deep venous thrombosis. Superficial   thrombophlebitis of the left basilic vein.        --- End of Report ---        < end of copied text >      ASSESSMENT AND PLAN:    33-year-old female with PMH of polysubstance abuse (on MMTP 110 mg), COPD, borderline personality disorder, anxiety presents to ED for multiple medical complaints ,found to have withdrawal     #Multiple medical complaints reported on admission - resolved   suspect 2/2 :   #Substance Withdrawal  #Suspected thiamine and folate deficiency  # H/O Polysubstance abuse   - patient eloped on 8/14 - had inpatient substance rehab in place - now readmitted   - [facility] protocol  - librium taper, atarax q6hr prn for anxiety while hospitalized  - cw methadone 110 mg po   - c/w thiamine and folate  - tox screen   - addiction medicine eval appreciated   - psych eval appreciated     # R/O UTI  # H/O H/O BL extremity abscesses in 7/24   - physical exam of extremities unremarkable   - U/A wbc 12 , LE +    - fu urine clx/ syphillis/ g+c - patient declining to give urine sample - will re attempt   - fu blood clx   - ID EVAL appreciated- Rocephin and Vanc ; vanc trough and dosing per clinical pharmacist     # Transaminitis - improved   - no abdominal pain   - fu repeat acute hepatitis panel   - monitor LFT  US Abdomen Upper Quadrant Right (08.19.24 @ 16:40) >Post cholecystectomy. CBD dilated to 8 mm, can be seen postcholecystectomy.  - GI EVAL if not improving     # H/O Nicotine addiction - counseling done    # H/O COPD - not in exacerbation - reports not taking any medications    dvt/ gi ppx/diet  dispo: acute (inpatient substance rehab)   handoff: complete librium protocol  ; patient non cooperative with collecting urine studies or physical exam  SUBJECTIVE:    Patient is a 33y old Female who presents with a chief complaint of "IV drug abuse while on methadone" (19 Aug 2024 17:06)    Currently admitted to medicine with the primary diagnosis of Polysubstance dependence        Patient seen and examined at bedside. This morning she is resting comfortably in bed and reports no new issues or overnight events.     ROS:   CONSTITUTIONAL: No weakness, fevers or chills   EYES/ENT: No visual changes; No vertigo or throat pain   NECK: No pain or stiffness   RESPIRATORY: No cough, wheezing, hemoptysis; No shortness of breath   CARDIOVASCULAR: No chest pain or palpitations   GASTROINTESTINAL: No abdominal or epigastric pain. No nausea, vomiting, or hematemesis; No diarrhea or constipation. No melena or hematochezia.  GENITOURINARY: No dysuria, frequency or hematuria  NEUROLOGICAL: No numbness or weakness  SKIN: No itching, rashes      PAST MEDICAL & SURGICAL HISTORY  Hepatitis C    COPD (chronic obstructive pulmonary disease)    Borderline personality disorder    Anxiety and depression    Nicotine dependence    Drug abuse    Heroin abuse    IV drug user    S/P cholecystectomy  in 2010      SOCIAL HISTORY:    ALLERGIES:  No Known Allergies    MEDICATIONS:    MEDICATIONS  (STANDING):  ALPRAZolam 0.5 milliGRAM(s) Oral four times a day  chlordiazePOXIDE   Oral   chlordiazePOXIDE 20 milliGRAM(s) Oral every 4 hours  escitalopram 20 milliGRAM(s) Oral at bedtime  folic acid 1 milliGRAM(s) Oral daily  lactated ringers. 1000 milliLiter(s) (100 mL/Hr) IV Continuous <Continuous>  lamoTRIgine 200 milliGRAM(s) Oral daily  magnesium oxide 400 milliGRAM(s) Oral three times a day with meals  methadone    Tablet 110 milliGRAM(s) Oral daily  nicotine - 21 mG/24Hr(s) Patch 1 Patch Transdermal daily  thiamine 100 milliGRAM(s) Oral daily  trimethoprim  160 mG/sulfamethoxazole 800 mG 1 Tablet(s) Oral two times a day    MEDICATIONS  (PRN):  albuterol/ipratropium for Nebulization 3 milliLiter(s) Nebulizer every 6 hours PRN Shortness of Breath and/or Wheezing  hydrOXYzine hydrochloride 50 milliGRAM(s) Oral every 6 hours PRN Anxiety  ondansetron Injectable 4 milliGRAM(s) IV Push every 8 hours PRN Nausea and/or Vomiting      VITALS:   Vital Signs Last 24 Hrs  T(C): 36.5 (21 Aug 2024 05:50), Max: 37 (20 Aug 2024 13:49)  T(F): 97.7 (21 Aug 2024 05:50), Max: 98.6 (20 Aug 2024 13:49)  HR: 49 (21 Aug 2024 05:50) (48 - 79)  BP: 93/48 (21 Aug 2024 05:50) (93/48 - 152/77)  RR: 18 (21 Aug 2024 05:50) (18 - 24)  SpO2: 94% (21 Aug 2024 05:50) (94% - 100%)    Parameters below as of 21 Aug 2024 05:50  Patient On (Oxygen Delivery Method): room air      LABS:                                   10.6   9.23  )-----------( 304      ( 20 Aug 2024 09:30 )             32.7     08-20    139  |  104  |  8<L>  ----------------------------<  83  3.9   |  21  |  0.8    Ca    8.6      20 Aug 2024 09:30  Mg     1.7     08-20    TPro  6.0  /  Alb  3.1<L>  /  TBili  0.2  /  DBili  x   /  AST  33  /  ALT  36  /  AlkPhos  168<H>  08-20      PT/INR - ( 20 Aug 2024 09:30 )   PT: 14.40 sec;   INR: 1.26 ratio           Urinalysis Basic - ( 20 Aug 2024 09:30 )    Color: x / Appearance: x / SG: x / pH: x  Gluc: 83 mg/dL / Ketone: x  / Bili: x / Urobili: x   Blood: x / Protein: x / Nitrite: x   Leuk Esterase: x / RBC: x / WBC x   Sq Epi: x / Non Sq Epi: x / Bacteria: x        PHYSICAL EXAM:  GEN: No acute distress  HEENT: normocephalic, atraumatic, aniceteric  LUNGS: bl breath sounds   HEART: S1/S2 present. RRR, no murmurs  ABD: Soft, non-tender, non-distended. Bowel sounds present  EXT: declined   NEURO: AAOX3     RADIOLOGY:    < from: US Abdomen Upper Quadrant Right (08.19.24 @ 16:40) >    FINDINGS:  Liver: Within normal limits.  Bile ducts: Dilated common bile duct measures 8 mm.  Gallbladder: Cholecystectomy.  Pancreas: Poorly visualized.  Right kidney: 9.4 cm. No hydronephrosis.  Ascites: None.    IMPRESSION:    Post cholecystectomy. CBD dilated to 8 mm, can be seen   postcholecystectomy.    --- End of Report ---    < end of copied text >  < from: Xray Chest 1 View-PORTABLE IMMEDIATE (Xray Chest 1 View-PORTABLE IMMEDIATE .) (08.19.24 @ 00:51) >  Findings:    Support devices: None.    Cardiac/mediastinum/hilum: Unremarkable.    Lung parenchyma/Pleura: Within normal limits.    Skeleton/soft tissues: Rotation of the spine, which may be positional in   nature.Surgical clips right upper quadrant.    Impression:    No radiographic evidence of acute cardiopulmonary disease.        --- End of Report ---          < end of copied text >  < from: [facility] Duplex Upper Ext Vein Scan, Left (08.12.24 @ 08:53) >  FINDINGS:    The left internal jugular, subclavian, axillary and brachial veins are   patent and compressible where applicable.  The basilic vein (superficial   vein)is thrombosed.  The cephalic vein (superficial vein) is patent and   without thrombus.    Doppler examination shows normal spontaneous and phasic flow.    IMPRESSION:  No evidence of left upper extremity deep venous thrombosis. Superficial   thrombophlebitis of the left basilic vein.        --- End of Report ---        < end of copied text >      ASSESSMENT AND PLAN:    33-year-old female with PMH of polysubstance abuse (on MMTP 110 mg), COPD, borderline personality disorder, anxiety presents to ED for multiple medical complaints ,found to have withdrawal     #Multiple medical complaints reported on admission - resolved   suspect 2/2 :   #Substance Withdrawal  #Suspected thiamine and folate deficiency  # H/O Polysubstance abuse   - patient eloped on 8/14 - had inpatient substance rehab in place - now readmitted   - [facility] protocol  - librium taper, atarax q6hr prn for anxiety while hospitalized  - cw methadone 110 mg po  - Xanax 0.5mg PRN (ativan 2nd line per psych but pt want IV line taken out)   - c/w thiamine and folate  - tox screen   - addiction medicine eval appreciated   - psych eval appreciated     # R/O UTI  # H/O H/O BL extremity abscesses in 7/24   - physical exam of extremities unremarkable   - U/A wbc 12 , LE +    - fu urine clx/ syphillis/ g+c - patient declining to give urine sample - will re attempt   - fu blood clx   - ID EVAL appreciated - PO Bactrim DS BID    # Transaminitis - improved   - no abdominal pain   - fu repeat acute hepatitis panel   - monitor LFT  US Abdomen Upper Quadrant Right (08.19.24 @ 16:40) >Post cholecystectomy. CBD dilated to 8 mm, can be seen postcholecystectomy.  - GI EVAL if not improving     # H/O Nicotine addiction - counseling done, patch 12mg q24    # H/O COPD - not in exacerbation - reports not taking any medications    dvt/ gi ppx/diet  dispo: Friday (inpatient substance rehab)   handoff: complete librium protocol; patient is easily reoriented and cooperative if spoke to kindly. Wants to go to Rehab (set for Friday).

## 2024-08-21 NOTE — CHART NOTE - NSCHARTNOTEFT_GEN_A_CORE
Patient demanding Ativan or she will leave AMA. Feel that risks to her health by leaving AMA at this time (1:23AM) is significant and could include both seizures and death and therefore Ativan will be ordered for her Informed by RN that patient is demanding Ativan or she will leave AMA. Feel that risks to her health by leaving AMA at this time (1:23AM) is significant and could include both seizures and death and therefore I ordered oral Ativan. However when I went to discuss issue directly, patient said she is going through withdrawals now and oral Ativan will take 40 minutes to work. In fact Ativan IV prn is already ordered so she will get this as well.

## 2024-08-21 NOTE — DISCHARGE NOTE PROVIDER - HOSPITAL COURSE
33-year-old female with PMH of polysubstance abuse (on MMTP 110 mg), COPD, borderline personality disorder, anxiety presents to ED for multiple medical complaints ,found to have withdrawal     #Multiple medical complaints reported on admission - resolved   suspect 2/2 :   #Substance Withdrawal  #Suspected thiamine and folate deficiency  # H/O Polysubstance abuse   - patient eloped on 8/14 - had inpatient substance rehab in place - now readmitted   - CIWA protocol  - librium taper, atarax q6hr prn for anxiety while hospitalized  - cw methadone 110 mg po   - c/w thiamine and folate  - tox screen   - addiction medicine eval appreciated   - psych eval appreciated     # R/O UTI  # H/O H/O BL extremity abscesses in 7/24   - physical exam of extremities unremarkable   - U/A wbc 12 , LE +    - fu urine clx/ syphillis/ g+c - patient declining to give urine sample - will re attempt   - fu blood clx   - ID EVAL appreciated- Rocephin and Vanc ; vanc trough and dosing per clinical pharmacist     # Transaminitis - improved   - no abdominal pain   - fu repeat acute hepatitis panel   - monitor LFT  US Abdomen Upper Quadrant Right (08.19.24 @ 16:40) >Post cholecystectomy. CBD dilated to 8 mm, can be seen postcholecystectomy.  - GI EVAL if not improving     # H/O Nicotine addiction - counseling done    # H/O COPD - not in exacerbation - reports not taking any medications    dvt/ gi ppx/diet  dispo: acute (inpatient substance rehab)       NOTE: pt seen and assessed at bedside. PT agitated and verbally abusive with staff and provider. States she has no outlet to smoke a cigarette and leaving AMA. Informed of the risk of leaving and told provider that she knows already. She refused to sign AMA paperwork. PIV removed by nursing staff. Pt informed to f/u with her Methadone clinic and counselor. CATCH team at bedside. 33-year-old female with PMH of polysubstance abuse (on MMTP 110 mg), COPD, borderline personality disorder, anxiety presents to ED for multiple medical complaints ,found to have withdrawal     #Multiple medical complaints reported on admission - resolved   suspect 2/2 :   #Substance Withdrawal  #Suspected thiamine and folate deficiency  # H/O Polysubstance abuse   - patient eloped on 8/14 - had inpatient substance rehab in place - now readmitted   - CIWA protocol  - librium taper, atarax q6hr prn for anxiety while hospitalized  - cw methadone 110 mg po   - c/w thiamine and folate  - tox screen   - addiction medicine eval appreciated   - psych eval appreciated     # R/O UTI  # H/O H/O BL extremity abscesses in 7/24   - physical exam of extremities unremarkable   - U/A wbc 12 , LE +    - fu urine clx/ syphillis/ g+c - patient declining to give urine sample - will re attempt   - fu blood clx   - ID EVAL appreciated- Rocephin and Vanc ; vanc trough and dosing per clinical pharmacist     # Transaminitis - improved   - no abdominal pain   - fu repeat acute hepatitis panel   - monitor LFT  US Abdomen Upper Quadrant Right (08.19.24 @ 16:40) >Post cholecystectomy. CBD dilated to 8 mm, can be seen postcholecystectomy.  - GI EVAL if not improving     # H/O Nicotine addiction - counseling done    # H/O COPD - not in exacerbation - reports not taking any medications    dvt/ gi ppx/diet  dispo: acute (inpatient substance rehab)

## 2024-08-22 PROCEDURE — 99233 SBSQ HOSP IP/OBS HIGH 50: CPT

## 2024-08-22 RX ORDER — ALPRAZOLAM 0.25 MG
0.5 TABLET ORAL
Refills: 0 | Status: DISCONTINUED | OUTPATIENT
Start: 2024-08-22 | End: 2024-08-26

## 2024-08-22 RX ADMIN — MAGNESIUM OXIDE TAB 400 MG (240 MG ELEMENTAL MG) 400 MILLIGRAM(S): 400 (240 MG) TAB at 07:58

## 2024-08-22 RX ADMIN — Medication 20 MILLIGRAM(S): at 06:46

## 2024-08-22 RX ADMIN — Medication 0.5 MILLIGRAM(S): at 00:55

## 2024-08-22 RX ADMIN — Medication 10 MILLIGRAM(S): at 21:39

## 2024-08-22 RX ADMIN — Medication 1 MILLIGRAM(S): at 07:59

## 2024-08-22 RX ADMIN — MAGNESIUM OXIDE TAB 400 MG (240 MG ELEMENTAL MG) 400 MILLIGRAM(S): 400 (240 MG) TAB at 12:10

## 2024-08-22 RX ADMIN — Medication 100 MILLIGRAM(S): at 07:58

## 2024-08-22 RX ADMIN — METHADONE HYDROCHLORIDE 110 MILLIGRAM(S): 1 POWDER ORAL at 07:55

## 2024-08-22 RX ADMIN — Medication 50 MILLIGRAM(S): at 11:19

## 2024-08-22 RX ADMIN — SULFAMETHOXAZOLE AND TRIMETHOPRIM 1 TABLET(S): 800; 160 TABLET ORAL at 06:47

## 2024-08-22 RX ADMIN — LAMOTRIGINE 200 MILLIGRAM(S): 100 TABLET, EXTENDED RELEASE ORAL at 07:58

## 2024-08-22 RX ADMIN — MAGNESIUM OXIDE TAB 400 MG (240 MG ELEMENTAL MG) 400 MILLIGRAM(S): 400 (240 MG) TAB at 16:26

## 2024-08-22 RX ADMIN — Medication 20 MILLIGRAM(S): at 01:13

## 2024-08-22 RX ADMIN — Medication 20 MILLIGRAM(S): at 10:17

## 2024-08-22 RX ADMIN — SULFAMETHOXAZOLE AND TRIMETHOPRIM 1 TABLET(S): 800; 160 TABLET ORAL at 18:22

## 2024-08-22 RX ADMIN — Medication 1 PATCH: at 07:58

## 2024-08-22 RX ADMIN — Medication 20 MILLIGRAM(S): at 14:33

## 2024-08-22 RX ADMIN — ESCITALOPRAM OXALATE 20 MILLIGRAM(S): 10 TABLET ORAL at 21:40

## 2024-08-22 NOTE — PROGRESS NOTE ADULT - SUBJECTIVE AND OBJECTIVE BOX
Pt seen, somnelent but arousable, appears sedated    T(F): , Max: 98.2 (08-22-24 @ 21:00)  HR: 58 (08-22-24 @ 21:00) (51 - 67)  BP: 96/54 (08-22-24 @ 21:00)  RR: 16 (08-22-24 @ 04:24)  SpO2: 97% (08-22-24 @ 21:00)  General: No apparent distress  Cardiovascular: S1, S2  Gastrointestinal: Soft, Non-tender, Non-distended  Respiratory: Good air entry bilaterally  Musculoskeletal: Moves all extremities  Lymphatic: No edema  Neurologic: somnelent  Dermatologic: Skin dry              Culture - Urine (collected 20 Aug 2024 16:30)  Source: Clean Catch Clean Catch (Midstream)  Final Report (21 Aug 2024 20:08):    No growth

## 2024-08-22 NOTE — PROGRESS NOTE ADULT - ASSESSMENT
This is a 32 y/o with history of polysubstance use disorder is presenting for abscesses.     IMPRESSION  #Bilateral extremity abscesses, cellulitis?  #History of poly substance use disorder. On methadone  #Withdrawal symptoms  #Screen for STD  #Pyuria (WBC 12)   #Mood and anxiety disorders.  #History of Hep C (likely Self cleared), Hep B Immune.   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  HIV and acute hepatitis panel negative (8/2024)  Syphillis negative. HIV screen negative. Hep C Viral Negative.    RECOMMENDATIONS  -Can D/C IV abx.  -Can be discharged on PO Bactrim 1 DS BID for total of 7 days. (S/D 8/19)   -Catch team/addiction medicine following. Monitor for withdrawals.   -Recall ID as needed.     If any questions, please send a message or call on [facility]  Please continue to update ID with any pertinent new laboratory or radiographic findings.    Bob Gallegos M.D  Infectious Diseases Attending/   [employer] at [facility]

## 2024-08-22 NOTE — PROGRESS NOTE ADULT - SUBJECTIVE AND OBJECTIVE BOX
RADU CONTRERAS  33y, Female    LOS  3d    INTERVAL EVENTS/HPI  - No acute events overnight  - T(F): , Max: 98 (08-22-24 @ 04:24)  - Denies any worsening symptoms  - Tolerating medication  - WBC Count: 9.23 (08-20-24 @ 09:30)    REVIEW OF SYSTEMS:  CONSTITUTIONAL: No fever or chills  HEENT: No sore throat  RESPIRATORY: No cough, no shortness of breath  CARDIOVASCULAR: No chest pain or palpitations  GASTROINTESTINAL: No abdominal or epigastric pain  GENITOURINARY: No dysuria  NEUROLOGICAL: No headache/dizziness  MSK: No joint pain, erythema, or swelling; no back pain  SKIN: No itching, rashes  All other ROS negative except noted above    Prior hospital charts reviewed [Yes]  Primary team notes reviewed [Yes]  Other consultant notes reviewed [Yes]    ANTIMICROBIALS:   trimethoprim  160 mG/sulfamethoxazole 800 mG 1 two times a day      OTHER MEDS: MEDICATIONS  (STANDING):  albuterol/ipratropium for Nebulization 3 every 6 hours PRN  ALPRAZolam 0.5 four times a day PRN  chlordiazePOXIDE 10 every 4 hours  chlordiazePOXIDE    escitalopram 20 at bedtime  hydrOXYzine hydrochloride 50 every 6 hours PRN  lamoTRIgine 200 daily  methadone    Tablet 110 daily  ondansetron Injectable 4 every 8 hours PRN      Vital Signs Last 24 Hrs  T(F): 97.4 (08-22-24 @ 14:59), Max: 98.7 (08-21-24 @ 14:12)    Vital Signs Last 24 Hrs  HR: 51 (08-22-24 @ 14:59) (50 - 67)  BP: 98/58 (08-22-24 @ 14:59) (93/53 - 100/60)  RR: 16 (08-22-24 @ 04:24)  SpO2: 98% (08-22-24 @ 14:59) (96% - 100%)  Wt(kg): --    EXAM:  GENERAL: NAD  HEAD: No head lesions  NECK: Supple  CHEST/LUNG: Clear to auscultation bilaterally  HEART: S1 S2  ABDOMEN: Soft, nontender  EXTREMITIES: Left upper extremity with multiple scar marks and nodules. no fluctuance. Right extremity with similar marks noted. Left lower extremity calf tenderness.   NERVOUS SYSTEM: Alert and oriented to person    Labs:            WBC Trend:  WBC Count: 9.23 (08-20-24 @ 09:30)  WBC Count: 11.82 (08-19-24 @ 02:07)      Creatine Trend:  Creatinine: 0.8 (08-20)  Creatinine: 0.7 (08-19)      Liver Biochemical Testing Trend:  Alanine Aminotransferase (ALT/SGPT): 36 (08-20)  Alanine Aminotransferase (ALT/SGPT): 50 *H* (08-19)  Alanine Aminotransferase (ALT/SGPT): 9 (08-10)  Alanine Aminotransferase (ALT/SGPT): 8 (08-09)  Alanine Aminotransferase (ALT/SGPT): 15 (07-28)  Aspartate Aminotransferase (AST/SGOT): 33 (08-20-24 @ 09:30)  Aspartate Aminotransferase (AST/SGOT): 87 (08-19-24 @ 02:07)  Aspartate Aminotransferase (AST/SGOT): 15 (08-10-24 @ 06:18)  Aspartate Aminotransferase (AST/SGOT): 15 (08-09-24 @ 15:55)  Aspartate Aminotransferase (AST/SGOT): 29 (07-28-24 @ 07:36)  Bilirubin Total: 0.2 (08-20)  Bilirubin Total: 0.3 (08-19)  Bilirubin Total: <0.2 (08-10)  Bilirubin Total: 0.8 (08-09)  Bilirubin Direct: <0.2 (07-28)      Trend LDH          MICROBIOLOGY:  Vancomycin Level, Random: <4.0 (08-20 @ 12:38)    Female    Culture - Urine (collected 20 Aug 2024 16:30)  Source: Clean Catch Clean Catch (Midstream)  Final Report:    No growth    Culture - Blood (collected 19 Aug 2024 02:07)  Source: .Blood Blood-Peripheral  Preliminary Report:    No growth at 48 Hours    Culture - Blood (collected 19 Aug 2024 02:07)  Source: .Blood Blood-Peripheral  Preliminary Report:    No growth at 48 Hours    Culture - Blood (collected 12 Aug 2024 19:51)  Source: .Blood None  Final Report:    No growth at 5 days    Culture - Blood (collected 28 Jul 2024 07:36)  Source: .Blood None  Final Report:    No growth at 5 days    Culture - Blood (collected 27 Jun 2024 13:42)  Source: .Blood Blood  Final Report:    No growth at 5 days      HIV-1/2 Combo Result: Nonreact (08-12-24 @ 19:51)        Treponema Pallidum Antibody Interpretation: Negative (08-20-24 @ 09:30)  Treponema Pallidum Antibody Interpretation: Negative (08-20-24 @ 09:30)      RADIOLOGY & ADDITIONAL TESTS:  I have personally reviewed the relevant images.   CXR      CT  < from: US Abdomen Upper Quadrant Right (08.19.24 @ 16:40) >  COMPARISON: CT abdomen and pelvis 12/8/2021.    TECHNIQUE: Sonography of the right upper quadrant.    FINDINGS:  Liver: Within normal limits.  Bile ducts: Dilated common bile duct measures 8 mm.  Gallbladder: Cholecystectomy.  Pancreas: Poorly visualized.  Right kidney: 9.4 cm. No hydronephrosis.  Ascites: None.    IMPRESSION:    Post cholecystectomy. CBD dilated to 8 mm, can be seen   postcholecystectomy.    --- End of Report ---      < end of copied text >  < from: Xray Chest 1 View-PORTABLE IMMEDIATE (Xray Chest 1 View-PORTABLE IMMEDIATE .) (08.19.24 @ 00:51) >  INTERPRETATION:  Clinical History / Reason for exam: Shortness of breath    Comparison : Chest radiograph arch second 2021.    Technique/Positioning: AP chest.    Findings:    Support devices: None.    Cardiac/mediastinum/hilum: Unremarkable.    Lung parenchyma/Pleura: Within normal limits.    Skeleton/soft tissues: Rotation of the spine, which may be positional in   nature.Surgical clips right upper quadrant.    Impression:    No radiographic evidence of acute cardiopulmonary disease.        --- End of Report ---    < end of copied text >        WEIGHT  Weight (kg): 50.7 (08-19-24 @ 06:33)      All available historical records have been reviewed

## 2024-08-22 NOTE — PROGRESS NOTE ADULT - TIME BILLING
On this date of service, level of risk to patient is considered: Moderate.   Reviewed consultant notes  Reviewed labs     I have personally seen and examined this patient.    I have reviewed all pertinent clinical information and reviewed all relevant imaging and diagnostic studies personally.   I discussed recommendations with the primary team.I discussed recommendations with the primary team.
On this date of service, level of risk to patient is considered: Moderate.     Due to: High risk medications that require intesive monitoring: IV vancomycin  Reviewed consultant notes  Reviewed labs     I have personally seen and examined this patient.    I have reviewed all pertinent clinical information and reviewed all relevant imaging and diagnostic studies personally.   I discussed recommendations with the primary team.I discussed recommendations with the primary team.

## 2024-08-22 NOTE — PROGRESS NOTE ADULT - ASSESSMENT
#Substance Withdrawal  # H/O Polysubstance abuse   - CIWA   - librium taper, atarax q6hr prn for anxiety while hospitalized  - methadone 110 mg po  - Xanax 0.5mg PRN   - plan for rehab friday    #thiamine and folate deficiency  replete    # UTI?   PO Bactrim BID    # Transaminitis   - resolved    # H/O Nicotine addiction   - patch    # H/O COPD   - stable

## 2024-08-23 LAB
ALBUMIN SERPL ELPH-MCNC: 3.3 G/DL — LOW (ref 3.5–5.2)
ALP SERPL-CCNC: 134 U/L — HIGH (ref 30–115)
ALT FLD-CCNC: 34 U/L — SIGNIFICANT CHANGE UP (ref 0–41)
ANION GAP SERPL CALC-SCNC: 11 MMOL/L — SIGNIFICANT CHANGE UP (ref 7–14)
AST SERPL-CCNC: 40 U/L — SIGNIFICANT CHANGE UP (ref 0–41)
BILIRUB SERPL-MCNC: <0.2 MG/DL — SIGNIFICANT CHANGE UP (ref 0.2–1.2)
BUN SERPL-MCNC: 5 MG/DL — LOW (ref 10–20)
CALCIUM SERPL-MCNC: 8.6 MG/DL — SIGNIFICANT CHANGE UP (ref 8.4–10.5)
CHLORIDE SERPL-SCNC: 105 MMOL/L — SIGNIFICANT CHANGE UP (ref 98–110)
CO2 SERPL-SCNC: 26 MMOL/L — SIGNIFICANT CHANGE UP (ref 17–32)
CREAT SERPL-MCNC: 0.9 MG/DL — SIGNIFICANT CHANGE UP (ref 0.7–1.5)
EGFR: 86 ML/MIN/1.73M2 — SIGNIFICANT CHANGE UP
GLUCOSE SERPL-MCNC: 91 MG/DL — SIGNIFICANT CHANGE UP (ref 70–99)
HCT VFR BLD CALC: 33.4 % — LOW (ref 37–47)
HGB BLD-MCNC: 11 G/DL — LOW (ref 12–16)
MAGNESIUM SERPL-MCNC: 2.1 MG/DL — SIGNIFICANT CHANGE UP (ref 1.8–2.4)
MCHC RBC-ENTMCNC: 28.8 PG — SIGNIFICANT CHANGE UP (ref 27–31)
MCHC RBC-ENTMCNC: 32.9 G/DL — SIGNIFICANT CHANGE UP (ref 32–37)
MCV RBC AUTO: 87.4 FL — SIGNIFICANT CHANGE UP (ref 81–99)
NRBC # BLD: 0 /100 WBCS — SIGNIFICANT CHANGE UP (ref 0–0)
PLATELET # BLD AUTO: 273 K/UL — SIGNIFICANT CHANGE UP (ref 130–400)
PMV BLD: 10.2 FL — SIGNIFICANT CHANGE UP (ref 7.4–10.4)
POTASSIUM SERPL-MCNC: 4.7 MMOL/L — SIGNIFICANT CHANGE UP (ref 3.5–5)
POTASSIUM SERPL-SCNC: 4.7 MMOL/L — SIGNIFICANT CHANGE UP (ref 3.5–5)
PROT SERPL-MCNC: 6.1 G/DL — SIGNIFICANT CHANGE UP (ref 6–8)
RBC # BLD: 3.82 M/UL — LOW (ref 4.2–5.4)
RBC # FLD: 14.8 % — HIGH (ref 11.5–14.5)
SODIUM SERPL-SCNC: 142 MMOL/L — SIGNIFICANT CHANGE UP (ref 135–146)
WBC # BLD: 5.42 K/UL — SIGNIFICANT CHANGE UP (ref 4.8–10.8)
WBC # FLD AUTO: 5.42 K/UL — SIGNIFICANT CHANGE UP (ref 4.8–10.8)

## 2024-08-23 PROCEDURE — 99232 SBSQ HOSP IP/OBS MODERATE 35: CPT

## 2024-08-23 RX ADMIN — Medication 100 MILLIGRAM(S): at 08:13

## 2024-08-23 RX ADMIN — Medication 1 PATCH: at 08:12

## 2024-08-23 RX ADMIN — SULFAMETHOXAZOLE AND TRIMETHOPRIM 1 TABLET(S): 800; 160 TABLET ORAL at 17:39

## 2024-08-23 RX ADMIN — METHADONE HYDROCHLORIDE 110 MILLIGRAM(S): 1 POWDER ORAL at 08:11

## 2024-08-23 RX ADMIN — Medication 10 MILLIGRAM(S): at 08:11

## 2024-08-23 RX ADMIN — ESCITALOPRAM OXALATE 20 MILLIGRAM(S): 10 TABLET ORAL at 21:03

## 2024-08-23 RX ADMIN — MAGNESIUM OXIDE TAB 400 MG (240 MG ELEMENTAL MG) 400 MILLIGRAM(S): 400 (240 MG) TAB at 08:13

## 2024-08-23 RX ADMIN — Medication 10 MILLIGRAM(S): at 12:20

## 2024-08-23 RX ADMIN — SULFAMETHOXAZOLE AND TRIMETHOPRIM 1 TABLET(S): 800; 160 TABLET ORAL at 05:27

## 2024-08-23 RX ADMIN — Medication 1 MILLIGRAM(S): at 08:12

## 2024-08-23 RX ADMIN — Medication 50 MILLIGRAM(S): at 23:40

## 2024-08-23 RX ADMIN — Medication 10 MILLIGRAM(S): at 05:27

## 2024-08-23 RX ADMIN — LAMOTRIGINE 200 MILLIGRAM(S): 100 TABLET, EXTENDED RELEASE ORAL at 08:12

## 2024-08-23 NOTE — PROGRESS NOTE ADULT - ASSESSMENT
#Substance Withdrawal  # H/O Polysubstance abuse   - CIWA   - librium taper finished today  -  atarax q6hr prn for anxiety while hospitalized  - methadone 110 mg po  - Xanax 0.5mg PRN   - plan for inpatient substance rehab on [address].     #thiamine and folate deficiency  replete    # UTI?   PO Bactrim BID    # Transaminitis   - resolved    # H/O Nicotine addiction   - patch    # H/O COPD   - stable    dispo: inpatient substance rehab on [address]

## 2024-08-23 NOTE — PROGRESS NOTE ADULT - SUBJECTIVE AND OBJECTIVE BOX
SUBJECTIVE:    Patient is a 33y old Female who presents with a chief complaint of "IV drug abuse while on methadone" (19 Aug 2024 17:06)    Currently admitted to medicine with the primary diagnosis of Polysubstance dependence      Today is hospital day 4d. This morning she is resting comfortably in bed, appears sedated, but arousalable  and conversable. No overnight events.     Patient is pending to Inpatient psych transfer. She is planned to be accepted on Monday    PAST MEDICAL & SURGICAL HISTORY  Hepatitis C    COPD (chronic obstructive pulmonary disease)    Borderline personality disorder    Anxiety and depression    Nicotine dependence    Drug abuse    Heroin abuse    IV drug user    S/P cholecystectomy  in 2010      SOCIAL HISTORY:  Negative for smoking/alcohol/drug use.     ALLERGIES:  No Known Allergies    MEDICATIONS:  STANDING MEDICATIONS  escitalopram 20 milliGRAM(s) Oral at bedtime  folic acid 1 milliGRAM(s) Oral daily  lactated ringers. 1000 milliLiter(s) IV Continuous <Continuous>  lamoTRIgine 200 milliGRAM(s) Oral daily  methadone    Tablet 110 milliGRAM(s) Oral daily  nicotine - 21 mG/24Hr(s) Patch 1 Patch Transdermal daily  thiamine 100 milliGRAM(s) Oral daily  trimethoprim  160 mG/sulfamethoxazole 800 mG 1 Tablet(s) Oral two times a day    PRN MEDICATIONS  albuterol/ipratropium for Nebulization 3 milliLiter(s) Nebulizer every 6 hours PRN  ALPRAZolam 0.5 milliGRAM(s) Oral four times a day PRN  hydrOXYzine hydrochloride 50 milliGRAM(s) Oral every 6 hours PRN  ondansetron Injectable 4 milliGRAM(s) IV Push every 8 hours PRN    VITALS:   T(F): 98.1  HR: 59  BP: 112/69  RR: 18  SpO2: 97%    LABS:                        11.0   5.42  )-----------( 273      ( 23 Aug 2024 09:58 )             33.4     08-23    142  |  105  |  5<L>  ----------------------------<  91  4.7   |  26  |  0.9    Ca    8.6      23 Aug 2024 09:58  Mg     2.1     08-23    TPro  6.1  /  Alb  3.3<L>  /  TBili  <0.2  /  DBili  x   /  AST  40  /  ALT  34  /  AlkPhos  134<H>  08-23      Urinalysis Basic - ( 23 Aug 2024 09:58 )    Color: x / Appearance: x / SG: x / pH: x  Gluc: 91 mg/dL / Ketone: x  / Bili: x / Urobili: x   Blood: x / Protein: x / Nitrite: x   Leuk Esterase: x / RBC: x / WBC x   Sq Epi: x / Non Sq Epi: x / Bacteria: x    RADIOLOGY:      PHYSICAL EXAM:  GEN: No acute distress, sedated  LUNGS: Clear to auscultation bilaterally , no wheezes, rales rhoinchi  HEART: Regular rate and rhythm +s1 s2  ABD: Soft, non-tender, non-distended. +BS  EXT: NC/NC/NE/2+PP/CARO/Skin Intact.   NEURO: AAOX3

## 2024-08-24 LAB
CULTURE RESULTS: SIGNIFICANT CHANGE UP
CULTURE RESULTS: SIGNIFICANT CHANGE UP
SPECIMEN SOURCE: SIGNIFICANT CHANGE UP
SPECIMEN SOURCE: SIGNIFICANT CHANGE UP

## 2024-08-24 PROCEDURE — 99232 SBSQ HOSP IP/OBS MODERATE 35: CPT

## 2024-08-24 RX ADMIN — SULFAMETHOXAZOLE AND TRIMETHOPRIM 1 TABLET(S): 800; 160 TABLET ORAL at 17:19

## 2024-08-24 RX ADMIN — Medication 50 MILLIGRAM(S): at 20:39

## 2024-08-24 RX ADMIN — Medication 1 PATCH: at 11:28

## 2024-08-24 RX ADMIN — ESCITALOPRAM OXALATE 20 MILLIGRAM(S): 10 TABLET ORAL at 21:14

## 2024-08-24 RX ADMIN — Medication 1 PATCH: at 11:06

## 2024-08-24 RX ADMIN — LAMOTRIGINE 200 MILLIGRAM(S): 100 TABLET, EXTENDED RELEASE ORAL at 11:06

## 2024-08-24 RX ADMIN — Medication 100 MILLIGRAM(S): at 11:06

## 2024-08-24 RX ADMIN — METHADONE HYDROCHLORIDE 110 MILLIGRAM(S): 1 POWDER ORAL at 11:07

## 2024-08-24 RX ADMIN — Medication 1 MILLIGRAM(S): at 11:06

## 2024-08-24 RX ADMIN — SULFAMETHOXAZOLE AND TRIMETHOPRIM 1 TABLET(S): 800; 160 TABLET ORAL at 05:23

## 2024-08-24 RX ADMIN — Medication 0.5 MILLIGRAM(S): at 21:18

## 2024-08-24 NOTE — PROGRESS NOTE ADULT - ASSESSMENT
#Substance Withdrawal  # H/O Polysubstance abuse   - CIWA   - librium taper finished today  -  atarax q6hr prn for anxiety while hospitalized  - methadone 110 mg po  - Xanax 0.5mg PRN   - plan for inpatient substance rehab on [address].     #thiamine and folate deficiency  replete    # UTI?   PO Bactrim BID    # Transaminitis   - resolved    # H/O Nicotine addiction   - patch    # H/O COPD   - stable    dispo: inpatient substance rehab on Monday

## 2024-08-24 NOTE — PROGRESS NOTE ADULT - SUBJECTIVE AND OBJECTIVE BOX
SUBJECTIVE:    Patient is a 33y old Female who presents with a chief complaint of "IV drug abuse while on methadone" (19 Aug 2024 17:06)    Currently admitted to medicine with the primary diagnosis of Polysubstance dependence      Patient seen and examined at bedside. This morning she is resting comfortably in bed, AOx3, calm and cooperative.    Patient is pending to Inpatient Rehab for substance abuse at [place]. She is planned to be accepted on Monday    PAST MEDICAL & SURGICAL HISTORY  Hepatitis C    COPD (chronic obstructive pulmonary disease)    Borderline personality disorder    Anxiety and depression    Nicotine dependence    Drug abuse    Heroin abuse    IV drug user    S/P cholecystectomy  in 2010      SOCIAL HISTORY:  Negative for smoking/alcohol/drug use.     ALLERGIES:  No Known Allergies    MEDICATIONS:  STANDING MEDICATIONS  escitalopram 20 milliGRAM(s) Oral at bedtime  folic acid 1 milliGRAM(s) Oral daily  lactated ringers. 1000 milliLiter(s) IV Continuous <Continuous>  lamoTRIgine 200 milliGRAM(s) Oral daily  methadone    Tablet 110 milliGRAM(s) Oral daily  nicotine - 21 mG/24Hr(s) Patch 1 Patch Transdermal daily  thiamine 100 milliGRAM(s) Oral daily  trimethoprim  160 mG/sulfamethoxazole 800 mG 1 Tablet(s) Oral two times a day    PRN MEDICATIONS  albuterol/ipratropium for Nebulization 3 milliLiter(s) Nebulizer every 6 hours PRN  ALPRAZolam 0.5 milliGRAM(s) Oral four times a day PRN  hydrOXYzine hydrochloride 50 milliGRAM(s) Oral every 6 hours PRN  ondansetron Injectable 4 milliGRAM(s) IV Push every 8 hours PRN    VITALS:   Vital Signs Last 24 Hrs  T(C): 37 (24 Aug 2024 13:52), Max: 37 (24 Aug 2024 13:52)  T(F): 98.6 (24 Aug 2024 13:52), Max: 98.6 (24 Aug 2024 13:52)  HR: 89 (24 Aug 2024 13:52) (60 - 89)  BP: 102/68 (24 Aug 2024 13:52) (92/49 - 105/59)  BP(mean): --  RR: 18 (24 Aug 2024 13:52) (18 - 18)  SpO2: 97% (24 Aug 2024 13:52) (97% - 98%)    Parameters below as of 24 Aug 2024 13:52  Patient On (Oxygen Delivery Method): room air        LABS:                                   11.0   5.42  )-----------( 273      ( 23 Aug 2024 09:58 )             33.4     08-23    142  |  105  |  5<L>  ----------------------------<  91  4.7   |  26  |  0.9    Ca    8.6      23 Aug 2024 09:58  Mg     2.1     08-23    TPro  6.1  /  Alb  3.3<L>  /  TBili  <0.2  /  DBili  x   /  AST  40  /  ALT  34  /  AlkPhos  134<H>  08-23        Urinalysis Basic - ( 23 Aug 2024 09:58 )    Color: x / Appearance: x / SG: x / pH: x  Gluc: 91 mg/dL / Ketone: x  / Bili: x / Urobili: x   Blood: x / Protein: x / Nitrite: x   Leuk Esterase: x / RBC: x / WBC x   Sq Epi: x / Non Sq Epi: x / Bacteria: x    RADIOLOGY:      PHYSICAL EXAM:  GEN: No acute distress, calm. cooperative  LUNGS: Clear to auscultation bilaterally , no wheezes, rales rhoinchi  HEART: Regular rate and rhythm +s1 s2  ABD: Soft, non-tender, non-distended. +BS  EXT: NC/NC/NE/2+PP/CARO/Skin Intact.   NEURO: AAOX3

## 2024-08-25 PROCEDURE — 76882 US LMTD JT/FCL EVL NVASC XTR: CPT | Mod: 26,RT

## 2024-08-25 PROCEDURE — 99232 SBSQ HOSP IP/OBS MODERATE 35: CPT

## 2024-08-25 RX ADMIN — Medication 50 MILLIGRAM(S): at 15:39

## 2024-08-25 RX ADMIN — Medication 1 PATCH: at 10:32

## 2024-08-25 RX ADMIN — SULFAMETHOXAZOLE AND TRIMETHOPRIM 1 TABLET(S): 800; 160 TABLET ORAL at 18:09

## 2024-08-25 RX ADMIN — SULFAMETHOXAZOLE AND TRIMETHOPRIM 1 TABLET(S): 800; 160 TABLET ORAL at 06:00

## 2024-08-25 RX ADMIN — Medication 0.5 MILLIGRAM(S): at 21:48

## 2024-08-25 RX ADMIN — Medication 1 MILLIGRAM(S): at 11:22

## 2024-08-25 RX ADMIN — Medication 0.5 MILLIGRAM(S): at 18:29

## 2024-08-25 RX ADMIN — METHADONE HYDROCHLORIDE 110 MILLIGRAM(S): 1 POWDER ORAL at 11:22

## 2024-08-25 RX ADMIN — LAMOTRIGINE 200 MILLIGRAM(S): 100 TABLET, EXTENDED RELEASE ORAL at 11:22

## 2024-08-25 RX ADMIN — Medication 100 MILLIGRAM(S): at 11:22

## 2024-08-25 RX ADMIN — ESCITALOPRAM OXALATE 20 MILLIGRAM(S): 10 TABLET ORAL at 21:25

## 2024-08-25 NOTE — PROGRESS NOTE ADULT - SUBJECTIVE AND OBJECTIVE BOX
Hospital Day:  6d    Subjective:    Patient is a 33y old  Female who presents with a chief complaint of "IV drug abuse while on methadone" (19 Aug 2024 17:06)    This morning patient is lying in bed comfortably. he reports no new complaints, including SOB, chest pain, abdominal pain, nausea, vomiting, dysuria, constipation, or diarrhea.      Past Medical Hx:   Hepatitis C    Depression    COPD (chronic obstructive pulmonary disease)    Borderline personality disorder    Anxiety and depression    Nicotine dependence    Drug abuse    Heroin abuse    IV drug user      Past Sx:  S/P cholecystectomy      Allergies:  No Known Allergies    Current Meds:   Standng Meds:  escitalopram 20 milliGRAM(s) Oral at bedtime  folic acid 1 milliGRAM(s) Oral daily  lactated ringers. 1000 milliLiter(s) (100 mL/Hr) IV Continuous <Continuous>  lamoTRIgine 200 milliGRAM(s) Oral daily  methadone    Tablet 110 milliGRAM(s) Oral daily  nicotine - 21 mG/24Hr(s) Patch 1 Patch Transdermal daily  thiamine 100 milliGRAM(s) Oral daily  trimethoprim  160 mG/sulfamethoxazole 800 mG 1 Tablet(s) Oral two times a day    PRN Meds:  albuterol/ipratropium for Nebulization 3 milliLiter(s) Nebulizer every 6 hours PRN Shortness of Breath and/or Wheezing  ALPRAZolam 0.5 milliGRAM(s) Oral four times a day PRN agitation  hydrOXYzine hydrochloride 50 milliGRAM(s) Oral every 6 hours PRN Anxiety  ondansetron Injectable 4 milliGRAM(s) IV Push every 8 hours PRN Nausea and/or Vomiting    HOME MEDICATIONS:  lamoTRIgine 200 mg oral tablet: 1 tab(s) orally once a day  Lexapro 20 mg oral tablet: 1 tab(s) orally once a day (at bedtime)  methadone 10 mg oral tablet: 110 milligram(s) orally once a day      Vital Signs:   T(F): 100 (08-25-24 @ 13:15), Max: 100 (08-25-24 @ 13:15)  HR: 71 (08-25-24 @ 13:15) (50 - 71)  BP: 116/69 (08-25-24 @ 13:15) (86/54 - 116/69)  RR: 18 (08-25-24 @ 13:15) (18 - 18)  SpO2: 99% (08-25-24 @ 13:15) (97% - 100%)        Physical Exam:   GENERAL: NAD  HEENT: NCAT  CHEST/LUNG: CTAB  HEART: Regular rate and rhythm; s1 s2 appreciated, No murmurs, rubs, or gallops  ABDOMEN: Soft, Nontender, Nondistended; Bowel sounds present  EXTREMITIES: No LE edema b/l  SKIN: possible abscess right UE  NERVOUS SYSTEM:  Alert & Oriented X3  LINES/CATHETERS:        Labs:                             Urinalysis Basic - ( 23 Aug 2024 09:58 )    Color: x / Appearance: x / SG: x / pH: x  Gluc: 91 mg/dL / Ketone: x  / Bili: x / Urobili: x   Blood: x / Protein: x / Nitrite: x   Leuk Esterase: x / RBC: x / WBC x   Sq Epi: x / Non Sq Epi: x / Bacteria: x          Culture - Blood (collected 08-19-24 @ 02:07)  Source: .Blood Blood-Peripheral  Final Report (08-24-24 @ 17:00):    No growth at 5 days    Culture - Blood (collected 08-19-24 @ 02:07)  Source: .Blood Blood-Peripheral  Final Report (08-24-24 @ 17:00):    No growth at 5 days            Assessment and Plan:

## 2024-08-25 NOTE — ED PROVIDER NOTE - AXIS
Right Deviation pt presents to ed from home c/o left sided abdominal pain radiating to her back x2 days. pt denies n/v/d at this time. no urinary symptoms. pmhx cataracts; denies any other pmhx. a&o x2 with no further complaints.

## 2024-08-25 NOTE — PROGRESS NOTE ADULT - ASSESSMENT
#Substance Withdrawal  # H/O Polysubstance abuse   - CIWA   - s/p librium taper   -  atarax q6hr prn for anxiety while hospitalized  - methadone 110 mg po  - Xanax 0.5mg PRN   - plan for inpatient substance rehab on [address].     #Right upper extremity possible abscess  -Patient refuses to consent for drainage without doing ultrasound first  -Ultrasound RUE   -She is currently on bactrim    #thiamine and folate deficiency  replete    # UTI?   PO Bactrim BID    # Transaminitis   - resolved    # H/O Nicotine addiction   - patch    # H/O COPD   - stable    dispo: inpatient substance rehab on Monday

## 2024-08-26 VITALS
OXYGEN SATURATION: 99 % | SYSTOLIC BLOOD PRESSURE: 97 MMHG | DIASTOLIC BLOOD PRESSURE: 43 MMHG | HEART RATE: 59 BPM | RESPIRATION RATE: 18 BRPM | TEMPERATURE: 98 F

## 2024-08-26 PROCEDURE — 99239 HOSP IP/OBS DSCHRG MGMT >30: CPT

## 2024-08-26 RX ORDER — SULFAMETHOXAZOLE AND TRIMETHOPRIM 800; 160 MG/1; MG/1
1 TABLET ORAL
Qty: 5 | Refills: 0
Start: 2024-08-26 | End: 2024-08-28

## 2024-08-26 RX ADMIN — Medication 0.5 MILLIGRAM(S): at 08:50

## 2024-08-26 RX ADMIN — Medication 1 PATCH: at 08:50

## 2024-08-26 RX ADMIN — LAMOTRIGINE 200 MILLIGRAM(S): 100 TABLET, EXTENDED RELEASE ORAL at 08:51

## 2024-08-26 RX ADMIN — METHADONE HYDROCHLORIDE 110 MILLIGRAM(S): 1 POWDER ORAL at 08:50

## 2024-08-26 RX ADMIN — Medication 100 MILLIGRAM(S): at 08:52

## 2024-08-26 RX ADMIN — Medication 1 MILLIGRAM(S): at 08:52

## 2024-08-26 RX ADMIN — SULFAMETHOXAZOLE AND TRIMETHOPRIM 1 TABLET(S): 800; 160 TABLET ORAL at 05:25

## 2024-08-26 NOTE — DISCHARGE NOTE NURSING/CASE MANAGEMENT/SOCIAL WORK - PATIENT PORTAL LINK FT
You can access the FollowMyHealth Patient Portal offered by [facility] by registering at the following website: [email or web address]. By joining [employer]’s FollowMyHealth portal, you will also be able to view your health information using other applications (apps) compatible with our system.

## 2024-08-26 NOTE — DISCHARGE NOTE PROVIDER - PROVIDER TOKENS
FREE:[LAST:[Methadone clinic],PHONE:[(   )    -],FAX:[(   )    -],FOLLOWUP:[1-3 days],ESTABLISHEDPATIENT:[T]]
PROVIDER:[TOKEN:[[id number]],FOLLOWUP:[1 week]]

## 2024-08-26 NOTE — DISCHARGE NOTE PROVIDER - NPI NUMBER (FOR SYSADMIN USE ONLY) :
· Blood pressure on soft side  Will hold losartan, metoprolol as well as Lasix for now    · Monitor
[[id number]]
[UNKNOWN]

## 2024-08-26 NOTE — DISCHARGE NOTE PROVIDER - ATTENDING DISCHARGE PHYSICAL EXAMINATION:
Vital Signs Last 24 Hrs  T(C): 36.6 (21 Aug 2024 08:37), Max: 37 (20 Aug 2024 13:49)  T(F): 97.9 (21 Aug 2024 08:37), Max: 98.6 (20 Aug 2024 13:49)  HR: 56 (21 Aug 2024 08:37) (48 - 79)  BP: 99/62 (21 Aug 2024 08:37) (93/48 - 152/77)  BP(mean): --  RR: 18 (21 Aug 2024 08:37) (18 - 24)  SpO2: 97% (21 Aug 2024 08:37) (94% - 100%)    Parameters below as of 21 Aug 2024 08:13  Patient On (Oxygen Delivery Method): room air    Refused a physical exam on assessment and stated she is leaving AMA
GENERAL: NAD, well-developed, 33y  EENT: EOMI, conjunctiva and sclera clear, No nasal obstruction or discharge  RESPIRATORY: Clear to auscultation bilaterally; No wheeze or crackles  CARDIOVASCULAR: Regular rate and rhythm; No murmurs, rubs, or gallops, no pitting edema  GASTROINTESTINAL: Abdomen Soft, Nontender, Nondistended  MUSCULOSKELETAL:  No cyanosis, extremities grossly symmetrical  PSYCH: AAOx3, affect appropriate  NEUROLOGY: non-focal, cognition grossly intact, MAEE

## 2024-08-26 NOTE — DISCHARGE NOTE PROVIDER - NSDCMRMEDTOKEN_GEN_ALL_CORE_FT
lamoTRIgine 200 mg oral tablet: 1 tab(s) orally once a day  Lexapro 20 mg oral tablet: 1 tab(s) orally once a day (at bedtime)  methadone 10 mg oral tablet: 110 milligram(s) orally once a day  
lamoTRIgine 200 mg oral tablet: 1 tab(s) orally once a day  Lexapro 20 mg oral tablet: 1 tab(s) orally once a day (at bedtime)  methadone 10 mg oral tablet: 110 milligram(s) orally once a day

## 2024-08-26 NOTE — DISCHARGE NOTE PROVIDER - CARE PROVIDER_API CALL
Quinten Bishop  Internal Medicine  [address] [place] [address]  Phone: ()-  Fax: ()-  Follow Up Time: 1 week  
Methadone clinic,   Phone: (   )    -  Fax: (   )    -  Established Patient  Follow Up Time: 1-3 days

## 2024-08-26 NOTE — DISCHARGE NOTE NURSING/CASE MANAGEMENT/SOCIAL WORK - NSDCPEFALRISK_GEN_ALL_CORE
For information on Fall & Injury Prevention, visit: [email or web address] OR  [email or web address] OR  https://www.cdc.gov/steadi/patient.html

## 2024-08-26 NOTE — DISCHARGE NOTE PROVIDER - HOSPITAL COURSE
33-year-old female with PMH of polysubstance abuse (on MMTP 110 mg), COPD, borderline personality disorder, anxiety presents to ED for multiple medical complaints ,found to have withdrawal.   PT admitted for Substance Withdrawal,  H/O Polysubstance abuse . PT treated with  CIWA  s/p librium taper , seen by addiction medicine and cath team, plan for rehab.   PT with Right upper extremity possible abscess , Ultrasound RUE small abscess, pt seen by ID treated with IV abx then switched to po bactrim. PT afebrile, wbc wnl. bcx negative.   PT with transaminitis , resolved.   PT to follow up with pcp. 33-year-old female with PMH of polysubstance abuse (on MMTP 110 mg), COPD, borderline personality disorder, anxiety presents to ED for multiple medical complaints ,found to have withdrawal.   PT admitted for Substance Withdrawal,  H/O Polysubstance abuse . PT treated with  CIWA  s/p librium taper , seen by addiction medicine and cath team, plan for rehab.   PT with Right upper extremity possible abscess , Ultrasound RUE Prominent soft tissue swelling in the area of interest, without a definite fluid collection., pt seen by ID treated with IV abx then switched to po bactrim. PT afebrile, wbc wnl. bcx negative.   PT with transaminitis , resolved.   PT to follow up with pcp.

## 2024-08-26 NOTE — PROGRESS NOTE ADULT - SUBJECTIVE AND OBJECTIVE BOX
RADU CONTRERAS 33y Female  MRN#: [id number]     SUBJECTIVE  Patient is a 33y old Female who presents with a chief complaint of "IV drug abuse while on methadone" (19 Aug 2024 17:06)    Interval/Overnight Events:    Today is hospital day 7d, and this morning she is lying in bed without distress.   No acute overnight events.     OBJECTIVE  PAST MEDICAL & SURGICAL HISTORY  Hepatitis C    COPD (chronic obstructive pulmonary disease)    Borderline personality disorder    Anxiety and depression    Nicotine dependence    Drug abuse    Heroin abuse    IV drug user    S/P cholecystectomy  in 2010      ALLERGIES:  No Known Allergies    MEDICATIONS:  STANDING MEDICATIONS  escitalopram 20 milliGRAM(s) Oral at bedtime  folic acid 1 milliGRAM(s) Oral daily  lactated ringers. 1000 milliLiter(s) IV Continuous <Continuous>  lamoTRIgine 200 milliGRAM(s) Oral daily  methadone    Tablet 110 milliGRAM(s) Oral daily  nicotine - 21 mG/24Hr(s) Patch 1 Patch Transdermal daily  thiamine 100 milliGRAM(s) Oral daily  trimethoprim  160 mG/sulfamethoxazole 800 mG 1 Tablet(s) Oral two times a day    PRN MEDICATIONS  albuterol/ipratropium for Nebulization 3 milliLiter(s) Nebulizer every 6 hours PRN  ALPRAZolam 0.5 milliGRAM(s) Oral four times a day PRN  hydrOXYzine hydrochloride 50 milliGRAM(s) Oral every 6 hours PRN  ondansetron Injectable 4 milliGRAM(s) IV Push every 8 hours PRN    HOME MEDICATIONS  Home Medications:  lamoTRIgine 200 mg oral tablet: 1 tab(s) orally once a day (12 Aug 2024 09:08)  Lexapro 20 mg oral tablet: 1 tab(s) orally once a day (at bedtime) (10 Aug 2024 19:56)  methadone 10 mg oral tablet: 110 milligram(s) orally once a day (09 Aug 2024 16:12)      LABS:                          CAPILLARY BLOOD GLUCOSE          PHYSICAL EXAM:  T(C): 36.8 (08-26-24 @ 04:35), Max: 37.8 (08-25-24 @ 13:15)  HR: 59 (08-26-24 @ 04:35) (59 - 78)  BP: 97/43 (08-26-24 @ 04:35) (97/43 - 116/69)  RR: 18 (08-26-24 @ 04:35) (18 - 18)  SpO2: 99% (08-26-24 @ 04:35) (99% - 99%)    GENERAL: NAD, well-developed, 33y  EENT: EOMI, conjunctiva and sclera clear, No nasal obstruction or discharge  RESPIRATORY: Clear to auscultation bilaterally; No wheeze or crackles  CARDIOVASCULAR: Regular rate and rhythm; No murmurs, rubs, or gallops, no pitting edema  GASTROINTESTINAL: Abdomen Soft, Nontender, Nondistended  MUSCULOSKELETAL:  No cyanosis, extremities grossly symmetrical  PSYCH: AAOx3, affect appropriate  NEUROLOGY: non-focal, cognition grossly intact, MAEE    ADMISSION SUMMARY  Patient is a 33y old Female who presents with a chief complaint of "IV drug abuse while on methadone" (19 Aug 2024 17:06)

## 2024-08-26 NOTE — PROGRESS NOTE ADULT - ASSESSMENT
33-year-old female with PMH of polysubstance abuse (on MMTP 110 mg), COPD, borderline personality disorder, anxiety presents to ED for multiple medical complaints ,found to have withdrawal     #Substance Withdrawal  # H/O Polysubstance abuse   - CIWA   - s/p librium taper   -  atarax q6hr prn for anxiety while hospitalized  - methadone 110 mg po  - Xanax 0.5mg PRN   - plan for inpatient substance rehab on [place].     #Right upper extremity possible abscess  -Patient refuses to consent for drainage without doing ultrasound first  -Ultrasound RUE   -She is currently on bactrim    #thiamine and folate deficiency  replete    # UTI?   PO Bactrim BID    # Transaminitis   - resolved    # H/O Nicotine addiction   - patch    # H/O COPD   - stable    dispo: inpatient substance rehab on Monday   33-year-old female with PMH of polysubstance abuse (on MMTP 110 mg), COPD, borderline personality disorder, anxiety presents to ED for multiple medical complaints ,found to have withdrawal     #Substance Withdrawal  # H/O Polysubstance abuse   - CIWA   - s/p librium taper   -  atarax q6hr prn for anxiety while hospitalized  - methadone 110 mg po  - Xanax 0.5mg PRN   - plan for inpatient substance rehab on Monday.     #Right upper extremity swelling  -Patient refuses to consent for drainage without doing ultrasound first  -Ultrasound RUE Prominent soft tissue swelling in the area of interest, without a definite fluid collection.  -She is currently on bactrim, finish 3 more days on discharge    #thiamine and folate deficiency  replete    # UTI?   PO Bactrim BID    # Transaminitis   - resolved    # H/O Nicotine addiction   - patch    # H/O COPD   - stable    dispo: inpatient substance rehab today

## 2024-08-26 NOTE — PROGRESS NOTE ADULT - PROVIDER SPECIALTY LIST ADULT
Hospitalist
Infectious Disease
Hospitalist
Hospitalist
Internal Medicine
Infectious Disease

## 2024-08-26 NOTE — DISCHARGE NOTE PROVIDER - NSDCCPCAREPLAN_GEN_ALL_CORE_FT
PRINCIPAL DISCHARGE DIAGNOSIS  Diagnosis: Polysubstance dependence  Assessment and Plan of Treatment: 33-year-old female with PMH of polysubstance abuse (on MMTP 110 mg), COPD, borderline personality disorder, anxiety presents to ED for multiple medical complaints ,found to have withdrawal. Assessed by Medicine and CATCH team and started on librium taper that was set to be completed on 8/23 at noon.   Plan:   - outpt f/u with Methadone clinic and counselor   - c/w home meds      SECONDARY DISCHARGE DIAGNOSES  Diagnosis: Skin infection  Assessment and Plan of Treatment:     Diagnosis: Intravenous drug abuse  Assessment and Plan of Treatment:      PRINCIPAL DISCHARGE DIAGNOSIS  Diagnosis: Polysubstance dependence  Assessment and Plan of Treatment: 33-year-old female with PMH of polysubstance abuse (on MMTP 110 mg), COPD, borderline personality disorder, anxiety presents to ED for multiple medical complaints ,found to have withdrawal. Assessed by Medicine and CATCH team and started on librium taper that was set to be completed on 8/23 at noon.   Plan:   - outpt f/u with Methadone clinic and counselor   - c/w home meds      SECONDARY DISCHARGE DIAGNOSES  Diagnosis: Skin infection  Assessment and Plan of Treatment: Cellulitis is a skin infection caused by bacteria, most often strep or staph. It often occurs after a break in the skin from a scrape, cut, bite, or puncture, or after a rash.  Cellulitis may be treated without doing tests to find out what caused it. But your doctor may do tests, if needed, to look for a specific bacteria, like methicillin-resistant Staphylococcus aureus (MRSA).  The doctor has checked you carefully, but problems can develop later. If you notice any problems or new symptoms, get medical treatment right away.  Follow-up care is a key part of your treatment and safety. Be sure to make and go to all appointments, and call your doctor or nurse advice line (811 in most provinces and territories) if you are having problems. It's also a good idea to know your test results and keep a list of the medicines you take.  How can you care for yourself at home?  Take your antibiotics as directed. Do not stop taking them just because you feel better. You need to take the full course of antibiotics.  Prop up the infected area on pillows to reduce pain and swelling. Try to keep the area above the level of your heart as often as you can.  If your doctor told you how to care for your infection, follow your doctor's instructions. If you did not get instructions, follow this general advice:  Wash the area with clean water 2 times a day. Don't use hydrogen peroxide or alcohol, which can slow healing.  You may cover the area with a thin layer of petroleum jelly, such as Vaseline, and a non-stick bandage.  Apply more petroleum jelly and replace the bandage as needed.  Be safe with medicines. Take pain medicines exactly as directed.  If the doctor gave you a prescription medicine for pain, take it as prescribed.  If you are not taking a prescription pain medicine, ask your doctor if you can take an over-the-counter medicine.  To prevent cellulitis in the future  Try to prevent cuts, scrapes, or other injuries to your skin. Cellulitis most often occurs where there is a break in the skin    Diagnosis: Intravenous drug abuse  Assessment and Plan of Treatment:

## 2024-08-26 NOTE — DISCHARGE NOTE PROVIDER - NSFOLLOWUPCLINICS_GEN_ALL_ED_FT
[facility]  Detox Mgmt  [address] [place] [address]  Phone: [phone]  Fax:   Follow Up Time: 1 week

## 2024-09-04 DIAGNOSIS — F41.8 OTHER SPECIFIED ANXIETY DISORDERS: ICD-10-CM

## 2024-09-04 DIAGNOSIS — F14.10 COCAINE ABUSE, UNCOMPLICATED: ICD-10-CM

## 2024-09-04 DIAGNOSIS — F60.3 BORDERLINE PERSONALITY DISORDER: ICD-10-CM

## 2024-09-04 DIAGNOSIS — F11.20 OPIOID DEPENDENCE, UNCOMPLICATED: ICD-10-CM

## 2024-09-04 DIAGNOSIS — L03.114 CELLULITIS OF LEFT UPPER LIMB: ICD-10-CM

## 2024-09-04 DIAGNOSIS — D84.9 IMMUNODEFICIENCY, UNSPECIFIED: ICD-10-CM

## 2024-09-04 DIAGNOSIS — F13.10 SEDATIVE, HYPNOTIC OR ANXIOLYTIC ABUSE, UNCOMPLICATED: ICD-10-CM

## 2024-09-04 DIAGNOSIS — E83.42 HYPOMAGNESEMIA: ICD-10-CM

## 2024-09-04 DIAGNOSIS — J44.9 CHRONIC OBSTRUCTIVE PULMONARY DISEASE, UNSPECIFIED: ICD-10-CM

## 2024-09-04 DIAGNOSIS — F17.200 NICOTINE DEPENDENCE, UNSPECIFIED, UNCOMPLICATED: ICD-10-CM

## 2024-09-04 DIAGNOSIS — E51.9 THIAMINE DEFICIENCY, UNSPECIFIED: ICD-10-CM

## 2024-09-04 DIAGNOSIS — F19.239 OTHER PSYCHOACTIVE SUBSTANCE DEPENDENCE WITH WITHDRAWAL, UNSPECIFIED: ICD-10-CM

## 2024-09-04 DIAGNOSIS — Z90.49 ACQUIRED ABSENCE OF OTHER SPECIFIED PARTS OF DIGESTIVE TRACT: ICD-10-CM

## 2024-09-05 LAB
1OH-MIDAZOLAM UR CFM-MCNC: NEGATIVE NG/ML — SIGNIFICANT CHANGE UP
6MAM UR CFM-MCNC: NEGATIVE NG/ML — SIGNIFICANT CHANGE UP
ALPRAZ UR CFM-MCNC: NEGATIVE NG/ML — SIGNIFICANT CHANGE UP
CLONAZEPAM UR CFM-MCNC: NEGATIVE NG/ML — SIGNIFICANT CHANGE UP
CODEINE UR CFM-MCNC: NEGATIVE NG/ML — SIGNIFICANT CHANGE UP
DIAZEPAM UR CFM-MCNC: NEGATIVE NG/ML — SIGNIFICANT CHANGE UP
DIHYDROCODONE RESULT: NEGATIVE NG/ML — SIGNIFICANT CHANGE UP
FENTANYL UR CFM-MCNC: NEGATIVE NG/ML — SIGNIFICANT CHANGE UP
FLUNITRAZEPAM UR CFM-MCNC: NEGATIVE NG/ML — SIGNIFICANT CHANGE UP
FLURAZEPAM UR CFM-MCNC: NEGATIVE NG/ML — SIGNIFICANT CHANGE UP
HYDROCODONE UR QL CFM: NEGATIVE NG/ML — SIGNIFICANT CHANGE UP
HYDROMORPHONE UR QL CFM: NEGATIVE NG/ML — SIGNIFICANT CHANGE UP
MEPERIDINE RESULT: NEGATIVE NG/ML — SIGNIFICANT CHANGE UP
MIDAZOLAM UR CFM-MCNC: NEGATIVE NG/ML — SIGNIFICANT CHANGE UP
NALOXONE RESULT: NEGATIVE NG/ML — SIGNIFICANT CHANGE UP
NALOXONE UR CFM-MCNC: NEGATIVE NG/ML — SIGNIFICANT CHANGE UP
NALTREXONE RESULT: NEGATIVE NG/ML — SIGNIFICANT CHANGE UP
NORDIAZEPAM, UR RESULT: NEGATIVE NG/ML — SIGNIFICANT CHANGE UP
TAPENTADOL RESULT: NEGATIVE NG/ML — SIGNIFICANT CHANGE UP
TEMAZEPAM UR CFM-MCNC: NEGATIVE NG/ML — SIGNIFICANT CHANGE UP

## 2024-09-06 LAB
7AMINOCLONAZEPAM UR CFM-MCNC: 819 NG/ML — HIGH
ALPHA-HYDROXYALPRAZOLAM, UR RESULT: 77 NG/ML — HIGH
BENZODIAZ UR QL SCN: POSITIVE
BZE UR QL SCN: SIGNIFICANT CHANGE UP NG/ML
COCAINE UR QL SCN: POSITIVE
EDDP UR QL CFM: SIGNIFICANT CHANGE UP NG/ML
EDDP, UR RESULT: SIGNIFICANT CHANGE UP NG/ML
LORAZEPAM UR CFM-MCNC: 2479 NG/ML — HIGH
METHADONE UR CFM-MCNC: POSITIVE
MORPHINE UR QL CFM: 105 NG/ML — HIGH
NORBUPRENORPHINE UR-MCNC: 18 NG/ML — HIGH
OPIATES UR QL CFM: POSITIVE
OXAZEPAM UR QL SCN: 1458 NG/ML — HIGH
TOXICOLOGIST REVIEW: POSITIVE — SIGNIFICANT CHANGE UP

## 2024-09-09 LAB
CARBOXYTHC UR CFM-MCNC: 921 NG/ML — HIGH
TOXICOLOGIST REVIEW: POSITIVE — SIGNIFICANT CHANGE UP

## 2024-09-10 NOTE — PATIENT PROFILE ADULT - BILL PAYMENT
This patient was brought to endoscopy 's attention by surgery nurse.  Before scheduling will physician please clarify the followin) Is this upper and lower endoscopy urgent, or would next available be acceptable.  (Currently scheduling in November.)    2) Would Chronic abdominal pain and abnormal CT scan be acceptable diagnoses?    3) Do these procedures need to wait till after the hepatobiliary scan?     Please advise.    Thank you for your time.  Lizet  
no

## 2024-09-23 NOTE — ED ADULT NURSE NOTE - SUICIDE SCREENING QUESTION 1
This patient has been assessed with a concern for Malnutrition and was treated during this hospitalization for the following Nutrition diagnosis/diagnoses:     -  09/21/2024: Moderate protein-calorie malnutrition  
No

## 2024-10-21 NOTE — ED ADULT NURSE NOTE - NS ED NURSE LEVEL OF CONSCIOUSNESS MENTAL STATUS
"Chief Complaint   Patient presents with    Hypothyroidism due to Hashimoto's thyroiditis     Follow-up       HPI:   Brandi Briseno is a 27 y.o.female who returns to endocrine clinic for follow-up evaluation of her hypothyroidism.  Last visit 10/16/2023. Her history is as follows:    Interim Events:   - pt's levothyroxine dose has been slowly increased since last visit  based on elevated TSH levels or TSH levels at the upper limit of the normal range. Pt had difficulty articulating her symptoms but intermittently \"does not feel well\".   - pt stopped Adderall in 07/2023. Has felt okay off the medication   - has had mild weight gain since 10/2023 despite regular exercise and good dietary habits.     1) hypothyroidism due to Hashimoto's thyroiditis:  - Pt had a routine physical in 12/2022 and was found to have an elevated TSH of 8.27 (0.38 - 4.31) She was prescribed levothyroxine 75 mcg daily by her PCP but the patient did not start the medication. Pt's sister is followed by me for a h/o transient Graves disease and advised pt to see me in consultation for further evaluation.  - Pt denied any URI or other illness in the months prior to her abnormal TSH in 12/2022.    Initial Endocrine Visit (04/11/2023)   Her TSH was mildly elevated at 6.800 with a normal free T4 of 1.36. I briefly looked at her gland with ultrasound in clinic which showed a mildly enlarged, hypoechoic, diffusely heterogeneous gland with increased vascularity and pseudonodules.  These findings are consistent with classic ultrasound images seen in Hashimoto's thyroiditis. Given her ultrasound images, mildly elevated TSH and simple goiter on physical exam, recommended that she start levothyroxine. Advised patient she could start taking the levothyroxine 75 mcg prescription from her PCP.   - dose has been slowly titrated    Current Rx: levothyroxine 125 mcg qAM  - Takes on an empty stomach.  Waits at least 30 minutes before food or hot liquids  - No " interfering factors  - No missed doses  - Is not on a high dose biotin supplement    Review of Systems   Constitutional:  Negative for fatigue.        Weight gain, mild   HENT: Negative.     Eyes: Negative.    Respiratory: Negative.     Cardiovascular: Negative.    Gastrointestinal:  Positive for constipation (Occasional) and diarrhea (More frequent due to IBS).   Endocrine: Negative.    Genitourinary: Negative.    Musculoskeletal: Negative.  Negative for arthralgias, joint swelling and myalgias.   Skin: Negative.    Allergic/Immunologic: Negative.    Neurological: Negative.    Hematological: Negative.    Psychiatric/Behavioral: Negative.       The following portions of the patient's history were reviewed and updated as appropriate: allergies, current medications, past family history, past medical history, past social history, past surgical history and problem list.      /70   Pulse 72   Wt 67.4 kg (148 lb 9.6 oz)   SpO2 98%   BMI 24.35 kg/m²   Physical Exam  Vitals reviewed.   Constitutional:       General: She is not in acute distress.     Appearance: She is well-developed. She is not diaphoretic.   HENT:      Head: Normocephalic.   Eyes:      Conjunctiva/sclera: Conjunctivae normal.      Pupils: Pupils are equal, round, and reactive to light.   Neck:      Thyroid: Thyromegaly (mild) present.      Trachea: No tracheal deviation.      Comments: No palpable thyroid nodules    Cardiovascular:      Rate and Rhythm: Normal rate and regular rhythm.      Heart sounds: Normal heart sounds. No murmur heard.  Pulmonary:      Effort: Pulmonary effort is normal. No respiratory distress.      Breath sounds: Normal breath sounds.   Lymphadenopathy:      Cervical: No cervical adenopathy.   Skin:     General: Skin is warm and dry.      Findings: No erythema.   Neurological:      Mental Status: She is alert and oriented to person, place, and time.      Cranial Nerves: No cranial nerve deficit.   Psychiatric:          "Behavior: Behavior normal.       LABS/IMAGING: outside records reviewed and summarized in HPI  Results for orders placed or performed in visit on 10/21/24   TSH    Collection Time: 10/21/24  8:41 AM    Specimen: Arm, Left; Blood   Result Value Ref Range    TSH 2.520 0.270 - 4.200 uIU/mL   T4, Free    Collection Time: 10/21/24  8:41 AM    Specimen: Arm, Left; Blood   Result Value Ref Range    Free T4 1.66 0.92 - 1.68 ng/dL       ASSESSMENT/PLAN:  1) hypothyroidism due to Hashimoto's thyroiditis:  - Patient was clinically euthyroid on exam.  -TSH today was normal at 2.520.   - Pt had difficulty articulating her symptoms but intermittently \"does not feel well\". Will see if she notes any benefit form a slightly higher dose with a goal TSH of < 2.00   - Increasing levothyroxine to 137 mcg qAM. Pt advised to contact me if she notes any palpitations on this higher dose.   - Will check TSH in 6-8 weeks and adjust dose as indicated.   - Reviewed proper thyroid hormone administration, and factors to avoid that decrease medication potency and medication absorption.     2) simple goiter:  - stable on physical exam  - At her initial visit in 04/2023, I briefly looked at her gland with ultrasound in clinic which showed a mildly enlarged, hypoechoic, diffusely heterogeneous gland with increased vascularity and pseudonodules.  These findings are consistent with classic ultrasound images seen in Hashimoto's thyroiditis.  - Will complete a formal Thyroid US at a later date if concerning changes in her goiter are noted on physical exam.    RTC 6 months    Electronically Signed: Keren Morris MD        " Awake

## 2024-11-18 NOTE — ED BEHAVIORAL HEALTH ASSESSMENT NOTE - NSSUICRSKFACTOR_PSY_ALL_CORE
Current and Past Psychiatric Diagnoses/Presenting Symptoms/Treatment Related Factors/Activating Events/Stressors
Name band;

## 2025-01-17 NOTE — PATIENT PROFILE ADULT - FALL HARM RISK - HARM RISK INTERVENTIONS
Communicate Risk of Fall with Harm to all staff/Reinforce activity limits and safety measures with patient and family/Tailored Fall Risk Interventions/Visual Cue: Yellow wristband and red socks/Bed in lowest position, wheels locked, appropriate side rails in place/Call bell, personal items and telephone in reach/Instruct patient to call for assistance before getting out of bed or chair/Non-slip footwear when patient is out of bed/Joseph to call system/Physically safe environment - no spills, clutter or unnecessary equipment/Purposeful Proactive Rounding/Room/bathroom lighting operational, light cord in reach
MST Score 2 or >

## 2025-01-23 ENCOUNTER — INPATIENT (INPATIENT)
Facility: HOSPITAL | Age: 35
LOS: 0 days | Discharge: LEFT BEFORE DISCHARGE | DRG: 420 | End: 2025-01-24
Attending: INTERNAL MEDICINE | Admitting: INTERNAL MEDICINE
Payer: MEDICAID

## 2025-01-23 VITALS
WEIGHT: 104.94 LBS | TEMPERATURE: 98 F | SYSTOLIC BLOOD PRESSURE: 130 MMHG | HEIGHT: 68 IN | HEART RATE: 73 BPM | OXYGEN SATURATION: 100 % | RESPIRATION RATE: 18 BRPM | DIASTOLIC BLOOD PRESSURE: 78 MMHG

## 2025-01-23 DIAGNOSIS — Z90.49 ACQUIRED ABSENCE OF OTHER SPECIFIED PARTS OF DIGESTIVE TRACT: Chronic | ICD-10-CM

## 2025-01-23 DIAGNOSIS — E11.621 TYPE 2 DIABETES MELLITUS WITH FOOT ULCER: ICD-10-CM

## 2025-01-23 LAB
ALBUMIN SERPL ELPH-MCNC: 4 G/DL — SIGNIFICANT CHANGE UP (ref 3.5–5.2)
ALP SERPL-CCNC: 96 U/L — SIGNIFICANT CHANGE UP (ref 30–115)
ALT FLD-CCNC: 49 U/L — HIGH (ref 0–41)
ANION GAP SERPL CALC-SCNC: 10 MMOL/L — SIGNIFICANT CHANGE UP (ref 7–14)
AST SERPL-CCNC: 39 U/L — SIGNIFICANT CHANGE UP (ref 0–41)
BASOPHILS # BLD AUTO: 0.07 K/UL — SIGNIFICANT CHANGE UP (ref 0–0.2)
BASOPHILS NFR BLD AUTO: 0.8 % — SIGNIFICANT CHANGE UP (ref 0–1)
BILIRUB SERPL-MCNC: 0.3 MG/DL — SIGNIFICANT CHANGE UP (ref 0.2–1.2)
BUN SERPL-MCNC: 8 MG/DL — LOW (ref 10–20)
CALCIUM SERPL-MCNC: 9.5 MG/DL — SIGNIFICANT CHANGE UP (ref 8.4–10.4)
CHLORIDE SERPL-SCNC: 102 MMOL/L — SIGNIFICANT CHANGE UP (ref 98–110)
CO2 SERPL-SCNC: 29 MMOL/L — SIGNIFICANT CHANGE UP (ref 17–32)
CREAT SERPL-MCNC: 0.8 MG/DL — SIGNIFICANT CHANGE UP (ref 0.7–1.5)
EGFR: 98 ML/MIN/1.73M2 — SIGNIFICANT CHANGE UP
EOSINOPHIL # BLD AUTO: 0.07 K/UL — SIGNIFICANT CHANGE UP (ref 0–0.7)
EOSINOPHIL NFR BLD AUTO: 0.8 % — SIGNIFICANT CHANGE UP (ref 0–8)
GLUCOSE SERPL-MCNC: 140 MG/DL — HIGH (ref 70–99)
HCG SERPL QL: NEGATIVE — SIGNIFICANT CHANGE UP
HCT VFR BLD CALC: 37.1 % — SIGNIFICANT CHANGE UP (ref 37–47)
HGB BLD-MCNC: 11.8 G/DL — LOW (ref 12–16)
IMM GRANULOCYTES NFR BLD AUTO: 0.4 % — HIGH (ref 0.1–0.3)
LACTATE SERPL-SCNC: 1.4 MMOL/L — SIGNIFICANT CHANGE UP (ref 0.7–2)
LYMPHOCYTES # BLD AUTO: 2.32 K/UL — SIGNIFICANT CHANGE UP (ref 1.2–3.4)
LYMPHOCYTES # BLD AUTO: 24.9 % — SIGNIFICANT CHANGE UP (ref 20.5–51.1)
MCHC RBC-ENTMCNC: 28.9 PG — SIGNIFICANT CHANGE UP (ref 27–31)
MCHC RBC-ENTMCNC: 31.8 G/DL — LOW (ref 32–37)
MCV RBC AUTO: 90.9 FL — SIGNIFICANT CHANGE UP (ref 81–99)
MONOCYTES # BLD AUTO: 0.81 K/UL — HIGH (ref 0.1–0.6)
MONOCYTES NFR BLD AUTO: 8.7 % — SIGNIFICANT CHANGE UP (ref 1.7–9.3)
NEUTROPHILS # BLD AUTO: 6.02 K/UL — SIGNIFICANT CHANGE UP (ref 1.4–6.5)
NEUTROPHILS NFR BLD AUTO: 64.4 % — SIGNIFICANT CHANGE UP (ref 42.2–75.2)
NRBC # BLD: 0 /100 WBCS — SIGNIFICANT CHANGE UP (ref 0–0)
NRBC BLD-RTO: 0 /100 WBCS — SIGNIFICANT CHANGE UP (ref 0–0)
PLATELET # BLD AUTO: 372 K/UL — SIGNIFICANT CHANGE UP (ref 130–400)
PMV BLD: 10.1 FL — SIGNIFICANT CHANGE UP (ref 7.4–10.4)
POTASSIUM SERPL-MCNC: 4.3 MMOL/L — SIGNIFICANT CHANGE UP (ref 3.5–5)
POTASSIUM SERPL-SCNC: 4.3 MMOL/L — SIGNIFICANT CHANGE UP (ref 3.5–5)
PROT SERPL-MCNC: 7 G/DL — SIGNIFICANT CHANGE UP (ref 6–8)
RBC # BLD: 4.08 M/UL — LOW (ref 4.2–5.4)
RBC # FLD: 15 % — HIGH (ref 11.5–14.5)
SODIUM SERPL-SCNC: 141 MMOL/L — SIGNIFICANT CHANGE UP (ref 135–146)
WBC # BLD: 9.33 K/UL — SIGNIFICANT CHANGE UP (ref 4.8–10.8)
WBC # FLD AUTO: 9.33 K/UL — SIGNIFICANT CHANGE UP (ref 4.8–10.8)

## 2025-01-23 PROCEDURE — 99285 EMERGENCY DEPT VISIT HI MDM: CPT

## 2025-01-23 PROCEDURE — 93970 EXTREMITY STUDY: CPT | Mod: 26

## 2025-01-23 PROCEDURE — 99222 1ST HOSP IP/OBS MODERATE 55: CPT

## 2025-01-23 PROCEDURE — 73630 X-RAY EXAM OF FOOT: CPT | Mod: 26,LT

## 2025-01-23 RX ORDER — ONDANSETRON 4 MG/1
4 TABLET, ORALLY DISINTEGRATING ORAL EVERY 8 HOURS
Refills: 0 | Status: DISCONTINUED | OUTPATIENT
Start: 2025-01-23 | End: 2025-01-24

## 2025-01-23 RX ORDER — ACETAMINOPHEN, DIPHENHYDRAMINE HCL, PHENYLEPHRINE HCL 325; 25; 5 MG/1; MG/1; MG/1
3 TABLET ORAL AT BEDTIME
Refills: 0 | Status: DISCONTINUED | OUTPATIENT
Start: 2025-01-23 | End: 2025-01-24

## 2025-01-23 RX ORDER — ESCITALOPRAM 10 MG/1
20 TABLET, FILM COATED ORAL DAILY
Refills: 0 | Status: DISCONTINUED | OUTPATIENT
Start: 2025-01-23 | End: 2025-01-24

## 2025-01-23 RX ORDER — ACETAMINOPHEN 160 MG/5ML
650 SUSPENSION ORAL EVERY 6 HOURS
Refills: 0 | Status: DISCONTINUED | OUTPATIENT
Start: 2025-01-23 | End: 2025-01-24

## 2025-01-23 RX ORDER — VANCOMYCIN HYDROCHLORIDE 50 MG/ML
1000 KIT ORAL ONCE
Refills: 0 | Status: COMPLETED | OUTPATIENT
Start: 2025-01-23 | End: 2025-01-23

## 2025-01-23 RX ORDER — VANCOMYCIN HYDROCHLORIDE 50 MG/ML
500 KIT ORAL EVERY 12 HOURS
Refills: 0 | Status: DISCONTINUED | OUTPATIENT
Start: 2025-01-23 | End: 2025-01-24

## 2025-01-23 RX ORDER — CLONAZEPAM 2 MG
1 TABLET ORAL ONCE
Refills: 0 | Status: DISCONTINUED | OUTPATIENT
Start: 2025-01-23 | End: 2025-01-23

## 2025-01-23 RX ORDER — MAGNESIUM, ALUMINUM HYDROXIDE 200-225/5
30 SUSPENSION, ORAL (FINAL DOSE FORM) ORAL EVERY 4 HOURS
Refills: 0 | Status: DISCONTINUED | OUTPATIENT
Start: 2025-01-23 | End: 2025-01-24

## 2025-01-23 RX ORDER — LAMOTRIGINE 100 MG/1
200 TABLET ORAL DAILY
Refills: 0 | Status: DISCONTINUED | OUTPATIENT
Start: 2025-01-23 | End: 2025-01-24

## 2025-01-23 RX ORDER — CLONAZEPAM 2 MG
1 TABLET ORAL
Refills: 0 | DISCHARGE

## 2025-01-23 RX ADMIN — Medication 1 MILLIGRAM(S): at 05:21

## 2025-01-23 RX ADMIN — VANCOMYCIN HYDROCHLORIDE 100 MILLIGRAM(S): KIT at 17:58

## 2025-01-23 RX ADMIN — LAMOTRIGINE 200 MILLIGRAM(S): 100 TABLET ORAL at 14:17

## 2025-01-23 RX ADMIN — VANCOMYCIN HYDROCHLORIDE 250 MILLIGRAM(S): KIT at 05:21

## 2025-01-23 RX ADMIN — ACETAMINOPHEN 650 MILLIGRAM(S): 160 SUSPENSION ORAL at 21:01

## 2025-01-23 RX ADMIN — ESCITALOPRAM 20 MILLIGRAM(S): 10 TABLET, FILM COATED ORAL at 14:10

## 2025-01-23 NOTE — ED PROVIDER NOTE - OBJECTIVE STATEMENT
34 f co 1 day of red swollen L foot and lower leg. no hx of dvt. no ocp use. pt is an IVDU injects in her calves. no fever or chills. no cp or sob.

## 2025-01-23 NOTE — H&P ADULT - NSHPPHYSICALEXAM_GEN_ALL_CORE
GENERAL: NAD, lying in bed comfortably  HEAD:  Atraumatic, normocephalic  EYES: EOMI, PERRL  HEART: Regular rate and rhythm  LUNGS: Unlabored respirations.  Clear to auscultation bilaterally, no crackles, wheezing, or rhonchi  ABDOMEN: Soft, nontender, nondistended  EXTREMITIES: swelling of L foot and lower leg  NERVOUS SYSTEM:  A&Ox3, moving all extremities, no focal deficits GENERAL: NAD, lying in bed comfortably  HEAD:  Atraumatic, normocephalic  EYES: EOMI, PERRL  HEART: Regular rate and rhythm  LUNGS: Unlabored respirations.  Clear to auscultation bilaterally, no crackles, wheezing, or rhonchi  ABDOMEN: Soft, nontender, nondistended  EXTREMITIES: diffuse swelling of dorsum of L foot and lower leg with associated erythema, TTP along dorsum  NERVOUS SYSTEM:  A&Ox3, moving all extremities, no focal deficits

## 2025-01-23 NOTE — H&P ADULT - HISTORY OF PRESENT ILLNESS
Patient is a 34 year old Female with PMHx of IVDU, COPD, borderline personality disorder, anxiety Patient is a 34 year old Female with PMHx of IVDU, COPD, borderline personality disorder, anxiety presenting to the ED with a 1 day history of swollen left foot and leg. Denies OCP use, history of DVT. Endorses injecting into her calves.     VITAL SIGNS: Last 24 Hours  T(F): 98.1 (23 Jan 2025 07:34), Max: 98.5 (23 Jan 2025 03:06)  HR: 68 (23 Jan 2025 07:34) (68 - 73)  BP: 117/70 (23 Jan 2025 07:34) (117/70 - 130/78)  RR: 18 (23 Jan 2025 07:34) (18 - 18)  SpO2: 100% (23 Jan 2025 03:06) (100% - 100%)    Labs:  WBC 9k  Hgb 11.8 (around baseline)  Lactate 1.4    Duplex LE - negative for DVT  X-ray of L foot: soft tissue swelling Patient is a 34 year old Female with PMHx of polysubstance use including IVDU, COPD, borderline personality disorder, anxiety presenting to the ED with a 2 day history of worsening swelling left foot with associated erythema and pain. Patient endorses injecting drugs in her calves. Had a low grade fever 2 days that self-resolved, has been afebrile since. Denies fever, chills, SOB, fatigue, weakness, purulent drainage. Has history of cellulitis in the past located in the arm requiring drainage due to abscess formation. Patient states she used heroine, cocaine and marijuana.      VITAL SIGNS: Last 24 Hours  T(F): 98.1 (23 Jan 2025 07:34), Max: 98.5 (23 Jan 2025 03:06)  HR: 68 (23 Jan 2025 07:34) (68 - 73)  BP: 117/70 (23 Jan 2025 07:34) (117/70 - 130/78)  RR: 18 (23 Jan 2025 07:34) (18 - 18)  SpO2: 100% (23 Jan 2025 03:06) (100% - 100%)    Labs:  WBC 9k  Hgb 11.8 (around baseline)  Lactate 1.4    Duplex LE - negative for DVT  X-ray of L foot: soft tissue swelling

## 2025-01-23 NOTE — H&P ADULT - ASSESSMENT
Patient is a 34 year old Female with PMHx of IVDU, COPD, borderline personality disorder, anxiety presenting to the ED with a 1 day history of swollen left foot and leg. Denies OCP use, history of DVT. Endorses injecting into her calves.     #Left foot swelling likely 2/2 non-purulent cellulitis 2/2 IVDU  #History of IVDU  - 1 day history of L foot and lower leg swelling  - History of polysubstance use including IVDU, noted to inject into her calves  - Hemodynamically stable, afebrile, SIRS negative  - Duplex LE negative for DVT, denies OCP use  - X-ray L foot shows soft tissue swelling  - C/w vancomycin given history of IVDU  - ID consult for abx  - MRSA swab pending  - Wound care?    #COPD    #Anxiety  #Borderline personality disorder  - Euthymic mood  - Continue with home medications    #Misc  - DVT Prophylaxis: N/A  - Diet: Regular  - Activity: As tolerated  - Code Status: Full code.   Patient is a 34 year old Female with PMHx of IVDU, COPD, borderline personality disorder, anxiety presenting to the ED with a 1 day history of swollen left foot and leg. Denies OCP use, history of DVT. Endorses injecting into her calves.     #Left foot swelling likely 2/2 non-purulent cellulitis 2/2 IVDU  #History of IVDU  - 1 day history of L foot and lower leg swelling  - History of polysubstance use including IVDU, noted to inject into her calves  - Hemodynamically stable, afebrile, SIRS negative  - WBC 9k, hgb 11.8 (baseline)  - Duplex LE negative for DVT, denies OCP use  - X-ray L foot shows soft tissue swelling  - C/w vancomycin given history of IVDU  - ID consult for abx  - MRSA swab pending  - Wound care?    #COPD    #Anxiety  #Borderline personality disorder  - Euthymic mood  - Continue with home medications    #Misc  - DVT Prophylaxis: N/A  - Diet: Regular  - Activity: As tolerated  - Code Status: Full code.   Patient is a 34 year old Female with PMHx of polysubstance use including IVDU, COPD, borderline personality disorder, anxiety presenting to the ED with a 2 day history of worsening swelling left foot with associated erythema and pain. Patient endorses injecting drugs in her calves.    #Left foot swelling likely 2/2 non-purulent cellulitis 2/2 IVDU  #History of IVDU  - 2 day history of worsening L foot swelling and erythema, no associated purulent drainage, mild extension of swelling to distal L LE  - History of polysubstance use including IVDU, noted to inject into her calves  - Noted multiple puncture wounds around posterior L calf, no associated erythema or drainage.   - Hemodynamically stable, afebrile, SIRS negative  - WBC 9k, hgb 11.8 (baseline)  - Duplex LE negative for DVT, denies OCP use  - X-ray L foot shows soft tissue swelling  - C/w vancomycin given history of IVDU  - ID consult for abx  - MRSA swab pending    #COPD  - Occasionally uses rescue inhaler    #Anxiety  #Borderline personality disorder  - Euthymic mood  - Continue with home medications (lamotrigine and lexapro)  - According to patient takes Klonopin 2 mg BID for anxiety    #Misc  - DVT Prophylaxis  - Diet: Regular  - Activity: As tolerated  - Code Status: Full code.   Patient is a 34 year old Female with PMHx of polysubstance use including IVDU, COPD, borderline personality disorder, anxiety presenting to the ED with a 2 day history of worsening swelling left foot with associated erythema and pain. Patient endorses injecting drugs in her calves.    #Left foot swelling likely 2/2 non-purulent cellulitis 2/2 IVDU  #History of IVDU  - 2 day history of worsening L foot swelling and erythema, no associated purulent drainage, mild extension of swelling to distal L LE  - History of polysubstance use including IVDU, noted to inject into her calves  - Noted multiple puncture wounds around posterior L calf, no associated erythema or drainage.   - Hemodynamically stable, afebrile, SIRS negative  - WBC 9k, hgb 11.8 (baseline)  - Duplex LE negative for DVT, denies OCP use  - X-ray L foot shows soft tissue swelling  - C/w vancomycin given history of IVDU with associated puncture wounds  - ID consult for abx  - MRSA swab pending    #COPD  - Occasionally uses rescue inhaler    #Anxiety  #Borderline personality disorder  - Euthymic mood  - Continue with home medications (lamotrigine and lexapro)  - According to patient takes Klonopin 2 mg BID for anxiety    #Misc  - DVT Prophylaxis  - Diet: Regular  - Activity: As tolerated  - Code Status: Full code.   Patient is a 34 year old Female with PMHx of polysubstance use including IVDU, COPD, borderline personality disorder, anxiety presenting to the ED with a 2 day history of worsening swelling left foot with associated erythema and pain. Patient endorses injecting drugs in her calves.    #Left foot swelling likely 2/2 non-purulent cellulitis 2/2 IVDU  - 2 day history of worsening L foot swelling and erythema, no associated purulent drainage, mild extension of swelling to distal L LE  - History of polysubstance use including IVDU, noted to inject into her calves  - Noted multiple puncture wounds around posterior L calf, no associated erythema or drainage.   - Hemodynamically stable, afebrile, SIRS negative  - WBC 9k, hgb 11.8 (baseline)  - Duplex LE negative for DVT, denies OCP use  - X-ray L foot shows soft tissue swelling  - C/w vancomycin given history of IVDU with associated puncture wounds  - ID consult for abx  - MRSA swab pending    #History of IVDU  #Polysubstance use  - According to patient takes 30 mg Methadone, [facility] on [address], although has not been compliant with going to the clinic  - Last injection of heroine was yesterday  - Addiction medicine consult  - Endorses using heroine, cocaine, marijuana, and tobacco  - Urine drug screen    #COPD  - Occasionally uses rescue inhaler    #Anxiety  #Borderline personality disorder  - Euthymic mood  - Continue with home medications (lamotrigine and lexapro)  - According to patient takes Klonopin 2 mg BID for anxiety    #Misc  - DVT Prophylaxis  - Diet: Regular  - Activity: As tolerated  - Code Status: Full code.

## 2025-01-23 NOTE — ED PROVIDER NOTE - PHYSICAL EXAMINATION
VITAL SIGNS: I have reviewed nursing notes and confirm.  CONSTITUTIONAL: Well-developed; well-nourished; in no acute distress.  SKIN: Skin exam is warm and dry, no acute rash.  HEAD: Normocephalic; atraumatic.  EYES: PERRL, EOM intact; conjunctiva and sclera clear.  ENT: No nasal discharge; airway clear.   NECK: Supple; non tender.  CARD:+ S1, S2   RESP: No wheezes, rales or rhonchi.  ABD: Normal bowel sounds; soft; non-distended; non-tender;  EXT: Normal ROM. L foot is red, swollen, tender, no crepitus. +LE pitting edema. injection sites of posterior calves are non infected appearing.   LYMPH: No acute adenopathy.  NEURO: Alert. Grossly unremarkable. No focal deficits.  PSYCH: Cooperative, appropriate.

## 2025-01-23 NOTE — ED PROVIDER NOTE - CLINICAL SUMMARY MEDICAL DECISION MAKING FREE TEXT BOX
LE cellulitis and swelling.  ivdu  labs, blood cultures, iv abx, US duplex in am, admit to hosp. LE cellulitis and swelling.  ivdu  labs, blood cultures, iv abx, US duplex in am, admit to hosp.    Patient signed out to me evaluated for a foot infection will admit for IV antibiotics and ID consult.  Labs and test reviewed.  X-ray to evaluate for any infection and soft tissue LE cellulitis and swelling.  ivdu  labs, blood cultures, iv abx, US duplex in am, admit to hosp.    Patient signed out to me evaluated for a foot infection will admit for IV antibiotics and ID consult.  Labs and test reviewed.  X-ray to evaluate for any infection and soft tissue.

## 2025-01-23 NOTE — ED ADULT TRIAGE NOTE - RESPIRATORY RATE (BREATHS/MIN)
Subjective   Patient ID: Nakia is a 6 month old female who is accompanied by:mother and father    Well Child Assessment:  History was provided by the mother and father. Nakia lives with her mother, father, grandfather and grandmother.   Nutrition  Types of milk consumed include formula. Additional intake includes solids and cereal. Formula - Types of formula consumed include cow's milk based (Enfamil Enspire). 6 ounces of formula are consumed per feeding. 25 ounces are consumed every 24 hours. Feedings occur 5-8 times per 24 hours. Cereal - Types of cereal consumed include rice. Solid Foods - Types of intake include fruits and vegetables. The patient can consume pureed foods.   Dental  The patient has teething symptoms (Drooling a lot). Tooth eruption is not evident.  Elimination  Urination occurs 4-6 times per 24 hours. Bowel movements occur 1-3 times per 24 hours. Stools have a formed consistency. Elimination problems do not include constipation, diarrhea or urinary symptoms. (Had hard stools a couple times but better after prunes. )   Sleep  The patient sleeps in her crib. Child falls asleep while on own. Sleep positions include supine. Average sleep duration is 11 hours.   Safety  Home is child-proofed? no. There is no smoking in the home. Home has working smoke alarms? yes. Home has working carbon monoxide alarms? yes. There is an appropriate car seat in use.   Screening  Immunizations are up-to-date.   Social  The caregiver enjoys the child. Childcare is provided at child's home. The childcare provider is a parent.       Pt will wake up in the middle of the night as couple times. Mom will give pacifier back and then pt goes back to sleep. Ok to stop pacifier and help pt learn to fall asleep on her own.     Additional concerns today: None     Review of Systems   Constitutional: Negative for fever.   HENT: Negative for congestion.    Respiratory: Negative for cough.    Gastrointestinal: Negative for constipation  and diarrhea.   Genitourinary: Negative for decreased urine volume.       Patient's medications, allergies, past medical, surgical, social and family histories were reviewed and updated as appropriate.    Objective   Vitals: Temp 97.5 °F (36.4 °C) (Temporal)   Ht 26\" (66 cm)   Wt 8.815 kg (19 lb 6.9 oz)   HC 43.2 cm (17\")   BMI 20.21 kg/m²   BSA 0.38 m²   Growth parameters are noted and are appropriate for age.  Physical Exam  Vitals signs and nursing note reviewed.   Constitutional:       General: She is active. She has a strong cry.      Appearance: She is well-developed.   HENT:      Head: Anterior fontanelle is flat.      Right Ear: Tympanic membrane normal.      Left Ear: Tympanic membrane normal.      Nose: Nose normal.      Mouth/Throat:      Mouth: Mucous membranes are moist.      Pharynx: Oropharynx is clear.   Eyes:      General: Red reflex is present bilaterally.         Left eye: Discharge (slight watery) present.     Conjunctiva/sclera: Conjunctivae normal.      Pupils: Pupils are equal, round, and reactive to light.   Neck:      Musculoskeletal: Normal range of motion and neck supple.   Cardiovascular:      Rate and Rhythm: Normal rate and regular rhythm.      Pulses:           Femoral pulses are 2+ on the right side and 2+ on the left side.     Heart sounds: S1 normal and S2 normal. No murmur.   Pulmonary:      Effort: Pulmonary effort is normal.      Breath sounds: Normal breath sounds. No wheezing.   Abdominal:      General: Bowel sounds are normal.      Palpations: Abdomen is soft. There is no mass.      Tenderness: There is no tenderness.      Hernia: No hernia is present.   Genitourinary:     Comments: Normal female externally    Musculoskeletal: Normal range of motion. Negative right Ortolani, left Ortolani, right Wang and left Wang.   Skin:     General: Skin is warm and dry.      Capillary Refill: Capillary refill takes less than 2 seconds.      Turgor: Normal.      Findings: No rash.    Neurological:      Mental Status: She is alert.      Primitive Reflexes: Suck normal. Symmetric Rogelio.         Assessment   Problem List Items Addressed This Visit        Ophthalmic    Obstruction of left lacrimal duct in infant      Other Visit Diagnoses     Encounter for routine child health examination without abnormal findings    -  Primary    Relevant Orders    PNEUMOCOCCAL CONJUGATE 13 VALENT VACC(PREVNAR-13) (Completed)    INFLUENZA QUADRIVALENT SPLIT PRES FREE 0.5 ML VACC, IM (FLULAVAL,FLUARIX,FLUZONE) (Completed)    DTAP HIB IPV VACC 0 THRU 4 YRS (PENTACEL) (Completed)    Need for vaccination        Relevant Orders    PNEUMOCOCCAL CONJUGATE 13 VALENT VACC(PREVNAR-13) (Completed)    DTAP HIB IPV VACC 0 THRU 4 YRS (PENTACEL) (Completed)    Need for influenza vaccination        Relevant Orders    INFLUENZA QUADRIVALENT SPLIT PRES FREE 0.5 ML VACC, IM (FLULAVAL,FLUARIX,FLUZONE) (Completed)          Screening tests  ASQ Score:  Not done  Developmental milestones were reviewed and were: normal based on age    Immunizations today: per orders. RTC one month for Flu #2.   History of previous adverse reactions to immunizations? no  Immunizations given today and vaccine counseling including benefits, risks, and adverse reactions were provided by myself during the visit.    Follow-up for the 9 month well child visit, or sooner as needed. Encourage tummy time to encourage rolling. Pt with normal tone and development overall. Follow up with eye doctor for blocked tear duct.    18

## 2025-01-23 NOTE — ED PROVIDER NOTE - CARE PLAN
Assessment and plan of treatment:	LE cellulitis and swelling.  ivdu  labs, blood cultures, iv abx, US duplex in am, admit to hosp.   Principal Discharge DX:	Diabetic foot ulcer  Assessment and plan of treatment:	LE cellulitis and swelling.  ivdu  labs, blood cultures, iv abx, US duplex in am, admit to hosp.   1

## 2025-01-23 NOTE — H&P ADULT - ATTENDING COMMENTS
(0) indicator not present patient seen and examined independently on morning rounds in the ED for the first time today, chart reviewed and discussed with medicine resident and agree with the above H/P and assessment and plan with the following addendum:     in brief, 33 yo woman with h/o polysubstance use and active IVDA (heroin), anxiety and borderline personality disorder who p/w left foot pain, swelling and redness x 2 days.  She injects drugs into her calf regularly but denies any prior episodes of skin infections.  In the ED she is accompanied by a friend and patient was regularly  leaving friend on stretcher and going to charge phone in [employer] vs outside.  she reports subjective fevers but did not check temperature.  she has also previously followed at methadone clinic but has not been going regularly.  active tobacco smoker as well as marijuana and cocaine use.  denies etoh.  last heroin use day prior to admission.     ROS- as per above otherwise review of systems unremarkable    pcp- Dr. Bishop    PE:  GEN-NAD, thin, disheveled, poor hygiene  PULM- Clear to auscultation bilaterally, fair air entry  CVS- +s1/s2 RRR   ABD- soft nt nd+bs, no rebound, no guarding  EXT- Left foot dorsum erythema and warmth with mild swelling and ttp- no drainage- multiple skin scabs on posterior legs bilaterally    labs/radiology reviewed    a/p:   #Left foot cellulitis  #polysubstance abuse--including IVDA (heroin)  #anxiety/borderline personality disorder  -admit to medicine  -check LE duplex  -leg elevation  -start abx (IV vanco)  -ID consult  -check bcx, mrsa nares   -catch team/addiction consult  -monitor wbc and fever curve  -local wound care  -MRI left foot to r/o abscess if symptoms worsen   -xray shows soft tissue swelling and NO foreign body  -check ESR, CRP    DVT.GI ppx  guarded prognosis    fULL CODE

## 2025-01-24 VITALS
SYSTOLIC BLOOD PRESSURE: 156 MMHG | TEMPERATURE: 97 F | OXYGEN SATURATION: 96 % | DIASTOLIC BLOOD PRESSURE: 73 MMHG | HEART RATE: 74 BPM | RESPIRATION RATE: 18 BRPM

## 2025-01-24 NOTE — ED ADULT NURSE REASSESSMENT NOTE - NS ED NURSE REASSESS COMMENT FT1
Dr. Devorah Phillips, admitting MD, aware of patient leaving AMA. pt was in no respiratory distress when leaving the ED.
pt requested and provided tylenol and meal earlier. pt reassessed for pain, not in assigned area, pt found  in waiting area on cellular phone with iv still intact. pt redirected back to assigned area. pt talking with her friend who convinced her to leave. ivr removed, pt ambulated out of ER with steady gait. friend at her side.
pt verbally aggressive with staff, complaining at 1930 of not getting any food. pt goes to the bathroom and stays in bathroom long time. staff knocks on door and states I'm pooping. pt did same yesterday constantly going to bathroom. dayshift nurse, Zenobia, verbalized to me that insulin needles were found on patient and discarded.  pt ambulating in ER with steady gait awaiting further orders.
c/o of mild left foot pain and swelling.

## 2025-01-31 DIAGNOSIS — F12.90 CANNABIS USE, UNSPECIFIED, UNCOMPLICATED: ICD-10-CM

## 2025-01-31 DIAGNOSIS — J44.9 CHRONIC OBSTRUCTIVE PULMONARY DISEASE, UNSPECIFIED: ICD-10-CM

## 2025-01-31 DIAGNOSIS — F14.90 COCAINE USE, UNSPECIFIED, UNCOMPLICATED: ICD-10-CM

## 2025-01-31 DIAGNOSIS — F19.10 OTHER PSYCHOACTIVE SUBSTANCE ABUSE, UNCOMPLICATED: ICD-10-CM

## 2025-01-31 DIAGNOSIS — F41.9 ANXIETY DISORDER, UNSPECIFIED: ICD-10-CM

## 2025-01-31 DIAGNOSIS — L03.116 CELLULITIS OF LEFT LOWER LIMB: ICD-10-CM

## 2025-01-31 DIAGNOSIS — F17.210 NICOTINE DEPENDENCE, CIGARETTES, UNCOMPLICATED: ICD-10-CM

## 2025-01-31 DIAGNOSIS — Z79.899 OTHER LONG TERM (CURRENT) DRUG THERAPY: ICD-10-CM

## 2025-01-31 DIAGNOSIS — Z53.29 PROCEDURE AND TREATMENT NOT CARRIED OUT BECAUSE OF PATIENT'S DECISION FOR OTHER REASONS: ICD-10-CM

## 2025-01-31 DIAGNOSIS — F60.3 BORDERLINE PERSONALITY DISORDER: ICD-10-CM

## 2025-01-31 DIAGNOSIS — F11.20 OPIOID DEPENDENCE, UNCOMPLICATED: ICD-10-CM

## 2025-04-08 NOTE — ED ADULT NURSE NOTE - IS THE PATIENT ABLE TO BE SCREENED?
weeks.    PROCEDURE  The patient underwent a surgical procedure on 03/19/2025.       Encounter Diagnosis   Name Primary?    Post-op pain Yes              No follow-ups on file.     Orders Placed This Encounter    REPATHA PUSHTRONEX SYSTEM 420 MG/3.5ML SOCT     Sig: INJECT 3.5ML UNDER THE SKIN EVERY MONTH    mirtazapine (REMERON) 15 MG tablet     Sig: Take 1 tablet by mouth nightly    ketorolac (TORADOL) 10 MG tablet     Sig: Take 1 tablet by mouth every 6 hours as needed for Pain     Dispense:  20 tablet     Refill:  0        G     Electronically signed by Dimitry Christensen MD on 4/8/2025 at 2:08 PM     Yes

## 2025-06-10 ENCOUNTER — OUTPATIENT (OUTPATIENT)
Dept: OUTPATIENT SERVICES | Facility: HOSPITAL | Age: 35
LOS: 1 days | End: 2025-06-10

## 2025-06-10 DIAGNOSIS — Z90.49 ACQUIRED ABSENCE OF OTHER SPECIFIED PARTS OF DIGESTIVE TRACT: Chronic | ICD-10-CM

## 2025-06-10 DIAGNOSIS — Z00.8 ENCOUNTER FOR OTHER GENERAL EXAMINATION: ICD-10-CM

## 2025-06-11 DIAGNOSIS — Z00.8 ENCOUNTER FOR OTHER GENERAL EXAMINATION: ICD-10-CM

## 2025-07-08 NOTE — ED PROVIDER NOTE - WR INTERPRETED BY 1
([facility]       location) :   If using vaso (only need to measure limb vaso being performed on)        min []  Other:      Skin assessment post-treatment (if applicable):    [x]  intact    []  redness- no adverse reaction                 []redness - adverse reaction:        [x]  Patient Education billed concurrently with other procedures   [x] Review HEP    [] Progressed/Changed HEP, detail:    [] Other detail:       Other Objective/Functional Measures  --    Pain Level at end of session (0-10 scale): 0/10    Assessment   Looking much better today v. Last week. Recommended she keep up with her anti-inflammatory.   Patient will continue to benefit from skilled PT / OT services to modify and progress therapeutic interventions, analyze and address functional mobility deficits, analyze and address ROM deficits, analyze and address strength deficits, analyze and address soft tissue restrictions, analyze and cue for proper movement patterns, analyze and modify for postural abnormalities, and instruct in home and community integration to address functional deficits and attain remaining goals.    Progress toward goals / Updated goals:  [x]  See Progress Note/Recertification  Short Term Goals: To be accomplished in 4 treatments.  Patient will be compliant with initial HEP and PT attendance MET    Long Term Goals: To be accomplished in 16 treatments.  Patient will be able to return to working at [employer] without significant pain or limitations NOT YET MET  Patient will be able to return to [employer] driving without increase in symptoms NOT YET MET  Patient will be able to self-manage care using updated HEP for improved independence  Patient will be able to stand to cook a meal without increase in symptoms MET FOR BACK  Improved FOTO score to 60 or better to demonstrate imrpoved function    PLAN  Yes  Continue plan of care  Re-Cert Due: 30 day progress note 7/6/25  [x]  Upgrade activities as tolerated  []  Discharge due to:  []   Karlo Barbour

## 2025-08-06 ENCOUNTER — EMERGENCY (EMERGENCY)
Facility: HOSPITAL | Age: 35
LOS: 0 days | Discharge: HOME | End: 2025-08-06
Attending: STUDENT IN AN ORGANIZED HEALTH CARE EDUCATION/TRAINING PROGRAM
Payer: MEDICAID

## 2025-08-06 VITALS
HEART RATE: 88 BPM | OXYGEN SATURATION: 98 % | SYSTOLIC BLOOD PRESSURE: 98 MMHG | TEMPERATURE: 98 F | RESPIRATION RATE: 20 BRPM | DIASTOLIC BLOOD PRESSURE: 66 MMHG

## 2025-08-06 VITALS
HEART RATE: 104 BPM | TEMPERATURE: 98 F | SYSTOLIC BLOOD PRESSURE: 105 MMHG | OXYGEN SATURATION: 97 % | RESPIRATION RATE: 20 BRPM | WEIGHT: 108.03 LBS | DIASTOLIC BLOOD PRESSURE: 70 MMHG

## 2025-08-06 DIAGNOSIS — M79.89 OTHER SPECIFIED SOFT TISSUE DISORDERS: ICD-10-CM

## 2025-08-06 DIAGNOSIS — J44.9 CHRONIC OBSTRUCTIVE PULMONARY DISEASE, UNSPECIFIED: ICD-10-CM

## 2025-08-06 DIAGNOSIS — R04.2 HEMOPTYSIS: ICD-10-CM

## 2025-08-06 DIAGNOSIS — F60.3 BORDERLINE PERSONALITY DISORDER: ICD-10-CM

## 2025-08-06 DIAGNOSIS — R05.1 ACUTE COUGH: ICD-10-CM

## 2025-08-06 DIAGNOSIS — J18.9 PNEUMONIA, UNSPECIFIED ORGANISM: ICD-10-CM

## 2025-08-06 DIAGNOSIS — F41.9 ANXIETY DISORDER, UNSPECIFIED: ICD-10-CM

## 2025-08-06 DIAGNOSIS — R07.89 OTHER CHEST PAIN: ICD-10-CM

## 2025-08-06 DIAGNOSIS — E83.42 HYPOMAGNESEMIA: ICD-10-CM

## 2025-08-06 DIAGNOSIS — Z90.49 ACQUIRED ABSENCE OF OTHER SPECIFIED PARTS OF DIGESTIVE TRACT: Chronic | ICD-10-CM

## 2025-08-06 LAB
ALBUMIN SERPL ELPH-MCNC: 3.3 G/DL — LOW (ref 3.5–5.2)
ALP SERPL-CCNC: 86 U/L — SIGNIFICANT CHANGE UP (ref 30–115)
ALT FLD-CCNC: 21 U/L — SIGNIFICANT CHANGE UP (ref 0–41)
ANION GAP SERPL CALC-SCNC: 11 MMOL/L — SIGNIFICANT CHANGE UP (ref 7–14)
APTT BLD: 31.7 SEC — SIGNIFICANT CHANGE UP (ref 27–39.2)
AST SERPL-CCNC: 27 U/L — SIGNIFICANT CHANGE UP (ref 0–41)
BASOPHILS # BLD AUTO: 0.08 K/UL — SIGNIFICANT CHANGE UP (ref 0–0.2)
BASOPHILS NFR BLD AUTO: 0.6 % — SIGNIFICANT CHANGE UP (ref 0–2)
BILIRUB SERPL-MCNC: 0.4 MG/DL — SIGNIFICANT CHANGE UP (ref 0.2–1.2)
BUN SERPL-MCNC: 11 MG/DL — SIGNIFICANT CHANGE UP (ref 10–20)
CALCIUM SERPL-MCNC: 9.6 MG/DL — SIGNIFICANT CHANGE UP (ref 8.4–10.5)
CHLORIDE SERPL-SCNC: 93 MMOL/L — LOW (ref 98–110)
CO2 SERPL-SCNC: 31 MMOL/L — SIGNIFICANT CHANGE UP (ref 17–32)
CREAT SERPL-MCNC: 0.6 MG/DL — LOW (ref 0.7–1.5)
EGFR: 120 ML/MIN/1.73M2 — SIGNIFICANT CHANGE UP
EGFR: 120 ML/MIN/1.73M2 — SIGNIFICANT CHANGE UP
EOSINOPHIL # BLD AUTO: 0.1 K/UL — SIGNIFICANT CHANGE UP (ref 0–0.5)
EOSINOPHIL NFR BLD AUTO: 0.8 % — SIGNIFICANT CHANGE UP (ref 0–6)
GLUCOSE SERPL-MCNC: 91 MG/DL — SIGNIFICANT CHANGE UP (ref 70–99)
HCG SERPL QL: NEGATIVE — SIGNIFICANT CHANGE UP
HCT VFR BLD CALC: 35.4 % — SIGNIFICANT CHANGE UP (ref 34.5–45)
HGB BLD-MCNC: 11.7 G/DL — SIGNIFICANT CHANGE UP (ref 11.5–15.5)
IMM GRANULOCYTES # BLD AUTO: 0.07 K/UL — SIGNIFICANT CHANGE UP (ref 0–0.07)
IMM GRANULOCYTES NFR BLD AUTO: 0.5 % — SIGNIFICANT CHANGE UP (ref 0–0.9)
INR BLD: 1.16 RATIO — SIGNIFICANT CHANGE UP (ref 0.65–1.3)
LYMPHOCYTES # BLD AUTO: 2.8 K/UL — SIGNIFICANT CHANGE UP (ref 1–3.3)
LYMPHOCYTES NFR BLD AUTO: 22 % — SIGNIFICANT CHANGE UP (ref 13–44)
MAGNESIUM SERPL-MCNC: 1.7 MG/DL — LOW (ref 1.8–2.4)
MCHC RBC-ENTMCNC: 28.8 PG — SIGNIFICANT CHANGE UP (ref 27–34)
MCHC RBC-ENTMCNC: 33.1 G/DL — SIGNIFICANT CHANGE UP (ref 32–36)
MCV RBC AUTO: 87.2 FL — SIGNIFICANT CHANGE UP (ref 80–100)
MONOCYTES # BLD AUTO: 1.42 K/UL — HIGH (ref 0–0.9)
MONOCYTES NFR BLD AUTO: 11.1 % — SIGNIFICANT CHANGE UP (ref 2–14)
NEUTROPHILS # BLD AUTO: 8.27 K/UL — HIGH (ref 1.8–7.4)
NEUTROPHILS NFR BLD AUTO: 65 % — SIGNIFICANT CHANGE UP (ref 43–77)
NRBC # BLD AUTO: 0 K/UL — SIGNIFICANT CHANGE UP (ref 0–0)
NRBC # FLD: 0 K/UL — SIGNIFICANT CHANGE UP (ref 0–0)
NRBC BLD AUTO-RTO: 0 /100 WBCS — SIGNIFICANT CHANGE UP (ref 0–0)
NT-PROBNP SERPL-SCNC: 39 PG/ML — SIGNIFICANT CHANGE UP (ref 0–300)
PLATELET # BLD AUTO: 388 K/UL — SIGNIFICANT CHANGE UP (ref 150–400)
PMV BLD: 10.3 FL — SIGNIFICANT CHANGE UP (ref 7–13)
POTASSIUM SERPL-MCNC: 3.8 MMOL/L — SIGNIFICANT CHANGE UP (ref 3.5–5)
POTASSIUM SERPL-SCNC: 3.8 MMOL/L — SIGNIFICANT CHANGE UP (ref 3.5–5)
PROT SERPL-MCNC: 7.2 G/DL — SIGNIFICANT CHANGE UP (ref 6–8)
PROTHROM AB SERPL-ACNC: 13.7 SEC — HIGH (ref 9.95–12.87)
RBC # BLD: 4.06 M/UL — SIGNIFICANT CHANGE UP (ref 3.8–5.2)
RBC # FLD: 13.8 % — SIGNIFICANT CHANGE UP (ref 10.3–14.5)
SODIUM SERPL-SCNC: 135 MMOL/L — SIGNIFICANT CHANGE UP (ref 135–146)
TROPONIN T, HIGH SENSITIVITY RESULT: <6 NG/L — SIGNIFICANT CHANGE UP (ref 6–13)
WBC # BLD: 12.74 K/UL — HIGH (ref 3.8–10.5)
WBC # FLD AUTO: 12.74 K/UL — HIGH (ref 3.8–10.5)

## 2025-08-06 PROCEDURE — 85025 COMPLETE CBC W/AUTO DIFF WBC: CPT

## 2025-08-06 PROCEDURE — 83735 ASSAY OF MAGNESIUM: CPT

## 2025-08-06 PROCEDURE — 93970 EXTREMITY STUDY: CPT

## 2025-08-06 PROCEDURE — 99285 EMERGENCY DEPT VISIT HI MDM: CPT

## 2025-08-06 PROCEDURE — 36415 COLL VENOUS BLD VENIPUNCTURE: CPT

## 2025-08-06 PROCEDURE — 83880 ASSAY OF NATRIURETIC PEPTIDE: CPT

## 2025-08-06 PROCEDURE — 99285 EMERGENCY DEPT VISIT HI MDM: CPT | Mod: 25

## 2025-08-06 PROCEDURE — 93970 EXTREMITY STUDY: CPT | Mod: 26

## 2025-08-06 PROCEDURE — 80053 COMPREHEN METABOLIC PANEL: CPT

## 2025-08-06 PROCEDURE — 93005 ELECTROCARDIOGRAM TRACING: CPT

## 2025-08-06 PROCEDURE — 71046 X-RAY EXAM CHEST 2 VIEWS: CPT

## 2025-08-06 PROCEDURE — 96374 THER/PROPH/DIAG INJ IV PUSH: CPT

## 2025-08-06 PROCEDURE — 84484 ASSAY OF TROPONIN QUANT: CPT

## 2025-08-06 PROCEDURE — 85730 THROMBOPLASTIN TIME PARTIAL: CPT

## 2025-08-06 PROCEDURE — 93010 ELECTROCARDIOGRAM REPORT: CPT

## 2025-08-06 PROCEDURE — 71046 X-RAY EXAM CHEST 2 VIEWS: CPT | Mod: 26

## 2025-08-06 PROCEDURE — 84703 CHORIONIC GONADOTROPIN ASSAY: CPT

## 2025-08-06 PROCEDURE — 85610 PROTHROMBIN TIME: CPT

## 2025-08-06 RX ORDER — MAGNESIUM SULFATE 500 MG/ML
2 SYRINGE (ML) INJECTION ONCE
Refills: 0 | Status: COMPLETED | OUTPATIENT
Start: 2025-08-06 | End: 2025-08-06

## 2025-08-06 RX ORDER — DOXYCYCLINE HYCLATE 100 MG
100 TABLET ORAL ONCE
Refills: 0 | Status: COMPLETED | OUTPATIENT
Start: 2025-08-06 | End: 2025-08-06

## 2025-08-06 RX ORDER — AMOXICILLIN AND CLAVULANATE POTASSIUM 500; 125 MG/1; MG/1
1 TABLET, FILM COATED ORAL ONCE
Refills: 0 | Status: COMPLETED | OUTPATIENT
Start: 2025-08-06 | End: 2025-08-06

## 2025-08-06 RX ORDER — AMOXICILLIN AND CLAVULANATE POTASSIUM 500; 125 MG/1; MG/1
1 TABLET, FILM COATED ORAL
Qty: 14 | Refills: 0
Start: 2025-08-06 | End: 2025-08-12

## 2025-08-06 RX ORDER — DOXYCYCLINE HYCLATE 100 MG
1 TABLET ORAL
Qty: 14 | Refills: 0
Start: 2025-08-06 | End: 2025-08-12

## 2025-08-06 RX ADMIN — Medication 25 GRAM(S): at 11:22

## 2025-08-06 RX ADMIN — AMOXICILLIN AND CLAVULANATE POTASSIUM 1 TABLET(S): 500; 125 TABLET, FILM COATED ORAL at 11:04

## 2025-08-06 RX ADMIN — Medication 100 MILLIGRAM(S): at 11:04

## 2025-08-19 ENCOUNTER — INPATIENT (INPATIENT)
Facility: HOSPITAL | Age: 35
LOS: 5 days | Discharge: HOME | DRG: 720 | End: 2025-08-25
Attending: INTERNAL MEDICINE | Admitting: STUDENT IN AN ORGANIZED HEALTH CARE EDUCATION/TRAINING PROGRAM
Payer: MEDICAID

## 2025-08-19 VITALS
HEART RATE: 121 BPM | HEIGHT: 62 IN | WEIGHT: 106.92 LBS | OXYGEN SATURATION: 100 % | RESPIRATION RATE: 20 BRPM | SYSTOLIC BLOOD PRESSURE: 102 MMHG | TEMPERATURE: 101 F | DIASTOLIC BLOOD PRESSURE: 65 MMHG

## 2025-08-19 DIAGNOSIS — A41.9 SEPSIS, UNSPECIFIED ORGANISM: ICD-10-CM

## 2025-08-19 DIAGNOSIS — Z90.49 ACQUIRED ABSENCE OF OTHER SPECIFIED PARTS OF DIGESTIVE TRACT: Chronic | ICD-10-CM

## 2025-08-19 LAB
ALBUMIN SERPL ELPH-MCNC: 3.2 G/DL — LOW (ref 3.5–5.2)
ALP SERPL-CCNC: 78 U/L — SIGNIFICANT CHANGE UP (ref 30–115)
ALT FLD-CCNC: 18 U/L — SIGNIFICANT CHANGE UP (ref 0–41)
ANION GAP SERPL CALC-SCNC: 14 MMOL/L — SIGNIFICANT CHANGE UP (ref 7–14)
APAP SERPL-MCNC: <5 UG/ML — LOW (ref 10–30)
APPEARANCE UR: CLEAR — SIGNIFICANT CHANGE UP
AST SERPL-CCNC: 25 U/L — SIGNIFICANT CHANGE UP (ref 0–41)
BASOPHILS # BLD AUTO: 0.04 K/UL — SIGNIFICANT CHANGE UP (ref 0–0.2)
BASOPHILS NFR BLD AUTO: 0.3 % — SIGNIFICANT CHANGE UP (ref 0–1)
BILIRUB SERPL-MCNC: 0.5 MG/DL — SIGNIFICANT CHANGE UP (ref 0.2–1.2)
BILIRUB UR-MCNC: NEGATIVE — SIGNIFICANT CHANGE UP
BUN SERPL-MCNC: 6 MG/DL — LOW (ref 10–20)
CALCIUM SERPL-MCNC: 8.6 MG/DL — SIGNIFICANT CHANGE UP (ref 8.4–10.5)
CHLORIDE SERPL-SCNC: 93 MMOL/L — LOW (ref 98–110)
CO2 SERPL-SCNC: 25 MMOL/L — SIGNIFICANT CHANGE UP (ref 17–32)
COLOR SPEC: YELLOW — SIGNIFICANT CHANGE UP
CREAT SERPL-MCNC: 0.6 MG/DL — LOW (ref 0.7–1.5)
D DIMER BLD IA.RAPID-MCNC: 612 NG/ML DDU — HIGH
DIFF PNL FLD: ABNORMAL
EGFR: 120 ML/MIN/1.73M2 — SIGNIFICANT CHANGE UP
EGFR: 120 ML/MIN/1.73M2 — SIGNIFICANT CHANGE UP
EOSINOPHIL # BLD AUTO: 0.02 K/UL — SIGNIFICANT CHANGE UP (ref 0–0.7)
EOSINOPHIL NFR BLD AUTO: 0.1 % — SIGNIFICANT CHANGE UP (ref 0–8)
ETHANOL SERPL-MCNC: <10 MG/DL — SIGNIFICANT CHANGE UP
GAS PNL BLDV: SIGNIFICANT CHANGE UP
GLUCOSE SERPL-MCNC: 117 MG/DL — HIGH (ref 70–99)
GLUCOSE UR QL: NEGATIVE MG/DL — SIGNIFICANT CHANGE UP
HCG SERPL QL: NEGATIVE — SIGNIFICANT CHANGE UP
HCT VFR BLD CALC: 31.9 % — LOW (ref 37–47)
HGB BLD-MCNC: 10.4 G/DL — LOW (ref 12–16)
IMM GRANULOCYTES NFR BLD AUTO: 0.6 % — HIGH (ref 0.1–0.3)
KETONES UR QL: NEGATIVE MG/DL — SIGNIFICANT CHANGE UP
LEUKOCYTE ESTERASE UR-ACNC: ABNORMAL
LYMPHOCYTES # BLD AUTO: 1.19 K/UL — LOW (ref 1.2–3.4)
LYMPHOCYTES # BLD AUTO: 8.5 % — LOW (ref 20.5–51.1)
MAGNESIUM SERPL-MCNC: 1.7 MG/DL — LOW (ref 1.8–2.4)
MCHC RBC-ENTMCNC: 28.3 PG — SIGNIFICANT CHANGE UP (ref 27–31)
MCHC RBC-ENTMCNC: 32.6 G/DL — SIGNIFICANT CHANGE UP (ref 32–37)
MCV RBC AUTO: 86.7 FL — SIGNIFICANT CHANGE UP (ref 81–99)
MONOCYTES # BLD AUTO: 1.57 K/UL — HIGH (ref 0.1–0.6)
MONOCYTES NFR BLD AUTO: 11.3 % — HIGH (ref 1.7–9.3)
NEUTROPHILS # BLD AUTO: 11.05 K/UL — HIGH (ref 1.4–6.5)
NEUTROPHILS NFR BLD AUTO: 79.2 % — HIGH (ref 42.2–75.2)
NITRITE UR-MCNC: POSITIVE
NRBC BLD AUTO-RTO: 0 /100 WBCS — SIGNIFICANT CHANGE UP (ref 0–0)
NT-PROBNP SERPL-SCNC: 54 PG/ML — SIGNIFICANT CHANGE UP (ref 0–300)
PH UR: 7.5 — SIGNIFICANT CHANGE UP (ref 5–8)
PLATELET # BLD AUTO: 430 K/UL — HIGH (ref 130–400)
PMV BLD: 9.5 FL — SIGNIFICANT CHANGE UP (ref 7.4–10.4)
POTASSIUM SERPL-MCNC: 3.6 MMOL/L — SIGNIFICANT CHANGE UP (ref 3.5–5)
POTASSIUM SERPL-SCNC: 3.6 MMOL/L — SIGNIFICANT CHANGE UP (ref 3.5–5)
PROT SERPL-MCNC: 6.5 G/DL — SIGNIFICANT CHANGE UP (ref 6–8)
PROT UR-MCNC: SIGNIFICANT CHANGE UP MG/DL
RBC # BLD: 3.68 M/UL — LOW (ref 4.2–5.4)
RBC # FLD: 14.4 % — SIGNIFICANT CHANGE UP (ref 11.5–14.5)
SALICYLATES SERPL-MCNC: <0.3 MG/DL — LOW (ref 4–30)
SODIUM SERPL-SCNC: 132 MMOL/L — LOW (ref 135–146)
SP GR SPEC: >1.03 — HIGH (ref 1–1.03)
TROPONIN T, HIGH SENSITIVITY RESULT: 11 NG/L — SIGNIFICANT CHANGE UP (ref 6–13)
UROBILINOGEN FLD QL: 1 MG/DL — SIGNIFICANT CHANGE UP (ref 0.2–1)
WBC # BLD: 13.95 K/UL — HIGH (ref 4.8–10.8)
WBC # FLD AUTO: 13.95 K/UL — HIGH (ref 4.8–10.8)

## 2025-08-19 PROCEDURE — 87899 AGENT NOS ASSAY W/OPTIC: CPT

## 2025-08-19 PROCEDURE — 80307 DRUG TEST PRSMV CHEM ANLYZR: CPT

## 2025-08-19 PROCEDURE — 87070 CULTURE OTHR SPECIMN AEROBIC: CPT

## 2025-08-19 PROCEDURE — 99291 CRITICAL CARE FIRST HOUR: CPT

## 2025-08-19 PROCEDURE — 93010 ELECTROCARDIOGRAM REPORT: CPT

## 2025-08-19 PROCEDURE — 87077 CULTURE AEROBIC IDENTIFY: CPT

## 2025-08-19 PROCEDURE — 71275 CT ANGIOGRAPHY CHEST: CPT | Mod: 26

## 2025-08-19 PROCEDURE — 80202 ASSAY OF VANCOMYCIN: CPT

## 2025-08-19 PROCEDURE — 36415 COLL VENOUS BLD VENIPUNCTURE: CPT

## 2025-08-19 PROCEDURE — 71046 X-RAY EXAM CHEST 2 VIEWS: CPT | Mod: 26

## 2025-08-19 PROCEDURE — 82962 GLUCOSE BLOOD TEST: CPT

## 2025-08-19 PROCEDURE — 70450 CT HEAD/BRAIN W/O DYE: CPT | Mod: 26

## 2025-08-19 PROCEDURE — 93306 TTE W/DOPPLER COMPLETE: CPT

## 2025-08-19 PROCEDURE — 80354 DRUG SCREENING FENTANYL: CPT

## 2025-08-19 PROCEDURE — 81001 URINALYSIS AUTO W/SCOPE: CPT

## 2025-08-19 PROCEDURE — 87186 SC STD MICRODIL/AGAR DIL: CPT

## 2025-08-19 PROCEDURE — 87641 MR-STAPH DNA AMP PROBE: CPT

## 2025-08-19 PROCEDURE — 80358 DRUG SCREENING METHADONE: CPT

## 2025-08-19 PROCEDURE — 87640 STAPH A DNA AMP PROBE: CPT

## 2025-08-19 PROCEDURE — 80353 DRUG SCREENING COCAINE: CPT

## 2025-08-19 PROCEDURE — 87637 SARSCOV2&INF A&B&RSV AMP PRB: CPT

## 2025-08-19 PROCEDURE — 87205 SMEAR GRAM STAIN: CPT

## 2025-08-19 PROCEDURE — 80346 BENZODIAZEPINES1-12: CPT

## 2025-08-19 RX ORDER — MAGNESIUM SULFATE 500 MG/ML
2 SYRINGE (ML) INJECTION ONCE
Refills: 0 | Status: COMPLETED | OUTPATIENT
Start: 2025-08-19 | End: 2025-08-19

## 2025-08-19 RX ORDER — SODIUM CHLORIDE 9 G/1000ML
1000 INJECTION, SOLUTION INTRAVENOUS
Refills: 0 | Status: DISCONTINUED | OUTPATIENT
Start: 2025-08-19 | End: 2025-08-19

## 2025-08-19 RX ORDER — DIAZEPAM 5 MG/1
5 TABLET ORAL ONCE
Refills: 0 | Status: DISCONTINUED | OUTPATIENT
Start: 2025-08-19 | End: 2025-08-19

## 2025-08-19 RX ORDER — ACETAMINOPHEN 500 MG/5ML
650 LIQUID (ML) ORAL ONCE
Refills: 0 | Status: COMPLETED | OUTPATIENT
Start: 2025-08-19 | End: 2025-08-19

## 2025-08-19 RX ORDER — VANCOMYCIN HCL IN 5 % DEXTROSE 1.5G/250ML
1000 PLASTIC BAG, INJECTION (ML) INTRAVENOUS ONCE
Refills: 0 | Status: COMPLETED | OUTPATIENT
Start: 2025-08-19 | End: 2025-08-19

## 2025-08-19 RX ORDER — SODIUM CHLORIDE 9 G/1000ML
500 INJECTION, SOLUTION INTRAVENOUS ONCE
Refills: 0 | Status: COMPLETED | OUTPATIENT
Start: 2025-08-19 | End: 2025-08-19

## 2025-08-19 RX ORDER — CEFEPIME 2 G/20ML
2000 INJECTION, POWDER, FOR SOLUTION INTRAVENOUS ONCE
Refills: 0 | Status: COMPLETED | OUTPATIENT
Start: 2025-08-19 | End: 2025-08-19

## 2025-08-19 RX ADMIN — CEFEPIME 100 MILLIGRAM(S): 2 INJECTION, POWDER, FOR SOLUTION INTRAVENOUS at 18:00

## 2025-08-19 RX ADMIN — CEFEPIME 2000 MILLIGRAM(S): 2 INJECTION, POWDER, FOR SOLUTION INTRAVENOUS at 18:30

## 2025-08-19 RX ADMIN — Medication 250 MILLIGRAM(S): at 18:30

## 2025-08-19 RX ADMIN — Medication 25 GRAM(S): at 20:33

## 2025-08-19 RX ADMIN — DIAZEPAM 5 MILLIGRAM(S): 5 TABLET ORAL at 21:17

## 2025-08-19 RX ADMIN — SODIUM CHLORIDE 500 MILLILITER(S): 9 INJECTION, SOLUTION INTRAVENOUS at 17:51

## 2025-08-20 DIAGNOSIS — F11.93 OPIOID USE, UNSPECIFIED WITH WITHDRAWAL: ICD-10-CM

## 2025-08-20 DIAGNOSIS — Z86.19 PERSONAL HISTORY OF OTHER INFECTIOUS AND PARASITIC DISEASES: ICD-10-CM

## 2025-08-20 DIAGNOSIS — F14.10 COCAINE ABUSE, UNCOMPLICATED: ICD-10-CM

## 2025-08-20 DIAGNOSIS — F13.939 SEDATIVE, HYPNOTIC OR ANXIOLYTIC USE, UNSPECIFIED WITH WITHDRAWAL, UNSPECIFIED: ICD-10-CM

## 2025-08-20 LAB
FLUAV AG NPH QL: SIGNIFICANT CHANGE UP
FLUBV AG NPH QL: SIGNIFICANT CHANGE UP
MRSA PCR RESULT.: DETECTED
RSV RNA NPH QL NAA+NON-PROBE: SIGNIFICANT CHANGE UP
SARS-COV-2 RNA SPEC QL NAA+PROBE: SIGNIFICANT CHANGE UP
SOURCE RESPIRATORY: SIGNIFICANT CHANGE UP

## 2025-08-20 PROCEDURE — 99223 1ST HOSP IP/OBS HIGH 75: CPT

## 2025-08-20 RX ORDER — METHADONE HCL 10 MG
40 TABLET ORAL DAILY
Refills: 0 | Status: DISCONTINUED | OUTPATIENT
Start: 2025-08-20 | End: 2025-08-20

## 2025-08-20 RX ORDER — VANCOMYCIN HCL IN 5 % DEXTROSE 1.5G/250ML
1500 PLASTIC BAG, INJECTION (ML) INTRAVENOUS EVERY 12 HOURS
Refills: 0 | Status: DISCONTINUED | OUTPATIENT
Start: 2025-08-20 | End: 2025-08-20

## 2025-08-20 RX ORDER — TRAZODONE HCL 100 MG
25 TABLET ORAL AT BEDTIME
Refills: 0 | Status: DISCONTINUED | OUTPATIENT
Start: 2025-08-20 | End: 2025-08-25

## 2025-08-20 RX ORDER — METHADONE HCL 10 MG
10 TABLET ORAL ONCE
Refills: 0 | Status: DISCONTINUED | OUTPATIENT
Start: 2025-08-23 | End: 2025-08-23

## 2025-08-20 RX ORDER — CEFEPIME 2 G/20ML
2000 INJECTION, POWDER, FOR SOLUTION INTRAVENOUS EVERY 12 HOURS
Refills: 0 | Status: DISCONTINUED | OUTPATIENT
Start: 2025-08-20 | End: 2025-08-22

## 2025-08-20 RX ORDER — VANCOMYCIN HCL IN 5 % DEXTROSE 1.5G/250ML
750 PLASTIC BAG, INJECTION (ML) INTRAVENOUS EVERY 12 HOURS
Refills: 0 | Status: DISCONTINUED | OUTPATIENT
Start: 2025-08-20 | End: 2025-08-22

## 2025-08-20 RX ORDER — DIAZEPAM 5 MG/1
10 TABLET ORAL EVERY 6 HOURS
Refills: 0 | Status: DISCONTINUED | OUTPATIENT
Start: 2025-08-20 | End: 2025-08-21

## 2025-08-20 RX ORDER — DIAZEPAM 5 MG/1
10 TABLET ORAL EVERY 6 HOURS
Refills: 0 | Status: DISCONTINUED | OUTPATIENT
Start: 2025-08-20 | End: 2025-08-20

## 2025-08-20 RX ORDER — ONDANSETRON HCL/PF 4 MG/2 ML
4 VIAL (ML) INJECTION EVERY 8 HOURS
Refills: 0 | Status: DISCONTINUED | OUTPATIENT
Start: 2025-08-20 | End: 2025-08-25

## 2025-08-20 RX ORDER — BUPRENORPHINE HYDROCHLORIDE, NALOXONE HYDROCHLORIDE 4; 1 MG/1; MG/1
1 FILM, SOLUBLE BUCCAL; SUBLINGUAL EVERY 4 HOURS
Refills: 0 | Status: DISCONTINUED | OUTPATIENT
Start: 2025-08-20 | End: 2025-08-20

## 2025-08-20 RX ORDER — DIAZEPAM 5 MG/1
20 TABLET ORAL EVERY 4 HOURS
Refills: 0 | Status: DISCONTINUED | OUTPATIENT
Start: 2025-08-20 | End: 2025-08-20

## 2025-08-20 RX ORDER — METHADONE HCL 10 MG
30 TABLET ORAL ONCE
Refills: 0 | Status: DISCONTINUED | OUTPATIENT
Start: 2025-08-21 | End: 2025-08-21

## 2025-08-20 RX ORDER — METHADONE HCL 10 MG
5 TABLET ORAL DAILY
Refills: 0 | Status: DISCONTINUED | OUTPATIENT
Start: 2025-08-20 | End: 2025-08-25

## 2025-08-20 RX ORDER — DIAZEPAM 5 MG/1
5 TABLET ORAL EVERY 6 HOURS
Refills: 0 | Status: DISCONTINUED | OUTPATIENT
Start: 2025-08-22 | End: 2025-08-22

## 2025-08-20 RX ORDER — METHADONE HCL 10 MG
20 TABLET ORAL ONCE
Refills: 0 | Status: DISCONTINUED | OUTPATIENT
Start: 2025-08-22 | End: 2025-08-22

## 2025-08-20 RX ORDER — DIAZEPAM 5 MG/1
2.5 TABLET ORAL EVERY 6 HOURS
Refills: 0 | Status: DISCONTINUED | OUTPATIENT
Start: 2025-08-20 | End: 2025-08-20

## 2025-08-20 RX ORDER — HYDROXYZINE HYDROCHLORIDE 25 MG/1
25 TABLET, FILM COATED ORAL EVERY 6 HOURS
Refills: 0 | Status: DISCONTINUED | OUTPATIENT
Start: 2025-08-20 | End: 2025-08-22

## 2025-08-20 RX ORDER — DIAZEPAM 5 MG/1
5 TABLET ORAL EVERY 4 HOURS
Refills: 0 | Status: DISCONTINUED | OUTPATIENT
Start: 2025-08-20 | End: 2025-08-25

## 2025-08-20 RX ORDER — DIAZEPAM 5 MG/1
7.5 TABLET ORAL EVERY 6 HOURS
Refills: 0 | Status: DISCONTINUED | OUTPATIENT
Start: 2025-08-21 | End: 2025-08-21

## 2025-08-20 RX ORDER — DIAZEPAM 5 MG/1
20 TABLET ORAL
Refills: 0 | Status: DISCONTINUED | OUTPATIENT
Start: 2025-08-20 | End: 2025-08-20

## 2025-08-20 RX ORDER — ACETAMINOPHEN 500 MG/5ML
650 LIQUID (ML) ORAL EVERY 6 HOURS
Refills: 0 | Status: DISCONTINUED | OUTPATIENT
Start: 2025-08-20 | End: 2025-08-25

## 2025-08-20 RX ORDER — ENOXAPARIN SODIUM 100 MG/ML
40 INJECTION SUBCUTANEOUS EVERY 24 HOURS
Refills: 0 | Status: DISCONTINUED | OUTPATIENT
Start: 2025-08-20 | End: 2025-08-25

## 2025-08-20 RX ADMIN — Medication 250 MILLIGRAM(S): at 03:16

## 2025-08-20 RX ADMIN — Medication 40 MILLIGRAM(S): at 03:15

## 2025-08-20 RX ADMIN — Medication 650 MILLIGRAM(S): at 15:49

## 2025-08-20 RX ADMIN — CEFEPIME 100 MILLIGRAM(S): 2 INJECTION, POWDER, FOR SOLUTION INTRAVENOUS at 05:25

## 2025-08-20 RX ADMIN — Medication 650 MILLIGRAM(S): at 16:19

## 2025-08-20 RX ADMIN — DIAZEPAM 10 MILLIGRAM(S): 5 TABLET ORAL at 17:46

## 2025-08-20 RX ADMIN — ENOXAPARIN SODIUM 40 MILLIGRAM(S): 100 INJECTION SUBCUTANEOUS at 17:47

## 2025-08-20 RX ADMIN — CEFEPIME 100 MILLIGRAM(S): 2 INJECTION, POWDER, FOR SOLUTION INTRAVENOUS at 17:40

## 2025-08-20 RX ADMIN — Medication 1 APPLICATION(S): at 05:50

## 2025-08-20 RX ADMIN — DIAZEPAM 10 MILLIGRAM(S): 5 TABLET ORAL at 11:35

## 2025-08-20 RX ADMIN — Medication 5 MILLIGRAM(S): at 19:16

## 2025-08-20 RX ADMIN — Medication 250 MILLIGRAM(S): at 17:41

## 2025-08-21 ENCOUNTER — RESULT REVIEW (OUTPATIENT)
Age: 35
End: 2025-08-21

## 2025-08-21 LAB
GRAM STN FLD: SIGNIFICANT CHANGE UP
PCP SPEC-MCNC: SIGNIFICANT CHANGE UP
SPECIMEN SOURCE: SIGNIFICANT CHANGE UP

## 2025-08-21 PROCEDURE — 93306 TTE W/DOPPLER COMPLETE: CPT | Mod: 26

## 2025-08-21 PROCEDURE — 99232 SBSQ HOSP IP/OBS MODERATE 35: CPT

## 2025-08-21 RX ORDER — MUPIROCIN CALCIUM 20 MG/G
1 CREAM TOPICAL
Refills: 0 | Status: DISCONTINUED | OUTPATIENT
Start: 2025-08-21 | End: 2025-08-25

## 2025-08-21 RX ADMIN — DIAZEPAM 7.5 MILLIGRAM(S): 5 TABLET ORAL at 18:05

## 2025-08-21 RX ADMIN — DIAZEPAM 7.5 MILLIGRAM(S): 5 TABLET ORAL at 11:58

## 2025-08-21 RX ADMIN — DIAZEPAM 7.5 MILLIGRAM(S): 5 TABLET ORAL at 00:23

## 2025-08-21 RX ADMIN — Medication 5 MILLIGRAM(S): at 06:20

## 2025-08-21 RX ADMIN — DIAZEPAM 10 MILLIGRAM(S): 5 TABLET ORAL at 06:10

## 2025-08-21 RX ADMIN — DIAZEPAM 7.5 MILLIGRAM(S): 5 TABLET ORAL at 06:09

## 2025-08-21 RX ADMIN — DIAZEPAM 10 MILLIGRAM(S): 5 TABLET ORAL at 00:24

## 2025-08-21 RX ADMIN — CEFEPIME 100 MILLIGRAM(S): 2 INJECTION, POWDER, FOR SOLUTION INTRAVENOUS at 18:10

## 2025-08-21 RX ADMIN — HYDROXYZINE HYDROCHLORIDE 25 MILLIGRAM(S): 25 TABLET, FILM COATED ORAL at 15:28

## 2025-08-21 RX ADMIN — DIAZEPAM 5 MILLIGRAM(S): 5 TABLET ORAL at 23:26

## 2025-08-21 RX ADMIN — Medication 1 APPLICATION(S): at 06:17

## 2025-08-21 RX ADMIN — CEFEPIME 100 MILLIGRAM(S): 2 INJECTION, POWDER, FOR SOLUTION INTRAVENOUS at 06:09

## 2025-08-21 RX ADMIN — Medication 30 MILLIGRAM(S): at 11:55

## 2025-08-21 RX ADMIN — Medication 250 MILLIGRAM(S): at 06:09

## 2025-08-21 RX ADMIN — MUPIROCIN CALCIUM 1 APPLICATION(S): 20 CREAM TOPICAL at 18:05

## 2025-08-21 RX ADMIN — ENOXAPARIN SODIUM 40 MILLIGRAM(S): 100 INJECTION SUBCUTANEOUS at 18:10

## 2025-08-22 DIAGNOSIS — F11.20 OPIOID DEPENDENCE, UNCOMPLICATED: ICD-10-CM

## 2025-08-22 DIAGNOSIS — F60.3 BORDERLINE PERSONALITY DISORDER: ICD-10-CM

## 2025-08-22 DIAGNOSIS — F05 DELIRIUM DUE TO KNOWN PHYSIOLOGICAL CONDITION: ICD-10-CM

## 2025-08-22 LAB
GLUCOSE BLDC GLUCOMTR-MCNC: 98 MG/DL — SIGNIFICANT CHANGE UP (ref 70–99)
LEGIONELLA AG UR QL: NEGATIVE — SIGNIFICANT CHANGE UP
S PNEUM AG UR QL: NEGATIVE — SIGNIFICANT CHANGE UP
VANCOMYCIN TROUGH SERPL-MCNC: 8.6 UG/ML — SIGNIFICANT CHANGE UP (ref 5–10)

## 2025-08-22 PROCEDURE — 99232 SBSQ HOSP IP/OBS MODERATE 35: CPT

## 2025-08-22 RX ORDER — DIAZEPAM 5 MG/1
2 TABLET ORAL EVERY 6 HOURS
Refills: 0 | Status: DISCONTINUED | OUTPATIENT
Start: 2025-08-23 | End: 2025-08-23

## 2025-08-22 RX ORDER — CEFEPIME 2 G/20ML
2000 INJECTION, POWDER, FOR SOLUTION INTRAVENOUS EVERY 8 HOURS
Refills: 0 | Status: DISCONTINUED | OUTPATIENT
Start: 2025-08-22 | End: 2025-08-25

## 2025-08-22 RX ORDER — HYDROXYZINE HYDROCHLORIDE 25 MG/1
50 TABLET, FILM COATED ORAL EVERY 6 HOURS
Refills: 0 | Status: DISCONTINUED | OUTPATIENT
Start: 2025-08-22 | End: 2025-08-25

## 2025-08-22 RX ORDER — SODIUM CHLORIDE 9 G/1000ML
1000 INJECTION, SOLUTION INTRAVENOUS
Refills: 0 | Status: DISCONTINUED | OUTPATIENT
Start: 2025-08-22 | End: 2025-08-25

## 2025-08-22 RX ORDER — ESCITALOPRAM OXALATE 20 MG/1
5 TABLET ORAL DAILY
Refills: 0 | Status: DISCONTINUED | OUTPATIENT
Start: 2025-08-22 | End: 2025-08-25

## 2025-08-22 RX ORDER — METHADONE HCL 10 MG
5 TABLET ORAL ONCE
Refills: 0 | Status: DISCONTINUED | OUTPATIENT
Start: 2025-08-24 | End: 2025-08-24

## 2025-08-22 RX ORDER — DIAZEPAM 5 MG/1
2 TABLET ORAL EVERY 12 HOURS
Refills: 0 | Status: DISCONTINUED | OUTPATIENT
Start: 2025-08-24 | End: 2025-08-24

## 2025-08-22 RX ORDER — VANCOMYCIN HCL IN 5 % DEXTROSE 1.5G/250ML
1000 PLASTIC BAG, INJECTION (ML) INTRAVENOUS EVERY 12 HOURS
Refills: 0 | Status: DISCONTINUED | OUTPATIENT
Start: 2025-08-22 | End: 2025-08-25

## 2025-08-22 RX ORDER — LAMOTRIGINE 150 MG/1
25 TABLET ORAL DAILY
Refills: 0 | Status: DISCONTINUED | OUTPATIENT
Start: 2025-08-22 | End: 2025-08-25

## 2025-08-22 RX ADMIN — MUPIROCIN CALCIUM 1 APPLICATION(S): 20 CREAM TOPICAL at 19:35

## 2025-08-22 RX ADMIN — SODIUM CHLORIDE 75 MILLILITER(S): 9 INJECTION, SOLUTION INTRAVENOUS at 10:26

## 2025-08-22 RX ADMIN — DIAZEPAM 5 MILLIGRAM(S): 5 TABLET ORAL at 19:35

## 2025-08-22 RX ADMIN — CEFEPIME 100 MILLIGRAM(S): 2 INJECTION, POWDER, FOR SOLUTION INTRAVENOUS at 06:26

## 2025-08-22 RX ADMIN — DIAZEPAM 5 MILLIGRAM(S): 5 TABLET ORAL at 06:27

## 2025-08-22 RX ADMIN — CEFEPIME 100 MILLIGRAM(S): 2 INJECTION, POWDER, FOR SOLUTION INTRAVENOUS at 16:15

## 2025-08-22 RX ADMIN — DIAZEPAM 5 MILLIGRAM(S): 5 TABLET ORAL at 12:39

## 2025-08-22 RX ADMIN — Medication 250 MILLIGRAM(S): at 22:50

## 2025-08-22 RX ADMIN — Medication 250 MILLIGRAM(S): at 00:16

## 2025-08-22 RX ADMIN — Medication 1 APPLICATION(S): at 06:27

## 2025-08-22 RX ADMIN — ENOXAPARIN SODIUM 40 MILLIGRAM(S): 100 INJECTION SUBCUTANEOUS at 19:36

## 2025-08-22 RX ADMIN — MUPIROCIN CALCIUM 1 APPLICATION(S): 20 CREAM TOPICAL at 06:27

## 2025-08-22 RX ADMIN — Medication 250 MILLIGRAM(S): at 10:24

## 2025-08-22 RX ADMIN — Medication 20 MILLIGRAM(S): at 12:39

## 2025-08-23 LAB — GRAM STN FLD: ABNORMAL

## 2025-08-23 PROCEDURE — 99232 SBSQ HOSP IP/OBS MODERATE 35: CPT

## 2025-08-23 RX ADMIN — MUPIROCIN CALCIUM 1 APPLICATION(S): 20 CREAM TOPICAL at 18:28

## 2025-08-23 RX ADMIN — ESCITALOPRAM OXALATE 5 MILLIGRAM(S): 20 TABLET ORAL at 11:43

## 2025-08-23 RX ADMIN — DIAZEPAM 2 MILLIGRAM(S): 5 TABLET ORAL at 11:44

## 2025-08-23 RX ADMIN — Medication 5 MILLIGRAM(S): at 18:29

## 2025-08-23 RX ADMIN — Medication 10 MILLIGRAM(S): at 12:00

## 2025-08-23 RX ADMIN — SODIUM CHLORIDE 75 MILLILITER(S): 9 INJECTION, SOLUTION INTRAVENOUS at 12:01

## 2025-08-23 RX ADMIN — LAMOTRIGINE 25 MILLIGRAM(S): 150 TABLET ORAL at 11:44

## 2025-08-23 RX ADMIN — CEFEPIME 100 MILLIGRAM(S): 2 INJECTION, POWDER, FOR SOLUTION INTRAVENOUS at 18:26

## 2025-08-23 RX ADMIN — DIAZEPAM 2 MILLIGRAM(S): 5 TABLET ORAL at 00:34

## 2025-08-23 RX ADMIN — Medication 1 APPLICATION(S): at 05:44

## 2025-08-23 RX ADMIN — Medication 250 MILLIGRAM(S): at 11:44

## 2025-08-23 RX ADMIN — CEFEPIME 100 MILLIGRAM(S): 2 INJECTION, POWDER, FOR SOLUTION INTRAVENOUS at 01:31

## 2025-08-23 RX ADMIN — DIAZEPAM 2 MILLIGRAM(S): 5 TABLET ORAL at 05:41

## 2025-08-23 RX ADMIN — MUPIROCIN CALCIUM 1 APPLICATION(S): 20 CREAM TOPICAL at 05:41

## 2025-08-23 RX ADMIN — CEFEPIME 100 MILLIGRAM(S): 2 INJECTION, POWDER, FOR SOLUTION INTRAVENOUS at 11:44

## 2025-08-23 RX ADMIN — DIAZEPAM 2 MILLIGRAM(S): 5 TABLET ORAL at 18:29

## 2025-08-24 LAB
-  AZTREONAM: SIGNIFICANT CHANGE UP
-  CEFEPIME: SIGNIFICANT CHANGE UP
-  CEFTAZIDIME: SIGNIFICANT CHANGE UP
-  CIPROFLOXACIN: SIGNIFICANT CHANGE UP
-  IMIPENEM: SIGNIFICANT CHANGE UP
-  LEVOFLOXACIN: SIGNIFICANT CHANGE UP
-  MEROPENEM: SIGNIFICANT CHANGE UP
-  PIPERACILLIN/TAZOBACTAM: SIGNIFICANT CHANGE UP
CULTURE RESULTS: ABNORMAL
METHOD TYPE: SIGNIFICANT CHANGE UP
ORGANISM # SPEC MICROSCOPIC CNT: ABNORMAL
ORGANISM # SPEC MICROSCOPIC CNT: SIGNIFICANT CHANGE UP
SPECIMEN SOURCE: SIGNIFICANT CHANGE UP

## 2025-08-24 PROCEDURE — 99232 SBSQ HOSP IP/OBS MODERATE 35: CPT

## 2025-08-24 RX ORDER — MAGNESIUM, ALUMINUM HYDROXIDE 200-200 MG
30 TABLET,CHEWABLE ORAL EVERY 6 HOURS
Refills: 0 | Status: DISCONTINUED | OUTPATIENT
Start: 2025-08-24 | End: 2025-08-25

## 2025-08-24 RX ORDER — LIDOCAINE HYDROCHLORIDE 20 MG/ML
1 JELLY TOPICAL ONCE
Refills: 0 | Status: COMPLETED | OUTPATIENT
Start: 2025-08-24 | End: 2025-08-24

## 2025-08-24 RX ORDER — METHADONE HCL 10 MG
5 TABLET ORAL ONCE
Refills: 0 | Status: DISCONTINUED | OUTPATIENT
Start: 2025-08-24 | End: 2025-08-24

## 2025-08-24 RX ORDER — ONDANSETRON HCL/PF 4 MG/2 ML
4 VIAL (ML) INJECTION ONCE
Refills: 0 | Status: COMPLETED | OUTPATIENT
Start: 2025-08-24 | End: 2025-08-24

## 2025-08-24 RX ADMIN — DIAZEPAM 2 MILLIGRAM(S): 5 TABLET ORAL at 05:50

## 2025-08-24 RX ADMIN — Medication 4 MILLIGRAM(S): at 02:31

## 2025-08-24 RX ADMIN — Medication 4 MILLIGRAM(S): at 10:51

## 2025-08-24 RX ADMIN — ENOXAPARIN SODIUM 40 MILLIGRAM(S): 100 INJECTION SUBCUTANEOUS at 17:16

## 2025-08-24 RX ADMIN — DIAZEPAM 5 MILLIGRAM(S): 5 TABLET ORAL at 10:51

## 2025-08-24 RX ADMIN — Medication 5 MILLIGRAM(S): at 01:53

## 2025-08-24 RX ADMIN — HYDROXYZINE HYDROCHLORIDE 50 MILLIGRAM(S): 25 TABLET, FILM COATED ORAL at 18:48

## 2025-08-24 RX ADMIN — MUPIROCIN CALCIUM 1 APPLICATION(S): 20 CREAM TOPICAL at 05:50

## 2025-08-24 RX ADMIN — Medication 5 MILLIGRAM(S): at 08:34

## 2025-08-24 RX ADMIN — CEFEPIME 100 MILLIGRAM(S): 2 INJECTION, POWDER, FOR SOLUTION INTRAVENOUS at 17:15

## 2025-08-24 RX ADMIN — Medication 250 MILLIGRAM(S): at 00:28

## 2025-08-24 RX ADMIN — Medication 5 MILLIGRAM(S): at 19:12

## 2025-08-24 RX ADMIN — CEFEPIME 100 MILLIGRAM(S): 2 INJECTION, POWDER, FOR SOLUTION INTRAVENOUS at 09:23

## 2025-08-24 RX ADMIN — LIDOCAINE HYDROCHLORIDE 1 PATCH: 20 JELLY TOPICAL at 10:43

## 2025-08-24 RX ADMIN — LAMOTRIGINE 25 MILLIGRAM(S): 150 TABLET ORAL at 18:51

## 2025-08-24 RX ADMIN — DIAZEPAM 2 MILLIGRAM(S): 5 TABLET ORAL at 17:15

## 2025-08-24 RX ADMIN — ESCITALOPRAM OXALATE 5 MILLIGRAM(S): 20 TABLET ORAL at 18:50

## 2025-08-24 RX ADMIN — Medication 30 MILLILITER(S): at 19:54

## 2025-08-24 RX ADMIN — MUPIROCIN CALCIUM 1 APPLICATION(S): 20 CREAM TOPICAL at 17:18

## 2025-08-24 RX ADMIN — Medication 250 MILLIGRAM(S): at 12:12

## 2025-08-24 RX ADMIN — Medication 4 MILLIGRAM(S): at 21:14

## 2025-08-24 RX ADMIN — Medication 25 MILLIGRAM(S): at 18:48

## 2025-08-24 RX ADMIN — CEFEPIME 100 MILLIGRAM(S): 2 INJECTION, POWDER, FOR SOLUTION INTRAVENOUS at 01:11

## 2025-08-24 RX ADMIN — Medication 1 APPLICATION(S): at 05:51

## 2025-08-25 ENCOUNTER — TRANSCRIPTION ENCOUNTER (OUTPATIENT)
Age: 35
End: 2025-08-25

## 2025-08-25 VITALS
OXYGEN SATURATION: 98 % | TEMPERATURE: 98 F | DIASTOLIC BLOOD PRESSURE: 70 MMHG | SYSTOLIC BLOOD PRESSURE: 106 MMHG | HEART RATE: 91 BPM | RESPIRATION RATE: 18 BRPM

## 2025-08-25 PROCEDURE — 99239 HOSP IP/OBS DSCHRG MGMT >30: CPT

## 2025-08-25 PROCEDURE — 99232 SBSQ HOSP IP/OBS MODERATE 35: CPT

## 2025-08-25 RX ORDER — CLINDAMYCIN PHOSPHATE 150 MG/ML
1 VIAL (ML) INJECTION
Qty: 63 | Refills: 0
Start: 2025-08-25 | End: 2025-09-14

## 2025-08-25 RX ORDER — CLINDAMYCIN PHOSPHATE 150 MG/ML
300 VIAL (ML) INJECTION EVERY 8 HOURS
Refills: 0 | Status: DISCONTINUED | OUTPATIENT
Start: 2025-08-25 | End: 2025-08-25

## 2025-08-25 RX ORDER — LEVOFLOXACIN 25 MG/ML
1 SOLUTION ORAL
Qty: 21 | Refills: 0
Start: 2025-08-25 | End: 2025-09-14

## 2025-08-25 RX ORDER — MUPIROCIN CALCIUM 20 MG/G
1 CREAM TOPICAL
Qty: 1 | Refills: 0
Start: 2025-08-25 | End: 2025-08-29

## 2025-08-25 RX ORDER — LEVOFLOXACIN 25 MG/ML
750 SOLUTION ORAL EVERY 24 HOURS
Refills: 0 | Status: DISCONTINUED | OUTPATIENT
Start: 2025-08-25 | End: 2025-08-25

## 2025-08-25 RX ADMIN — Medication 250 MILLIGRAM(S): at 00:52

## 2025-08-25 RX ADMIN — ESCITALOPRAM OXALATE 5 MILLIGRAM(S): 20 TABLET ORAL at 11:46

## 2025-08-25 RX ADMIN — Medication 4 MILLIGRAM(S): at 01:10

## 2025-08-25 RX ADMIN — Medication 650 MILLIGRAM(S): at 06:57

## 2025-08-25 RX ADMIN — Medication 250 MILLIGRAM(S): at 11:47

## 2025-08-25 RX ADMIN — CEFEPIME 100 MILLIGRAM(S): 2 INJECTION, POWDER, FOR SOLUTION INTRAVENOUS at 01:58

## 2025-08-25 RX ADMIN — LAMOTRIGINE 25 MILLIGRAM(S): 150 TABLET ORAL at 11:47

## 2025-08-25 RX ADMIN — Medication 0.1 MILLIGRAM(S): at 01:10

## 2025-08-25 RX ADMIN — CEFEPIME 100 MILLIGRAM(S): 2 INJECTION, POWDER, FOR SOLUTION INTRAVENOUS at 11:47

## 2025-08-31 DIAGNOSIS — G47.00 INSOMNIA, UNSPECIFIED: ICD-10-CM

## 2025-08-31 DIAGNOSIS — F19.139 OTHER PSYCHOACTIVE SUBSTANCE ABUSE WITH WITHDRAWAL, UNSPECIFIED: ICD-10-CM

## 2025-08-31 DIAGNOSIS — J18.9 PNEUMONIA, UNSPECIFIED ORGANISM: ICD-10-CM

## 2025-08-31 DIAGNOSIS — Z59.00 HOMELESSNESS UNSPECIFIED: ICD-10-CM

## 2025-08-31 DIAGNOSIS — R56.9 UNSPECIFIED CONVULSIONS: ICD-10-CM

## 2025-08-31 DIAGNOSIS — J44.9 CHRONIC OBSTRUCTIVE PULMONARY DISEASE, UNSPECIFIED: ICD-10-CM

## 2025-08-31 DIAGNOSIS — N39.0 URINARY TRACT INFECTION, SITE NOT SPECIFIED: ICD-10-CM

## 2025-08-31 DIAGNOSIS — A41.9 SEPSIS, UNSPECIFIED ORGANISM: ICD-10-CM

## 2025-08-31 DIAGNOSIS — F11.20 OPIOID DEPENDENCE, UNCOMPLICATED: ICD-10-CM

## 2025-08-31 DIAGNOSIS — E83.42 HYPOMAGNESEMIA: ICD-10-CM

## 2025-08-31 DIAGNOSIS — Z86.19 PERSONAL HISTORY OF OTHER INFECTIOUS AND PARASITIC DISEASES: ICD-10-CM

## 2025-08-31 DIAGNOSIS — D84.81 IMMUNODEFICIENCY DUE TO CONDITIONS CLASSIFIED ELSEWHERE: ICD-10-CM

## 2025-08-31 DIAGNOSIS — R60.9 EDEMA, UNSPECIFIED: ICD-10-CM

## 2025-08-31 DIAGNOSIS — J86.9 PYOTHORAX WITHOUT FISTULA: ICD-10-CM

## 2025-08-31 DIAGNOSIS — F43.22 ADJUSTMENT DISORDER WITH ANXIETY: ICD-10-CM

## 2025-08-31 DIAGNOSIS — F60.3 BORDERLINE PERSONALITY DISORDER: ICD-10-CM

## 2025-08-31 DIAGNOSIS — Z79.899 OTHER LONG TERM (CURRENT) DRUG THERAPY: ICD-10-CM

## 2025-08-31 SDOH — ECONOMIC STABILITY - HOUSING INSECURITY: HOMELESSNESS UNSPECIFIED: Z59.00
